# Patient Record
Sex: MALE | Race: WHITE | NOT HISPANIC OR LATINO | URBAN - METROPOLITAN AREA
[De-identification: names, ages, dates, MRNs, and addresses within clinical notes are randomized per-mention and may not be internally consistent; named-entity substitution may affect disease eponyms.]

---

## 2019-04-10 ENCOUNTER — EMERGENCY (EMERGENCY)
Facility: HOSPITAL | Age: 39
LOS: 1 days | Discharge: HOME | End: 2019-04-10
Admitting: EMERGENCY MEDICINE
Payer: MEDICARE

## 2019-04-10 VITALS
RESPIRATION RATE: 16 BRPM | HEART RATE: 105 BPM | OXYGEN SATURATION: 97 % | DIASTOLIC BLOOD PRESSURE: 83 MMHG | TEMPERATURE: 97 F | SYSTOLIC BLOOD PRESSURE: 140 MMHG

## 2019-04-10 VITALS — HEART RATE: 86 BPM

## 2019-04-10 PROCEDURE — 99284 EMERGENCY DEPT VISIT MOD MDM: CPT

## 2019-04-10 PROCEDURE — 93971 EXTREMITY STUDY: CPT | Mod: 26,LT

## 2019-04-10 PROCEDURE — 73564 X-RAY EXAM KNEE 4 OR MORE: CPT | Mod: 26,LT

## 2019-04-10 RX ORDER — IBUPROFEN 200 MG
600 TABLET ORAL ONCE
Qty: 0 | Refills: 0 | Status: COMPLETED | OUTPATIENT
Start: 2019-04-10 | End: 2019-04-10

## 2019-04-10 RX ADMIN — Medication 600 MILLIGRAM(S): at 19:24

## 2019-04-10 NOTE — ED PROVIDER NOTE - CLINICAL SUMMARY MEDICAL DECISION MAKING FREE TEXT BOX
37 yo M here for r/o DVT from [facility] due to L knee pain. pt c/o pain to anterior aspect of knee. no s/ sx of dvt. will obtain xr, sono, and nsaids.

## 2019-04-10 NOTE — ED PROVIDER NOTE - NSFOLLOWUPINSTRUCTIONS_ED_ALL_ED_FT
If your symptoms persist have your ultrasound repeated in 1 week  Take motrin 600mg every 6 hours for pain   Follow up with your PMD in 1-2 days  Return to ER for new or worsening symptoms

## 2019-04-10 NOTE — ED PROVIDER NOTE - OBJECTIVE STATEMENT
37 yo M here for leg pain x 3 weeks. pt reports pain to anterior aspect of L knee for the past 3 weeks. denies injury or trauma. denies other complaints. denies hx of dvt or PE. denies fever chills cp sob weakness HA dizziness numbness tingling. no meds taken.

## 2019-04-10 NOTE — ED PROVIDER NOTE - PHYSICAL EXAMINATION
L knee: FROM NVI sensate intact no erythema/ecchymosis no swelling no calf tenderness mild ttp to anterior aspect of knee.

## 2019-04-10 NOTE — ED ADULT TRIAGE NOTE - CHIEF COMPLAINT QUOTE
Pt sent from [facility] to R/O DVT.  pt c/o L knee pain x few weeks.  Denies chest pain, sob, dizziness, injury

## 2019-05-14 ENCOUNTER — APPOINTMENT (OUTPATIENT)
Dept: PULMONOLOGY | Facility: CLINIC | Age: 39
End: 2019-05-14
Payer: MEDICARE

## 2019-05-14 VITALS
HEART RATE: 94 BPM | OXYGEN SATURATION: 94 % | HEIGHT: 69 IN | WEIGHT: 315 LBS | BODY MASS INDEX: 46.65 KG/M2 | SYSTOLIC BLOOD PRESSURE: 130 MMHG | DIASTOLIC BLOOD PRESSURE: 76 MMHG

## 2019-05-14 DIAGNOSIS — Z78.9 OTHER SPECIFIED HEALTH STATUS: ICD-10-CM

## 2019-05-14 DIAGNOSIS — R05 COUGH: ICD-10-CM

## 2019-05-14 DIAGNOSIS — R06.2 WHEEZING: ICD-10-CM

## 2019-05-14 PROCEDURE — 99203 OFFICE O/P NEW LOW 30 MIN: CPT | Mod: 25

## 2019-05-14 PROCEDURE — 94727 GAS DIL/WSHOT DETER LNG VOL: CPT

## 2019-05-14 PROCEDURE — 94729 DIFFUSING CAPACITY: CPT

## 2019-05-14 PROCEDURE — 94060 EVALUATION OF WHEEZING: CPT

## 2019-05-14 NOTE — REASON FOR VISIT
[Consultation] : a consultation visit [Cough] : cough [Shortness of Breath] : shortness of Breath [Wheezing] : wheezing

## 2019-05-19 PROBLEM — R06.2 WHEEZING: Status: ACTIVE | Noted: 2019-05-19

## 2019-05-19 PROBLEM — Z78.9 NON-SMOKER: Status: ACTIVE | Noted: 2019-05-19

## 2019-05-19 PROBLEM — R05 COUGH: Status: ACTIVE | Noted: 2019-05-19

## 2019-05-19 RX ORDER — MONTELUKAST 10 MG/1
10 TABLET, FILM COATED ORAL
Refills: 0 | Status: ACTIVE | COMMUNITY
Start: 2019-05-19

## 2019-05-19 NOTE — HISTORY OF PRESENT ILLNESS
[FreeTextEntry1] : 40 yo male presents for evaluation of a 4 month history of dry cough with SOB and wheezing. He denies fever, chest pain or hemoptysis. The patient uses montelukast daily but refuses to use inhaled steroids and albuterol MDI. He has ROMMEL but is not compliant with treatment.

## 2019-05-19 NOTE — DISCUSSION/SUMMARY
[FreeTextEntry1] : 40 yo female with respiratory symptoms, likely due to airways inflammation. The patient is to continue montelukast daily and start nebulized steroids with albuterol. Treatment adjustment will depend on symptomatic needs.PFT will be re attempted in the future. If symptoms persist, a HRCT of the chest will be performed.I reviewed the chest xray images on line performed 3/15/19, which appear to be essentially normal. Diet and weight loss stressed. She is to follow up with her PMD as before.

## 2019-05-19 NOTE — PHYSICAL EXAM
[Well Groomed] : well groomed [No Deformities] : no deformities [General Appearance - In No Acute Distress] : no acute distress [Eyelids - No Xanthelasma] : the eyelids demonstrated no xanthelasmas [Normal Conjunctiva] : the conjunctiva exhibited no abnormalities [Normal Oropharynx] : normal oropharynx [Neck Appearance] : the appearance of the neck was normal [Neck Cervical Mass (___cm)] : no neck mass was observed [Jugular Venous Distention Increased] : there was no jugular-venous distention [Heart Rate And Rhythm] : heart rate and rhythm were normal [Thyroid Nodule] : there were no palpable thyroid nodules [Thyroid Diffuse Enlargement] : the thyroid was not enlarged [Heart Sounds] : normal S1 and S2 [Murmurs] : no murmurs present [Respiration, Rhythm And Depth] : normal respiratory rhythm and effort [Abdomen Soft] : soft [Auscultation Breath Sounds / Voice Sounds] : lungs were clear to auscultation bilaterally [Exaggerated Use Of Accessory Muscles For Inspiration] : no accessory muscle use [Abdomen Tenderness] : non-tender [Nail Clubbing] : no clubbing of the fingernails [Abdomen Mass (___ Cm)] : no abdominal mass palpated [Cyanosis, Localized] : no localized cyanosis [Petechial Hemorrhages (___cm)] : no petechial hemorrhages [Skin Color & Pigmentation] : normal skin color and pigmentation [Skin Turgor] : normal skin turgor [] : no rash [No Focal Deficits] : no focal deficits [Impaired Insight] : insight and judgment were intact [Oriented To Time, Place, And Person] : oriented to person, place, and time [Affect] : the affect was normal [FreeTextEntry1] : Obese

## 2019-05-19 NOTE — REVIEW OF SYSTEMS
[As Noted in HPI] : as noted in HPI [Cough] : cough [Dyspnea] : dyspnea [Wheezing] : wheezing [Negative] : Pulmonary Hypertension [Sputum] : not coughing up ~M sputum

## 2019-05-19 NOTE — CONSULT LETTER
[Dear  ___] : Dear  [unfilled], [Courtesy Letter:] : I had the pleasure of seeing your patient, [unfilled], in my office today. [Consult Closing:] : Thank you very much for allowing me to participate in the care of this patient.  If you have any questions, please do not hesitate to contact me. [Please see my note below.] : Please see my note below. [Sincerely,] : Sincerely,

## 2019-06-26 RX ORDER — ALBUTEROL SULFATE 2.5 MG/3ML
(2.5 MG/3ML) SOLUTION RESPIRATORY (INHALATION)
Qty: 90 | Refills: 3 | Status: ACTIVE | COMMUNITY
Start: 2019-06-26 | End: 1900-01-01

## 2019-08-15 ENCOUNTER — RECORD ABSTRACTING (OUTPATIENT)
Age: 39
End: 2019-08-15

## 2019-08-15 DIAGNOSIS — Z86.59 PERSONAL HISTORY OF OTHER MENTAL AND BEHAVIORAL DISORDERS: ICD-10-CM

## 2019-08-15 DIAGNOSIS — Z86.39 PERSONAL HISTORY OF OTHER ENDOCRINE, NUTRITIONAL AND METABOLIC DISEASE: ICD-10-CM

## 2019-08-19 RX ORDER — MONTELUKAST 10 MG/1
10 TABLET, FILM COATED ORAL
Qty: 90 | Refills: 3 | Status: ACTIVE | COMMUNITY
Start: 2019-08-19 | End: 1900-01-01

## 2019-08-19 RX ORDER — BUDESONIDE 0.5 MG/2ML
0.5 INHALANT ORAL TWICE DAILY
Qty: 120 | Refills: 3 | Status: ACTIVE | COMMUNITY
Start: 2019-08-19 | End: 1900-01-01

## 2019-09-25 RX ORDER — BUDESONIDE 0.5 MG/2ML
0.5 INHALANT ORAL
Qty: 180 | Refills: 3 | Status: ACTIVE | COMMUNITY
Start: 2019-09-25 | End: 1900-01-01

## 2019-09-25 RX ORDER — ALBUTEROL SULFATE 2.5 MG/3ML
(2.5 MG/3ML) SOLUTION RESPIRATORY (INHALATION)
Qty: 180 | Refills: 3 | Status: ACTIVE | COMMUNITY
Start: 2019-09-25 | End: 1900-01-01

## 2019-10-31 ENCOUNTER — APPOINTMENT (OUTPATIENT)
Dept: PULMONOLOGY | Facility: CLINIC | Age: 39
End: 2019-10-31
Payer: MEDICARE

## 2019-10-31 VITALS
BODY MASS INDEX: 51.69 KG/M2 | WEIGHT: 315 LBS | OXYGEN SATURATION: 95 % | HEART RATE: 107 BPM | DIASTOLIC BLOOD PRESSURE: 70 MMHG | SYSTOLIC BLOOD PRESSURE: 102 MMHG

## 2019-10-31 DIAGNOSIS — R06.02 SHORTNESS OF BREATH: ICD-10-CM

## 2019-10-31 PROCEDURE — 99214 OFFICE O/P EST MOD 30 MIN: CPT | Mod: 25

## 2019-10-31 PROCEDURE — 94060 EVALUATION OF WHEEZING: CPT

## 2019-10-31 PROCEDURE — 94727 GAS DIL/WSHOT DETER LNG VOL: CPT

## 2019-10-31 PROCEDURE — 94729 DIFFUSING CAPACITY: CPT

## 2019-10-31 RX ORDER — ALBUTEROL SULFATE 2.5 MG/3ML
(2.5 MG/3ML) SOLUTION RESPIRATORY (INHALATION) 4 TIMES DAILY
Qty: 3 | Refills: 3 | Status: ACTIVE | COMMUNITY
Start: 2019-08-19 | End: 1900-01-01

## 2019-10-31 RX ORDER — BUDESONIDE 0.5 MG/2ML
0.5 INHALANT ORAL TWICE DAILY
Qty: 2 | Refills: 3 | Status: ACTIVE | COMMUNITY
Start: 2019-06-26 | End: 1900-01-01

## 2019-11-03 PROBLEM — R06.02 SOB (SHORTNESS OF BREATH): Status: ACTIVE | Noted: 2019-05-19

## 2019-11-03 NOTE — PHYSICAL EXAM
[Well Groomed] : well groomed [General Appearance - In No Acute Distress] : no acute distress [FreeTextEntry1] : Obese [Normal Conjunctiva] : the conjunctiva exhibited no abnormalities [Eyelids - No Xanthelasma] : the eyelids demonstrated no xanthelasmas [Normal Oropharynx] : normal oropharynx [Neck Appearance] : the appearance of the neck was normal [Neck Cervical Mass (___cm)] : no neck mass was observed [Jugular Venous Distention Increased] : there was no jugular-venous distention [Thyroid Diffuse Enlargement] : the thyroid was not enlarged [Thyroid Nodule] : there were no palpable thyroid nodules [Heart Rate And Rhythm] : heart rate and rhythm were normal [Heart Sounds] : normal S1 and S2 [Murmurs] : no murmurs present [Respiration, Rhythm And Depth] : normal respiratory rhythm and effort [Exaggerated Use Of Accessory Muscles For Inspiration] : no accessory muscle use [Auscultation Breath Sounds / Voice Sounds] : lungs were clear to auscultation bilaterally [Abdomen Soft] : soft [Abdomen Tenderness] : non-tender [Abdomen Mass (___ Cm)] : no abdominal mass palpated [Nail Clubbing] : no clubbing of the fingernails [Cyanosis, Localized] : no localized cyanosis [Petechial Hemorrhages (___cm)] : no petechial hemorrhages [Skin Color & Pigmentation] : normal skin color and pigmentation [] : no rash [No Venous Stasis] : no venous stasis [Skin Lesions] : no skin lesions [No Skin Ulcers] : no skin ulcer [No Xanthoma] : no  xanthoma was observed [No Focal Deficits] : no focal deficits [Oriented To Time, Place, And Person] : oriented to person, place, and time

## 2019-11-03 NOTE — REVIEW OF SYSTEMS
[As Noted in HPI] : as noted in HPI [Cough] : no cough [Sputum] : not coughing up ~M sputum [Dyspnea] : no dyspnea [Wheezing] : wheezing [Negative] : Sleep Disorder

## 2019-11-03 NOTE — DISCUSSION/SUMMARY
[FreeTextEntry1] : 40 yo female with OAD and ROMMEL at baseline. Use of nebulized steroids and bronchodilators as before. Treatment adjustment will depend on symptomatic needs. Use of CPAP stressed. Diet and weight loss discussed. She is to follow up with her PMD as before.

## 2020-04-04 ENCOUNTER — INPATIENT (INPATIENT)
Facility: HOSPITAL | Age: 40
LOS: 12 days | Discharge: PSYCHIATRIC OR SUBSTANCE USE FACILITY | End: 2020-04-17
Attending: HOSPITALIST | Admitting: HOSPITALIST
Payer: MEDICARE

## 2020-04-04 VITALS
HEART RATE: 112 BPM | DIASTOLIC BLOOD PRESSURE: 45 MMHG | SYSTOLIC BLOOD PRESSURE: 87 MMHG | TEMPERATURE: 103 F | OXYGEN SATURATION: 93 % | RESPIRATION RATE: 26 BRPM

## 2020-04-04 LAB
ALBUMIN SERPL ELPH-MCNC: 3.7 G/DL — SIGNIFICANT CHANGE UP (ref 3.3–5)
ALP SERPL-CCNC: 48 U/L — SIGNIFICANT CHANGE UP (ref 40–120)
ALT FLD-CCNC: 29 U/L — SIGNIFICANT CHANGE UP (ref 4–41)
ANION GAP SERPL CALC-SCNC: 18 MMO/L — HIGH (ref 7–14)
AST SERPL-CCNC: 52 U/L — HIGH (ref 4–40)
BASE EXCESS BLDV CALC-SCNC: 1.1 MMOL/L — SIGNIFICANT CHANGE UP
BASOPHILS # BLD AUTO: 0.03 K/UL — SIGNIFICANT CHANGE UP (ref 0–0.2)
BASOPHILS NFR BLD AUTO: 0.3 % — SIGNIFICANT CHANGE UP (ref 0–2)
BILIRUB SERPL-MCNC: 0.6 MG/DL — SIGNIFICANT CHANGE UP (ref 0.2–1.2)
BLOOD GAS VENOUS - CREATININE: 1.89 MG/DL — HIGH (ref 0.5–1.3)
BUN SERPL-MCNC: 39 MG/DL — HIGH (ref 7–23)
CALCIUM SERPL-MCNC: 8.7 MG/DL — SIGNIFICANT CHANGE UP (ref 8.4–10.5)
CHLORIDE BLDV-SCNC: 103 MMOL/L — SIGNIFICANT CHANGE UP (ref 96–108)
CHLORIDE SERPL-SCNC: 99 MMOL/L — SIGNIFICANT CHANGE UP (ref 98–107)
CO2 SERPL-SCNC: 23 MMOL/L — SIGNIFICANT CHANGE UP (ref 22–31)
CREAT SERPL-MCNC: 1.93 MG/DL — HIGH (ref 0.5–1.3)
CRP SERPL-MCNC: 308.7 MG/L — HIGH
EOSINOPHIL # BLD AUTO: 0.01 K/UL — SIGNIFICANT CHANGE UP (ref 0–0.5)
EOSINOPHIL NFR BLD AUTO: 0.1 % — SIGNIFICANT CHANGE UP (ref 0–6)
GAS PNL BLDV: 136 MMOL/L — SIGNIFICANT CHANGE UP (ref 136–146)
GLUCOSE BLDV-MCNC: 112 MG/DL — HIGH (ref 70–99)
GLUCOSE SERPL-MCNC: 107 MG/DL — HIGH (ref 70–99)
HCO3 BLDV-SCNC: 25 MMOL/L — SIGNIFICANT CHANGE UP (ref 20–27)
HCT VFR BLD CALC: 36.7 % — LOW (ref 39–50)
HCT VFR BLDV CALC: 38.1 % — LOW (ref 39–51)
HGB BLD-MCNC: 12.1 G/DL — LOW (ref 13–17)
HGB BLDV-MCNC: 12.4 G/DL — LOW (ref 13–17)
IMM GRANULOCYTES NFR BLD AUTO: 0.7 % — SIGNIFICANT CHANGE UP (ref 0–1.5)
LACTATE BLDV-MCNC: 1.9 MMOL/L — SIGNIFICANT CHANGE UP (ref 0.5–2)
LYMPHOCYTES # BLD AUTO: 2.87 K/UL — SIGNIFICANT CHANGE UP (ref 1–3.3)
LYMPHOCYTES # BLD AUTO: 24.6 % — SIGNIFICANT CHANGE UP (ref 13–44)
MCHC RBC-ENTMCNC: 28.4 PG — SIGNIFICANT CHANGE UP (ref 27–34)
MCHC RBC-ENTMCNC: 33 % — SIGNIFICANT CHANGE UP (ref 32–36)
MCV RBC AUTO: 86.2 FL — SIGNIFICANT CHANGE UP (ref 80–100)
MONOCYTES # BLD AUTO: 0.49 K/UL — SIGNIFICANT CHANGE UP (ref 0–0.9)
MONOCYTES NFR BLD AUTO: 4.2 % — SIGNIFICANT CHANGE UP (ref 2–14)
NEUTROPHILS # BLD AUTO: 8.18 K/UL — HIGH (ref 1.8–7.4)
NEUTROPHILS NFR BLD AUTO: 70.1 % — SIGNIFICANT CHANGE UP (ref 43–77)
NRBC # FLD: 0 K/UL — SIGNIFICANT CHANGE UP (ref 0–0)
NT-PROBNP SERPL-SCNC: 583.4 PG/ML — SIGNIFICANT CHANGE UP
PCO2 BLDV: 41 MMHG — SIGNIFICANT CHANGE UP (ref 41–51)
PH BLDV: 7.41 PH — SIGNIFICANT CHANGE UP (ref 7.32–7.43)
PLATELET # BLD AUTO: 276 K/UL — SIGNIFICANT CHANGE UP (ref 150–400)
PMV BLD: 11 FL — SIGNIFICANT CHANGE UP (ref 7–13)
PO2 BLDV: 55 MMHG — HIGH (ref 35–40)
POTASSIUM BLDV-SCNC: 3.4 MMOL/L — SIGNIFICANT CHANGE UP (ref 3.4–4.5)
POTASSIUM SERPL-MCNC: 3.4 MMOL/L — LOW (ref 3.5–5.3)
POTASSIUM SERPL-SCNC: 3.4 MMOL/L — LOW (ref 3.5–5.3)
PROT SERPL-MCNC: 7.9 G/DL — SIGNIFICANT CHANGE UP (ref 6–8.3)
RBC # BLD: 4.26 M/UL — SIGNIFICANT CHANGE UP (ref 4.2–5.8)
RBC # FLD: 14.9 % — HIGH (ref 10.3–14.5)
SAO2 % BLDV: 86.7 % — HIGH (ref 60–85)
SODIUM SERPL-SCNC: 140 MMOL/L — SIGNIFICANT CHANGE UP (ref 135–145)
TROPONIN T, HIGH SENSITIVITY: 48 NG/L — SIGNIFICANT CHANGE UP (ref ?–14)
WBC # BLD: 11.66 K/UL — HIGH (ref 3.8–10.5)
WBC # FLD AUTO: 11.66 K/UL — HIGH (ref 3.8–10.5)

## 2020-04-04 PROCEDURE — 71045 X-RAY EXAM CHEST 1 VIEW: CPT | Mod: 26

## 2020-04-04 RX ORDER — ALBUTEROL 90 UG/1
2 AEROSOL, METERED ORAL ONCE
Refills: 0 | Status: COMPLETED | OUTPATIENT
Start: 2020-04-04 | End: 2020-04-04

## 2020-04-04 RX ORDER — SODIUM CHLORIDE 9 MG/ML
500 INJECTION INTRAMUSCULAR; INTRAVENOUS; SUBCUTANEOUS ONCE
Refills: 0 | Status: COMPLETED | OUTPATIENT
Start: 2020-04-04 | End: 2020-04-04

## 2020-04-04 RX ORDER — TIOTROPIUM BROMIDE 18 UG/1
1 CAPSULE ORAL; RESPIRATORY (INHALATION) ONCE
Refills: 0 | Status: COMPLETED | OUTPATIENT
Start: 2020-04-04 | End: 2020-04-04

## 2020-04-04 RX ORDER — ACETAMINOPHEN 500 MG
975 TABLET ORAL ONCE
Refills: 0 | Status: COMPLETED | OUTPATIENT
Start: 2020-04-04 | End: 2020-04-04

## 2020-04-04 RX ORDER — ALBUTEROL 90 UG/1
4 AEROSOL, METERED ORAL ONCE
Refills: 0 | Status: COMPLETED | OUTPATIENT
Start: 2020-04-04 | End: 2020-04-04

## 2020-04-04 RX ADMIN — Medication 975 MILLIGRAM(S): at 20:16

## 2020-04-04 RX ADMIN — TIOTROPIUM BROMIDE 1 CAPSULE(S): 18 CAPSULE ORAL; RESPIRATORY (INHALATION) at 20:16

## 2020-04-04 RX ADMIN — Medication 125 MILLIGRAM(S): at 20:24

## 2020-04-04 RX ADMIN — ALBUTEROL 2 PUFF(S): 90 AEROSOL, METERED ORAL at 20:37

## 2020-04-04 RX ADMIN — ALBUTEROL 4 PUFF(S): 90 AEROSOL, METERED ORAL at 20:16

## 2020-04-04 RX ADMIN — SODIUM CHLORIDE 500 MILLILITER(S): 9 INJECTION INTRAMUSCULAR; INTRAVENOUS; SUBCUTANEOUS at 20:25

## 2020-04-04 NOTE — ED PROVIDER NOTE - ATTENDING CONTRIBUTION TO CARE
Kelly: 38yo male with a h/o schizophrenia BIBEMS from [facility] for one week of cough, fever and SOB worse today. PT is a poor historian and denies any symptoms but is ill appearing and hypoxic on arrival. Exam: GENERAL: ill appearing, NAD, HEENT: MMM, PERRLA, CARDIO: +S1/S2, no murmurs, rubs or gallops, LUNGS: + tachypnea and accessory muscle use, no retractions, speaking in short sentences, decreased air movement b/l ABD: soft, nontender, BSx4 quadrants, no guarding or rigidity. EXT: No LE edema or calf TTP, 2+ distal pulses x 4 extremities. NEURO: AxOx3,PSYCH: flat affect, no SI/HI SKIN: no rashes or lesions, well perfused A/P- 38 yo male with likely COVID and hypoxia. pt ill appearing but O2 sat improved on NRB. PT hypotensive so small amount of IVF ordered. will obtain labs, CXR, EKG and admit.

## 2020-04-04 NOTE — ED PROVIDER NOTE - PHYSICAL EXAMINATION
General: A&Ox3, well nourished, obese, diaphoretic, in distress  HENT: NC/AT. Posterior oropharynx clear. Patent airway  Eyes: PERRL, EOMI  CV: RRR, no m/r/g. 2+ peripheral pulses. Extremities are warm and well perfused.  Respiratory: Tachypneic, accessory muscle usage, in respiratory distress.  Abdominal: soft, non-distended, non-tender, no rebound, guarding, or rigidity  Neuro: No focal deficits  Skin: no rashes  Psych: normal mood and affect

## 2020-04-04 NOTE — ED PROVIDER NOTE - CLINICAL SUMMARY MEDICAL DECISION MAKING FREE TEXT BOX
Jonathan Weil, PGY3 - strong suspicion for COVID-19 pneumonia or other viral vs bacterial respiratory tract infection, w/ significant risk for deterioration given hx asthma and morbid obesity. Clearly labored, requiring NRB to maintain sats. Giving albuterol, tiotropium, steroids, small fluid bolus for hypotension, and continue supplemental O2 w/ close monitoring.

## 2020-04-04 NOTE — ED PROVIDER NOTE - OBJECTIVE STATEMENT
39M hx schizophrenia/asthma BIBEMS from [facility] for dyspnea. He has had about 1 week of shortness of breath with cough and now fevers, but his dyspnea has worsened today. EMS found him hypoxic to the 70s, improved to 90s on NRB, febrile to 103.

## 2020-04-04 NOTE — ED ADULT TRIAGE NOTE - CHIEF COMPLAINT QUOTE
Pt from [facility]. Sent for fever. Pt with shallow respirations, hypotensive and febrile.

## 2020-04-04 NOTE — ED ADULT NURSE REASSESSMENT NOTE - NS ED NURSE REASSESS COMMENT FT1
Patient presents alert and awake poor historian with SOB, hypoxia and febrile from [facility]. Patient is diaphoretic and tachypneic on exam. IV placed in LT wrist #[id number], RT hand #20g. Labs sent. On monitor will ctm.

## 2020-04-05 DIAGNOSIS — Z02.9 ENCOUNTER FOR ADMINISTRATIVE EXAMINATIONS, UNSPECIFIED: ICD-10-CM

## 2020-04-05 DIAGNOSIS — A41.9 SEPSIS, UNSPECIFIED ORGANISM: ICD-10-CM

## 2020-04-05 DIAGNOSIS — F20.9 SCHIZOPHRENIA, UNSPECIFIED: ICD-10-CM

## 2020-04-05 DIAGNOSIS — N17.9 ACUTE KIDNEY FAILURE, UNSPECIFIED: ICD-10-CM

## 2020-04-05 DIAGNOSIS — R74.0 NONSPECIFIC ELEVATION OF LEVELS OF TRANSAMINASE AND LACTIC ACID DEHYDROGENASE [LDH]: ICD-10-CM

## 2020-04-05 DIAGNOSIS — I10 ESSENTIAL (PRIMARY) HYPERTENSION: ICD-10-CM

## 2020-04-05 DIAGNOSIS — J96.01 ACUTE RESPIRATORY FAILURE WITH HYPOXIA: ICD-10-CM

## 2020-04-05 DIAGNOSIS — Z29.9 ENCOUNTER FOR PROPHYLACTIC MEASURES, UNSPECIFIED: ICD-10-CM

## 2020-04-05 DIAGNOSIS — E11.9 TYPE 2 DIABETES MELLITUS WITHOUT COMPLICATIONS: ICD-10-CM

## 2020-04-05 LAB
ALBUMIN SERPL ELPH-MCNC: 3.8 G/DL — SIGNIFICANT CHANGE UP (ref 3.3–5)
ALP SERPL-CCNC: 50 U/L — SIGNIFICANT CHANGE UP (ref 40–120)
ALT FLD-CCNC: 24 U/L — SIGNIFICANT CHANGE UP (ref 4–41)
ANION GAP SERPL CALC-SCNC: 17 MMO/L — HIGH (ref 7–14)
APTT BLD: 35.2 SEC — SIGNIFICANT CHANGE UP (ref 27.5–36.3)
AST SERPL-CCNC: 47 U/L — HIGH (ref 4–40)
BASE EXCESS BLDV CALC-SCNC: -0.3 MMOL/L — SIGNIFICANT CHANGE UP
BILIRUB SERPL-MCNC: 0.4 MG/DL — SIGNIFICANT CHANGE UP (ref 0.2–1.2)
BLOOD GAS VENOUS - CREATININE: SIGNIFICANT CHANGE UP MG/DL (ref 0.5–1.3)
BUN SERPL-MCNC: 43 MG/DL — HIGH (ref 7–23)
CALCIUM SERPL-MCNC: 8.7 MG/DL — SIGNIFICANT CHANGE UP (ref 8.4–10.5)
CHLORIDE BLDV-SCNC: 108 MMOL/L — SIGNIFICANT CHANGE UP (ref 96–108)
CHLORIDE SERPL-SCNC: 100 MMOL/L — SIGNIFICANT CHANGE UP (ref 98–107)
CK SERPL-CCNC: 883 U/L — HIGH (ref 30–200)
CO2 SERPL-SCNC: 24 MMOL/L — SIGNIFICANT CHANGE UP (ref 22–31)
CREAT SERPL-MCNC: 1.56 MG/DL — HIGH (ref 0.5–1.3)
D DIMER BLD IA.RAPID-MCNC: 346 NG/ML — SIGNIFICANT CHANGE UP
FERRITIN SERPL-MCNC: 438.8 NG/ML — HIGH (ref 30–400)
GAS PNL BLDV: 139 MMOL/L — SIGNIFICANT CHANGE UP (ref 136–146)
GLUCOSE BLDV-MCNC: 183 MG/DL — HIGH (ref 70–99)
GLUCOSE SERPL-MCNC: 177 MG/DL — HIGH (ref 70–99)
HCO3 BLDV-SCNC: 23 MMOL/L — SIGNIFICANT CHANGE UP (ref 20–27)
HCT VFR BLD CALC: 38.1 % — LOW (ref 39–50)
HCT VFR BLDV CALC: 37.8 % — LOW (ref 39–51)
HGB BLD-MCNC: 12.1 G/DL — LOW (ref 13–17)
HGB BLDV-MCNC: 12.3 G/DL — LOW (ref 13–17)
INR BLD: 1.29 — HIGH (ref 0.88–1.17)
LACTATE BLDV-MCNC: 1.3 MMOL/L — SIGNIFICANT CHANGE UP (ref 0.5–2)
LDH SERPL L TO P-CCNC: 616 U/L — HIGH (ref 135–225)
MAGNESIUM SERPL-MCNC: 2.1 MG/DL — SIGNIFICANT CHANGE UP (ref 1.6–2.6)
MCHC RBC-ENTMCNC: 28.2 PG — SIGNIFICANT CHANGE UP (ref 27–34)
MCHC RBC-ENTMCNC: 31.8 % — LOW (ref 32–36)
MCV RBC AUTO: 88.8 FL — SIGNIFICANT CHANGE UP (ref 80–100)
NRBC # FLD: 0 K/UL — SIGNIFICANT CHANGE UP (ref 0–0)
PCO2 BLDV: 60 MMHG — HIGH (ref 41–51)
PH BLDV: 7.26 PH — LOW (ref 7.32–7.43)
PHOSPHATE SERPL-MCNC: 4.3 MG/DL — SIGNIFICANT CHANGE UP (ref 2.5–4.5)
PLATELET # BLD AUTO: 284 K/UL — SIGNIFICANT CHANGE UP (ref 150–400)
PMV BLD: 11.3 FL — SIGNIFICANT CHANGE UP (ref 7–13)
PO2 BLDV: 72 MMHG — HIGH (ref 35–40)
POTASSIUM BLDV-SCNC: 3.8 MMOL/L — SIGNIFICANT CHANGE UP (ref 3.4–4.5)
POTASSIUM SERPL-MCNC: 4 MMOL/L — SIGNIFICANT CHANGE UP (ref 3.5–5.3)
POTASSIUM SERPL-SCNC: 4 MMOL/L — SIGNIFICANT CHANGE UP (ref 3.5–5.3)
PROCALCITONIN SERPL-MCNC: 0.28 NG/ML — HIGH (ref 0.02–0.1)
PROT SERPL-MCNC: 8.1 G/DL — SIGNIFICANT CHANGE UP (ref 6–8.3)
PROTHROM AB SERPL-ACNC: 14.9 SEC — HIGH (ref 9.8–13.1)
RBC # BLD: 4.29 M/UL — SIGNIFICANT CHANGE UP (ref 4.2–5.8)
RBC # FLD: 15 % — HIGH (ref 10.3–14.5)
SAO2 % BLDV: 91.3 % — HIGH (ref 60–85)
SARS-COV-2 RNA SPEC QL NAA+PROBE: DETECTED
SODIUM SERPL-SCNC: 141 MMOL/L — SIGNIFICANT CHANGE UP (ref 135–145)
TROPONIN T, HIGH SENSITIVITY: 25 NG/L — SIGNIFICANT CHANGE UP (ref ?–14)
WBC # BLD: 13.63 K/UL — HIGH (ref 3.8–10.5)
WBC # FLD AUTO: 13.63 K/UL — HIGH (ref 3.8–10.5)

## 2020-04-05 PROCEDURE — 12345: CPT | Mod: NC

## 2020-04-05 PROCEDURE — 99233 SBSQ HOSP IP/OBS HIGH 50: CPT | Mod: CS

## 2020-04-05 RX ORDER — CLOZAPINE 150 MG/1
50 TABLET, ORALLY DISINTEGRATING ORAL AT BEDTIME
Refills: 0 | Status: DISCONTINUED | OUTPATIENT
Start: 2020-04-05 | End: 2020-04-17

## 2020-04-05 RX ORDER — POTASSIUM CHLORIDE 20 MEQ
20 PACKET (EA) ORAL ONCE
Refills: 0 | Status: DISCONTINUED | OUTPATIENT
Start: 2020-04-05 | End: 2020-04-05

## 2020-04-05 RX ORDER — INSULIN LISPRO 100/ML
VIAL (ML) SUBCUTANEOUS
Refills: 0 | Status: DISCONTINUED | OUTPATIENT
Start: 2020-04-05 | End: 2020-04-17

## 2020-04-05 RX ORDER — HEPARIN SODIUM 5000 [USP'U]/ML
5000 INJECTION INTRAVENOUS; SUBCUTANEOUS EVERY 8 HOURS
Refills: 0 | Status: DISCONTINUED | OUTPATIENT
Start: 2020-04-05 | End: 2020-04-09

## 2020-04-05 RX ORDER — ALBUTEROL 90 UG/1
2 AEROSOL, METERED ORAL EVERY 4 HOURS
Refills: 0 | Status: DISCONTINUED | OUTPATIENT
Start: 2020-04-05 | End: 2020-04-17

## 2020-04-05 RX ORDER — GLUCAGON INJECTION, SOLUTION 0.5 MG/.1ML
1 INJECTION, SOLUTION SUBCUTANEOUS ONCE
Refills: 0 | Status: DISCONTINUED | OUTPATIENT
Start: 2020-04-05 | End: 2020-04-17

## 2020-04-05 RX ORDER — ACETAMINOPHEN 500 MG
650 TABLET ORAL EVERY 4 HOURS
Refills: 0 | Status: DISCONTINUED | OUTPATIENT
Start: 2020-04-05 | End: 2020-04-17

## 2020-04-05 RX ORDER — DEXTROSE 50 % IN WATER 50 %
25 SYRINGE (ML) INTRAVENOUS ONCE
Refills: 0 | Status: DISCONTINUED | OUTPATIENT
Start: 2020-04-05 | End: 2020-04-17

## 2020-04-05 RX ORDER — DEXTROSE 50 % IN WATER 50 %
12.5 SYRINGE (ML) INTRAVENOUS ONCE
Refills: 0 | Status: DISCONTINUED | OUTPATIENT
Start: 2020-04-05 | End: 2020-04-17

## 2020-04-05 RX ORDER — SODIUM CHLORIDE 9 MG/ML
1000 INJECTION INTRAMUSCULAR; INTRAVENOUS; SUBCUTANEOUS
Refills: 0 | Status: COMPLETED | OUTPATIENT
Start: 2020-04-05 | End: 2020-04-05

## 2020-04-05 RX ORDER — SODIUM CHLORIDE 9 MG/ML
500 INJECTION INTRAMUSCULAR; INTRAVENOUS; SUBCUTANEOUS ONCE
Refills: 0 | Status: DISCONTINUED | OUTPATIENT
Start: 2020-04-05 | End: 2020-04-05

## 2020-04-05 RX ORDER — BENZTROPINE MESYLATE 1 MG
0.5 TABLET ORAL
Refills: 0 | Status: DISCONTINUED | OUTPATIENT
Start: 2020-04-05 | End: 2020-04-17

## 2020-04-05 RX ORDER — SERTRALINE 25 MG/1
100 TABLET, FILM COATED ORAL DAILY
Refills: 0 | Status: DISCONTINUED | OUTPATIENT
Start: 2020-04-05 | End: 2020-04-17

## 2020-04-05 RX ORDER — DEXTROSE 50 % IN WATER 50 %
15 SYRINGE (ML) INTRAVENOUS ONCE
Refills: 0 | Status: DISCONTINUED | OUTPATIENT
Start: 2020-04-05 | End: 2020-04-17

## 2020-04-05 RX ORDER — HYDROXYCHLOROQUINE SULFATE 200 MG
400 TABLET ORAL EVERY 12 HOURS
Refills: 0 | Status: COMPLETED | OUTPATIENT
Start: 2020-04-05 | End: 2020-04-05

## 2020-04-05 RX ORDER — INSULIN LISPRO 100/ML
VIAL (ML) SUBCUTANEOUS AT BEDTIME
Refills: 0 | Status: DISCONTINUED | OUTPATIENT
Start: 2020-04-05 | End: 2020-04-17

## 2020-04-05 RX ORDER — SENNA PLUS 8.6 MG/1
2 TABLET ORAL AT BEDTIME
Refills: 0 | Status: DISCONTINUED | OUTPATIENT
Start: 2020-04-05 | End: 2020-04-05

## 2020-04-05 RX ORDER — HYDROXYCHLOROQUINE SULFATE 200 MG
TABLET ORAL
Refills: 0 | Status: COMPLETED | OUTPATIENT
Start: 2020-04-05 | End: 2020-04-09

## 2020-04-05 RX ORDER — ZINC SULFATE TAB 220 MG (50 MG ZINC EQUIVALENT) 220 (50 ZN) MG
220 TAB ORAL DAILY
Refills: 0 | Status: DISCONTINUED | OUTPATIENT
Start: 2020-04-05 | End: 2020-04-17

## 2020-04-05 RX ORDER — HYDROXYCHLOROQUINE SULFATE 200 MG
200 TABLET ORAL EVERY 12 HOURS
Refills: 0 | Status: COMPLETED | OUTPATIENT
Start: 2020-04-06 | End: 2020-04-09

## 2020-04-05 RX ORDER — ATORVASTATIN CALCIUM 80 MG/1
10 TABLET, FILM COATED ORAL AT BEDTIME
Refills: 0 | Status: DISCONTINUED | OUTPATIENT
Start: 2020-04-05 | End: 2020-04-17

## 2020-04-05 RX ORDER — SODIUM CHLORIDE 9 MG/ML
1000 INJECTION, SOLUTION INTRAVENOUS
Refills: 0 | Status: DISCONTINUED | OUTPATIENT
Start: 2020-04-05 | End: 2020-04-17

## 2020-04-05 RX ORDER — PANTOPRAZOLE SODIUM 20 MG/1
40 TABLET, DELAYED RELEASE ORAL
Refills: 0 | Status: DISCONTINUED | OUTPATIENT
Start: 2020-04-05 | End: 2020-04-17

## 2020-04-05 RX ADMIN — SODIUM CHLORIDE 100 MILLILITER(S): 9 INJECTION INTRAMUSCULAR; INTRAVENOUS; SUBCUTANEOUS at 13:51

## 2020-04-05 RX ADMIN — Medication 400 MILLIGRAM(S): at 17:45

## 2020-04-05 RX ADMIN — HEPARIN SODIUM 5000 UNIT(S): 5000 INJECTION INTRAVENOUS; SUBCUTANEOUS at 23:23

## 2020-04-05 RX ADMIN — Medication 0.5 MILLIGRAM(S): at 18:53

## 2020-04-05 RX ADMIN — HEPARIN SODIUM 5000 UNIT(S): 5000 INJECTION INTRAVENOUS; SUBCUTANEOUS at 07:27

## 2020-04-05 RX ADMIN — ATORVASTATIN CALCIUM 10 MILLIGRAM(S): 80 TABLET, FILM COATED ORAL at 23:23

## 2020-04-05 RX ADMIN — SODIUM CHLORIDE 100 MILLILITER(S): 9 INJECTION INTRAMUSCULAR; INTRAVENOUS; SUBCUTANEOUS at 06:34

## 2020-04-05 RX ADMIN — Medication 0.5 MILLIGRAM(S): at 07:27

## 2020-04-05 RX ADMIN — CLOZAPINE 50 MILLIGRAM(S): 150 TABLET, ORALLY DISINTEGRATING ORAL at 23:21

## 2020-04-05 RX ADMIN — Medication 400 MILLIGRAM(S): at 13:12

## 2020-04-05 RX ADMIN — HEPARIN SODIUM 5000 UNIT(S): 5000 INJECTION INTRAVENOUS; SUBCUTANEOUS at 17:46

## 2020-04-05 RX ADMIN — Medication 1: at 17:46

## 2020-04-05 RX ADMIN — Medication 1: at 13:18

## 2020-04-05 NOTE — PROGRESS NOTE ADULT - PROBLEM SELECTOR PLAN 7
-f/u hba1c  - SSI and FS qac/hs, patient started on steroids may need to be started on standing premeal and bedtime if steroid induced hyperglycemia

## 2020-04-05 NOTE — PROGRESS NOTE ADULT - SUBJECTIVE AND OBJECTIVE BOX
Patient is a 39y old  Male who presents with a chief complaint of     INTERVAL HPI/OVERNIGHT EVENTS: overnight events noted, pt currently without complaints    MEDICATIONS  (STANDING):  atorvastatin 10 milliGRAM(s) Oral at bedtime  benztropine 0.5 milliGRAM(s) Oral two times a day  cloZAPine 50 milliGRAM(s) Oral at bedtime  dextrose 5%. 1000 milliLiter(s) (50 mL/Hr) IV Continuous <Continuous>  dextrose 50% Injectable 12.5 Gram(s) IV Push once  dextrose 50% Injectable 25 Gram(s) IV Push once  dextrose 50% Injectable 25 Gram(s) IV Push once  heparin  Injectable 5000 Unit(s) SubCutaneous every 8 hours  hydroxychloroquine   Oral   hydroxychloroquine 400 milliGRAM(s) Oral every 12 hours  insulin lispro (HumaLOG) corrective regimen sliding scale   SubCutaneous three times a day before meals  insulin lispro (HumaLOG) corrective regimen sliding scale   SubCutaneous at bedtime  sertraline 100 milliGRAM(s) Oral daily  zinc sulfate 220 milliGRAM(s) Oral daily    MEDICATIONS  (PRN):  acetaminophen   Tablet .. 650 milliGRAM(s) Oral every 4 hours PRN Temp greater or equal to 38.5C (101.3F)  ALBUTerol    90 MICROgram(s) HFA Inhaler 2 Puff(s) Inhalation every 4 hours PRN Shortness of Breath and/or Wheezing  benzonatate 100 milliGRAM(s) Oral three times a day PRN Cough  dextrose 40% Gel 15 Gram(s) Oral once PRN Blood Glucose LESS THAN 70 milliGRAM(s)/deciliter  glucagon  Injectable 1 milliGRAM(s) IntraMuscular once PRN Glucose LESS THAN 70 milligrams/deciliter      Allergies    No Known Allergies    Intolerances        REVIEW OF SYSTEMS:  Please see interval HPI:    Vital Signs Last 24 Hrs  T(C): 36.7 (05 Apr 2020 08:30), Max: 39.6 (04 Apr 2020 20:17)  T(F): 98 (05 Apr 2020 08:30), Max: 103.2 (04 Apr 2020 20:17)  HR: 80 (05 Apr 2020 08:30) (80 - 114)  BP: 130/65 (05 Apr 2020 08:30) (87/45 - 130/65)  BP(mean): --  RR: 24 (05 Apr 2020 08:30) (24 - 30)  SpO2: 94% (05 Apr 2020 08:30) (93% - 94%)  I&O's Detail        PHYSICAL EXAM:  GENERAL: NAD  HEAD:  NCAT  EYES: aniteric  NERVOUS SYSTEM:  follows commands  CHEST/LUNG: no accessory muscle use, speaking in full sentences  ABDOMEN: obese, distended, non-tender  EXTREMITIES:  no CCE      LABS:                        12.1   13.63 )-----------( 284      ( 05 Apr 2020 05:30 )             38.1     05 Apr 2020 05:30    141    |  100    |  43     ----------------------------<  177    4.0     |  24     |  1.56     Ca    8.7        05 Apr 2020 05:30  Phos  4.3       05 Apr 2020 05:30  Mg     2.1       05 Apr 2020 05:30    TPro  8.1    /  Alb  3.8    /  TBili  0.4    /  DBili  x      /  AST  47     /  ALT  24     /  AlkPhos  50     05 Apr 2020 05:30    PT/INR - ( 05 Apr 2020 05:30 )   PT: 14.9 SEC;   INR: 1.29          PTT - ( 05 Apr 2020 05:30 )  PTT:35.2 SEC  CAPILLARY BLOOD GLUCOSE      POCT Blood Glucose.: 163 mg/dL (05 Apr 2020 13:17)  POCT Blood Glucose.: 172 mg/dL (05 Apr 2020 07:34)  POCT Blood Glucose.: 170 mg/dL (05 Apr 2020 05:53)    BLOOD CULTURE    RADIOLOGY & ADDITIONAL TESTS:    Imaging Personally Reviewed:  [ ] YES     Consultant(s) Notes Reviewed:      Care Discussed with Consultants/Other Providers:

## 2020-04-05 NOTE — PROGRESS NOTE ADULT - PROBLEM SELECTOR PLAN 3
- CXR with bilateral opacities, CBC with leukocytosis, elevated CRP  - COVID-19 PCR positive, elevated LDH, Procalcitonin, D-Dimer, Ferritin, CK, Troponin    - although patient is on multiple QT prolonging medications, QTc is currently 439ms - will start Plaquenil, and continue with antipsychotics as these medications are for schizophrenia, monitor daily QTc   - s/p solumedrol 125mg mg IV x1, c/w 60mg IV BID as patient on NRB  - supportive care with PRN tessalon perles, acetaminophen for fever  - currently on NRB, if decompensates will need to intubate as noninvasive ventilation not an option

## 2020-04-05 NOTE — H&P ADULT - PROBLEM SELECTOR PLAN 6
- c/w home medications   - monitor EKG for QTc in setting of Plaquenil and antipsychotic medications - resident at [facility]  - c/w home medications (benztropine, clozapine, sertraline)  - monitor EKG for QTc in setting of Plaquenil and antipsychotic medications

## 2020-04-05 NOTE — H&P ADULT - PROBLEM SELECTOR PLAN 7
- controlled  - SBPs soft, hold home antihypertensives  - monitor VS [id number] - A1C with AM labs  - at home on metformin  - SSI and FS qac/hs, patient started on steroids may need to be started on standing premeal and bedtime if steroid induced hyperglycemia

## 2020-04-05 NOTE — H&P ADULT - PROBLEM SELECTOR PLAN 8
- heparin subq - controlled  - SBPs soft, hold home antihypertensives  - monitor VS [id number] - controlled  - not on any home medications   - monitor VS [id number]

## 2020-04-05 NOTE — PROGRESS NOTE ADULT - PROBLEM SELECTOR PLAN 1
- hypoxic to 70s with EMS, now 90s on NRB  - COVID-19 PCR positive, QTc 439  - continue albuterol HFA PRN  -continue 60mg BID solumedrol  - continue HCQ, Qtc < 500

## 2020-04-05 NOTE — H&P ADULT - PROBLEM SELECTOR PLAN 1
- hypoxic to 70s with EMS, now 90s on NRB  - likely 2/2 COVID-19 infection, management as below  - albuterol HFA PRN, s/p solumedrol 125mg IV x1, c/w 40mg BID  - monitor VS [id number] - hypoxic to 70s with EMS, now 90s on NRB  - likely 2/2 COVID-19 infection, management as below  - albuterol HFA PRN, s/p solumedrol 125mg IV x1, c/w 60mg BID  - monitor VS [id number]

## 2020-04-05 NOTE — H&P ADULT - NSHPLABSRESULTS_GEN_ALL_CORE
12.1   11.66 )-----------( 276      ( 04 Apr 2020 19:33 )             36.7     04-04    140  |  99  |  39<H>  ----------------------------<  107<H>  3.4<L>   |  23  |  1.93<H>    Ca    8.7      04 Apr 2020 19:33    TPro  7.9  /  Alb  3.7  /  TBili  0.6  /  DBili  x   /  AST  52<H>  /  ALT  29  /  AlkPhos  48  04-04    EKG -     CXR - b/l patchy opacities 12.1   11.66 )-----------( 276      ( 04 Apr 2020 19:33 )             36.7     04-04    140  |  99  |  39<H>  ----------------------------<  107<H>  3.4<L>   |  23  |  1.93<H>    Ca    8.7      04 Apr 2020 19:33    TPro  7.9  /  Alb  3.7  /  TBili  0.6  /  DBili  x   /  AST  52<H>  /  ALT  29  /  AlkPhos  48  04-04    EKG - Sinus Tachycardia, QTc 439ms    CXR - b/l patchy opacities

## 2020-04-05 NOTE — PROGRESS NOTE ADULT - PROBLEM SELECTOR PLAN 4
- SCr. improved to 1.56 from 1.93, previously 0.7-0.8 in 2016  - likely pre-renal in setting of viral infection  - s/w 1L NS bolus, will given another 500 cc over 5 hours  - trend daily BMP, renally dose medications

## 2020-04-05 NOTE — CHART NOTE - NSCHARTNOTEFT_GEN_A_CORE
Spoke w/ pts' HCP Claudia [phone]  updated w/ pt's dx, lab results and current condition.  HCP asked to call daily to update    Eri Hull ACP [id number]

## 2020-04-05 NOTE — H&P ADULT - NSHPREVIEWOFSYSTEMS_GEN_ALL_CORE
REVIEW OF SYSTEMS:    CONSTITUTIONAL: +fevers  EYES/ENT: No visual changes;  No vertigo or throat pain   NECK: No pain or stiffness  RESPIRATORY: +cough +SOB  CARDIOVASCULAR: No chest pain or palpitations  GASTROINTESTINAL: No abdominal or epigastric pain. No nausea, vomiting, or hematemesis; No diarrhea or constipation. No melena or hematochezia.  GENITOURINARY: No dysuria, frequency or hematuria  NEUROLOGICAL: No numbness or weakness  SKIN: No itching, rashes

## 2020-04-05 NOTE — H&P ADULT - PROBLEM SELECTOR PLAN 2
+SIRS for tachypnea, tachycardia, fever, leukocytosis   - CXR with bilateral opacities, COVID-19 PCR pending  - management as below for COVID, lower suspicion for bacterial infection, hold off BCx and abx, trend fever curve.

## 2020-04-05 NOTE — H&P ADULT - PROBLEM SELECTOR PLAN 10
Transitions of Care Status:  1.  Name of PCP:  2.  PCP Contacted on Admission: [ ] Y    [x] N    3.  PCP contacted at Discharge: [ ] Y    [ ] N    [ ] N/A  4.  Post-Discharge Appointment Date and Location:  5.  Summary of Handoff given to PCP:

## 2020-04-05 NOTE — H&P ADULT - PROBLEM SELECTOR PLAN 9
Transitions of Care Status:  1.  Name of PCP:  2.  PCP Contacted on Admission: [ ] Y    [x] N    3.  PCP contacted at Discharge: [ ] Y    [ ] N    [ ] N/A  4.  Post-Discharge Appointment Date and Location:  5.  Summary of Handoff given to PCP: - heparin subq

## 2020-04-05 NOTE — H&P ADULT - ATTENDING COMMENTS
39M hx of schizophrenia and asthma BIBEMS form [facility] for worsening SOB a/w acute hypoxic respiratory failure and sepsis likely 2/2 COVID-19 infection. NRB Sat 93 % RR 30. Qtc 439; c/w  psychotropic meds; solumedrol, Plaquenil. Gentle IVF given soft BP

## 2020-04-05 NOTE — RAPID RESPONSE TEAM SUMMARY - NSSITUATIONBACKGROUNDRRT_GEN_ALL_CORE
39M hx of schizophrenia and asthma BIBEMS form [facility] for worsening SOB a/w acute hypoxic respiratory failure and sepsis likely 2/2 COVID-19 infection. RRT called for AMS and hypoxia to 87 while on non breather. At the time of arrival to patient's room, patient is AAOx2-3 (baseline) and sating 93-94% on NRB. BP stable in low 100s. Per primary RN, also concern for "gurgling" when patient falls asleep. Patient with known history of sleep apnea. No further interventions done as patient back to baseline.

## 2020-04-05 NOTE — H&P ADULT - PROBLEM SELECTOR PLAN 4
- SCr. 1.93, previously 0.7-0.8 in 2016  - likely pre-renal in setting of viral infection  - s/w 1L NS bolus, will given another 500 cc bolus, hold off on maintenance fluids as patients with COVID can decompensate and go into ARDS  - trend daily BMP, renally dose medications - SCr. 1.93, previously 0.7-0.8 in 2016  - likely pre-renal in setting of viral infection  - s/w 1L NS bolus, will given another 500 cc over 5 hours  - trend daily BMP, renally dose medications

## 2020-04-05 NOTE — H&P ADULT - ASSESSMENT
39M hx of schizophrenia and asthma BIBEMS form [facility] for worsening SOB a/w acute hypoxic respiratory failure and sepsis likely 2/2 COVID-19 infection.

## 2020-04-05 NOTE — ED ADULT NURSE NOTE - CHIEF COMPLAINT QUOTE
Pt from [facility]. Sent for fever. Pt with shallow respirations, hypotensive and febrile.

## 2020-04-05 NOTE — H&P ADULT - HISTORY OF PRESENT ILLNESS
39M hx of schizophrenia and asthma BIBEMS form [facility] for worsening SOB. Patient has been having SOB, cough, and fevers for one week, exposed to +COVID at [facility]. Today dyspnea worsened, EMS found patient hypoxic to 70s, improved to 90s on NRB. Otherwise denies cp, abdominal pain, n/v/d, dysuria, change in urinary frequency.     ED - Tm 103.2, , BP 99/60, RR 26, 93% on NRB  s/p NS 500cc bolus, acetaminophen 975mg PO x1, albuterol inhaler x 1, solumedrol 125mg IV x1, tiotropium x1

## 2020-04-05 NOTE — H&P ADULT - PROBLEM SELECTOR PLAN 3
- CXR with bilateral opacities, CBC with lymphopenia, elevated CRP  - f/u COVID-19 PCR, LDH, Procalcitonin, D-Dimer, Ferritin, CK, Troponin  - will start Plaquenil 400mg BID x 2 doses, followed by 200mg BID, monitor QTc   - s/p solumedrol 125mg mg IV x1, c/w 40mg IV BID as patient on NRB  - supportive care with PRN tessalon perles, acetaminophen for fever  - currently on NRB, if decompensates will need to intubate as noninvasive ventilation not an option - CXR with bilateral opacities, CBC with leukocytosis, elevated CRP  - f/u COVID-19 PCR, LDH, Procalcitonin, D-Dimer, Ferritin, CK, Troponin  - will start Plaquenil 400mg BID x 2 doses, followed by 200mg BID, monitor QTc   - s/p solumedrol 125mg mg IV x1, c/w 40mg IV BID as patient on NRB  - supportive care with PRN tessalon perles, acetaminophen for fever  - currently on NRB, if decompensates will need to intubate as noninvasive ventilation not an option - CXR with bilateral opacities, CBC with leukocytosis, elevated CRP  - f/u COVID-19 PCR, LDH, Procalcitonin, D-Dimer, Ferritin, CK, Troponin    - although patient is on multiple QT prolonging medications, QTc is currently 439ms - will start Plaquenil, and continue with antipsychotics as these medications are for schizophrenia, monitor daily QTc   - s/p solumedrol 125mg mg IV x1, c/w 60mg IV BID as patient on NRB  - supportive care with PRN tessalon perles, acetaminophen for fever  - currently on NRB, if decompensates will need to intubate as noninvasive ventilation not an option

## 2020-04-05 NOTE — PROGRESS NOTE ADULT - PROBLEM SELECTOR PLAN 2
+SIRS for tachypnea, tachycardia, fever, leukocytosis   - CXR with bilateral opacities, COVID-19 PCR positive

## 2020-04-05 NOTE — ED ADULT NURSE NOTE - OBJECTIVE STATEMENT
Patient received to room 4 from [facility] for fever, hypotension and SOB. Placed on NRB, oxygen ranging between 93-98% on 15L, medicine team made aware. Patient NSR on monitor at this time. A&ox4, hx of schizophrenia, cooperative at this time. Repositioned for comfort, RR 20-30's, shallow and rapid. Medical rapid response called around 4am because patient began to desat 87%, no intervention at the time. Fluids running as per MD orders. Aunt's number on chart.

## 2020-04-05 NOTE — PROGRESS NOTE ADULT - PROBLEM SELECTOR PLAN 6
- resident at [facility]  - c/w home medications (benztropine, clozapine, sertraline)  - monitor EKG for QTc in setting of Plaquenil and antipsychotic medications  -c/s behavioral health

## 2020-04-06 LAB
ALBUMIN SERPL ELPH-MCNC: 3.3 G/DL — SIGNIFICANT CHANGE UP (ref 3.3–5)
ALP SERPL-CCNC: 53 U/L — SIGNIFICANT CHANGE UP (ref 40–120)
ALT FLD-CCNC: 42 U/L — HIGH (ref 4–41)
ANION GAP SERPL CALC-SCNC: 20 MMO/L — HIGH (ref 7–14)
AST SERPL-CCNC: 86 U/L — HIGH (ref 4–40)
BILIRUB SERPL-MCNC: 0.4 MG/DL — SIGNIFICANT CHANGE UP (ref 0.2–1.2)
BUN SERPL-MCNC: 39 MG/DL — HIGH (ref 7–23)
CALCIUM SERPL-MCNC: 9.5 MG/DL — SIGNIFICANT CHANGE UP (ref 8.4–10.5)
CHLORIDE SERPL-SCNC: 105 MMOL/L — SIGNIFICANT CHANGE UP (ref 98–107)
CK SERPL-CCNC: 1726 U/L — HIGH (ref 30–200)
CO2 SERPL-SCNC: 20 MMOL/L — LOW (ref 22–31)
CREAT SERPL-MCNC: 1.15 MG/DL — SIGNIFICANT CHANGE UP (ref 0.5–1.3)
GLUCOSE SERPL-MCNC: 132 MG/DL — HIGH (ref 70–99)
HCT VFR BLD CALC: 40.1 % — SIGNIFICANT CHANGE UP (ref 39–50)
HGB BLD-MCNC: 12.5 G/DL — LOW (ref 13–17)
MCHC RBC-ENTMCNC: 28.1 PG — SIGNIFICANT CHANGE UP (ref 27–34)
MCHC RBC-ENTMCNC: 31.2 % — LOW (ref 32–36)
MCV RBC AUTO: 90.1 FL — SIGNIFICANT CHANGE UP (ref 80–100)
NRBC # FLD: 0.03 K/UL — SIGNIFICANT CHANGE UP (ref 0–0)
PLATELET # BLD AUTO: 370 K/UL — SIGNIFICANT CHANGE UP (ref 150–400)
PMV BLD: 11.3 FL — SIGNIFICANT CHANGE UP (ref 7–13)
POTASSIUM SERPL-MCNC: 4.3 MMOL/L — SIGNIFICANT CHANGE UP (ref 3.5–5.3)
POTASSIUM SERPL-SCNC: 4.3 MMOL/L — SIGNIFICANT CHANGE UP (ref 3.5–5.3)
PROT SERPL-MCNC: 7.9 G/DL — SIGNIFICANT CHANGE UP (ref 6–8.3)
RBC # BLD: 4.45 M/UL — SIGNIFICANT CHANGE UP (ref 4.2–5.8)
RBC # FLD: 15.1 % — HIGH (ref 10.3–14.5)
SODIUM SERPL-SCNC: 145 MMOL/L — SIGNIFICANT CHANGE UP (ref 135–145)
WBC # BLD: 16.26 K/UL — HIGH (ref 3.8–10.5)
WBC # FLD AUTO: 16.26 K/UL — HIGH (ref 3.8–10.5)

## 2020-04-06 PROCEDURE — 99233 SBSQ HOSP IP/OBS HIGH 50: CPT

## 2020-04-06 RX ADMIN — Medication 200 MILLIGRAM(S): at 05:09

## 2020-04-06 RX ADMIN — Medication 1: at 12:20

## 2020-04-06 RX ADMIN — HEPARIN SODIUM 5000 UNIT(S): 5000 INJECTION INTRAVENOUS; SUBCUTANEOUS at 18:08

## 2020-04-06 RX ADMIN — Medication 0.5 MILLIGRAM(S): at 18:07

## 2020-04-06 RX ADMIN — Medication 3: at 17:52

## 2020-04-06 RX ADMIN — SERTRALINE 100 MILLIGRAM(S): 25 TABLET, FILM COATED ORAL at 12:08

## 2020-04-06 RX ADMIN — Medication 60 MILLIGRAM(S): at 18:09

## 2020-04-06 RX ADMIN — PANTOPRAZOLE SODIUM 40 MILLIGRAM(S): 20 TABLET, DELAYED RELEASE ORAL at 05:09

## 2020-04-06 RX ADMIN — ATORVASTATIN CALCIUM 10 MILLIGRAM(S): 80 TABLET, FILM COATED ORAL at 22:56

## 2020-04-06 RX ADMIN — Medication 0.5 MILLIGRAM(S): at 05:09

## 2020-04-06 RX ADMIN — Medication 200 MILLIGRAM(S): at 18:07

## 2020-04-06 RX ADMIN — ZINC SULFATE TAB 220 MG (50 MG ZINC EQUIVALENT) 220 MILLIGRAM(S): 220 (50 ZN) TAB at 18:07

## 2020-04-06 RX ADMIN — HEPARIN SODIUM 5000 UNIT(S): 5000 INJECTION INTRAVENOUS; SUBCUTANEOUS at 22:03

## 2020-04-06 RX ADMIN — Medication 60 MILLIGRAM(S): at 05:09

## 2020-04-06 RX ADMIN — HEPARIN SODIUM 5000 UNIT(S): 5000 INJECTION INTRAVENOUS; SUBCUTANEOUS at 05:09

## 2020-04-06 RX ADMIN — CLOZAPINE 50 MILLIGRAM(S): 150 TABLET, ORALLY DISINTEGRATING ORAL at 22:56

## 2020-04-06 NOTE — PROGRESS NOTE ADULT - PROBLEM SELECTOR PLAN 4
- SCr. improved to 1.56 from 1.93, previously 0.7-0.8 in 2016  - likely pre-renal in setting of viral infection  - s/w 1L NS bolus, will given another 500 cc over 5 hours  - trend daily BMP, renally dose medications - SCr. improved to 1.56 from 1.93, previously 0.7-0.8 in 2016  - likely pre-renal in setting of viral infection  - s/w 1L NS bolus, will given another 500 cc over 5 hours  - trend daily BMP, renally dose medications  - renal function improving creatinine 1.1 today

## 2020-04-06 NOTE — PROGRESS NOTE ADULT - SUBJECTIVE AND OBJECTIVE BOX
Patient is a 39y old  Male who presents with a chief complaint of     SUBJECTIVE / OVERNIGHT EVENTS: pt seen and examined at 12:25pm,  no overnight events, pt reports having cough and sob, on non rebreather oxygen, Sats ok on it, no reports of any diarrhea, No other new issues reported.    MEDICATIONS  (STANDING):  atorvastatin 10 milliGRAM(s) Oral at bedtime  benztropine 0.5 milliGRAM(s) Oral two times a day  cloZAPine 50 milliGRAM(s) Oral at bedtime  dextrose 5%. 1000 milliLiter(s) (50 mL/Hr) IV Continuous <Continuous>  dextrose 50% Injectable 12.5 Gram(s) IV Push once  dextrose 50% Injectable 25 Gram(s) IV Push once  dextrose 50% Injectable 25 Gram(s) IV Push once  heparin  Injectable 5000 Unit(s) SubCutaneous every 8 hours  hydroxychloroquine   Oral   hydroxychloroquine 200 milliGRAM(s) Oral every 12 hours  insulin lispro (HumaLOG) corrective regimen sliding scale   SubCutaneous three times a day before meals  insulin lispro (HumaLOG) corrective regimen sliding scale   SubCutaneous at bedtime  methylPREDNISolone sodium succinate Injectable 60 milliGRAM(s) IV Push two times a day  pantoprazole    Tablet 40 milliGRAM(s) Oral before breakfast  sertraline 100 milliGRAM(s) Oral daily  zinc sulfate 220 milliGRAM(s) Oral daily    MEDICATIONS  (PRN):  acetaminophen   Tablet .. 650 milliGRAM(s) Oral every 4 hours PRN Temp greater or equal to 38.5C (101.3F)  ALBUTerol    90 MICROgram(s) HFA Inhaler 2 Puff(s) Inhalation every 4 hours PRN Shortness of Breath and/or Wheezing  benzonatate 100 milliGRAM(s) Oral three times a day PRN Cough  dextrose 40% Gel 15 Gram(s) Oral once PRN Blood Glucose LESS THAN 70 milliGRAM(s)/deciliter  glucagon  Injectable 1 milliGRAM(s) IntraMuscular once PRN Glucose LESS THAN 70 milligrams/deciliter      Vital Signs Last 24 Hrs  T(C): 36.4 (06 Apr 2020 12:15), Max: 36.8 (05 Apr 2020 23:16)  T(F): 97.6 (06 Apr 2020 12:15), Max: 98.3 (05 Apr 2020 23:16)  HR: 96 (06 Apr 2020 12:15) (93 - 102)  BP: 123/91 (06 Apr 2020 12:15) (108/62 - 123/91)  BP(mean): --  RR: 24 (06 Apr 2020 12:15) (24 - 28)  SpO2: 91% (06 Apr 2020 12:15) (91% - 100%)  CAPILLARY BLOOD GLUCOSE      POCT Blood Glucose.: 199 mg/dL (06 Apr 2020 12:12)  POCT Blood Glucose.: 150 mg/dL (06 Apr 2020 08:33)  POCT Blood Glucose.: 161 mg/dL (05 Apr 2020 23:05)  POCT Blood Glucose.: 174 mg/dL (05 Apr 2020 17:27)    I&O's Summary      PHYSICAL EXAM:  GENERAL: NAD, Obese male  CHEST/LUNG: Breathing comfortably on non rebreather mask oxygen, no accessory muscle use, coughing+  HEART: tachycardia to 102 on vital signs  ABDOMEN: Obese, Soft, Nontender, Nondistended  EXTREMITIES: no LE edema  PSYCH: Calm  NEUROLOGY: AA, answers questions appropriately      LABS:                        12.5   16.26 )-----------( 370      ( 06 Apr 2020 07:00 )             40.1     04-06    145  |  105  |  39<H>  ----------------------------<  132<H>  4.3   |  20<L>  |  1.15    Ca    9.5      06 Apr 2020 07:00  Phos  4.3     04-05  Mg     2.1     04-05    TPro  7.9  /  Alb  3.3  /  TBili  0.4  /  DBili  x   /  AST  86<H>  /  ALT  42<H>  /  AlkPhos  53  04-06    PT/INR - ( 05 Apr 2020 05:30 )   PT: 14.9 SEC;   INR: 1.29          PTT - ( 05 Apr 2020 05:30 )  PTT:35.2 SEC  CARDIAC MARKERS ( 06 Apr 2020 07:00 )  x     / x     / 1726 u/L / x     / x      CARDIAC MARKERS ( 05 Apr 2020 05:30 )  x     / x     / 883 u/L / x     / x              RADIOLOGY & ADDITIONAL TESTS:    Imaging Personally Reviewed:    Consultant(s) Notes Reviewed:      Care Discussed with Consultants/Other Providers:

## 2020-04-06 NOTE — PROGRESS NOTE ADULT - PROBLEM SELECTOR PLAN 1
- hypoxic to 70s with EMS, now on NRB, sats ok on NRB  - COVID-19 PCR positive, QTc 439  - continue albuterol HFA PRN  -continue 60mg BID solumedrol  - continue HCQ, Qtc < 500  - f/u ekg for qtc - hypoxic to 70s with EMS, now on NRB, sats ok on NRB  - COVID-19 PCR positive, QTc 439  - continue albuterol HFA PRN  -continue 60mg BID solumedrol  - continue HCQ, Qtc < 500  - f/u ekg for qtc  - cont to monitor respiratory status closely.  - Updated pt's aunt Sharee who is the HCP on 4/6

## 2020-04-06 NOTE — PROGRESS NOTE ADULT - PROBLEM SELECTOR PLAN 3
- CXR with bilateral opacities, CBC with leukocytosis, elevated CRP  - COVID-19 PCR positive, elevated LDH, Procalcitonin, D-Dimer, Ferritin, CK, Troponin    - although patient is on multiple QT prolonging medications, QTc is currently 439ms - on Plaquenil, and continue with antipsychotics as these medications are for schizophrenia, monitor daily QTc   - s/p solumedrol 125mg mg IV x1, c/w 60mg IV BID as patient on NRB  - supportive care with PRN tessalon perles, acetaminophen for fever  - currently on NRB, if decompensates will need to intubate as noninvasive ventilation not an option

## 2020-04-06 NOTE — PROGRESS NOTE ADULT - PROBLEM SELECTOR PLAN 6
- resident at [facility]  - c/w home medications (benztropine, clozapine, sertraline)  - monitor EKG for QTc in setting of Plaquenil and antipsychotic medications  -c/s behavioral health

## 2020-04-07 LAB
ALBUMIN SERPL ELPH-MCNC: 3.3 G/DL — SIGNIFICANT CHANGE UP (ref 3.3–5)
ALP SERPL-CCNC: 52 U/L — SIGNIFICANT CHANGE UP (ref 40–120)
ALT FLD-CCNC: 40 U/L — SIGNIFICANT CHANGE UP (ref 4–41)
ANION GAP SERPL CALC-SCNC: 14 MMO/L — SIGNIFICANT CHANGE UP (ref 7–14)
AST SERPL-CCNC: 65 U/L — HIGH (ref 4–40)
BILIRUB SERPL-MCNC: 0.4 MG/DL — SIGNIFICANT CHANGE UP (ref 0.2–1.2)
BUN SERPL-MCNC: 39 MG/DL — HIGH (ref 7–23)
CALCIUM SERPL-MCNC: 9.4 MG/DL — SIGNIFICANT CHANGE UP (ref 8.4–10.5)
CHLORIDE SERPL-SCNC: 105 MMOL/L — SIGNIFICANT CHANGE UP (ref 98–107)
CO2 SERPL-SCNC: 25 MMOL/L — SIGNIFICANT CHANGE UP (ref 22–31)
CREAT SERPL-MCNC: 0.9 MG/DL — SIGNIFICANT CHANGE UP (ref 0.5–1.3)
GLUCOSE SERPL-MCNC: 168 MG/DL — HIGH (ref 70–99)
HBA1C BLD-MCNC: 6.6 % — HIGH (ref 4–5.6)
HCT VFR BLD CALC: 40.2 % — SIGNIFICANT CHANGE UP (ref 39–50)
HGB BLD-MCNC: 12.5 G/DL — LOW (ref 13–17)
MCHC RBC-ENTMCNC: 27.8 PG — SIGNIFICANT CHANGE UP (ref 27–34)
MCHC RBC-ENTMCNC: 31.1 % — LOW (ref 32–36)
MCV RBC AUTO: 89.3 FL — SIGNIFICANT CHANGE UP (ref 80–100)
NRBC # FLD: 0.03 K/UL — SIGNIFICANT CHANGE UP (ref 0–0)
PLATELET # BLD AUTO: 429 K/UL — HIGH (ref 150–400)
PMV BLD: 11 FL — SIGNIFICANT CHANGE UP (ref 7–13)
POTASSIUM SERPL-MCNC: 4.1 MMOL/L — SIGNIFICANT CHANGE UP (ref 3.5–5.3)
POTASSIUM SERPL-SCNC: 4.1 MMOL/L — SIGNIFICANT CHANGE UP (ref 3.5–5.3)
PROT SERPL-MCNC: 7.6 G/DL — SIGNIFICANT CHANGE UP (ref 6–8.3)
RBC # BLD: 4.5 M/UL — SIGNIFICANT CHANGE UP (ref 4.2–5.8)
RBC # FLD: 14.9 % — HIGH (ref 10.3–14.5)
SODIUM SERPL-SCNC: 144 MMOL/L — SIGNIFICANT CHANGE UP (ref 135–145)
WBC # BLD: 14.08 K/UL — HIGH (ref 3.8–10.5)
WBC # FLD AUTO: 14.08 K/UL — HIGH (ref 3.8–10.5)

## 2020-04-07 PROCEDURE — 99233 SBSQ HOSP IP/OBS HIGH 50: CPT

## 2020-04-07 PROCEDURE — 93010 ELECTROCARDIOGRAM REPORT: CPT | Mod: CS

## 2020-04-07 RX ADMIN — Medication 1: at 08:58

## 2020-04-07 RX ADMIN — Medication 0.5 MILLIGRAM(S): at 05:30

## 2020-04-07 RX ADMIN — Medication 200 MILLIGRAM(S): at 15:20

## 2020-04-07 RX ADMIN — Medication 1: at 13:10

## 2020-04-07 RX ADMIN — ATORVASTATIN CALCIUM 10 MILLIGRAM(S): 80 TABLET, FILM COATED ORAL at 21:42

## 2020-04-07 RX ADMIN — PANTOPRAZOLE SODIUM 40 MILLIGRAM(S): 20 TABLET, DELAYED RELEASE ORAL at 05:40

## 2020-04-07 RX ADMIN — HEPARIN SODIUM 5000 UNIT(S): 5000 INJECTION INTRAVENOUS; SUBCUTANEOUS at 21:42

## 2020-04-07 RX ADMIN — Medication 60 MILLIGRAM(S): at 18:05

## 2020-04-07 RX ADMIN — ZINC SULFATE TAB 220 MG (50 MG ZINC EQUIVALENT) 220 MILLIGRAM(S): 220 (50 ZN) TAB at 18:07

## 2020-04-07 RX ADMIN — HEPARIN SODIUM 5000 UNIT(S): 5000 INJECTION INTRAVENOUS; SUBCUTANEOUS at 13:14

## 2020-04-07 RX ADMIN — HEPARIN SODIUM 5000 UNIT(S): 5000 INJECTION INTRAVENOUS; SUBCUTANEOUS at 05:30

## 2020-04-07 RX ADMIN — Medication 200 MILLIGRAM(S): at 05:30

## 2020-04-07 RX ADMIN — CLOZAPINE 50 MILLIGRAM(S): 150 TABLET, ORALLY DISINTEGRATING ORAL at 21:42

## 2020-04-07 RX ADMIN — SERTRALINE 100 MILLIGRAM(S): 25 TABLET, FILM COATED ORAL at 13:14

## 2020-04-07 RX ADMIN — Medication 0.5 MILLIGRAM(S): at 18:07

## 2020-04-07 RX ADMIN — Medication 100 MILLIGRAM(S): at 18:07

## 2020-04-07 RX ADMIN — Medication 60 MILLIGRAM(S): at 05:29

## 2020-04-07 NOTE — PROGRESS NOTE ADULT - PROBLEM SELECTOR PLAN 4
- SCr. improved previously 0.7-0.8 in 2016  - likely pre-renal in setting of viral infection  - trend daily BMP, renally dose medications  - renal function improved

## 2020-04-07 NOTE — PROGRESS NOTE ADULT - PROBLEM SELECTOR PLAN 6
- resident at [facility]  - c/w home medications (benztropine, clozapine, sertraline)  - monitor EKG for QTc in setting of Plaquenil and antipsychotic medications  -c/s behavioral health

## 2020-04-07 NOTE — PROGRESS NOTE ADULT - PROBLEM SELECTOR PLAN 7
-hba1c 6.6  - SSI and FS qac/hs, patient started on steroids may need to be started on standing premeal and bedtime if steroid induced hyperglycemia

## 2020-04-07 NOTE — PROVIDER CONTACT NOTE (OTHER) - BACKGROUND
pt is baseline a&ox2, admitted from [facility] for covid+ related illness.

## 2020-04-07 NOTE — PROGRESS NOTE ADULT - SUBJECTIVE AND OBJECTIVE BOX
Patient is a 39y old  Male who presents with a chief complaint of     SUBJECTIVE / OVERNIGHT EVENTS: pt seen and examined at 12N,  no overnight events, afebrile, pt reports having cough and sob, on non rebreather oxygen, Sats ok on it, no diarrhea, No other new issues reported.        MEDICATIONS  (STANDING):  atorvastatin 10 milliGRAM(s) Oral at bedtime  benztropine 0.5 milliGRAM(s) Oral two times a day  cloZAPine 50 milliGRAM(s) Oral at bedtime  dextrose 5%. 1000 milliLiter(s) (50 mL/Hr) IV Continuous <Continuous>  dextrose 50% Injectable 12.5 Gram(s) IV Push once  dextrose 50% Injectable 25 Gram(s) IV Push once  dextrose 50% Injectable 25 Gram(s) IV Push once  heparin  Injectable 5000 Unit(s) SubCutaneous every 8 hours  hydroxychloroquine   Oral   hydroxychloroquine 200 milliGRAM(s) Oral every 12 hours  insulin lispro (HumaLOG) corrective regimen sliding scale   SubCutaneous three times a day before meals  insulin lispro (HumaLOG) corrective regimen sliding scale   SubCutaneous at bedtime  methylPREDNISolone sodium succinate Injectable 60 milliGRAM(s) IV Push two times a day  pantoprazole    Tablet 40 milliGRAM(s) Oral before breakfast  sertraline 100 milliGRAM(s) Oral daily  zinc sulfate 220 milliGRAM(s) Oral daily    MEDICATIONS  (PRN):  acetaminophen   Tablet .. 650 milliGRAM(s) Oral every 4 hours PRN Temp greater or equal to 38.5C (101.3F)  ALBUTerol    90 MICROgram(s) HFA Inhaler 2 Puff(s) Inhalation every 4 hours PRN Shortness of Breath and/or Wheezing  benzonatate 100 milliGRAM(s) Oral three times a day PRN Cough  dextrose 40% Gel 15 Gram(s) Oral once PRN Blood Glucose LESS THAN 70 milliGRAM(s)/deciliter  glucagon  Injectable 1 milliGRAM(s) IntraMuscular once PRN Glucose LESS THAN 70 milligrams/deciliter      Vital Signs Last 24 Hrs  T(C): 36.7 (07 Apr 2020 12:26), Max: 36.7 (07 Apr 2020 05:24)  T(F): 98 (07 Apr 2020 12:26), Max: 98 (07 Apr 2020 05:24)  HR: 84 (07 Apr 2020 12:26) (84 - 96)  BP: 134/103 (07 Apr 2020 12:26) (103/74 - 134/103)  BP(mean): --  RR: 24 (07 Apr 2020 12:26) (24 - 26)  SpO2: 97% (07 Apr 2020 12:26) (93% - 97%)  CAPILLARY BLOOD GLUCOSE      POCT Blood Glucose.: 179 mg/dL (07 Apr 2020 12:29)  POCT Blood Glucose.: 154 mg/dL (07 Apr 2020 08:11)  POCT Blood Glucose.: 164 mg/dL (06 Apr 2020 22:37)  POCT Blood Glucose.: 261 mg/dL (06 Apr 2020 17:17)    I&O's Summary      PHYSICAL EXAM:  GENERAL: NAD, Obese male  CHEST/LUNG: Breathing comfortably on non rebreather mask oxygen, no accessory muscle use, coughing+  HEART: tachycardia to 102 on vital signs  ABDOMEN: Obese, Soft, Nontender, Nondistended  EXTREMITIES: no LE edema  PSYCH: Calm  NEUROLOGY: AA, answers questions appropriately      LABS:                        12.5   14.08 )-----------( 429      ( 07 Apr 2020 06:00 )             40.2     04-07    144  |  105  |  39<H>  ----------------------------<  168<H>  4.1   |  25  |  0.90    Ca    9.4      07 Apr 2020 06:00    TPro  7.6  /  Alb  3.3  /  TBili  0.4  /  DBili  x   /  AST  65<H>  /  ALT  40  /  AlkPhos  52  04-07      CARDIAC MARKERS ( 06 Apr 2020 07:00 )  x     / x     / 1726 u/L / x     / x              Procalcitonin, Serum: 0.28 ng/mL (04-05-20 @ 05:30)    C-Reactive Protein, Serum: 308.7 mg/L (04-04-20 @ 19:33)      D-Dimer Assay, Quantitative: 346 ng/mL (04-05-20 @ 05:30)    Ferritin, Serum: 438.8 ng/mL (04-05-20 @ 05:30)      RADIOLOGY & ADDITIONAL TESTS:    Imaging Personally Reviewed:    Care Discussed with Consultants/Other Providers:

## 2020-04-07 NOTE — PROGRESS NOTE ADULT - PROBLEM SELECTOR PLAN 1
- hypoxic to 70s with EMS, now on NRB, sats ok on NRB  - COVID-19 PCR positive, QTc 439  - continue albuterol HFA PRN  -continue 60mg BID solumedrol, cont plaquenil  - continue HCQ, Qtc < 500  - f/u ekg for qtc discussed with ACP  - cont to monitor respiratory status closely.  - Updated pt's aunt Sharee who is the HCP on 4/6 - hypoxic to 70s with EMS, now on NRB, sats ok on NRB  - COVID-19 PCR positive, QTc 439  - continue albuterol HFA PRN  -continue 60mg BID solumedrol, cont plaquenil  - continue HCQ, Qtc < 500  - f/u ekg for qtc discussed with ACP  - cont to monitor respiratory status closely.  - Updated pt's aunt Sharee who is the HCP on 4/7

## 2020-04-08 LAB
ALBUMIN SERPL ELPH-MCNC: 3.5 G/DL — SIGNIFICANT CHANGE UP (ref 3.3–5)
ALP SERPL-CCNC: 56 U/L — SIGNIFICANT CHANGE UP (ref 40–120)
ALT FLD-CCNC: 35 U/L — SIGNIFICANT CHANGE UP (ref 4–41)
ANION GAP SERPL CALC-SCNC: 18 MMO/L — HIGH (ref 7–14)
AST SERPL-CCNC: 55 U/L — HIGH (ref 4–40)
BILIRUB SERPL-MCNC: 0.4 MG/DL — SIGNIFICANT CHANGE UP (ref 0.2–1.2)
BUN SERPL-MCNC: 41 MG/DL — HIGH (ref 7–23)
CALCIUM SERPL-MCNC: 9.1 MG/DL — SIGNIFICANT CHANGE UP (ref 8.4–10.5)
CHLORIDE SERPL-SCNC: 109 MMOL/L — HIGH (ref 98–107)
CO2 SERPL-SCNC: 25 MMOL/L — SIGNIFICANT CHANGE UP (ref 22–31)
CREAT SERPL-MCNC: 0.93 MG/DL — SIGNIFICANT CHANGE UP (ref 0.5–1.3)
CRP SERPL-MCNC: 30.9 MG/L — HIGH
FERRITIN SERPL-MCNC: 412 NG/ML — HIGH (ref 30–400)
GLUCOSE SERPL-MCNC: 118 MG/DL — HIGH (ref 70–99)
HCT VFR BLD CALC: 39.7 % — SIGNIFICANT CHANGE UP (ref 39–50)
HGB BLD-MCNC: 12.1 G/DL — LOW (ref 13–17)
MCHC RBC-ENTMCNC: 28.1 PG — SIGNIFICANT CHANGE UP (ref 27–34)
MCHC RBC-ENTMCNC: 30.5 % — LOW (ref 32–36)
MCV RBC AUTO: 92.1 FL — SIGNIFICANT CHANGE UP (ref 80–100)
NRBC # FLD: 0 K/UL — SIGNIFICANT CHANGE UP (ref 0–0)
PLATELET # BLD AUTO: 410 K/UL — HIGH (ref 150–400)
PMV BLD: 10.8 FL — SIGNIFICANT CHANGE UP (ref 7–13)
POTASSIUM SERPL-MCNC: 4 MMOL/L — SIGNIFICANT CHANGE UP (ref 3.5–5.3)
POTASSIUM SERPL-SCNC: 4 MMOL/L — SIGNIFICANT CHANGE UP (ref 3.5–5.3)
PROCALCITONIN SERPL-MCNC: 0.07 NG/ML — SIGNIFICANT CHANGE UP (ref 0.02–0.1)
PROT SERPL-MCNC: 7.4 G/DL — SIGNIFICANT CHANGE UP (ref 6–8.3)
RBC # BLD: 4.31 M/UL — SIGNIFICANT CHANGE UP (ref 4.2–5.8)
RBC # FLD: 15.1 % — HIGH (ref 10.3–14.5)
SODIUM SERPL-SCNC: 152 MMOL/L — HIGH (ref 135–145)
WBC # BLD: 13.71 K/UL — HIGH (ref 3.8–10.5)
WBC # FLD AUTO: 13.71 K/UL — HIGH (ref 3.8–10.5)

## 2020-04-08 PROCEDURE — 99233 SBSQ HOSP IP/OBS HIGH 50: CPT

## 2020-04-08 PROCEDURE — 93010 ELECTROCARDIOGRAM REPORT: CPT | Mod: CS,76

## 2020-04-08 RX ORDER — SODIUM CHLORIDE 9 MG/ML
1000 INJECTION, SOLUTION INTRAVENOUS
Refills: 0 | Status: DISCONTINUED | OUTPATIENT
Start: 2020-04-08 | End: 2020-04-09

## 2020-04-08 RX ADMIN — ZINC SULFATE TAB 220 MG (50 MG ZINC EQUIVALENT) 220 MILLIGRAM(S): 220 (50 ZN) TAB at 13:06

## 2020-04-08 RX ADMIN — SODIUM CHLORIDE 70 MILLILITER(S): 9 INJECTION, SOLUTION INTRAVENOUS at 15:05

## 2020-04-08 RX ADMIN — Medication 200 MILLIGRAM(S): at 05:34

## 2020-04-08 RX ADMIN — Medication 1: at 13:06

## 2020-04-08 RX ADMIN — HEPARIN SODIUM 5000 UNIT(S): 5000 INJECTION INTRAVENOUS; SUBCUTANEOUS at 22:48

## 2020-04-08 RX ADMIN — PANTOPRAZOLE SODIUM 40 MILLIGRAM(S): 20 TABLET, DELAYED RELEASE ORAL at 05:36

## 2020-04-08 RX ADMIN — SERTRALINE 100 MILLIGRAM(S): 25 TABLET, FILM COATED ORAL at 13:07

## 2020-04-08 RX ADMIN — ATORVASTATIN CALCIUM 10 MILLIGRAM(S): 80 TABLET, FILM COATED ORAL at 22:48

## 2020-04-08 RX ADMIN — Medication 60 MILLIGRAM(S): at 18:09

## 2020-04-08 RX ADMIN — Medication 60 MILLIGRAM(S): at 05:35

## 2020-04-08 RX ADMIN — HEPARIN SODIUM 5000 UNIT(S): 5000 INJECTION INTRAVENOUS; SUBCUTANEOUS at 05:35

## 2020-04-08 RX ADMIN — Medication 100 MILLIGRAM(S): at 18:10

## 2020-04-08 RX ADMIN — HEPARIN SODIUM 5000 UNIT(S): 5000 INJECTION INTRAVENOUS; SUBCUTANEOUS at 13:11

## 2020-04-08 RX ADMIN — Medication 200 MILLIGRAM(S): at 18:09

## 2020-04-08 RX ADMIN — Medication 0.5 MILLIGRAM(S): at 18:10

## 2020-04-08 RX ADMIN — Medication 0.5 MILLIGRAM(S): at 05:35

## 2020-04-08 RX ADMIN — CLOZAPINE 50 MILLIGRAM(S): 150 TABLET, ORALLY DISINTEGRATING ORAL at 22:48

## 2020-04-08 NOTE — PROGRESS NOTE ADULT - PROBLEM SELECTOR PLAN 4
- SCr. improved previously 0.7-0.8 in 2016  - likely pre-renal in setting of viral infection  - trend daily BMP, renally dose medications  - renal function improved - SCr. improved previously 0.7-0.8 in 2016  - likely pre-renal in setting of viral infection  - trend daily BMP, renally dose medications  - renal function improved    - Hypernatremia- likely due to dehydration, will give gentle hydration for now

## 2020-04-08 NOTE — PROGRESS NOTE ADULT - SUBJECTIVE AND OBJECTIVE BOX
Patient is a 39y old  Male who presents with a chief complaint of     SUBJECTIVE / OVERNIGHT EVENTS: pt seen and examined at 11am, no overnight events, afebrile, pt reports having cough, denies sob comfortable on non rebreather oxygen, Sats ok on it, no diarrhea, No other new issues reported.        MEDICATIONS  (STANDING):  atorvastatin 10 milliGRAM(s) Oral at bedtime  benztropine 0.5 milliGRAM(s) Oral two times a day  cloZAPine 50 milliGRAM(s) Oral at bedtime  dextrose 5%. 1000 milliLiter(s) (50 mL/Hr) IV Continuous <Continuous>  dextrose 50% Injectable 12.5 Gram(s) IV Push once  dextrose 50% Injectable 25 Gram(s) IV Push once  dextrose 50% Injectable 25 Gram(s) IV Push once  heparin  Injectable 5000 Unit(s) SubCutaneous every 8 hours  hydroxychloroquine   Oral   hydroxychloroquine 200 milliGRAM(s) Oral every 12 hours  insulin lispro (HumaLOG) corrective regimen sliding scale   SubCutaneous three times a day before meals  insulin lispro (HumaLOG) corrective regimen sliding scale   SubCutaneous at bedtime  methylPREDNISolone sodium succinate Injectable 60 milliGRAM(s) IV Push two times a day  pantoprazole    Tablet 40 milliGRAM(s) Oral before breakfast  sertraline 100 milliGRAM(s) Oral daily  zinc sulfate 220 milliGRAM(s) Oral daily    MEDICATIONS  (PRN):  acetaminophen   Tablet .. 650 milliGRAM(s) Oral every 4 hours PRN Temp greater or equal to 38.5C (101.3F)  ALBUTerol    90 MICROgram(s) HFA Inhaler 2 Puff(s) Inhalation every 4 hours PRN Shortness of Breath and/or Wheezing  benzonatate 100 milliGRAM(s) Oral three times a day PRN Cough  dextrose 40% Gel 15 Gram(s) Oral once PRN Blood Glucose LESS THAN 70 milliGRAM(s)/deciliter  glucagon  Injectable 1 milliGRAM(s) IntraMuscular once PRN Glucose LESS THAN 70 milligrams/deciliter      Vital Signs Last 24 Hrs  T(C): 36.9 (08 Apr 2020 12:15), Max: 36.9 (08 Apr 2020 12:15)  T(F): 98.5 (08 Apr 2020 12:15), Max: 98.5 (08 Apr 2020 12:15)  HR: 99 (08 Apr 2020 12:15) (93 - 99)  BP: 128/71 (08 Apr 2020 12:15) (110/61 - 132/106)  BP(mean): --  RR: 22 (08 Apr 2020 12:15) (22 - 24)  SpO2: 97% (08 Apr 2020 12:15) (95% - 98%)  CAPILLARY BLOOD GLUCOSE      POCT Blood Glucose.: 186 mg/dL (08 Apr 2020 12:10)  POCT Blood Glucose.: 149 mg/dL (08 Apr 2020 08:55)  POCT Blood Glucose.: 153 mg/dL (07 Apr 2020 22:48)  POCT Blood Glucose.: 136 mg/dL (07 Apr 2020 17:11)    I&O's Summary      PHYSICAL EXAM:  GENERAL: NAD, Obese male  CHEST/LUNG: Breathing comfortably on non rebreather mask oxygen, no accessory muscle use, coughing+  HEART: no tachycardia on vital signs  ABDOMEN: Obese, Soft, Nontender, Nondistended  EXTREMITIES: no LE edema  PSYCH: Calm  NEUROLOGY: AA, answers questions appropriately    LABS:                        12.1   13.71 )-----------( 410      ( 08 Apr 2020 05:54 )             39.7     04-08    152<H>  |  109<H>  |  41<H>  ----------------------------<  118<H>  4.0   |  25  |  0.93    Ca    9.1      08 Apr 2020 05:54    TPro  7.4  /  Alb  3.5  /  TBili  0.4  /  DBili  x   /  AST  55<H>  /  ALT  35  /  AlkPhos  56  04-08              Procalcitonin, Serum: 0.07 ng/mL (04-08-20 @ 05:54)  Procalcitonin, Serum: 0.28 ng/mL (04-05-20 @ 05:30)    C-Reactive Protein, Serum: 30.9 mg/L (04-08-20 @ 05:54)  C-Reactive Protein, Serum: 308.7 mg/L (04-04-20 @ 19:33)      D-Dimer Assay, Quantitative: 346 ng/mL (04-05-20 @ 05:30)    Ferritin, Serum: 412.0 ng/mL (04-08-20 @ 05:54)  Ferritin, Serum: 438.8 ng/mL (04-05-20 @ 05:30)      RADIOLOGY & ADDITIONAL TESTS:    Imaging Personally Reviewed:    Care Discussed with Consultants/Other Providers:

## 2020-04-08 NOTE — PROGRESS NOTE ADULT - PROBLEM SELECTOR PLAN 6
- resident at [facility]  - c/w home medications (benztropine, clozapine, sertraline)  - monitor EKG for QTc in setting of Plaquenil and antipsychotic medications  -c/s behavioral health

## 2020-04-08 NOTE — PROGRESS NOTE ADULT - PROBLEM SELECTOR PLAN 1
- hypoxic to 70s with EMS, now on NRB, sats ok on NRB  - COVID-19 PCR positive, QTc 439  - continue albuterol HFA PRN  -continue 60mg BID solumedrol, cont plaquenil  - continue HCQ, Qtc < 500  - f/u ekg for qtc discussed with ACP  - cont to monitor respiratory status closely, wean as tolerated  - Updated pt's aunt Sharee who is the HCP on 4/7

## 2020-04-09 LAB
ALBUMIN SERPL ELPH-MCNC: 3.5 G/DL — SIGNIFICANT CHANGE UP (ref 3.3–5)
ALP SERPL-CCNC: 56 U/L — SIGNIFICANT CHANGE UP (ref 40–120)
ALT FLD-CCNC: 33 U/L — SIGNIFICANT CHANGE UP (ref 4–41)
ANION GAP SERPL CALC-SCNC: 13 MMO/L — SIGNIFICANT CHANGE UP (ref 7–14)
AST SERPL-CCNC: 39 U/L — SIGNIFICANT CHANGE UP (ref 4–40)
BILIRUB SERPL-MCNC: 0.7 MG/DL — SIGNIFICANT CHANGE UP (ref 0.2–1.2)
BUN SERPL-MCNC: 30 MG/DL — HIGH (ref 7–23)
CALCIUM SERPL-MCNC: 9.2 MG/DL — SIGNIFICANT CHANGE UP (ref 8.4–10.5)
CHLORIDE SERPL-SCNC: 102 MMOL/L — SIGNIFICANT CHANGE UP (ref 98–107)
CO2 SERPL-SCNC: 27 MMOL/L — SIGNIFICANT CHANGE UP (ref 22–31)
CREAT SERPL-MCNC: 0.84 MG/DL — SIGNIFICANT CHANGE UP (ref 0.5–1.3)
GLUCOSE SERPL-MCNC: 127 MG/DL — HIGH (ref 70–99)
HCT VFR BLD CALC: 39 % — SIGNIFICANT CHANGE UP (ref 39–50)
HGB BLD-MCNC: 12.2 G/DL — LOW (ref 13–17)
MCHC RBC-ENTMCNC: 28.1 PG — SIGNIFICANT CHANGE UP (ref 27–34)
MCHC RBC-ENTMCNC: 31.3 % — LOW (ref 32–36)
MCV RBC AUTO: 89.9 FL — SIGNIFICANT CHANGE UP (ref 80–100)
NRBC # FLD: 0 K/UL — SIGNIFICANT CHANGE UP (ref 0–0)
PLATELET # BLD AUTO: 406 K/UL — HIGH (ref 150–400)
PMV BLD: 10.6 FL — SIGNIFICANT CHANGE UP (ref 7–13)
POTASSIUM SERPL-MCNC: 3.9 MMOL/L — SIGNIFICANT CHANGE UP (ref 3.5–5.3)
POTASSIUM SERPL-SCNC: 3.9 MMOL/L — SIGNIFICANT CHANGE UP (ref 3.5–5.3)
PROT SERPL-MCNC: 7.3 G/DL — SIGNIFICANT CHANGE UP (ref 6–8.3)
RBC # BLD: 4.34 M/UL — SIGNIFICANT CHANGE UP (ref 4.2–5.8)
RBC # FLD: 14.6 % — HIGH (ref 10.3–14.5)
SODIUM SERPL-SCNC: 142 MMOL/L — SIGNIFICANT CHANGE UP (ref 135–145)
WBC # BLD: 13.11 K/UL — HIGH (ref 3.8–10.5)
WBC # FLD AUTO: 13.11 K/UL — HIGH (ref 3.8–10.5)

## 2020-04-09 PROCEDURE — 93010 ELECTROCARDIOGRAM REPORT: CPT | Mod: CS

## 2020-04-09 PROCEDURE — 99233 SBSQ HOSP IP/OBS HIGH 50: CPT

## 2020-04-09 RX ORDER — ENOXAPARIN SODIUM 100 MG/ML
40 INJECTION SUBCUTANEOUS EVERY 12 HOURS
Refills: 0 | Status: DISCONTINUED | OUTPATIENT
Start: 2020-04-09 | End: 2020-04-17

## 2020-04-09 RX ADMIN — ATORVASTATIN CALCIUM 10 MILLIGRAM(S): 80 TABLET, FILM COATED ORAL at 21:22

## 2020-04-09 RX ADMIN — ZINC SULFATE TAB 220 MG (50 MG ZINC EQUIVALENT) 220 MILLIGRAM(S): 220 (50 ZN) TAB at 13:21

## 2020-04-09 RX ADMIN — PANTOPRAZOLE SODIUM 40 MILLIGRAM(S): 20 TABLET, DELAYED RELEASE ORAL at 05:53

## 2020-04-09 RX ADMIN — Medication 0.5 MILLIGRAM(S): at 17:13

## 2020-04-09 RX ADMIN — Medication 60 MILLIGRAM(S): at 17:13

## 2020-04-09 RX ADMIN — Medication 1: at 09:06

## 2020-04-09 RX ADMIN — Medication 200 MILLIGRAM(S): at 13:21

## 2020-04-09 RX ADMIN — Medication 60 MILLIGRAM(S): at 05:51

## 2020-04-09 RX ADMIN — Medication 0.5 MILLIGRAM(S): at 05:51

## 2020-04-09 RX ADMIN — ENOXAPARIN SODIUM 40 MILLIGRAM(S): 100 INJECTION SUBCUTANEOUS at 21:25

## 2020-04-09 RX ADMIN — SERTRALINE 100 MILLIGRAM(S): 25 TABLET, FILM COATED ORAL at 13:21

## 2020-04-09 RX ADMIN — CLOZAPINE 50 MILLIGRAM(S): 150 TABLET, ORALLY DISINTEGRATING ORAL at 21:22

## 2020-04-09 RX ADMIN — Medication 200 MILLIGRAM(S): at 05:51

## 2020-04-09 RX ADMIN — HEPARIN SODIUM 5000 UNIT(S): 5000 INJECTION INTRAVENOUS; SUBCUTANEOUS at 13:21

## 2020-04-09 RX ADMIN — Medication 1: at 13:20

## 2020-04-09 RX ADMIN — HEPARIN SODIUM 5000 UNIT(S): 5000 INJECTION INTRAVENOUS; SUBCUTANEOUS at 05:51

## 2020-04-09 NOTE — PROGRESS NOTE ADULT - SUBJECTIVE AND OBJECTIVE BOX
Patient is a 39y old  Male who presents with a chief complaint of     SUBJECTIVE / OVERNIGHT EVENTS: pt seen and examined earlier this morning, no overnight events, afebrile, pt reports having cough, denies sob comfortable on non rebreather oxygen, Sats ok on it, no diarrhea, Per nsg desats when tried to wean to nasal canula. No other new issues reported.        MEDICATIONS  (STANDING):  atorvastatin 10 milliGRAM(s) Oral at bedtime  benztropine 0.5 milliGRAM(s) Oral two times a day  cloZAPine 50 milliGRAM(s) Oral at bedtime  dextrose 5%. 1000 milliLiter(s) (50 mL/Hr) IV Continuous <Continuous>  dextrose 50% Injectable 12.5 Gram(s) IV Push once  dextrose 50% Injectable 25 Gram(s) IV Push once  dextrose 50% Injectable 25 Gram(s) IV Push once  heparin  Injectable 5000 Unit(s) SubCutaneous every 8 hours  insulin lispro (HumaLOG) corrective regimen sliding scale   SubCutaneous three times a day before meals  insulin lispro (HumaLOG) corrective regimen sliding scale   SubCutaneous at bedtime  methylPREDNISolone sodium succinate Injectable 60 milliGRAM(s) IV Push two times a day  pantoprazole    Tablet 40 milliGRAM(s) Oral before breakfast  sertraline 100 milliGRAM(s) Oral daily  zinc sulfate 220 milliGRAM(s) Oral daily    MEDICATIONS  (PRN):  acetaminophen   Tablet .. 650 milliGRAM(s) Oral every 4 hours PRN Temp greater or equal to 38.5C (101.3F)  ALBUTerol    90 MICROgram(s) HFA Inhaler 2 Puff(s) Inhalation every 4 hours PRN Shortness of Breath and/or Wheezing  benzonatate 100 milliGRAM(s) Oral three times a day PRN Cough  dextrose 40% Gel 15 Gram(s) Oral once PRN Blood Glucose LESS THAN 70 milliGRAM(s)/deciliter  glucagon  Injectable 1 milliGRAM(s) IntraMuscular once PRN Glucose LESS THAN 70 milligrams/deciliter      Vital Signs Last 24 Hrs  T(C): 36.6 (09 Apr 2020 13:29), Max: 37.4 (08 Apr 2020 21:46)  T(F): 97.9 (09 Apr 2020 13:29), Max: 99.3 (08 Apr 2020 21:46)  HR: 99 (09 Apr 2020 13:29) (91 - 99)  BP: 119/58 (09 Apr 2020 13:29) (107/70 - 125/68)  BP(mean): --  RR: 21 (09 Apr 2020 13:29) (21 - 23)  SpO2: 95% (09 Apr 2020 13:29) (88% - 96%)  CAPILLARY BLOOD GLUCOSE      POCT Blood Glucose.: 160 mg/dL (09 Apr 2020 12:21)  POCT Blood Glucose.: 162 mg/dL (09 Apr 2020 08:56)  POCT Blood Glucose.: 166 mg/dL (08 Apr 2020 22:36)  POCT Blood Glucose.: 142 mg/dL (08 Apr 2020 17:12)    I&O's Summary      PHYSICAL EXAM:  GENERAL: NAD, Obese male  CHEST/LUNG: Breathing comfortably on non rebreather mask oxygen, no accessory muscle use  HEART: no tachycardia on vital signs  ABDOMEN: Obese, Soft, Nontender, Nondistended  EXTREMITIES: no LE edema  PSYCH: Calm  NEUROLOGY: AA, answers questions appropriately      LABS:                        12.2   13.11 )-----------( 406      ( 09 Apr 2020 06:42 )             39.0     04-09    142  |  102  |  30<H>  ----------------------------<  127<H>  3.9   |  27  |  0.84    Ca    9.2      09 Apr 2020 06:42    TPro  7.3  /  Alb  3.5  /  TBili  0.7  /  DBili  x   /  AST  39  /  ALT  33  /  AlkPhos  56  04-09              Procalcitonin, Serum: 0.07 ng/mL (04-08-20 @ 05:54)  Procalcitonin, Serum: 0.28 ng/mL (04-05-20 @ 05:30)    C-Reactive Protein, Serum: 30.9 mg/L (04-08-20 @ 05:54)  C-Reactive Protein, Serum: 308.7 mg/L (04-04-20 @ 19:33)      D-Dimer Assay, Quantitative: 346 ng/mL (04-05-20 @ 05:30)    Ferritin, Serum: 412.0 ng/mL (04-08-20 @ 05:54)  Ferritin, Serum: 438.8 ng/mL (04-05-20 @ 05:30)      RADIOLOGY & ADDITIONAL TESTS:    Imaging Personally Reviewed:    Care Discussed with Consultants/Other Providers:

## 2020-04-09 NOTE — PROGRESS NOTE ADULT - PROBLEM SELECTOR PLAN 1
- hypoxic to 70s with EMS, now on NRB, sats ok on NRB, unable to wean off non rebreather, discussed with ID attending Dr. Palmer about Anakinra will hold off for now, will inflammatory markers in am and decide upon Anakinra  - COVID-19 PCR positive, QTc 439  - continue albuterol HFA PRN  -continue 60mg BID solumedrol, cont plaquenil  - continue HCQ, Qtc < 500  - f/u ekg for qtc discussed with ACP  - cont to monitor respiratory status closely, wean as tolerated  - Updated pt's aunt Sharee who is the HCP on 4/7 - hypoxic to 70s with EMS, now on NRB, sats ok on NRB, unable to wean off non rebreather, discussed with ID attending Dr. Palmer about Anakinra will hold off for now, will inflammatory markers in am and decide upon Anakinra  - COVID-19 PCR positive, QTc 439  - continue albuterol HFA PRN  -continue 60mg BID solumedrol  - completed plaquenil on 4/9  - continue HCQ, Qtc < 500  - f/u ekg for qtc discussed with ACP  - cont to monitor respiratory status closely, wean as tolerated  - Updated pt's aunt Sharee who is the HCP on 4/7 - hypoxic to 70s with EMS, now on NRB, sats ok on NRB, unable to wean off non rebreather, discussed with ID attending Dr. Palmer about Anakinra will hold off for now, will inflammatory markers in am and decide upon Anakinra  - COVID-19 PCR positive, QTc 439  - continue albuterol HFA PRN  -continue 60mg BID solumedrol  - completed plaquenil on 4/9  - continue HCQ, Qtc < 500  - f/u ekg for qtc discussed with ACP  - cont to monitor respiratory status closely, wean as tolerated  - Updated pt's aunt Sharee who is the HCP on 4/9

## 2020-04-09 NOTE — PROGRESS NOTE ADULT - PROBLEM SELECTOR PLAN 6
- resident at [facility]  - c/w home medications (benztropine, clozapine, sertraline)  - monitor EKG for QTc in setting of Plaquenil and antipsychotic medications  -c/s behavioral health

## 2020-04-09 NOTE — PROGRESS NOTE ADULT - PROBLEM SELECTOR PLAN 4
- SCr. improved previously 0.7-0.8 in 2016  - likely pre-renal in setting of viral infection  - trend daily BMP, renally dose medications  - renal function improved    - Hypernatremia- likely due to dehydration, improved on gentle hydration

## 2020-04-10 LAB
ALBUMIN SERPL ELPH-MCNC: 3.4 G/DL — SIGNIFICANT CHANGE UP (ref 3.3–5)
ALP SERPL-CCNC: 53 U/L — SIGNIFICANT CHANGE UP (ref 40–120)
ALT FLD-CCNC: 34 U/L — SIGNIFICANT CHANGE UP (ref 4–41)
ANION GAP SERPL CALC-SCNC: 16 MMO/L — HIGH (ref 7–14)
AST SERPL-CCNC: 45 U/L — HIGH (ref 4–40)
BILIRUB SERPL-MCNC: 0.5 MG/DL — SIGNIFICANT CHANGE UP (ref 0.2–1.2)
BUN SERPL-MCNC: 32 MG/DL — HIGH (ref 7–23)
CALCIUM SERPL-MCNC: 9.1 MG/DL — SIGNIFICANT CHANGE UP (ref 8.4–10.5)
CHLORIDE SERPL-SCNC: 101 MMOL/L — SIGNIFICANT CHANGE UP (ref 98–107)
CO2 SERPL-SCNC: 23 MMOL/L — SIGNIFICANT CHANGE UP (ref 22–31)
CREAT SERPL-MCNC: 0.74 MG/DL — SIGNIFICANT CHANGE UP (ref 0.5–1.3)
CRP SERPL-MCNC: 14 MG/L — HIGH
D DIMER BLD IA.RAPID-MCNC: 442 NG/ML — SIGNIFICANT CHANGE UP
FERRITIN SERPL-MCNC: 381.7 NG/ML — SIGNIFICANT CHANGE UP (ref 30–400)
GLUCOSE SERPL-MCNC: 136 MG/DL — HIGH (ref 70–99)
HCT VFR BLD CALC: 40.9 % — SIGNIFICANT CHANGE UP (ref 39–50)
HGB BLD-MCNC: 12.7 G/DL — LOW (ref 13–17)
LDH SERPL L TO P-CCNC: 453 U/L — HIGH (ref 135–225)
MAGNESIUM SERPL-MCNC: 2.6 MG/DL — SIGNIFICANT CHANGE UP (ref 1.6–2.6)
MCHC RBC-ENTMCNC: 28 PG — SIGNIFICANT CHANGE UP (ref 27–34)
MCHC RBC-ENTMCNC: 31.1 % — LOW (ref 32–36)
MCV RBC AUTO: 90.3 FL — SIGNIFICANT CHANGE UP (ref 80–100)
NRBC # FLD: 0 K/UL — SIGNIFICANT CHANGE UP (ref 0–0)
PHOSPHATE SERPL-MCNC: 3.7 MG/DL — SIGNIFICANT CHANGE UP (ref 2.5–4.5)
PLATELET # BLD AUTO: 388 K/UL — SIGNIFICANT CHANGE UP (ref 150–400)
PMV BLD: 11.2 FL — SIGNIFICANT CHANGE UP (ref 7–13)
POTASSIUM SERPL-MCNC: 4.4 MMOL/L — SIGNIFICANT CHANGE UP (ref 3.5–5.3)
POTASSIUM SERPL-SCNC: 4.4 MMOL/L — SIGNIFICANT CHANGE UP (ref 3.5–5.3)
PROT SERPL-MCNC: 6.9 G/DL — SIGNIFICANT CHANGE UP (ref 6–8.3)
RBC # BLD: 4.53 M/UL — SIGNIFICANT CHANGE UP (ref 4.2–5.8)
RBC # FLD: 14.6 % — HIGH (ref 10.3–14.5)
SODIUM SERPL-SCNC: 140 MMOL/L — SIGNIFICANT CHANGE UP (ref 135–145)
WBC # BLD: 14.82 K/UL — HIGH (ref 3.8–10.5)
WBC # FLD AUTO: 14.82 K/UL — HIGH (ref 3.8–10.5)

## 2020-04-10 PROCEDURE — 99233 SBSQ HOSP IP/OBS HIGH 50: CPT

## 2020-04-10 RX ORDER — ANAKINRA 100MG/0.67
100 SYRINGE (ML) SUBCUTANEOUS EVERY 6 HOURS
Refills: 0 | Status: COMPLETED | OUTPATIENT
Start: 2020-04-10 | End: 2020-04-13

## 2020-04-10 RX ADMIN — ENOXAPARIN SODIUM 40 MILLIGRAM(S): 100 INJECTION SUBCUTANEOUS at 09:09

## 2020-04-10 RX ADMIN — Medication 0.5 MILLIGRAM(S): at 16:49

## 2020-04-10 RX ADMIN — Medication 0.5 MILLIGRAM(S): at 05:50

## 2020-04-10 RX ADMIN — ENOXAPARIN SODIUM 40 MILLIGRAM(S): 100 INJECTION SUBCUTANEOUS at 21:43

## 2020-04-10 RX ADMIN — Medication 1: at 17:18

## 2020-04-10 RX ADMIN — CLOZAPINE 50 MILLIGRAM(S): 150 TABLET, ORALLY DISINTEGRATING ORAL at 21:43

## 2020-04-10 RX ADMIN — ATORVASTATIN CALCIUM 10 MILLIGRAM(S): 80 TABLET, FILM COATED ORAL at 21:43

## 2020-04-10 RX ADMIN — Medication 100 MILLIGRAM(S): at 09:16

## 2020-04-10 RX ADMIN — Medication 100 MILLIGRAM(S): at 16:48

## 2020-04-10 RX ADMIN — PANTOPRAZOLE SODIUM 40 MILLIGRAM(S): 20 TABLET, DELAYED RELEASE ORAL at 05:50

## 2020-04-10 RX ADMIN — Medication 60 MILLIGRAM(S): at 16:48

## 2020-04-10 RX ADMIN — Medication 1: at 11:58

## 2020-04-10 RX ADMIN — ZINC SULFATE TAB 220 MG (50 MG ZINC EQUIVALENT) 220 MILLIGRAM(S): 220 (50 ZN) TAB at 11:59

## 2020-04-10 RX ADMIN — Medication 60 MILLIGRAM(S): at 05:49

## 2020-04-10 RX ADMIN — SERTRALINE 100 MILLIGRAM(S): 25 TABLET, FILM COATED ORAL at 11:59

## 2020-04-10 RX ADMIN — ALBUTEROL 2 PUFF(S): 90 AEROSOL, METERED ORAL at 09:17

## 2020-04-10 NOTE — PROGRESS NOTE ADULT - PROBLEM SELECTOR PLAN 1
- hypoxic to 70s with EMS, now on NRB, sats ok on NRB, unable to wean off non rebreather, discussed with ID attending Dr. Palmer will start on Anakinra today  - COVID-19 PCR positive, QTc 439  - continue albuterol HFA PRN  -continue 60mg BID solumedrol, can stop after today's dose  - completed plaquenil on 4/9  - cont to monitor respiratory status closely, wean as tolerated  - Updated pt's aunt Sharee who is the HCP on 4/10

## 2020-04-10 NOTE — PROGRESS NOTE ADULT - PROBLEM SELECTOR PLAN 7
-hba1c 6.6  - SSI and FS qac/hs, patient on steroids may need to be started on standing premeal and bedtime if steroid induced hyperglycemia

## 2020-04-10 NOTE — PROGRESS NOTE ADULT - SUBJECTIVE AND OBJECTIVE BOX
Patient is a 39y old  Male who presents with a chief complaint of     SUBJECTIVE / OVERNIGHT EVENTS: pt seen and examined earlier this morning, no overnight events, afebrile, pt reports having cough, denies sob comfortable on non rebreather oxygen, asking for ryanne cola, Sats ok on it, no diarrhea, Per nsg desats when tried to wean to nasal canula. No other new issues reported.       ROS  MEDICATIONS  (STANDING):  anakinra Injectable 100 milliGRAM(s) SubCutaneous every 6 hours  atorvastatin 10 milliGRAM(s) Oral at bedtime  benztropine 0.5 milliGRAM(s) Oral two times a day  cloZAPine 50 milliGRAM(s) Oral at bedtime  dextrose 5%. 1000 milliLiter(s) (50 mL/Hr) IV Continuous <Continuous>  dextrose 50% Injectable 12.5 Gram(s) IV Push once  dextrose 50% Injectable 25 Gram(s) IV Push once  dextrose 50% Injectable 25 Gram(s) IV Push once  enoxaparin Injectable 40 milliGRAM(s) SubCutaneous every 12 hours  insulin lispro (HumaLOG) corrective regimen sliding scale   SubCutaneous three times a day before meals  insulin lispro (HumaLOG) corrective regimen sliding scale   SubCutaneous at bedtime  methylPREDNISolone sodium succinate Injectable 60 milliGRAM(s) IV Push two times a day  pantoprazole    Tablet 40 milliGRAM(s) Oral before breakfast  sertraline 100 milliGRAM(s) Oral daily  zinc sulfate 220 milliGRAM(s) Oral daily    MEDICATIONS  (PRN):  acetaminophen   Tablet .. 650 milliGRAM(s) Oral every 4 hours PRN Temp greater or equal to 38.5C (101.3F)  ALBUTerol    90 MICROgram(s) HFA Inhaler 2 Puff(s) Inhalation every 4 hours PRN Shortness of Breath and/or Wheezing  benzonatate 100 milliGRAM(s) Oral three times a day PRN Cough  dextrose 40% Gel 15 Gram(s) Oral once PRN Blood Glucose LESS THAN 70 milliGRAM(s)/deciliter  glucagon  Injectable 1 milliGRAM(s) IntraMuscular once PRN Glucose LESS THAN 70 milligrams/deciliter      Vital Signs Last 24 Hrs  T(C): 36.4 (10 Apr 2020 09:56), Max: 36.9 (09 Apr 2020 21:05)  T(F): 97.6 (10 Apr 2020 09:56), Max: 98.4 (09 Apr 2020 21:05)  HR: 100 (10 Apr 2020 09:56) (60 - 100)  BP: 110/58 (10 Apr 2020 09:56) (101/69 - 114/79)  BP(mean): --  RR: 24 (10 Apr 2020 09:56) (22 - 24)  SpO2: 93% (10 Apr 2020 09:56) (93% - 96%)  CAPILLARY BLOOD GLUCOSE      POCT Blood Glucose.: 160 mg/dL (10 Apr 2020 11:48)  POCT Blood Glucose.: 146 mg/dL (10 Apr 2020 08:54)  POCT Blood Glucose.: 166 mg/dL (09 Apr 2020 22:08)  POCT Blood Glucose.: 132 mg/dL (09 Apr 2020 17:17)    I&O's Summary      PHYSICAL EXAM:  GENERAL: NAD, Obese male  CHEST/LUNG: Breathing comfortably on non rebreather mask oxygen, no accessory muscle use  HEART: no tachycardia on vital signs  ABDOMEN: Obese, Soft, Nontender, Nondistended  EXTREMITIES: no LE edema  PSYCH: Calm  NEUROLOGY: AA, answers questions appropriately        LABS:                        12.7   14.82 )-----------( 388      ( 10 Apr 2020 07:12 )             40.9     04-10    140  |  101  |  32<H>  ----------------------------<  136<H>  4.4   |  23  |  0.74    Ca    9.1      10 Apr 2020 07:12  Phos  3.7     04-10  Mg     2.6     04-10    TPro  6.9  /  Alb  3.4  /  TBili  0.5  /  DBili  x   /  AST  45<H>  /  ALT  34  /  AlkPhos  53  04-10              Procalcitonin, Serum: 0.07 ng/mL (04-08-20 @ 05:54)  Procalcitonin, Serum: 0.28 ng/mL (04-05-20 @ 05:30)    C-Reactive Protein, Serum: 14.0 mg/L (04-10-20 @ 07:12)  C-Reactive Protein, Serum: 30.9 mg/L (04-08-20 @ 05:54)  C-Reactive Protein, Serum: 308.7 mg/L (04-04-20 @ 19:33)      D-Dimer Assay, Quantitative: 442 ng/mL (04-10-20 @ 07:12)  D-Dimer Assay, Quantitative: 346 ng/mL (04-05-20 @ 05:30)    Ferritin, Serum: 381.7 ng/mL (04-10-20 @ 07:12)  Ferritin, Serum: 412.0 ng/mL (04-08-20 @ 05:54)  Ferritin, Serum: 438.8 ng/mL (04-05-20 @ 05:30)      RADIOLOGY & ADDITIONAL TESTS:    Imaging Personally Reviewed:    Care Discussed with Consultants/Other Providers:

## 2020-04-10 NOTE — PROGRESS NOTE ADULT - PROBLEM SELECTOR PLAN 3
- CXR with bilateral opacities, CBC with leukocytosis, elevated CRP  - COVID-19 PCR positive, elevated LDH, Procalcitonin, D-Dimer, Ferritin, CK, Troponin    - although patient is on multiple QT prolonging medications, QTc is currently 439ms - completed Plaquenil  - continue with antipsychotics as these medications are for schizophrenia, monitor daily QTc   - s/p solumedrol 125mg mg IV x1, c/w 60mg IV BID complete day 5/5 after today's dose,   - cont on NRB  - supportive care with PRN tessalon perles, acetaminophen for fever  - currently on NRB, if decompensates will need to intubate as noninvasive ventilation not an option

## 2020-04-10 NOTE — PROGRESS NOTE ADULT - PROBLEM SELECTOR PLAN 6
- resident at [facility]  - c/w home medications (benztropine, clozapine, sertraline)  - monitor EKG for QTc in setting of Plaquenil and antipsychotic medications  -c/s behavioral health

## 2020-04-11 DIAGNOSIS — B34.2 CORONAVIRUS INFECTION, UNSPECIFIED: ICD-10-CM

## 2020-04-11 LAB
ALBUMIN SERPL ELPH-MCNC: 3.2 G/DL — LOW (ref 3.3–5)
ALP SERPL-CCNC: 53 U/L — SIGNIFICANT CHANGE UP (ref 40–120)
ALT FLD-CCNC: 36 U/L — SIGNIFICANT CHANGE UP (ref 4–41)
ANION GAP SERPL CALC-SCNC: 15 MMO/L — HIGH (ref 7–14)
AST SERPL-CCNC: 38 U/L — SIGNIFICANT CHANGE UP (ref 4–40)
BILIRUB SERPL-MCNC: 0.5 MG/DL — SIGNIFICANT CHANGE UP (ref 0.2–1.2)
BUN SERPL-MCNC: 28 MG/DL — HIGH (ref 7–23)
CALCIUM SERPL-MCNC: 9.2 MG/DL — SIGNIFICANT CHANGE UP (ref 8.4–10.5)
CHLORIDE SERPL-SCNC: 102 MMOL/L — SIGNIFICANT CHANGE UP (ref 98–107)
CO2 SERPL-SCNC: 24 MMOL/L — SIGNIFICANT CHANGE UP (ref 22–31)
CREAT SERPL-MCNC: 0.73 MG/DL — SIGNIFICANT CHANGE UP (ref 0.5–1.3)
GLUCOSE SERPL-MCNC: 133 MG/DL — HIGH (ref 70–99)
HCT VFR BLD CALC: 40.4 % — SIGNIFICANT CHANGE UP (ref 39–50)
HGB BLD-MCNC: 12.5 G/DL — LOW (ref 13–17)
MAGNESIUM SERPL-MCNC: 2.4 MG/DL — SIGNIFICANT CHANGE UP (ref 1.6–2.6)
MCHC RBC-ENTMCNC: 27.7 PG — SIGNIFICANT CHANGE UP (ref 27–34)
MCHC RBC-ENTMCNC: 30.9 % — LOW (ref 32–36)
MCV RBC AUTO: 89.6 FL — SIGNIFICANT CHANGE UP (ref 80–100)
NRBC # FLD: 0.02 K/UL — SIGNIFICANT CHANGE UP (ref 0–0)
PHOSPHATE SERPL-MCNC: 3.7 MG/DL — SIGNIFICANT CHANGE UP (ref 2.5–4.5)
PLATELET # BLD AUTO: 416 K/UL — HIGH (ref 150–400)
PMV BLD: 11 FL — SIGNIFICANT CHANGE UP (ref 7–13)
POTASSIUM SERPL-MCNC: 4.3 MMOL/L — SIGNIFICANT CHANGE UP (ref 3.5–5.3)
POTASSIUM SERPL-SCNC: 4.3 MMOL/L — SIGNIFICANT CHANGE UP (ref 3.5–5.3)
PROCALCITONIN SERPL-MCNC: 0.06 NG/ML — SIGNIFICANT CHANGE UP (ref 0.02–0.1)
PROT SERPL-MCNC: 6.8 G/DL — SIGNIFICANT CHANGE UP (ref 6–8.3)
RBC # BLD: 4.51 M/UL — SIGNIFICANT CHANGE UP (ref 4.2–5.8)
RBC # FLD: 14.6 % — HIGH (ref 10.3–14.5)
SODIUM SERPL-SCNC: 141 MMOL/L — SIGNIFICANT CHANGE UP (ref 135–145)
WBC # BLD: 18.15 K/UL — HIGH (ref 3.8–10.5)
WBC # FLD AUTO: 18.15 K/UL — HIGH (ref 3.8–10.5)

## 2020-04-11 PROCEDURE — 99233 SBSQ HOSP IP/OBS HIGH 50: CPT

## 2020-04-11 RX ADMIN — Medication 0.5 MILLIGRAM(S): at 17:58

## 2020-04-11 RX ADMIN — ATORVASTATIN CALCIUM 10 MILLIGRAM(S): 80 TABLET, FILM COATED ORAL at 21:56

## 2020-04-11 RX ADMIN — Medication 60 MILLIGRAM(S): at 17:58

## 2020-04-11 RX ADMIN — PANTOPRAZOLE SODIUM 40 MILLIGRAM(S): 20 TABLET, DELAYED RELEASE ORAL at 04:55

## 2020-04-11 RX ADMIN — SERTRALINE 100 MILLIGRAM(S): 25 TABLET, FILM COATED ORAL at 12:57

## 2020-04-11 RX ADMIN — ENOXAPARIN SODIUM 40 MILLIGRAM(S): 100 INJECTION SUBCUTANEOUS at 21:56

## 2020-04-11 RX ADMIN — Medication 100 MILLIGRAM(S): at 21:56

## 2020-04-11 RX ADMIN — Medication 100 MILLIGRAM(S): at 14:17

## 2020-04-11 RX ADMIN — CLOZAPINE 50 MILLIGRAM(S): 150 TABLET, ORALLY DISINTEGRATING ORAL at 21:56

## 2020-04-11 RX ADMIN — Medication 100 MILLIGRAM(S): at 05:00

## 2020-04-11 RX ADMIN — Medication 1: at 12:56

## 2020-04-11 RX ADMIN — ZINC SULFATE TAB 220 MG (50 MG ZINC EQUIVALENT) 220 MILLIGRAM(S): 220 (50 ZN) TAB at 12:57

## 2020-04-11 RX ADMIN — Medication 0.5 MILLIGRAM(S): at 04:55

## 2020-04-11 RX ADMIN — Medication 60 MILLIGRAM(S): at 04:54

## 2020-04-11 RX ADMIN — ENOXAPARIN SODIUM 40 MILLIGRAM(S): 100 INJECTION SUBCUTANEOUS at 07:54

## 2020-04-11 RX ADMIN — Medication 100 MILLIGRAM(S): at 01:13

## 2020-04-11 NOTE — PROGRESS NOTE ADULT - PROBLEM SELECTOR PLAN 1
- hypoxic to 70s with EMS, now on NC 6L and saturating 94%  - COVID-19 PCR positive, QTc 439  - continue albuterol HFA PRN  - s/p course of plaquenil and steroids  - cont to monitor respiratory status closely, wean as tolerated

## 2020-04-11 NOTE — PROGRESS NOTE ADULT - PROBLEM SELECTOR PLAN 2
- CXR with bilateral opacities, CBC with leukocytosis, elevated CRP  - COVID-19 PCR positive, elevated LDH, Procalcitonin, D-Dimer, Ferritin, CK, Troponin  - although patient is on multiple QT prolonging medications, QTc is currently 439ms - completed Plaquenil  - continue with antipsychotics as these medications are for schizophrenia, monitor daily QTc   - s/p solumedrol 125mg mg IV x1, c/w 60mg IV BID completed 5 day course  - supportive care with PRN tessalon perles, acetaminophen for fever  - currently on NRB, if decompensates will need to intubate as noninvasive ventilation not an option

## 2020-04-11 NOTE — PROGRESS NOTE ADULT - SUBJECTIVE AND OBJECTIVE BOX
Patient is a 39y old  Male who presents with a chief complaint of     SUBJECTIVE / OVERNIGHT EVENTS: Denies any complaints. Wants pizza and soda. Stable on 6l NC     ROS  MEDICATIONS  (STANDING):  anakinra Injectable 100 milliGRAM(s) SubCutaneous every 6 hours  atorvastatin 10 milliGRAM(s) Oral at bedtime  benztropine 0.5 milliGRAM(s) Oral two times a day  cloZAPine 50 milliGRAM(s) Oral at bedtime  dextrose 5%. 1000 milliLiter(s) (50 mL/Hr) IV Continuous <Continuous>  dextrose 50% Injectable 12.5 Gram(s) IV Push once  dextrose 50% Injectable 25 Gram(s) IV Push once  dextrose 50% Injectable 25 Gram(s) IV Push once  enoxaparin Injectable 40 milliGRAM(s) SubCutaneous every 12 hours  insulin lispro (HumaLOG) corrective regimen sliding scale   SubCutaneous three times a day before meals  insulin lispro (HumaLOG) corrective regimen sliding scale   SubCutaneous at bedtime  methylPREDNISolone sodium succinate Injectable 60 milliGRAM(s) IV Push two times a day  pantoprazole    Tablet 40 milliGRAM(s) Oral before breakfast  sertraline 100 milliGRAM(s) Oral daily  zinc sulfate 220 milliGRAM(s) Oral daily    MEDICATIONS  (PRN):  acetaminophen   Tablet .. 650 milliGRAM(s) Oral every 4 hours PRN Temp greater or equal to 38.5C (101.3F)  ALBUTerol    90 MICROgram(s) HFA Inhaler 2 Puff(s) Inhalation every 4 hours PRN Shortness of Breath and/or Wheezing  benzonatate 100 milliGRAM(s) Oral three times a day PRN Cough  dextrose 40% Gel 15 Gram(s) Oral once PRN Blood Glucose LESS THAN 70 milliGRAM(s)/deciliter  glucagon  Injectable 1 milliGRAM(s) IntraMuscular once PRN Glucose LESS THAN 70 milligrams/deciliter      Vital Signs Last 24 Hrs  T(C): 36.6 (11 Apr 2020 14:17), Max: 36.8 (10 Apr 2020 21:32)  T(F): 97.9 (11 Apr 2020 14:17), Max: 98.2 (10 Apr 2020 21:32)  HR: 97 (11 Apr 2020 14:17) (64 - 97)  BP: 139/80 (11 Apr 2020 14:17) (107/75 - 139/80)  BP(mean): --  RR: 20 (11 Apr 2020 14:17) (20 - 20)  SpO2: 94% (11 Apr 2020 14:17) (90% - 94%)      CAPILLARY BLOOD GLUCOSE      POCT Blood Glucose.: 186 mg/dL (11 Apr 2020 12:22)  POCT Blood Glucose.: 150 mg/dL (11 Apr 2020 08:24)  POCT Blood Glucose.: 204 mg/dL (10 Apr 2020 22:22)  POCT Blood Glucose.: 155 mg/dL (10 Apr 2020 17:05)      PHYSICAL EXAM:  GENERAL: NAD, Obese male  CHEST/LUNG: Breathing comfortably on NC, no accessory muscle use  HEART:  tachycardia on vital signs  ABDOMEN: Obese, Soft, Nontender, Nondistended  EXTREMITIES: no LE edema  PSYCH: Calm  NEUROLOGY: AA, answers questions appropriately        LABS:                          12.5   18.15 )-----------( 416      ( 11 Apr 2020 07:39 )             40.4   04-11    141  |  102  |  28<H>  ----------------------------<  133<H>  4.3   |  24  |  0.73    Ca    9.2      11 Apr 2020 07:39  Phos  3.7     04-11  Mg     2.4     04-11    TPro  6.8  /  Alb  3.2<L>  /  TBili  0.5  /  DBili  x   /  AST  38  /  ALT  36  /  AlkPhos  53  04-11        Procalcitonin, Serum: 0.07 ng/mL (04-08-20 @ 05:54)  Procalcitonin, Serum: 0.28 ng/mL (04-05-20 @ 05:30)    C-Reactive Protein, Serum: 14.0 mg/L (04-10-20 @ 07:12)  C-Reactive Protein, Serum: 30.9 mg/L (04-08-20 @ 05:54)  C-Reactive Protein, Serum: 308.7 mg/L (04-04-20 @ 19:33)      D-Dimer Assay, Quantitative: 442 ng/mL (04-10-20 @ 07:12)  D-Dimer Assay, Quantitative: 346 ng/mL (04-05-20 @ 05:30)    Ferritin, Serum: 381.7 ng/mL (04-10-20 @ 07:12)  Ferritin, Serum: 412.0 ng/mL (04-08-20 @ 05:54)  Ferritin, Serum: 438.8 ng/mL (04-05-20 @ 05:30)      RADIOLOGY & ADDITIONAL TESTS:    Imaging Personally Reviewed:    Care Discussed with Consultants/Other Providers:

## 2020-04-11 NOTE — PROGRESS NOTE ADULT - ASSESSMENT
39M hx of schizophrenia and asthma BIBEMS form [place] for worsening SOB a/w acute hypoxic respiratory failure and sepsis  2/2 COVID-19 infection.

## 2020-04-11 NOTE — PROGRESS NOTE ADULT - PROBLEM SELECTOR PLAN 5
- resident at [facility]  - c/w home medications (benztropine, clozapine, sertraline)  -c/s behavioral health  -abilify depot shot monthly, next dose on april 24th

## 2020-04-12 LAB
ALBUMIN SERPL ELPH-MCNC: 3.3 G/DL — SIGNIFICANT CHANGE UP (ref 3.3–5)
ALP SERPL-CCNC: 50 U/L — SIGNIFICANT CHANGE UP (ref 40–120)
ALT FLD-CCNC: 49 U/L — HIGH (ref 4–41)
ANION GAP SERPL CALC-SCNC: 13 MMO/L — SIGNIFICANT CHANGE UP (ref 7–14)
AST SERPL-CCNC: 32 U/L — SIGNIFICANT CHANGE UP (ref 4–40)
BILIRUB SERPL-MCNC: 0.4 MG/DL — SIGNIFICANT CHANGE UP (ref 0.2–1.2)
BUN SERPL-MCNC: 28 MG/DL — HIGH (ref 7–23)
CALCIUM SERPL-MCNC: 9.1 MG/DL — SIGNIFICANT CHANGE UP (ref 8.4–10.5)
CHLORIDE SERPL-SCNC: 101 MMOL/L — SIGNIFICANT CHANGE UP (ref 98–107)
CO2 SERPL-SCNC: 24 MMOL/L — SIGNIFICANT CHANGE UP (ref 22–31)
CREAT SERPL-MCNC: 0.76 MG/DL — SIGNIFICANT CHANGE UP (ref 0.5–1.3)
GLUCOSE SERPL-MCNC: 145 MG/DL — HIGH (ref 70–99)
HCT VFR BLD CALC: 41.9 % — SIGNIFICANT CHANGE UP (ref 39–50)
HGB BLD-MCNC: 13.1 G/DL — SIGNIFICANT CHANGE UP (ref 13–17)
MAGNESIUM SERPL-MCNC: 2.4 MG/DL — SIGNIFICANT CHANGE UP (ref 1.6–2.6)
MCHC RBC-ENTMCNC: 28.3 PG — SIGNIFICANT CHANGE UP (ref 27–34)
MCHC RBC-ENTMCNC: 31.3 % — LOW (ref 32–36)
MCV RBC AUTO: 90.5 FL — SIGNIFICANT CHANGE UP (ref 80–100)
NRBC # FLD: 0 K/UL — SIGNIFICANT CHANGE UP (ref 0–0)
PHOSPHATE SERPL-MCNC: 4.4 MG/DL — SIGNIFICANT CHANGE UP (ref 2.5–4.5)
PLATELET # BLD AUTO: 391 K/UL — SIGNIFICANT CHANGE UP (ref 150–400)
PMV BLD: 11 FL — SIGNIFICANT CHANGE UP (ref 7–13)
POTASSIUM SERPL-MCNC: 4.3 MMOL/L — SIGNIFICANT CHANGE UP (ref 3.5–5.3)
POTASSIUM SERPL-SCNC: 4.3 MMOL/L — SIGNIFICANT CHANGE UP (ref 3.5–5.3)
PROT SERPL-MCNC: 6.9 G/DL — SIGNIFICANT CHANGE UP (ref 6–8.3)
RBC # BLD: 4.63 M/UL — SIGNIFICANT CHANGE UP (ref 4.2–5.8)
RBC # FLD: 14.7 % — HIGH (ref 10.3–14.5)
SODIUM SERPL-SCNC: 138 MMOL/L — SIGNIFICANT CHANGE UP (ref 135–145)
WBC # BLD: 18.07 K/UL — HIGH (ref 3.8–10.5)
WBC # FLD AUTO: 18.07 K/UL — HIGH (ref 3.8–10.5)

## 2020-04-12 PROCEDURE — 99232 SBSQ HOSP IP/OBS MODERATE 35: CPT

## 2020-04-12 RX ORDER — NYSTATIN CREAM 100000 [USP'U]/G
1 CREAM TOPICAL THREE TIMES A DAY
Refills: 0 | Status: DISCONTINUED | OUTPATIENT
Start: 2020-04-12 | End: 2020-04-17

## 2020-04-12 RX ADMIN — Medication 0.5 MILLIGRAM(S): at 17:51

## 2020-04-12 RX ADMIN — Medication 0.5 MILLIGRAM(S): at 05:38

## 2020-04-12 RX ADMIN — Medication 100 MILLIGRAM(S): at 17:51

## 2020-04-12 RX ADMIN — Medication 100 MILLIGRAM(S): at 23:07

## 2020-04-12 RX ADMIN — PANTOPRAZOLE SODIUM 40 MILLIGRAM(S): 20 TABLET, DELAYED RELEASE ORAL at 05:38

## 2020-04-12 RX ADMIN — CLOZAPINE 50 MILLIGRAM(S): 150 TABLET, ORALLY DISINTEGRATING ORAL at 23:06

## 2020-04-12 RX ADMIN — Medication 100 MILLIGRAM(S): at 01:26

## 2020-04-12 RX ADMIN — ATORVASTATIN CALCIUM 10 MILLIGRAM(S): 80 TABLET, FILM COATED ORAL at 23:06

## 2020-04-12 RX ADMIN — Medication 100 MILLIGRAM(S): at 05:38

## 2020-04-12 RX ADMIN — SERTRALINE 100 MILLIGRAM(S): 25 TABLET, FILM COATED ORAL at 11:24

## 2020-04-12 RX ADMIN — ZINC SULFATE TAB 220 MG (50 MG ZINC EQUIVALENT) 220 MILLIGRAM(S): 220 (50 ZN) TAB at 11:24

## 2020-04-12 RX ADMIN — ENOXAPARIN SODIUM 40 MILLIGRAM(S): 100 INJECTION SUBCUTANEOUS at 23:07

## 2020-04-12 RX ADMIN — Medication 100 MILLIGRAM(S): at 11:24

## 2020-04-12 RX ADMIN — ENOXAPARIN SODIUM 40 MILLIGRAM(S): 100 INJECTION SUBCUTANEOUS at 11:24

## 2020-04-12 RX ADMIN — NYSTATIN CREAM 1 APPLICATION(S): 100000 CREAM TOPICAL at 23:06

## 2020-04-12 NOTE — PROGRESS NOTE ADULT - SUBJECTIVE AND OBJECTIVE BOX
Patient is a 39y old  Male who presents with a chief complaint of     SUBJECTIVE / OVERNIGHT EVENTS: Denies any complaints. Stable on 4l NC     ROS  MEDICATIONS  (STANDING):  anakinra Injectable 100 milliGRAM(s) SubCutaneous every 6 hours  atorvastatin 10 milliGRAM(s) Oral at bedtime  benztropine 0.5 milliGRAM(s) Oral two times a day  cloZAPine 50 milliGRAM(s) Oral at bedtime  dextrose 5%. 1000 milliLiter(s) (50 mL/Hr) IV Continuous <Continuous>  dextrose 50% Injectable 12.5 Gram(s) IV Push once  dextrose 50% Injectable 25 Gram(s) IV Push once  dextrose 50% Injectable 25 Gram(s) IV Push once  enoxaparin Injectable 40 milliGRAM(s) SubCutaneous every 12 hours  insulin lispro (HumaLOG) corrective regimen sliding scale   SubCutaneous three times a day before meals  insulin lispro (HumaLOG) corrective regimen sliding scale   SubCutaneous at bedtime  methylPREDNISolone sodium succinate Injectable 60 milliGRAM(s) IV Push two times a day  pantoprazole    Tablet 40 milliGRAM(s) Oral before breakfast  sertraline 100 milliGRAM(s) Oral daily  zinc sulfate 220 milliGRAM(s) Oral daily    MEDICATIONS  (PRN):  acetaminophen   Tablet .. 650 milliGRAM(s) Oral every 4 hours PRN Temp greater or equal to 38.5C (101.3F)  ALBUTerol    90 MICROgram(s) HFA Inhaler 2 Puff(s) Inhalation every 4 hours PRN Shortness of Breath and/or Wheezing  benzonatate 100 milliGRAM(s) Oral three times a day PRN Cough  dextrose 40% Gel 15 Gram(s) Oral once PRN Blood Glucose LESS THAN 70 milliGRAM(s)/deciliter  glucagon  Injectable 1 milliGRAM(s) IntraMuscular once PRN Glucose LESS THAN 70 milligrams/deciliter      Vital Signs Last 24 Hrs  T(C): 36.4 (11 Apr 2020 21:51), Max: 36.6 (11 Apr 2020 14:17)  T(F): 97.5 (11 Apr 2020 21:51), Max: 97.9 (11 Apr 2020 14:17)  HR: 65 (11 Apr 2020 21:51) (65 - 97)  BP: 116/67 (11 Apr 2020 21:51) (116/67 - 139/80)  BP(mean): --  RR: 20 (11 Apr 2020 21:51) (19 - 20)  SpO2: 94% (11 Apr 2020 21:51) (94% - 94%)    CAPILLARY BLOOD GLUCOSE    POCT Blood Glucose.: 109 mg/dL (12 Apr 2020 10:19)  POCT Blood Glucose.: 155 mg/dL (12 Apr 2020 08:48)  POCT Blood Glucose.: 184 mg/dL (11 Apr 2020 22:12)  POCT Blood Glucose.: 114 mg/dL (11 Apr 2020 17:50)        PHYSICAL EXAM:  GENERAL: NAD, Obese male  CHEST/LUNG: Breathing comfortably on NC, no accessory muscle use  HEART:  tachycardia on vital signs  ABDOMEN: Obese, Soft, Nontender, Nondistended  EXTREMITIES: no LE edema  PSYCH: Calm  NEUROLOGY: AA, answers questions appropriately        LABS:                          13.1   18.07 )-----------( 391      ( 12 Apr 2020 05:53 )             41.9   04-12    138  |  101  |  28<H>  ----------------------------<  145<H>  4.3   |  24  |  0.76    Ca    9.1      12 Apr 2020 05:53  Phos  4.4     04-12  Mg     2.4     04-12    TPro  6.9  /  Alb  3.3  /  TBili  0.4  /  DBili  x   /  AST  32  /  ALT  49<H>  /  AlkPhos  50  04-12    Procalcitonin, Serum: 0.07 ng/mL (04-08-20 @ 05:54)  Procalcitonin, Serum: 0.28 ng/mL (04-05-20 @ 05:30)    C-Reactive Protein, Serum: 14.0 mg/L (04-10-20 @ 07:12)  C-Reactive Protein, Serum: 30.9 mg/L (04-08-20 @ 05:54)  C-Reactive Protein, Serum: 308.7 mg/L (04-04-20 @ 19:33)      D-Dimer Assay, Quantitative: 442 ng/mL (04-10-20 @ 07:12)  D-Dimer Assay, Quantitative: 346 ng/mL (04-05-20 @ 05:30)    Ferritin, Serum: 381.7 ng/mL (04-10-20 @ 07:12)  Ferritin, Serum: 412.0 ng/mL (04-08-20 @ 05:54)  Ferritin, Serum: 438.8 ng/mL (04-05-20 @ 05:30)      RADIOLOGY & ADDITIONAL TESTS:    Imaging Personally Reviewed:    Care Discussed with Consultants/Other Providers:

## 2020-04-12 NOTE — PROGRESS NOTE ADULT - PROBLEM SELECTOR PLAN 5
- resident at [facility]  - c/w home medications (benztropine, clozapine, sertraline)  -c/s behavioral health  -abilify depot shot monthly, next dose on april 24th

## 2020-04-12 NOTE — PROGRESS NOTE ADULT - PROBLEM SELECTOR PLAN 1
- hypoxic to 70s with EMS, now on NC 4L and saturating 94%  - COVID-19 PCR positive, QTc 439  - continue albuterol HFA PRN  - s/p course of plaquenil and steroids  - cont to monitor respiratory status closely, wean as tolerated

## 2020-04-13 PROCEDURE — 99232 SBSQ HOSP IP/OBS MODERATE 35: CPT

## 2020-04-13 RX ORDER — ANAKINRA 100MG/0.67
100 SYRINGE (ML) SUBCUTANEOUS EVERY 24 HOURS
Refills: 0 | Status: DISCONTINUED | OUTPATIENT
Start: 2020-04-17 | End: 2020-04-17

## 2020-04-13 RX ORDER — ANAKINRA 100MG/0.67
100 SYRINGE (ML) SUBCUTANEOUS EVERY 12 HOURS
Refills: 0 | Status: COMPLETED | OUTPATIENT
Start: 2020-04-14 | End: 2020-04-16

## 2020-04-13 RX ORDER — ANAKINRA 100MG/0.67
SYRINGE (ML) SUBCUTANEOUS
Refills: 0 | Status: DISCONTINUED | OUTPATIENT
Start: 2020-04-14 | End: 2020-04-17

## 2020-04-13 RX ADMIN — CLOZAPINE 50 MILLIGRAM(S): 150 TABLET, ORALLY DISINTEGRATING ORAL at 22:28

## 2020-04-13 RX ADMIN — PANTOPRAZOLE SODIUM 40 MILLIGRAM(S): 20 TABLET, DELAYED RELEASE ORAL at 05:13

## 2020-04-13 RX ADMIN — NYSTATIN CREAM 1 APPLICATION(S): 100000 CREAM TOPICAL at 13:59

## 2020-04-13 RX ADMIN — Medication 0.5 MILLIGRAM(S): at 17:29

## 2020-04-13 RX ADMIN — ATORVASTATIN CALCIUM 10 MILLIGRAM(S): 80 TABLET, FILM COATED ORAL at 22:29

## 2020-04-13 RX ADMIN — Medication 100 MILLIGRAM(S): at 05:12

## 2020-04-13 RX ADMIN — NYSTATIN CREAM 1 APPLICATION(S): 100000 CREAM TOPICAL at 22:29

## 2020-04-13 RX ADMIN — ENOXAPARIN SODIUM 40 MILLIGRAM(S): 100 INJECTION SUBCUTANEOUS at 22:29

## 2020-04-13 RX ADMIN — Medication 100 MILLIGRAM(S): at 11:49

## 2020-04-13 RX ADMIN — ZINC SULFATE TAB 220 MG (50 MG ZINC EQUIVALENT) 220 MILLIGRAM(S): 220 (50 ZN) TAB at 11:49

## 2020-04-13 RX ADMIN — SERTRALINE 100 MILLIGRAM(S): 25 TABLET, FILM COATED ORAL at 11:49

## 2020-04-13 RX ADMIN — Medication 0.5 MILLIGRAM(S): at 05:13

## 2020-04-13 RX ADMIN — ENOXAPARIN SODIUM 40 MILLIGRAM(S): 100 INJECTION SUBCUTANEOUS at 08:28

## 2020-04-13 RX ADMIN — NYSTATIN CREAM 1 APPLICATION(S): 100000 CREAM TOPICAL at 05:13

## 2020-04-13 RX ADMIN — Medication 100 MILLIGRAM(S): at 11:48

## 2020-04-13 NOTE — PROGRESS NOTE ADULT - SUBJECTIVE AND OBJECTIVE BOX
Patient is a 39y old  Male who presents with a chief complaint of     SUBJECTIVE / OVERNIGHT EVENTS: Asleep this morning but arousable. When sleeping sat in high 80s but improved to low 90s with 6L NC     ROS  MEDICATIONS  (STANDING):  anakinra Injectable 100 milliGRAM(s) SubCutaneous every 6 hours  atorvastatin 10 milliGRAM(s) Oral at bedtime  benztropine 0.5 milliGRAM(s) Oral two times a day  cloZAPine 50 milliGRAM(s) Oral at bedtime  dextrose 5%. 1000 milliLiter(s) (50 mL/Hr) IV Continuous <Continuous>  dextrose 50% Injectable 12.5 Gram(s) IV Push once  dextrose 50% Injectable 25 Gram(s) IV Push once  dextrose 50% Injectable 25 Gram(s) IV Push once  enoxaparin Injectable 40 milliGRAM(s) SubCutaneous every 12 hours  insulin lispro (HumaLOG) corrective regimen sliding scale   SubCutaneous three times a day before meals  insulin lispro (HumaLOG) corrective regimen sliding scale   SubCutaneous at bedtime  methylPREDNISolone sodium succinate Injectable 60 milliGRAM(s) IV Push two times a day  pantoprazole    Tablet 40 milliGRAM(s) Oral before breakfast  sertraline 100 milliGRAM(s) Oral daily  zinc sulfate 220 milliGRAM(s) Oral daily    MEDICATIONS  (PRN):  acetaminophen   Tablet .. 650 milliGRAM(s) Oral every 4 hours PRN Temp greater or equal to 38.5C (101.3F)  ALBUTerol    90 MICROgram(s) HFA Inhaler 2 Puff(s) Inhalation every 4 hours PRN Shortness of Breath and/or Wheezing  benzonatate 100 milliGRAM(s) Oral three times a day PRN Cough  dextrose 40% Gel 15 Gram(s) Oral once PRN Blood Glucose LESS THAN 70 milliGRAM(s)/deciliter  glucagon  Injectable 1 milliGRAM(s) IntraMuscular once PRN Glucose LESS THAN 70 milligrams/deciliter      Vital Signs Last 24 Hrs  T(C): 36.2 (13 Apr 2020 05:10), Max: 36.7 (12 Apr 2020 20:48)  T(F): 97.2 (13 Apr 2020 05:10), Max: 98 (12 Apr 2020 20:48)  HR: 60 (13 Apr 2020 05:10) (60 - 82)  BP: 105/77 (13 Apr 2020 05:10) (105/77 - 125/78)  BP(mean): --  RR: 21 (13 Apr 2020 05:10) (20 - 21)  SpO2: 93% (13 Apr 2020 05:10) (93% - 94%)    CAPILLARY BLOOD GLUCOSE  POCT Blood Glucose.: 104 mg/dL (13 Apr 2020 08:26)  POCT Blood Glucose.: 113 mg/dL (12 Apr 2020 21:48)  POCT Blood Glucose.: 129 mg/dL (12 Apr 2020 17:31)  POCT Blood Glucose.: 127 mg/dL (12 Apr 2020 13:01)        PHYSICAL EXAM:  GENERAL: NAD, Obese male  CHEST/LUNG: Breathing comfortably on NC, no accessory muscle use  HEART: not tachycardia on vital signs  ABDOMEN: Obese, Soft, Nontender, Nondistended  EXTREMITIES: no LE edema  PSYCH: Calm  NEUROLOGY: AA, answers questions appropriately        LABS:                          13.1   18.07 )-----------( 391      ( 12 Apr 2020 05:53 )             41.9   04-12    138  |  101  |  28<H>  ----------------------------<  145<H>  4.3   |  24  |  0.76    Ca    9.1      12 Apr 2020 05:53  Phos  4.4     04-12  Mg     2.4     04-12    TPro  6.9  /  Alb  3.3  /  TBili  0.4  /  DBili  x   /  AST  32  /  ALT  49<H>  /  AlkPhos  50  04-12  	    Procalcitonin, Serum: 0.07 ng/mL (04-08-20 @ 05:54)  Procalcitonin, Serum: 0.28 ng/mL (04-05-20 @ 05:30)    C-Reactive Protein, Serum: 14.0 mg/L (04-10-20 @ 07:12)  C-Reactive Protein, Serum: 30.9 mg/L (04-08-20 @ 05:54)  C-Reactive Protein, Serum: 308.7 mg/L (04-04-20 @ 19:33)      D-Dimer Assay, Quantitative: 442 ng/mL (04-10-20 @ 07:12)  D-Dimer Assay, Quantitative: 346 ng/mL (04-05-20 @ 05:30)    Ferritin, Serum: 381.7 ng/mL (04-10-20 @ 07:12)  Ferritin, Serum: 412.0 ng/mL (04-08-20 @ 05:54)  Ferritin, Serum: 438.8 ng/mL (04-05-20 @ 05:30)      RADIOLOGY & ADDITIONAL TESTS:    Imaging Personally Reviewed:    Care Discussed with Consultants/Other Providers:

## 2020-04-13 NOTE — PROGRESS NOTE ADULT - PROBLEM SELECTOR PLAN 1
- hypoxic to 70s with EMS, now on NC 6L and saturating low to mid 90s%  - COVID-19 PCR positive, QTc 439  - continue albuterol HFA PRN  - s/p course of plaquenil and steroids  -currently on course of anakinra Day 3. Will continue with a tapered course.   - cont to monitor respiratory status closely, wean as tolerated

## 2020-04-13 NOTE — PROGRESS NOTE ADULT - PROBLEM SELECTOR PLAN 5
- resident at [facility]  - c/w home medications (benztropine, clozapine, sertraline)  -c/s behavioral health  -abilify depot shot monthly, next dose on april 24th

## 2020-04-14 PROCEDURE — 99232 SBSQ HOSP IP/OBS MODERATE 35: CPT

## 2020-04-14 RX ADMIN — NYSTATIN CREAM 1 APPLICATION(S): 100000 CREAM TOPICAL at 05:01

## 2020-04-14 RX ADMIN — ATORVASTATIN CALCIUM 10 MILLIGRAM(S): 80 TABLET, FILM COATED ORAL at 21:32

## 2020-04-14 RX ADMIN — Medication 100 MILLIGRAM(S): at 05:04

## 2020-04-14 RX ADMIN — Medication 0.5 MILLIGRAM(S): at 17:16

## 2020-04-14 RX ADMIN — SERTRALINE 100 MILLIGRAM(S): 25 TABLET, FILM COATED ORAL at 11:59

## 2020-04-14 RX ADMIN — Medication 0.5 MILLIGRAM(S): at 05:01

## 2020-04-14 RX ADMIN — ENOXAPARIN SODIUM 40 MILLIGRAM(S): 100 INJECTION SUBCUTANEOUS at 21:32

## 2020-04-14 RX ADMIN — ZINC SULFATE TAB 220 MG (50 MG ZINC EQUIVALENT) 220 MILLIGRAM(S): 220 (50 ZN) TAB at 11:59

## 2020-04-14 RX ADMIN — PANTOPRAZOLE SODIUM 40 MILLIGRAM(S): 20 TABLET, DELAYED RELEASE ORAL at 05:01

## 2020-04-14 RX ADMIN — Medication 100 MILLIGRAM(S): at 17:16

## 2020-04-14 RX ADMIN — CLOZAPINE 50 MILLIGRAM(S): 150 TABLET, ORALLY DISINTEGRATING ORAL at 21:32

## 2020-04-14 RX ADMIN — ENOXAPARIN SODIUM 40 MILLIGRAM(S): 100 INJECTION SUBCUTANEOUS at 08:17

## 2020-04-14 NOTE — PROGRESS NOTE ADULT - PROBLEM SELECTOR PLAN 5
- resident at [facility]  - c/w home medications (benztropine, clozapine, sertraline)  -c/s behavioral health  -abilify depot shot monthly, next dose on april 24th

## 2020-04-14 NOTE — PROGRESS NOTE ADULT - SUBJECTIVE AND OBJECTIVE BOX
Patient is a 39y old  Male who presents with a chief complaint of     SUBJECTIVE / OVERNIGHT EVENTS: Denies any complaints. Feels well. Able to transition to 3L NC today     ROS  MEDICATIONS  (STANDING):  anakinra Injectable 100 milliGRAM(s) SubCutaneous every 6 hours  atorvastatin 10 milliGRAM(s) Oral at bedtime  benztropine 0.5 milliGRAM(s) Oral two times a day  cloZAPine 50 milliGRAM(s) Oral at bedtime  dextrose 5%. 1000 milliLiter(s) (50 mL/Hr) IV Continuous <Continuous>  dextrose 50% Injectable 12.5 Gram(s) IV Push once  dextrose 50% Injectable 25 Gram(s) IV Push once  dextrose 50% Injectable 25 Gram(s) IV Push once  enoxaparin Injectable 40 milliGRAM(s) SubCutaneous every 12 hours  insulin lispro (HumaLOG) corrective regimen sliding scale   SubCutaneous three times a day before meals  insulin lispro (HumaLOG) corrective regimen sliding scale   SubCutaneous at bedtime  methylPREDNISolone sodium succinate Injectable 60 milliGRAM(s) IV Push two times a day  pantoprazole    Tablet 40 milliGRAM(s) Oral before breakfast  sertraline 100 milliGRAM(s) Oral daily  zinc sulfate 220 milliGRAM(s) Oral daily    MEDICATIONS  (PRN):  acetaminophen   Tablet .. 650 milliGRAM(s) Oral every 4 hours PRN Temp greater or equal to 38.5C (101.3F)  ALBUTerol    90 MICROgram(s) HFA Inhaler 2 Puff(s) Inhalation every 4 hours PRN Shortness of Breath and/or Wheezing  benzonatate 100 milliGRAM(s) Oral three times a day PRN Cough  dextrose 40% Gel 15 Gram(s) Oral once PRN Blood Glucose LESS THAN 70 milliGRAM(s)/deciliter  glucagon  Injectable 1 milliGRAM(s) IntraMuscular once PRN Glucose LESS THAN 70 milligrams/deciliter      Vital Signs Last 24 Hrs  T(C): 36.3 (14 Apr 2020 11:13), Max: 36.3 (13 Apr 2020 22:27)  T(F): 97.4 (14 Apr 2020 11:13), Max: 97.4 (13 Apr 2020 22:27)  HR: 89 (14 Apr 2020 11:13) (86 - 90)  BP: 116/80 (14 Apr 2020 11:13) (110/60 - 118/89)  BP(mean): --  RR: 20 (14 Apr 2020 11:13) (20 - 22)  SpO2: 94% (14 Apr 2020 11:13) (93% - 94%)    CAPILLARY BLOOD GLUCOSE  POCT Blood Glucose.: 91 mg/dL (14 Apr 2020 12:13)  POCT Blood Glucose.: 112 mg/dL (14 Apr 2020 08:26)  POCT Blood Glucose.: 117 mg/dL (13 Apr 2020 21:27)  POCT Blood Glucose.: 117 mg/dL (13 Apr 2020 17:11)      PHYSICAL EXAM:  GENERAL: NAD, Obese male  CHEST/LUNG: Breathing comfortably on NC, no accessory muscle use  HEART: not tachycardia on vital signs  ABDOMEN: Obese, Soft, Nontender, Nondistended  EXTREMITIES: no LE edema  PSYCH: Calm  NEUROLOGY: AA, answers questions appropriately        LABS:      	    Procalcitonin, Serum: 0.07 ng/mL (04-08-20 @ 05:54)  Procalcitonin, Serum: 0.28 ng/mL (04-05-20 @ 05:30)    C-Reactive Protein, Serum: 14.0 mg/L (04-10-20 @ 07:12)  C-Reactive Protein, Serum: 30.9 mg/L (04-08-20 @ 05:54)  C-Reactive Protein, Serum: 308.7 mg/L (04-04-20 @ 19:33)      D-Dimer Assay, Quantitative: 442 ng/mL (04-10-20 @ 07:12)  D-Dimer Assay, Quantitative: 346 ng/mL (04-05-20 @ 05:30)    Ferritin, Serum: 381.7 ng/mL (04-10-20 @ 07:12)  Ferritin, Serum: 412.0 ng/mL (04-08-20 @ 05:54)  Ferritin, Serum: 438.8 ng/mL (04-05-20 @ 05:30)      RADIOLOGY & ADDITIONAL TESTS:    Imaging Personally Reviewed:    Care Discussed with Consultants/Other Providers:

## 2020-04-15 ENCOUNTER — TRANSCRIPTION ENCOUNTER (OUTPATIENT)
Age: 40
End: 2020-04-15

## 2020-04-15 LAB
ANION GAP SERPL CALC-SCNC: 11 MMO/L — SIGNIFICANT CHANGE UP (ref 7–14)
BUN SERPL-MCNC: 19 MG/DL — SIGNIFICANT CHANGE UP (ref 7–23)
CALCIUM SERPL-MCNC: 9.2 MG/DL — SIGNIFICANT CHANGE UP (ref 8.4–10.5)
CHLORIDE SERPL-SCNC: 101 MMOL/L — SIGNIFICANT CHANGE UP (ref 98–107)
CO2 SERPL-SCNC: 25 MMOL/L — SIGNIFICANT CHANGE UP (ref 22–31)
CREAT SERPL-MCNC: 0.89 MG/DL — SIGNIFICANT CHANGE UP (ref 0.5–1.3)
CRP SERPL-MCNC: 18.1 MG/L — HIGH
GLUCOSE SERPL-MCNC: 119 MG/DL — HIGH (ref 70–99)
HCT VFR BLD CALC: 42.5 % — SIGNIFICANT CHANGE UP (ref 39–50)
HGB BLD-MCNC: 13.1 G/DL — SIGNIFICANT CHANGE UP (ref 13–17)
MCHC RBC-ENTMCNC: 27.9 PG — SIGNIFICANT CHANGE UP (ref 27–34)
MCHC RBC-ENTMCNC: 30.8 % — LOW (ref 32–36)
MCV RBC AUTO: 90.6 FL — SIGNIFICANT CHANGE UP (ref 80–100)
NRBC # FLD: 0 K/UL — SIGNIFICANT CHANGE UP (ref 0–0)
PLATELET # BLD AUTO: 272 K/UL — SIGNIFICANT CHANGE UP (ref 150–400)
PMV BLD: 11.3 FL — SIGNIFICANT CHANGE UP (ref 7–13)
POTASSIUM SERPL-MCNC: 4.1 MMOL/L — SIGNIFICANT CHANGE UP (ref 3.5–5.3)
POTASSIUM SERPL-SCNC: 4.1 MMOL/L — SIGNIFICANT CHANGE UP (ref 3.5–5.3)
PROCALCITONIN SERPL-MCNC: 0.1 NG/ML — SIGNIFICANT CHANGE UP (ref 0.02–0.1)
RBC # BLD: 4.69 M/UL — SIGNIFICANT CHANGE UP (ref 4.2–5.8)
RBC # FLD: 14.9 % — HIGH (ref 10.3–14.5)
SODIUM SERPL-SCNC: 137 MMOL/L — SIGNIFICANT CHANGE UP (ref 135–145)
WBC # BLD: 11.85 K/UL — HIGH (ref 3.8–10.5)
WBC # FLD AUTO: 11.85 K/UL — HIGH (ref 3.8–10.5)

## 2020-04-15 PROCEDURE — 99232 SBSQ HOSP IP/OBS MODERATE 35: CPT | Mod: CS

## 2020-04-15 RX ADMIN — ZINC SULFATE TAB 220 MG (50 MG ZINC EQUIVALENT) 220 MILLIGRAM(S): 220 (50 ZN) TAB at 16:25

## 2020-04-15 RX ADMIN — ENOXAPARIN SODIUM 40 MILLIGRAM(S): 100 INJECTION SUBCUTANEOUS at 21:20

## 2020-04-15 RX ADMIN — Medication 0.5 MILLIGRAM(S): at 16:54

## 2020-04-15 RX ADMIN — PANTOPRAZOLE SODIUM 40 MILLIGRAM(S): 20 TABLET, DELAYED RELEASE ORAL at 05:10

## 2020-04-15 RX ADMIN — Medication 100 MILLIGRAM(S): at 05:10

## 2020-04-15 RX ADMIN — ATORVASTATIN CALCIUM 10 MILLIGRAM(S): 80 TABLET, FILM COATED ORAL at 21:20

## 2020-04-15 RX ADMIN — SERTRALINE 100 MILLIGRAM(S): 25 TABLET, FILM COATED ORAL at 16:25

## 2020-04-15 RX ADMIN — Medication 0.5 MILLIGRAM(S): at 05:10

## 2020-04-15 RX ADMIN — CLOZAPINE 50 MILLIGRAM(S): 150 TABLET, ORALLY DISINTEGRATING ORAL at 21:20

## 2020-04-15 RX ADMIN — Medication 100 MILLIGRAM(S): at 16:54

## 2020-04-15 RX ADMIN — ENOXAPARIN SODIUM 40 MILLIGRAM(S): 100 INJECTION SUBCUTANEOUS at 09:33

## 2020-04-15 NOTE — DIETITIAN INITIAL EVALUATION ADULT. - OTHER INFO
40 y/o M with hx schizophrenia BIBEMS with SOB and hypoxic resp failure 2/2 COVID-19. Collateral obtained from chart review. A&Ox2-3 per nursing. Unable to assess PO intake. No GI distress (nausea/vomiting/diarrhea/constipation) per chart. Last BM 4/13 with "fecal impaction." No chewing or swallowing difficulties at this time per chart. Unable to assess wt history.

## 2020-04-15 NOTE — DIETITIAN INITIAL EVALUATION ADULT. - PROBLEM SELECTOR PLAN 7
- A1C with AM labs  - at home on metformin  - SSI and FS qac/hs, patient started on steroids may need to be started on standing premeal and bedtime if steroid induced hyperglycemia

## 2020-04-15 NOTE — DISCHARGE NOTE PROVIDER - NSDCFUSCHEDAPPT_GEN_ALL_CORE_FT
ELKE INTERIANO ; 04/30/2020 ; [facility] [id number] [facility] ; 04/30/2020 ; [facility] [id number] [facility] ; 04/30/2020 ; [facility] [id number] [facility] ; 04/30/2020 ; [facility] [id number]

## 2020-04-15 NOTE — DIETITIAN INITIAL EVALUATION ADULT. - PROBLEM SELECTOR PLAN 6
- resident at [facility]  - c/w home medications (benztropine, clozapine, sertraline)  - monitor EKG for QTc in setting of Plaquenil and antipsychotic medications

## 2020-04-15 NOTE — DIETITIAN INITIAL EVALUATION ADULT. - PROBLEM SELECTOR PLAN 1
- hypoxic to 70s with EMS, now 90s on NRB  - likely 2/2 COVID-19 infection, management as below  - albuterol HFA PRN, s/p solumedrol 125mg IV x1, c/w 60mg BID  - monitor VS [id number]

## 2020-04-15 NOTE — DIETITIAN INITIAL EVALUATION ADULT. - PERTINENT LABORATORY DATA
04-15 Na 137 mmol/L Glu 119 mg/dL<H> K+ 4.1 mmol/L Cr 0.89 mg/dL BUN 19 mg/dL Phos n/a    04-07-20 HbA1c 6.6 %  04-15 @ 08:48 POCT 108 mg/dL  04-14 @ 22:28 POCT 136 mg/dL  04-14 @ 16:43 POCT 97 mg/dL  04-14 @ 12:13 POCT 91 mg/dL

## 2020-04-15 NOTE — DISCHARGE NOTE PROVIDER - NSDCCPCAREPLAN_GEN_ALL_CORE_FT
PRINCIPAL DISCHARGE DIAGNOSIS  Diagnosis: Acute respiratory failure with hypoxia  Assessment and Plan of Treatment: You have been diagnosed with the COVID-19 virus during your hospital stay. You must self quarantine to complete a 14 day time period.  Monitor for fevers, shortness of breath and cough primarily.  Monitor your temperature daily to not any changes and increases.    It has been determined that you no longer need hospitalization and can recover while remaining in self-quarantine at home. You should follow the prevention steps below until a healthcare provider or local or state health department says you can return to your normal activities.  1. You should restrict activities outside your home, except for getting medical care.  2. Do not go to work, school, or public areas.  3. Avoid using public transportation, ride-sharing, or taxis.  4. Separate yourself from other people and animals in your home.  People: As much as possible, you should stay in a specific room and away from other people in your home. Also, you should use a separate bathroom, if available.  Animals: You should restrict contact with pets and other animals while you are sick with COVID-19, just like you would around other people. Although there have not been reports of pets or other animals becoming sick with COVID-19, it is still recommended that people sick with COVID-19 limit contact with animals until more information is known about the virus.  When possible, have another member of your household care for your animals while you are sick. If you must care for your pet or be around animals while you are sick, wash your hands before and after you interact with pets and wear a facemask.  5. Call ahead before visiting your doctor.  If you have a medical appointment, call the healthcare provider and tell them that you have or may have COVID-19. This will help the healthcare provider’s office take steps to keep other people from getting infected or exposed.  6. Wear a facemask.  You should wear a facemask when you are around other people (e.g., sharing a room or vehicle) or pets and before you enter a healthcare pro      SECONDARY DISCHARGE DIAGNOSES  Diagnosis: Type 2 diabetes mellitus without complication, without long-term current use of insulin  Assessment and Plan of Treatment: Continue your medication regimen and a consistent carbohydrate diet (Meaning eating the same amount of carbohydrates at the same time each day). Monitor blood glucose levels throughout the day before meals and at bedtime. Record blood sugars and bring to outpatient providers appointment in order to be reviewed by your doctor for management modifications. If your sugars are more than 400 or less than 70 you should contact your PCP immediately. Monitor for signs/symptoms of low blood glucose, such as, dizziness, altered mental status, or cool/clammy skin. In addition, monitor for signs/symptoms of high blood glucose, such as, feeling hot, dry, fatigued, or with increased thirst/urination. Make regular podiatry appointments in order to have feet checked for wounds and uncontrolled toe nail growth to prevent infections, as well as, appointments with an ophthalmologist to monitor your vision.      Diagnosis: GABRIELLE (acute kidney injury)  Assessment and Plan of Treatment: In order to prevent further disease progression, continue to follow recommendations made by your primary provider/nephrologist. Continue a diet that is low in sodium and avoid foods that are concentrated in potassium and phosphorus. Continue your medications/supplementations as directed and avoid over-the-counter drugs that are harmful to kidneys, such as, Non-Steroidal Anti-Inflammatory Drugs (NSAIDs). Follow-up as outpatient to monitor your kidney function, as well as, vitamin D, Calcium, potassium, and phosphorus levels.      Diagnosis: Schizophrenia  Assessment and Plan of Treatment: Continue your medications as directed and follow up with your PCP and psychiatrist for further evaluation and medical management. If you are ever in need of immediate psychiatric assistance you may reach out to the [facility] [phone].      Diagnosis: Pneumonia  Assessment and Plan of Treatment: You Completed antibiotic therapy as directed.  Monitor for any further signs and symptoms of further infection including but not limited to fevers, rigors, chills and or difficulty breathing.      Diagnosis: Hypertension  Assessment and Plan of Treatment: Continue blood pressure medication regimen as directed. Monitor for any visual changes, headaches or dizziness.  Monitor blood pressure regularly.  Follow up with your PCP for further management for high blood pressure. PRINCIPAL DISCHARGE DIAGNOSIS  Diagnosis: Acute respiratory failure with hypoxia  Assessment and Plan of Treatment: You have been diagnosed with the COVID-19 virus during your hospital stay.  Repeat COVID testing on 4/16/20 was negative.  You must self quarantine to complete a 14 day time period (last day 4/18/2020).  Monitor for fevers, shortness of breath and cough primarily.  Monitor your temperature daily to not any changes and increases.    It has been determined that you no longer need hospitalization and can recover while remaining in self-quarantine at home. You should follow the prevention steps below until a healthcare provider or local or state health department says you can return to your normal activities.  1. You should restrict activities outside your home, except for getting medical care.  2. Do not go to work, school, or public areas.  3. Avoid using public transportation, ride-sharing, or taxis.  4. Separate yourself from other people and animals in your home.  People: As much as possible, you should stay in a specific room and away from other people in your home. Also, you should use a separate bathroom, if available.  Animals: You should restrict contact with pets and other animals while you are sick with COVID-19, just like you would around other people. Although there have not been reports of pets or other animals becoming sick with COVID-19, it is still recommended that people sick with COVID-19 limit contact with animals until more information is known about the virus.  When possible, have another member of your household care for your animals while you are sick. If you must care for your pet or be around animals while you are sick, wash your hands before and after you interact with pets and wear a facemask.  5. Call ahead before visiting your doctor.  If you have a medical appointment, call the healthcare provider and tell them that you have or may have COVID-19. This will help the healthcare provider’s office take steps to keep other people from getting infected or exposed.  6. Wear a facemask.  You should wear a facemask when you are around other people (e.g., sha      SECONDARY DISCHARGE DIAGNOSES  Diagnosis: Type 2 diabetes mellitus without complication, without long-term current use of insulin  Assessment and Plan of Treatment: Continue your medication regimen and a consistent carbohydrate diet (Meaning eating the same amount of carbohydrates at the same time each day). Monitor blood glucose levels throughout the day before meals and at bedtime. Record blood sugars and bring to outpatient providers appointment in order to be reviewed by your doctor for management modifications. If your sugars are more than 400 or less than 70 you should contact your PCP immediately. Monitor for signs/symptoms of low blood glucose, such as, dizziness, altered mental status, or cool/clammy skin. In addition, monitor for signs/symptoms of high blood glucose, such as, feeling hot, dry, fatigued, or with increased thirst/urination. Make regular podiatry appointments in order to have feet checked for wounds and uncontrolled toe nail growth to prevent infections, as well as, appointments with an ophthalmologist to monitor your vision.      Diagnosis: GABRIELLE (acute kidney injury)  Assessment and Plan of Treatment: In order to prevent further disease progression, continue to follow recommendations made by your primary provider/nephrologist. Continue a diet that is low in sodium and avoid foods that are concentrated in potassium and phosphorus. Continue your medications/supplementations as directed and avoid over-the-counter drugs that are harmful to kidneys, such as, Non-Steroidal Anti-Inflammatory Drugs (NSAIDs). Follow-up as outpatient to monitor your kidney function, as well as, vitamin D, Calcium, potassium, and phosphorus levels.      Diagnosis: Schizophrenia  Assessment and Plan of Treatment: Continue your medications as directed and follow up with your PCP and psychiatrist for further evaluation and medical management. If you are ever in need of immediate psychiatric assistance you may reach out to the [facility] [phone].      Diagnosis: Pneumonia  Assessment and Plan of Treatment: You Completed antibiotic therapy  Monitor for any further signs and symptoms of further infection including but not limited to fevers, rigors, chills and or difficulty breathing.      Diagnosis: Hypertension  Assessment and Plan of Treatment: Continue blood pressure medication regimen as directed. Monitor for any visual changes, headaches or dizziness.  Monitor blood pressure regularly.  Follow up with your PCP for further management for high blood pressure.

## 2020-04-15 NOTE — DIETITIAN INITIAL EVALUATION ADULT. - PROBLEM SELECTOR PLAN 4
- SCr. 1.93, previously 0.7-0.8 in 2016  - likely pre-renal in setting of viral infection  - s/w 1L NS bolus, will given another 500 cc over 5 hours  - trend daily BMP, renally dose medications

## 2020-04-15 NOTE — DIETITIAN INITIAL EVALUATION ADULT. - PERTINENT MEDS FT
MEDICATIONS  (STANDING):  anakinra Injectable 100 milliGRAM(s) SubCutaneous every 12 hours  atorvastatin 10 milliGRAM(s) Oral at bedtime  benztropine 0.5 milliGRAM(s) Oral two times a day  cloZAPine 50 milliGRAM(s) Oral at bedtime  dextrose 5%. 1000 milliLiter(s) (50 mL/Hr) IV Continuous <Continuous>  dextrose 50% Injectable 12.5 Gram(s) IV Push once  dextrose 50% Injectable 25 Gram(s) IV Push once  dextrose 50% Injectable 25 Gram(s) IV Push once  enoxaparin Injectable 40 milliGRAM(s) SubCutaneous every 12 hours  insulin lispro (HumaLOG) corrective regimen sliding scale   SubCutaneous three times a day before meals  insulin lispro (HumaLOG) corrective regimen sliding scale   SubCutaneous at bedtime  nystatin Powder 1 Application(s) Topical three times a day  pantoprazole    Tablet 40 milliGRAM(s) Oral before breakfast  sertraline 100 milliGRAM(s) Oral daily  zinc sulfate 220 milliGRAM(s) Oral daily

## 2020-04-15 NOTE — DISCHARGE NOTE PROVIDER - NSFOLLOWUPCLINICS_GEN_ALL_ED_FT
[facility]  Psychiatry  [id number] [place] [address]  Phone: [phone]  Fax:   Follow Up Time:

## 2020-04-15 NOTE — DISCHARGE NOTE PROVIDER - HOSPITAL COURSE
39M hx of schizophrenia and asthma BIBEMS from [facility] for worsening SOB. Patient had been having SOB, cough, and fevers for one week, exposed to +COVID at [facility]. EMS found patient hypoxic to 70s, improved to 90s on NRB.        Pt was admitted to the hospital and was found to have COVID 19. Pt was treated with ---- plaquenil, steroids, IL1 inhibitor---- . Pt was also managed supportively with oxygen supplementation. Pt was weaned off supplemental oxygen and was saturating well on room air prior to discharge.         Dispo- On ___ this case was reviewed with  ____, the patient is medically stable and optimized for discharge. All medications were reviewed and prescriptions were sent to mutually agreed upon pharmacy. 39M hx of schizophrenia and asthma BIBEMS from [facility] for worsening SOB. Patient had been having SOB, cough, and fevers for one week, exposed to +COVID at [facility]. EMS found patient hypoxic to 70s, improved to 90s on NRB.        Pt was admitted to the hospital and was found to have COVID 19 (om 4/4/20). Pt was treated with plaquenil, steroids, IL1 inhibitor. Pt was also managed supportively with oxygen supplementation. Pt was weaned off supplemental oxygen and was saturating well on room air prior to discharge. Repeat COVID testing on 4/16/20 was negative.        Dispo- On 4/17/20 this case was reviewed with Dr. Malik, the patient is medically stable and optimized for discharge. All medications were reviewed.

## 2020-04-15 NOTE — DIETITIAN INITIAL EVALUATION ADULT. - PROBLEM SELECTOR PLAN 3
- CXR with bilateral opacities, CBC with leukocytosis, elevated CRP  - f/u COVID-19 PCR, LDH, Procalcitonin, D-Dimer, Ferritin, CK, Troponin    - although patient is on multiple QT prolonging medications, QTc is currently 439ms - will start Plaquenil, and continue with antipsychotics as these medications are for schizophrenia, monitor daily QTc   - s/p solumedrol 125mg mg IV x1, c/w 60mg IV BID as patient on NRB  - supportive care with PRN tessalon perles, acetaminophen for fever  - currently on NRB, if decompensates will need to intubate as noninvasive ventilation not an option

## 2020-04-15 NOTE — PROGRESS NOTE ADULT - SUBJECTIVE AND OBJECTIVE BOX
Patient is a 39y old  Male who presents with a chief complaint of     SUBJECTIVE / OVERNIGHT EVENTS: Denies any complaints. Feels well. Able to transition to 2L NC today     ROS  MEDICATIONS  (STANDING):  anakinra Injectable 100 milliGRAM(s) SubCutaneous every 6 hours  atorvastatin 10 milliGRAM(s) Oral at bedtime  benztropine 0.5 milliGRAM(s) Oral two times a day  cloZAPine 50 milliGRAM(s) Oral at bedtime  dextrose 5%. 1000 milliLiter(s) (50 mL/Hr) IV Continuous <Continuous>  dextrose 50% Injectable 12.5 Gram(s) IV Push once  dextrose 50% Injectable 25 Gram(s) IV Push once  dextrose 50% Injectable 25 Gram(s) IV Push once  enoxaparin Injectable 40 milliGRAM(s) SubCutaneous every 12 hours  insulin lispro (HumaLOG) corrective regimen sliding scale   SubCutaneous three times a day before meals  insulin lispro (HumaLOG) corrective regimen sliding scale   SubCutaneous at bedtime  methylPREDNISolone sodium succinate Injectable 60 milliGRAM(s) IV Push two times a day  pantoprazole    Tablet 40 milliGRAM(s) Oral before breakfast  sertraline 100 milliGRAM(s) Oral daily  zinc sulfate 220 milliGRAM(s) Oral daily    MEDICATIONS  (PRN):  acetaminophen   Tablet .. 650 milliGRAM(s) Oral every 4 hours PRN Temp greater or equal to 38.5C (101.3F)  ALBUTerol    90 MICROgram(s) HFA Inhaler 2 Puff(s) Inhalation every 4 hours PRN Shortness of Breath and/or Wheezing  benzonatate 100 milliGRAM(s) Oral three times a day PRN Cough  dextrose 40% Gel 15 Gram(s) Oral once PRN Blood Glucose LESS THAN 70 milliGRAM(s)/deciliter  glucagon  Injectable 1 milliGRAM(s) IntraMuscular once PRN Glucose LESS THAN 70 milligrams/deciliter    Vital Signs Last 24 Hrs  T(C): 36.3 (15 Apr 2020 05:08), Max: 36.3 (14 Apr 2020 20:51)  T(F): 97.4 (15 Apr 2020 05:08), Max: 97.4 (14 Apr 2020 21:31)  HR: 84 (15 Apr 2020 05:08) (84 - 100)  BP: 101/65 (15 Apr 2020 05:08) (100/50 - 101/65)  BP(mean): --  RR: 20 (15 Apr 2020 05:08) (20 - 20)  SpO2: 95% (15 Apr 2020 05:08) (95% - 96%)    CAPILLARY BLOOD GLUCOSE      POCT Blood Glucose.: 106 mg/dL (15 Apr 2020 11:54)      PHYSICAL EXAM:  GENERAL: NAD, Obese male  CHEST/LUNG: Breathing comfortably on NC, no accessory muscle use  HEART: not tachycardia on vital signs  ABDOMEN: Obese, Soft, Nontender, Nondistended  EXTREMITIES: no LE edema  PSYCH: Calm  NEUROLOGY: AA, answers questions appropriately        LABS:                          13.1   11.85 )-----------( 272      ( 15 Apr 2020 06:35 )             42.5   04-15    137  |  101  |  19  ----------------------------<  119<H>  4.1   |  25  |  0.89    Ca    9.2      15 Apr 2020 06:35      	    Procalcitonin, Serum: 0.07 ng/mL (04-08-20 @ 05:54)  Procalcitonin, Serum: 0.28 ng/mL (04-05-20 @ 05:30)    C-Reactive Protein, Serum: 14.0 mg/L (04-10-20 @ 07:12)  C-Reactive Protein, Serum: 30.9 mg/L (04-08-20 @ 05:54)  C-Reactive Protein, Serum: 308.7 mg/L (04-04-20 @ 19:33)      D-Dimer Assay, Quantitative: 442 ng/mL (04-10-20 @ 07:12)  D-Dimer Assay, Quantitative: 346 ng/mL (04-05-20 @ 05:30)    Ferritin, Serum: 381.7 ng/mL (04-10-20 @ 07:12)  Ferritin, Serum: 412.0 ng/mL (04-08-20 @ 05:54)  Ferritin, Serum: 438.8 ng/mL (04-05-20 @ 05:30)      RADIOLOGY & ADDITIONAL TESTS:    Imaging Personally Reviewed:    Care Discussed with Consultants/Other Providers:

## 2020-04-15 NOTE — PROGRESS NOTE ADULT - PROBLEM SELECTOR PLAN 5
- resident at [facility]  - c/w home medications (benztropine, clozapine, sertraline)  -c/s behavioral health  -abilify depot shot monthly, next dose on april 24th  -Will need repeat covid testing prior to discharge

## 2020-04-15 NOTE — PROGRESS NOTE ADULT - PROBLEM SELECTOR PLAN 1
- hypoxic to 70s with EMS, now on NC 2L and saturating mid 90s%  - COVID-19 PCR positive, QTc 439  - continue albuterol HFA PRN  - s/p course of plaquenil and steroids  -currently on course of anakinra. Will continue with a tapered course.   - cont to monitor respiratory status closely, wean as tolerated

## 2020-04-16 PROCEDURE — 99232 SBSQ HOSP IP/OBS MODERATE 35: CPT | Mod: CS

## 2020-04-16 RX ADMIN — ENOXAPARIN SODIUM 40 MILLIGRAM(S): 100 INJECTION SUBCUTANEOUS at 20:21

## 2020-04-16 RX ADMIN — Medication 0.5 MILLIGRAM(S): at 17:22

## 2020-04-16 RX ADMIN — SERTRALINE 100 MILLIGRAM(S): 25 TABLET, FILM COATED ORAL at 10:59

## 2020-04-16 RX ADMIN — CLOZAPINE 50 MILLIGRAM(S): 150 TABLET, ORALLY DISINTEGRATING ORAL at 20:22

## 2020-04-16 RX ADMIN — Medication 100 MILLIGRAM(S): at 17:23

## 2020-04-16 RX ADMIN — ENOXAPARIN SODIUM 40 MILLIGRAM(S): 100 INJECTION SUBCUTANEOUS at 10:59

## 2020-04-16 RX ADMIN — Medication 100 MILLIGRAM(S): at 04:57

## 2020-04-16 RX ADMIN — ATORVASTATIN CALCIUM 10 MILLIGRAM(S): 80 TABLET, FILM COATED ORAL at 20:22

## 2020-04-16 RX ADMIN — NYSTATIN CREAM 1 APPLICATION(S): 100000 CREAM TOPICAL at 20:22

## 2020-04-16 RX ADMIN — PANTOPRAZOLE SODIUM 40 MILLIGRAM(S): 20 TABLET, DELAYED RELEASE ORAL at 04:57

## 2020-04-16 RX ADMIN — Medication 0.5 MILLIGRAM(S): at 04:58

## 2020-04-16 NOTE — PROGRESS NOTE ADULT - SUBJECTIVE AND OBJECTIVE BOX
Patient is a 39y old  Male who presents with a chief complaint of     SUBJECTIVE / OVERNIGHT EVENTS: Pt seen and examined at 12:25pm, no overnight events, denies any cough or sob, feels comfortable on nasal canula oxygen, no diarrhea, no other complaints. No other new issues reported.  MEDICATIONS  (STANDING):  anakinra Injectable 100 milliGRAM(s) SubCutaneous every 12 hours  atorvastatin 10 milliGRAM(s) Oral at bedtime  benztropine 0.5 milliGRAM(s) Oral two times a day  cloZAPine 50 milliGRAM(s) Oral at bedtime  dextrose 5%. 1000 milliLiter(s) (50 mL/Hr) IV Continuous <Continuous>  dextrose 50% Injectable 12.5 Gram(s) IV Push once  dextrose 50% Injectable 25 Gram(s) IV Push once  dextrose 50% Injectable 25 Gram(s) IV Push once  enoxaparin Injectable 40 milliGRAM(s) SubCutaneous every 12 hours  insulin lispro (HumaLOG) corrective regimen sliding scale   SubCutaneous three times a day before meals  insulin lispro (HumaLOG) corrective regimen sliding scale   SubCutaneous at bedtime  nystatin Powder 1 Application(s) Topical three times a day  pantoprazole    Tablet 40 milliGRAM(s) Oral before breakfast  sertraline 100 milliGRAM(s) Oral daily  zinc sulfate 220 milliGRAM(s) Oral daily    MEDICATIONS  (PRN):  acetaminophen   Tablet .. 650 milliGRAM(s) Oral every 4 hours PRN Temp greater or equal to 38.5C (101.3F)  ALBUTerol    90 MICROgram(s) HFA Inhaler 2 Puff(s) Inhalation every 4 hours PRN Shortness of Breath and/or Wheezing  benzonatate 100 milliGRAM(s) Oral three times a day PRN Cough  dextrose 40% Gel 15 Gram(s) Oral once PRN Blood Glucose LESS THAN 70 milliGRAM(s)/deciliter  glucagon  Injectable 1 milliGRAM(s) IntraMuscular once PRN Glucose LESS THAN 70 milligrams/deciliter      Vital Signs Last 24 Hrs  T(C): 36.4 (16 Apr 2020 13:17), Max: 36.8 (16 Apr 2020 04:56)  T(F): 97.6 (16 Apr 2020 13:17), Max: 98.2 (16 Apr 2020 04:56)  HR: 83 (16 Apr 2020 13:17) (70 - 83)  BP: 126/71 (16 Apr 2020 13:17) (105/60 - 126/71)  BP(mean): --  RR: 19 (16 Apr 2020 13:17) (19 - 20)  SpO2: 93% (16 Apr 2020 13:17) (93% - 97%)  CAPILLARY BLOOD GLUCOSE      POCT Blood Glucose.: 80 mg/dL (16 Apr 2020 12:07)  POCT Blood Glucose.: 133 mg/dL (16 Apr 2020 08:59)  POCT Blood Glucose.: 120 mg/dL (15 Apr 2020 22:33)  POCT Blood Glucose.: 97 mg/dL (15 Apr 2020 17:09)  POCT Blood Glucose.: 105 mg/dL (15 Apr 2020 16:46)    I&O's Summary    15 Apr 2020 07:01  -  16 Apr 2020 07:00  --------------------------------------------------------  IN: 554 mL / OUT: 1 mL / NET: 553 mL        PHYSICAL EXAM:  GENERAL: NAD, Obese male  CHEST/LUNG: Breathing comfortably on NC, no accessory muscle use  HEART: not tachycardia on vital signs  ABDOMEN: Obese, Soft, Nontender, Nondistended  EXTREMITIES: no LE edema  PSYCH: Calm  NEUROLOGY: AA, answers questions appropriately        LABS:                        13.1   11.85 )-----------( 272      ( 15 Apr 2020 06:35 )             42.5     04-15    137  |  101  |  19  ----------------------------<  119<H>  4.1   |  25  |  0.89    Ca    9.2      15 Apr 2020 06:35                Procalcitonin, Serum: 0.10 ng/mL (04-15-20 @ 06:35)  Procalcitonin, Serum: 0.06 ng/mL (04-10-20 @ 07:12)    C-Reactive Protein, Serum: 18.1 mg/L (04-15-20 @ 06:35)  C-Reactive Protein, Serum: 14.0 mg/L (04-10-20 @ 07:12)      D-Dimer Assay, Quantitative: 442 ng/mL (04-10-20 @ 07:12)    Ferritin, Serum: 381.7 ng/mL (04-10-20 @ 07:12)      RADIOLOGY & ADDITIONAL TESTS:    Imaging Personally Reviewed:    Care Discussed with Consultants/Other Providers:

## 2020-04-16 NOTE — PROGRESS NOTE ADULT - PROBLEM SELECTOR PLAN 5
- resident at [facility]  - c/w home medications (benztropine, clozapine, sertraline)  -c/s behavioral health  -abilify depot shot monthly, next dose on april 24th  -Will need repeat covid testing prior to discharge

## 2020-04-16 NOTE — PROGRESS NOTE ADULT - PROBLEM SELECTOR PLAN 2
- CXR with bilateral opacities, CBC with leukocytosis, elevated CRP  - COVID-19 PCR positive, elevated LDH, Procalcitonin, D-Dimer, Ferritin, CK, Troponin  - although patient is on multiple QT prolonging medications, QTc is currently 439ms - completed Plaquenil  - continue with antipsychotics as these medications are for schizophrenia, monitor daily QTc   - s/p solumedrol 125mg mg IV x1, c/w 60mg IV BID completed 5 day course  - supportive care with PRN tessalon perles, acetaminophen for fever  - currently on NRB, if decompensates will need to intubate as noninvasive ventilation not an option  - rpt covid in preparation for d/c

## 2020-04-16 NOTE — PROGRESS NOTE ADULT - PROBLEM SELECTOR PLAN 1
- hypoxic to 70s with EMS, now on NC 2L and saturating ok  - COVID-19 PCR positive, QTc 439  - continue albuterol HFA PRN  - s/p course of plaquenil and steroids  -currently on course of anakinra. Will continue with a tapered course.   - cont to monitor respiratory status closely, wean as tolerated  - updated aunt brittney on 4/16

## 2020-04-17 ENCOUNTER — TRANSCRIPTION ENCOUNTER (OUTPATIENT)
Age: 40
End: 2020-04-17

## 2020-04-17 VITALS
OXYGEN SATURATION: 95 % | TEMPERATURE: 98 F | DIASTOLIC BLOOD PRESSURE: 70 MMHG | HEART RATE: 91 BPM | SYSTOLIC BLOOD PRESSURE: 122 MMHG | RESPIRATION RATE: 18 BRPM

## 2020-04-17 LAB
ALBUMIN SERPL ELPH-MCNC: 3.5 G/DL — SIGNIFICANT CHANGE UP (ref 3.3–5)
ALP SERPL-CCNC: 65 U/L — SIGNIFICANT CHANGE UP (ref 40–120)
ALT FLD-CCNC: 126 U/L — HIGH (ref 4–41)
ANION GAP SERPL CALC-SCNC: 13 MMO/L — SIGNIFICANT CHANGE UP (ref 7–14)
AST SERPL-CCNC: 54 U/L — HIGH (ref 4–40)
BASOPHILS # BLD AUTO: 0.04 K/UL — SIGNIFICANT CHANGE UP (ref 0–0.2)
BASOPHILS NFR BLD AUTO: 0.5 % — SIGNIFICANT CHANGE UP (ref 0–2)
BILIRUB SERPL-MCNC: 0.3 MG/DL — SIGNIFICANT CHANGE UP (ref 0.2–1.2)
BUN SERPL-MCNC: 16 MG/DL — SIGNIFICANT CHANGE UP (ref 7–23)
CALCIUM SERPL-MCNC: 9.5 MG/DL — SIGNIFICANT CHANGE UP (ref 8.4–10.5)
CHLORIDE SERPL-SCNC: 104 MMOL/L — SIGNIFICANT CHANGE UP (ref 98–107)
CO2 SERPL-SCNC: 24 MMOL/L — SIGNIFICANT CHANGE UP (ref 22–31)
CREAT SERPL-MCNC: 0.83 MG/DL — SIGNIFICANT CHANGE UP (ref 0.5–1.3)
EOSINOPHIL # BLD AUTO: 0 K/UL — SIGNIFICANT CHANGE UP (ref 0–0.5)
EOSINOPHIL NFR BLD AUTO: 0 % — SIGNIFICANT CHANGE UP (ref 0–6)
GLUCOSE SERPL-MCNC: 118 MG/DL — HIGH (ref 70–99)
HCT VFR BLD CALC: 41.7 % — SIGNIFICANT CHANGE UP (ref 39–50)
HGB BLD-MCNC: 13.2 G/DL — SIGNIFICANT CHANGE UP (ref 13–17)
IMM GRANULOCYTES NFR BLD AUTO: 2.8 % — HIGH (ref 0–1.5)
LYMPHOCYTES # BLD AUTO: 2.56 K/UL — SIGNIFICANT CHANGE UP (ref 1–3.3)
LYMPHOCYTES # BLD AUTO: 32.9 % — SIGNIFICANT CHANGE UP (ref 13–44)
MAGNESIUM SERPL-MCNC: 2.2 MG/DL — SIGNIFICANT CHANGE UP (ref 1.6–2.6)
MCHC RBC-ENTMCNC: 28.6 PG — SIGNIFICANT CHANGE UP (ref 27–34)
MCHC RBC-ENTMCNC: 31.7 % — LOW (ref 32–36)
MCV RBC AUTO: 90.5 FL — SIGNIFICANT CHANGE UP (ref 80–100)
MONOCYTES # BLD AUTO: 0.59 K/UL — SIGNIFICANT CHANGE UP (ref 0–0.9)
MONOCYTES NFR BLD AUTO: 7.6 % — SIGNIFICANT CHANGE UP (ref 2–14)
NEUTROPHILS # BLD AUTO: 4.37 K/UL — SIGNIFICANT CHANGE UP (ref 1.8–7.4)
NEUTROPHILS NFR BLD AUTO: 56.2 % — SIGNIFICANT CHANGE UP (ref 43–77)
NRBC # FLD: 0 K/UL — SIGNIFICANT CHANGE UP (ref 0–0)
PHOSPHATE SERPL-MCNC: 4.1 MG/DL — SIGNIFICANT CHANGE UP (ref 2.5–4.5)
PLATELET # BLD AUTO: 264 K/UL — SIGNIFICANT CHANGE UP (ref 150–400)
PMV BLD: 11.5 FL — SIGNIFICANT CHANGE UP (ref 7–13)
POTASSIUM SERPL-MCNC: 4.4 MMOL/L — SIGNIFICANT CHANGE UP (ref 3.5–5.3)
POTASSIUM SERPL-SCNC: 4.4 MMOL/L — SIGNIFICANT CHANGE UP (ref 3.5–5.3)
PROT SERPL-MCNC: 6.6 G/DL — SIGNIFICANT CHANGE UP (ref 6–8.3)
RBC # BLD: 4.61 M/UL — SIGNIFICANT CHANGE UP (ref 4.2–5.8)
RBC # FLD: 14.8 % — HIGH (ref 10.3–14.5)
SARS-COV-2 RNA SPEC QL NAA+PROBE: SIGNIFICANT CHANGE UP
SODIUM SERPL-SCNC: 141 MMOL/L — SIGNIFICANT CHANGE UP (ref 135–145)
WBC # BLD: 7.78 K/UL — SIGNIFICANT CHANGE UP (ref 3.8–10.5)
WBC # FLD AUTO: 7.78 K/UL — SIGNIFICANT CHANGE UP (ref 3.8–10.5)

## 2020-04-17 PROCEDURE — 99239 HOSP IP/OBS DSCHRG MGMT >30: CPT | Mod: CS

## 2020-04-17 RX ORDER — ALBUTEROL 90 UG/1
2 AEROSOL, METERED ORAL
Qty: 0 | Refills: 0 | DISCHARGE
Start: 2020-04-17

## 2020-04-17 RX ORDER — DOCUSATE SODIUM 100 MG
1 CAPSULE ORAL
Qty: 0 | Refills: 0 | DISCHARGE

## 2020-04-17 RX ORDER — NYSTATIN CREAM 100000 [USP'U]/G
1 CREAM TOPICAL
Qty: 0 | Refills: 0 | DISCHARGE
Start: 2020-04-17

## 2020-04-17 RX ORDER — PANTOPRAZOLE SODIUM 20 MG/1
1 TABLET, DELAYED RELEASE ORAL
Qty: 0 | Refills: 0 | DISCHARGE
Start: 2020-04-17

## 2020-04-17 RX ORDER — ACETAMINOPHEN 500 MG
2 TABLET ORAL
Qty: 0 | Refills: 0 | DISCHARGE
Start: 2020-04-17

## 2020-04-17 RX ORDER — ZINC SULFATE TAB 220 MG (50 MG ZINC EQUIVALENT) 220 (50 ZN) MG
1 TAB ORAL
Qty: 0 | Refills: 0 | DISCHARGE
Start: 2020-04-17

## 2020-04-17 RX ORDER — CLOZAPINE 150 MG/1
2 TABLET, ORALLY DISINTEGRATING ORAL
Qty: 0 | Refills: 0 | DISCHARGE

## 2020-04-17 RX ADMIN — ENOXAPARIN SODIUM 40 MILLIGRAM(S): 100 INJECTION SUBCUTANEOUS at 12:51

## 2020-04-17 RX ADMIN — Medication 0.5 MILLIGRAM(S): at 17:38

## 2020-04-17 RX ADMIN — Medication 0.5 MILLIGRAM(S): at 04:16

## 2020-04-17 RX ADMIN — SERTRALINE 100 MILLIGRAM(S): 25 TABLET, FILM COATED ORAL at 12:51

## 2020-04-17 RX ADMIN — PANTOPRAZOLE SODIUM 40 MILLIGRAM(S): 20 TABLET, DELAYED RELEASE ORAL at 04:16

## 2020-04-17 RX ADMIN — Medication 100 MILLIGRAM(S): at 17:38

## 2020-04-17 NOTE — PROGRESS NOTE ADULT - PROBLEM SELECTOR PLAN 2
- CXR with bilateral opacities, CBC with leukocytosis, elevated CRP  - COVID-19 PCR positive, elevated LDH, Procalcitonin, D-Dimer, Ferritin, CK, Troponin  - although patient is on multiple QT prolonging medications, QTc is currently 439ms - completed Plaquenil  - continue with antipsychotics as these medications are for schizophrenia, monitor daily QTc   - s/p solumedrol 125mg mg IV x1, c/w 60mg IV BID completed 5 day course  - supportive care with PRN tessalon perles, acetaminophen for fever  - currently on NRB, if decompensates will need to intubate as noninvasive ventilation not an option  - rpt covid in preparation for d/c - CXR with bilateral opacities, CBC with leukocytosis, elevated CRP  - COVID-19 PCR positive, elevated LDH, Procalcitonin, D-Dimer, Ferritin, CK, Troponin  - although patient is on multiple QT prolonging medications, QTc is currently 439ms - completed Plaquenil  - continue with antipsychotics as these medications are for schizophrenia, monitor daily QTc   - s/p solumedrol 125mg mg IV x1, c/w 60mg IV BID completed 5 day course  - supportive care with PRN tessalon perles, acetaminophen for fever  - currently on NRB, if decompensates will need to intubate as noninvasive ventilation not an option  - rpt covid neg

## 2020-04-17 NOTE — PROGRESS NOTE ADULT - PROBLEM SELECTOR PLAN 1
- hypoxic to 70s with EMS, now on RA and saturating ok  - COVID-19 PCR positive, QTc 439  - continue albuterol HFA PRN  - s/p course of plaquenil and steroids  -currently on course of anakinra. Will continue with a tapered course.   - cont to monitor respiratory status closely, wean as tolerated  - updated aunt brittney on 4/17  - d/c today back to [facility] d/c planning time spent in coordination 40 mts ( preparing d/c summary and plan, discussion with ACP and pt's family)

## 2020-04-17 NOTE — DISCHARGE NOTE NURSING/CASE MANAGEMENT/SOCIAL WORK - NSDCCRNAME_GEN_ALL_CORE_FT
[facility] [address] , [place]  at 430pm SHENG ([address] [facility] [address] [place] [address]) Cedrick Villaseñor Pick-Up 9:30p.m. Trip # [id number]

## 2020-04-17 NOTE — PROGRESS NOTE ADULT - SUBJECTIVE AND OBJECTIVE BOX
Patient is a 39y old  Male who presents with a chief complaint of     SUBJECTIVE / OVERNIGHT EVENTS: Pt seen and examined earlier this am, no overnight events, denies any cough or sob, feels comfortable on RA, no diarrhea, no other complaints. No other new issues reported.      MEDICATIONS  (STANDING):  anakinra Injectable   SubCutaneous   anakinra Injectable 100 milliGRAM(s) SubCutaneous every 24 hours  atorvastatin 10 milliGRAM(s) Oral at bedtime  benztropine 0.5 milliGRAM(s) Oral two times a day  cloZAPine 50 milliGRAM(s) Oral at bedtime  dextrose 5%. 1000 milliLiter(s) (50 mL/Hr) IV Continuous <Continuous>  dextrose 50% Injectable 12.5 Gram(s) IV Push once  dextrose 50% Injectable 25 Gram(s) IV Push once  dextrose 50% Injectable 25 Gram(s) IV Push once  enoxaparin Injectable 40 milliGRAM(s) SubCutaneous every 12 hours  insulin lispro (HumaLOG) corrective regimen sliding scale   SubCutaneous three times a day before meals  insulin lispro (HumaLOG) corrective regimen sliding scale   SubCutaneous at bedtime  nystatin Powder 1 Application(s) Topical three times a day  pantoprazole    Tablet 40 milliGRAM(s) Oral before breakfast  sertraline 100 milliGRAM(s) Oral daily  zinc sulfate 220 milliGRAM(s) Oral daily    MEDICATIONS  (PRN):  acetaminophen   Tablet .. 650 milliGRAM(s) Oral every 4 hours PRN Temp greater or equal to 38.5C (101.3F)  ALBUTerol    90 MICROgram(s) HFA Inhaler 2 Puff(s) Inhalation every 4 hours PRN Shortness of Breath and/or Wheezing  benzonatate 100 milliGRAM(s) Oral three times a day PRN Cough  dextrose 40% Gel 15 Gram(s) Oral once PRN Blood Glucose LESS THAN 70 milliGRAM(s)/deciliter  glucagon  Injectable 1 milliGRAM(s) IntraMuscular once PRN Glucose LESS THAN 70 milligrams/deciliter      Vital Signs Last 24 Hrs  T(C): 36.5 (17 Apr 2020 12:52), Max: 36.5 (17 Apr 2020 12:52)  T(F): 97.7 (17 Apr 2020 12:52), Max: 97.7 (17 Apr 2020 12:52)  HR: 91 (17 Apr 2020 12:52) (85 - 101)  BP: 122/70 (17 Apr 2020 12:52) (114/63 - 122/70)  BP(mean): --  RR: 18 (17 Apr 2020 12:52) (18 - 21)  SpO2: 95% (17 Apr 2020 12:52) (90% - 96%)  CAPILLARY BLOOD GLUCOSE      POCT Blood Glucose.: 81 mg/dL (17 Apr 2020 12:10)  POCT Blood Glucose.: 122 mg/dL (17 Apr 2020 08:49)  POCT Blood Glucose.: 114 mg/dL (16 Apr 2020 22:18)  POCT Blood Glucose.: 114 mg/dL (16 Apr 2020 16:52)    I&O's Summary      PHYSICAL EXAM:  GENERAL: NAD, Obese male  CHEST/LUNG: Breathing comfortably on NC, no accessory muscle use  HEART: not tachycardia on vital signs  ABDOMEN: Obese, Soft, Nontender, Nondistended  EXTREMITIES: no LE edema  PSYCH: Calm  NEUROLOGY: AA, answers questions appropriately      LABS:                        13.2   7.78  )-----------( 264      ( 17 Apr 2020 06:00 )             41.7     04-17    141  |  104  |  16  ----------------------------<  118<H>  4.4   |  24  |  0.83    Ca    9.5      17 Apr 2020 06:00  Phos  4.1     04-17  Mg     2.2     04-17    TPro  6.6  /  Alb  3.5  /  TBili  0.3  /  DBili  x   /  AST  54<H>  /  ALT  126<H>  /  AlkPhos  65  04-17              Procalcitonin, Serum: 0.10 ng/mL (04-15-20 @ 06:35)    C-Reactive Protein, Serum: 18.1 mg/L (04-15-20 @ 06:35)            RADIOLOGY & ADDITIONAL TESTS:    Imaging Personally Reviewed:    Care Discussed with Consultants/Other Providers:

## 2020-04-17 NOTE — DISCHARGE NOTE NURSING/CASE MANAGEMENT/SOCIAL WORK - PATIENT PORTAL LINK FT
You can access the FollowMyHealth Patient Portal offered by [facility] by registering at the following website: [email or web address]. By joining [facility]’s FollowMyHealth portal, you will also be able to view your health information using other applications (apps) compatible with our system.

## 2020-04-17 NOTE — PROGRESS NOTE ADULT - PROBLEM SELECTOR PLAN 5
- resident at [facility]  - c/w home medications (benztropine, clozapine, sertraline)  -c/s behavioral health  -abilify depot shot monthly, next dose on april 24th  -Will need repeat covid testing prior to discharge - resident at [facility]  - c/w home medications (benztropine, clozapine, sertraline)  -c/s behavioral health  -abilify depot shot monthly, next dose on april 24th

## 2020-04-24 ENCOUNTER — INPATIENT (INPATIENT)
Facility: HOSPITAL | Age: 40
LOS: 2 days | Discharge: PSYCHIATRIC OR SUBSTANCE USE FACILITY | End: 2020-04-27
Attending: STUDENT IN AN ORGANIZED HEALTH CARE EDUCATION/TRAINING PROGRAM | Admitting: STUDENT IN AN ORGANIZED HEALTH CARE EDUCATION/TRAINING PROGRAM
Payer: MEDICARE

## 2020-04-24 VITALS
SYSTOLIC BLOOD PRESSURE: 100 MMHG | TEMPERATURE: 99 F | OXYGEN SATURATION: 95 % | HEART RATE: 109 BPM | DIASTOLIC BLOOD PRESSURE: 71 MMHG | RESPIRATION RATE: 15 BRPM

## 2020-04-24 DIAGNOSIS — F20.9 SCHIZOPHRENIA, UNSPECIFIED: ICD-10-CM

## 2020-04-24 DIAGNOSIS — Z29.9 ENCOUNTER FOR PROPHYLACTIC MEASURES, UNSPECIFIED: ICD-10-CM

## 2020-04-24 DIAGNOSIS — E11.9 TYPE 2 DIABETES MELLITUS WITHOUT COMPLICATIONS: ICD-10-CM

## 2020-04-24 DIAGNOSIS — B34.9 VIRAL INFECTION, UNSPECIFIED: ICD-10-CM

## 2020-04-24 DIAGNOSIS — F20.0 PARANOID SCHIZOPHRENIA: ICD-10-CM

## 2020-04-24 DIAGNOSIS — Z02.9 ENCOUNTER FOR ADMINISTRATIVE EXAMINATIONS, UNSPECIFIED: ICD-10-CM

## 2020-04-24 DIAGNOSIS — R26.81 UNSTEADINESS ON FEET: ICD-10-CM

## 2020-04-24 LAB
ALBUMIN SERPL ELPH-MCNC: 3 G/DL — LOW (ref 3.3–5)
ALP SERPL-CCNC: 145 U/L — HIGH (ref 40–120)
ALT FLD-CCNC: 122 U/L — HIGH (ref 4–41)
AMPHET UR-MCNC: NEGATIVE — SIGNIFICANT CHANGE UP
ANION GAP SERPL CALC-SCNC: 13 MMO/L — SIGNIFICANT CHANGE UP (ref 7–14)
APPEARANCE UR: SIGNIFICANT CHANGE UP
AST SERPL-CCNC: 96 U/L — HIGH (ref 4–40)
BACTERIA # UR AUTO: HIGH
BARBITURATES UR SCN-MCNC: NEGATIVE — SIGNIFICANT CHANGE UP
BASE EXCESS BLDV CALC-SCNC: 1.9 MMOL/L — SIGNIFICANT CHANGE UP
BASOPHILS # BLD AUTO: 0.04 K/UL — SIGNIFICANT CHANGE UP (ref 0–0.2)
BASOPHILS NFR BLD AUTO: 0.7 % — SIGNIFICANT CHANGE UP (ref 0–2)
BENZODIAZ UR-MCNC: NEGATIVE — SIGNIFICANT CHANGE UP
BILIRUB SERPL-MCNC: 0.7 MG/DL — SIGNIFICANT CHANGE UP (ref 0.2–1.2)
BILIRUB UR-MCNC: NEGATIVE — SIGNIFICANT CHANGE UP
BLOOD GAS VENOUS - CREATININE: 1 MG/DL — SIGNIFICANT CHANGE UP (ref 0.5–1.3)
BLOOD UR QL VISUAL: SIGNIFICANT CHANGE UP
BUN SERPL-MCNC: 12 MG/DL — SIGNIFICANT CHANGE UP (ref 7–23)
CALCIUM SERPL-MCNC: 9 MG/DL — SIGNIFICANT CHANGE UP (ref 8.4–10.5)
CANNABINOIDS UR-MCNC: NEGATIVE — SIGNIFICANT CHANGE UP
CHLORIDE BLDV-SCNC: 103 MMOL/L — SIGNIFICANT CHANGE UP (ref 96–108)
CHLORIDE SERPL-SCNC: 97 MMOL/L — LOW (ref 98–107)
CK SERPL-CCNC: 86 U/L — SIGNIFICANT CHANGE UP (ref 30–200)
CO2 SERPL-SCNC: 24 MMOL/L — SIGNIFICANT CHANGE UP (ref 22–31)
COCAINE METAB.OTHER UR-MCNC: NEGATIVE — SIGNIFICANT CHANGE UP
COLOR SPEC: YELLOW — SIGNIFICANT CHANGE UP
CREAT SERPL-MCNC: 0.93 MG/DL — SIGNIFICANT CHANGE UP (ref 0.5–1.3)
CRP SERPL-MCNC: 190.8 MG/L — HIGH
D DIMER BLD IA.RAPID-MCNC: 1008 NG/ML — SIGNIFICANT CHANGE UP
EOSINOPHIL # BLD AUTO: 0.03 K/UL — SIGNIFICANT CHANGE UP (ref 0–0.5)
EOSINOPHIL NFR BLD AUTO: 0.5 % — SIGNIFICANT CHANGE UP (ref 0–6)
FERRITIN SERPL-MCNC: 1081 NG/ML — HIGH (ref 30–400)
FOLATE SERPL-MCNC: 18.6 NG/ML — SIGNIFICANT CHANGE UP (ref 4.7–20)
GAS PNL BLDV: 135 MMOL/L — LOW (ref 136–146)
GIANT PLATELETS BLD QL SMEAR: PRESENT — SIGNIFICANT CHANGE UP
GLUCOSE BLDC GLUCOMTR-MCNC: 104 MG/DL — HIGH (ref 70–99)
GLUCOSE BLDC GLUCOMTR-MCNC: 121 MG/DL — HIGH (ref 70–99)
GLUCOSE BLDV-MCNC: 112 MG/DL — HIGH (ref 70–99)
GLUCOSE SERPL-MCNC: 112 MG/DL — HIGH (ref 70–99)
GLUCOSE UR-MCNC: NEGATIVE — SIGNIFICANT CHANGE UP
HCO3 BLDV-SCNC: 26 MMOL/L — SIGNIFICANT CHANGE UP (ref 20–27)
HCT VFR BLD CALC: 32.4 % — LOW (ref 39–50)
HCT VFR BLDV CALC: 26.5 % — LOW (ref 39–51)
HGB BLD-MCNC: 10.9 G/DL — LOW (ref 13–17)
HGB BLDV-MCNC: 8.5 G/DL — LOW (ref 13–17)
HYALINE CASTS # UR AUTO: HIGH
IMM GRANULOCYTES NFR BLD AUTO: 0.9 % — SIGNIFICANT CHANGE UP (ref 0–1.5)
KETONES UR-MCNC: NEGATIVE — SIGNIFICANT CHANGE UP
LACTATE BLDV-MCNC: 2.2 MMOL/L — HIGH (ref 0.5–2)
LDH SERPL L TO P-CCNC: 364 U/L — HIGH (ref 135–225)
LEUKOCYTE ESTERASE UR-ACNC: SIGNIFICANT CHANGE UP
LYMPHOCYTES # BLD AUTO: 1.46 K/UL — SIGNIFICANT CHANGE UP (ref 1–3.3)
LYMPHOCYTES # BLD AUTO: 26.4 % — SIGNIFICANT CHANGE UP (ref 13–44)
MANUAL SMEAR VERIFICATION: SIGNIFICANT CHANGE UP
MCHC RBC-ENTMCNC: 28.7 PG — SIGNIFICANT CHANGE UP (ref 27–34)
MCHC RBC-ENTMCNC: 33.6 % — SIGNIFICANT CHANGE UP (ref 32–36)
MCV RBC AUTO: 85.3 FL — SIGNIFICANT CHANGE UP (ref 80–100)
METHADONE UR-MCNC: NEGATIVE — SIGNIFICANT CHANGE UP
MICROCYTES BLD QL: SLIGHT — SIGNIFICANT CHANGE UP
MONOCYTES # BLD AUTO: 0.61 K/UL — SIGNIFICANT CHANGE UP (ref 0–0.9)
MONOCYTES NFR BLD AUTO: 11 % — SIGNIFICANT CHANGE UP (ref 2–14)
NEUTROPHILS # BLD AUTO: 3.35 K/UL — SIGNIFICANT CHANGE UP (ref 1.8–7.4)
NEUTROPHILS NFR BLD AUTO: 60.5 % — SIGNIFICANT CHANGE UP (ref 43–77)
NITRITE UR-MCNC: NEGATIVE — SIGNIFICANT CHANGE UP
NRBC # FLD: 0 K/UL — SIGNIFICANT CHANGE UP (ref 0–0)
OPIATES UR-MCNC: NEGATIVE — SIGNIFICANT CHANGE UP
OXYCODONE UR-MCNC: NEGATIVE — SIGNIFICANT CHANGE UP
PCO2 BLDV: 37 MMHG — LOW (ref 41–51)
PCP UR-MCNC: NEGATIVE — SIGNIFICANT CHANGE UP
PH BLDV: 7.45 PH — HIGH (ref 7.32–7.43)
PH UR: 6 — SIGNIFICANT CHANGE UP (ref 5–8)
PLATELET # BLD AUTO: 100 K/UL — LOW (ref 150–400)
PLATELET COUNT - ESTIMATE: SIGNIFICANT CHANGE UP
PMV BLD: 12.4 FL — SIGNIFICANT CHANGE UP (ref 7–13)
PO2 BLDV: 48 MMHG — HIGH (ref 35–40)
POTASSIUM BLDV-SCNC: 4.6 MMOL/L — HIGH (ref 3.4–4.5)
POTASSIUM SERPL-MCNC: 3.1 MMOL/L — LOW (ref 3.5–5.3)
POTASSIUM SERPL-SCNC: 3.1 MMOL/L — LOW (ref 3.5–5.3)
PROCALCITONIN SERPL-MCNC: 0.43 NG/ML — HIGH (ref 0.02–0.1)
PROT SERPL-MCNC: 7.2 G/DL — SIGNIFICANT CHANGE UP (ref 6–8.3)
PROT UR-MCNC: 50 — SIGNIFICANT CHANGE UP
RBC # BLD: 3.8 M/UL — LOW (ref 4.2–5.8)
RBC # FLD: 15.2 % — HIGH (ref 10.3–14.5)
RBC CASTS # UR COMP ASSIST: SIGNIFICANT CHANGE UP (ref 0–?)
SAO2 % BLDV: 88 % — HIGH (ref 60–85)
SARS-COV-2 RNA SPEC QL NAA+PROBE: SIGNIFICANT CHANGE UP
SODIUM SERPL-SCNC: 134 MMOL/L — LOW (ref 135–145)
SP GR SPEC: 1.02 — SIGNIFICANT CHANGE UP (ref 1–1.04)
SQUAMOUS # UR AUTO: SIGNIFICANT CHANGE UP
TSH SERPL-MCNC: 5.27 UIU/ML — HIGH (ref 0.27–4.2)
UROBILINOGEN FLD QL: SIGNIFICANT CHANGE UP
VIT B12 SERPL-MCNC: 491 PG/ML — SIGNIFICANT CHANGE UP (ref 200–900)
WBC # BLD: 5.54 K/UL — SIGNIFICANT CHANGE UP (ref 3.8–10.5)
WBC # FLD AUTO: 5.54 K/UL — SIGNIFICANT CHANGE UP (ref 3.8–10.5)
WBC UR QL: >50 — HIGH (ref 0–?)

## 2020-04-24 PROCEDURE — 99223 1ST HOSP IP/OBS HIGH 75: CPT | Mod: 95

## 2020-04-24 PROCEDURE — 99223 1ST HOSP IP/OBS HIGH 75: CPT | Mod: CS,AI,GC

## 2020-04-24 PROCEDURE — 71045 X-RAY EXAM CHEST 1 VIEW: CPT | Mod: 26

## 2020-04-24 RX ORDER — CLOZAPINE 150 MG/1
50 TABLET, ORALLY DISINTEGRATING ORAL AT BEDTIME
Refills: 0 | Status: DISCONTINUED | OUTPATIENT
Start: 2020-04-24 | End: 2020-04-24

## 2020-04-24 RX ORDER — POTASSIUM CHLORIDE 20 MEQ
40 PACKET (EA) ORAL ONCE
Refills: 0 | Status: COMPLETED | OUTPATIENT
Start: 2020-04-24 | End: 2020-04-24

## 2020-04-24 RX ORDER — OLANZAPINE 15 MG/1
2.5 TABLET, FILM COATED ORAL EVERY 6 HOURS
Refills: 0 | Status: DISCONTINUED | OUTPATIENT
Start: 2020-04-24 | End: 2020-04-27

## 2020-04-24 RX ORDER — DEXTROSE 50 % IN WATER 50 %
12.5 SYRINGE (ML) INTRAVENOUS ONCE
Refills: 0 | Status: DISCONTINUED | OUTPATIENT
Start: 2020-04-24 | End: 2020-04-27

## 2020-04-24 RX ORDER — CEFTRIAXONE 500 MG/1
1000 INJECTION, POWDER, FOR SOLUTION INTRAMUSCULAR; INTRAVENOUS ONCE
Refills: 0 | Status: COMPLETED | OUTPATIENT
Start: 2020-04-24 | End: 2020-04-26

## 2020-04-24 RX ORDER — ACETAMINOPHEN 500 MG
650 TABLET ORAL EVERY 6 HOURS
Refills: 0 | Status: DISCONTINUED | OUTPATIENT
Start: 2020-04-24 | End: 2020-04-27

## 2020-04-24 RX ORDER — OLANZAPINE 15 MG/1
2.5 TABLET, FILM COATED ORAL DAILY
Refills: 0 | Status: DISCONTINUED | OUTPATIENT
Start: 2020-04-24 | End: 2020-04-24

## 2020-04-24 RX ORDER — DEXTROSE 50 % IN WATER 50 %
25 SYRINGE (ML) INTRAVENOUS ONCE
Refills: 0 | Status: DISCONTINUED | OUTPATIENT
Start: 2020-04-24 | End: 2020-04-27

## 2020-04-24 RX ORDER — SERTRALINE 25 MG/1
100 TABLET, FILM COATED ORAL DAILY
Refills: 0 | Status: DISCONTINUED | OUTPATIENT
Start: 2020-04-24 | End: 2020-04-27

## 2020-04-24 RX ORDER — ENOXAPARIN SODIUM 100 MG/ML
40 INJECTION SUBCUTANEOUS DAILY
Refills: 0 | Status: DISCONTINUED | OUTPATIENT
Start: 2020-04-24 | End: 2020-04-27

## 2020-04-24 RX ORDER — ACETAMINOPHEN 500 MG
975 TABLET ORAL ONCE
Refills: 0 | Status: COMPLETED | OUTPATIENT
Start: 2020-04-24 | End: 2020-04-24

## 2020-04-24 RX ORDER — CLOZAPINE 150 MG/1
25 TABLET, ORALLY DISINTEGRATING ORAL AT BEDTIME
Refills: 0 | Status: DISCONTINUED | OUTPATIENT
Start: 2020-04-24 | End: 2020-04-27

## 2020-04-24 RX ORDER — BENZTROPINE MESYLATE 1 MG
0.5 TABLET ORAL
Refills: 0 | Status: DISCONTINUED | OUTPATIENT
Start: 2020-04-24 | End: 2020-04-27

## 2020-04-24 RX ORDER — GLUCAGON INJECTION, SOLUTION 0.5 MG/.1ML
1 INJECTION, SOLUTION SUBCUTANEOUS ONCE
Refills: 0 | Status: DISCONTINUED | OUTPATIENT
Start: 2020-04-24 | End: 2020-04-27

## 2020-04-24 RX ORDER — INSULIN LISPRO 100/ML
VIAL (ML) SUBCUTANEOUS
Refills: 0 | Status: DISCONTINUED | OUTPATIENT
Start: 2020-04-24 | End: 2020-04-27

## 2020-04-24 RX ORDER — INSULIN LISPRO 100/ML
VIAL (ML) SUBCUTANEOUS AT BEDTIME
Refills: 0 | Status: DISCONTINUED | OUTPATIENT
Start: 2020-04-24 | End: 2020-04-27

## 2020-04-24 RX ORDER — SODIUM CHLORIDE 9 MG/ML
1000 INJECTION, SOLUTION INTRAVENOUS
Refills: 0 | Status: DISCONTINUED | OUTPATIENT
Start: 2020-04-24 | End: 2020-04-27

## 2020-04-24 RX ORDER — DEXTROSE 50 % IN WATER 50 %
15 SYRINGE (ML) INTRAVENOUS ONCE
Refills: 0 | Status: DISCONTINUED | OUTPATIENT
Start: 2020-04-24 | End: 2020-04-27

## 2020-04-24 RX ADMIN — SERTRALINE 100 MILLIGRAM(S): 25 TABLET, FILM COATED ORAL at 18:07

## 2020-04-24 RX ADMIN — Medication 975 MILLIGRAM(S): at 09:37

## 2020-04-24 RX ADMIN — Medication 40 MILLIEQUIVALENT(S): at 09:41

## 2020-04-24 RX ADMIN — ENOXAPARIN SODIUM 40 MILLIGRAM(S): 100 INJECTION SUBCUTANEOUS at 14:43

## 2020-04-24 RX ADMIN — Medication 0.5 MILLIGRAM(S): at 18:06

## 2020-04-24 NOTE — ED ADULT NURSE NOTE - CHIEF COMPLAINT QUOTE
Pt brought in by ambulance from [place] [address].  Staff called EMS for B/L lower extremity weakness for "a while", but this morning they gave out.  Pt ambulatory to ambulance.  Denies any pain.  Pt endorses mild dizziness.  Calm and cooperative in triage.  Covid negative from 4/16.  Denies any symptoms.  PMHx:  HTN, ROMMEL, schizophrenia

## 2020-04-24 NOTE — H&P ADULT - NSHPLABSRESULTS_GEN_ALL_CORE
LABS: Personally reviewed labs, imaging, and ECG                          10.9   5.54  )-----------( 100      ( 24 Apr 2020 08:00 )             32.4       04-24    134<L>  |  97<L>  |  12  ----------------------------<  112<H>  3.1<L>   |  24  |  0.93    Ca    9.0      24 Apr 2020 08:00    TPro  7.2  /  Alb  3.0<L>  /  TBili  0.7  /  DBili  x   /  AST  96<H>  /  ALT  122<H>  /  AlkPhos  145<H>  04-24           RADIOLOGY & ADDITIONAL TESTS:     < from: Xray Chest 1 View AP/PA (04.24.20 @ 08:54) >    Findings:  The heart is unchanged in size. Mild hazy bilateral opacities are decreased compared to prior exam. No pneumothorax. Unremarkable osseous structures.    Impression:  1.  Mild hazy bilateral opacities appear decreased compared to prior exam.    < end of copied text >      ECG: sinus 100 bpm, QTc 438, no acute ischemic pattern changes

## 2020-04-24 NOTE — H&P ADULT - PROBLEM SELECTOR PLAN 4
DVT ppx: lovenox 40 QD  Diet: Carb consistent  Eventual PT eval DVT ppx: lovenox 40 QD  Diet: Carb consistent  PT eval

## 2020-04-24 NOTE — H&P ADULT - PROBLEM SELECTOR PLAN 5
Transitions of Care Status:  1.  Name of PCP: Gillian Soto  2.  PCP Contacted on Admission: [ ] Y    [x] N    3.  PCP contacted at Discharge: [ ] Y    [ ] N    [ ] N/A  4.  Post-Discharge Appointment Date and Location:  5.  Summary of Handoff given to PCP:

## 2020-04-24 NOTE — H&P ADULT - NSHPPHYSICALEXAM_GEN_ALL_CORE
Vital Signs Last 24 Hrs  T(C): 36.5 (24 Apr 2020 12:40), Max: 37.9 (24 Apr 2020 09:15)  T(F): 97.7 (24 Apr 2020 12:40), Max: 100.2 (24 Apr 2020 09:15)  HR: 106 (24 Apr 2020 09:15) (106 - 109)  BP: 114/65 (24 Apr 2020 09:15) (100/71 - 114/65)  BP(mean): --  RR: 26 (24 Apr 2020 09:15) (15 - 26)  SpO2: 95% (24 Apr 2020 09:15) (95% - 95%)    Physical Exam:  Gen: Alert, well-developed  HEENT: NCAT, clear conjunctiva, no scleral icterus  Neck: Supple, no JVD, no LAD  CV: RRR, S1S2, no m/r/g  Resp: CTAB, normal respiratory effort on RA  Abd: Soft, NT, ND, normal bowel sounds  Ext: no edema, no clubbing or cyanosis  Neuro: Alert, appropriate speech, CN2-12 grossly intact, briskly ARSHAD  Skin: warm, perfused

## 2020-04-24 NOTE — BEHAVIORAL HEALTH ASSESSMENT NOTE - SUMMARY
42 yo , male, residing at [facility] with PMHx HTN, HLD, ROMMEL, DM, PN, COVID (+4/4), Asthma, Obesity, PPHx Schizophrenia, paranoid type presents to [facility] for c/o "feeling wobbly" for 1 month.     Chart reviewed, pt A&OX3, reports mood as "normal," denies si/sa/hi, denies a/v hallucinations/delusions, reports intermittent sleep disturbances, no c/o appetite disturbances, per chart review multiple inpatient hospitalizations, reports saw psychiatrist Dr. Clari Soto [phone] in March and spoke to MD this morning, reports patient unable to state current medications. On physical exam patient able to move lower extremities without difficulty, no c/o numbness, tingling or pain, reports "feeling wobbly" for 1 month.     Collateral obtained from patient's aunt Sharee Clark [phone] reports patient has no hx si/sa/hi, no history alcohol/substance abuse, past history paranoia, auditory hallucinations,  reports last inpatient hospitalization 2016, diagnosed with Schizophrenia at age 23.     Left voicemail for Dr. Soto, awaiting call back to discuss patient's current medication regimen, including last Abilify injection. Discussed with Karey Chowdhury PA recommendations below:    PLAN:  -Obtain and monitor ANC, WBC's, Troponins, CRP, ESR  -Monitor EKG qtc interval  -c/w Zoloft 100mg PO daily  -c/w Cogentin 0.5mg PO BID  -CHANGE Clozapine to 25mg PO HS-monitor cardiac status  -Zyprexa 2.5mg PO/IM Q6H PRN-SEVERE AGITATION, if refractory agitation may give additional 2.5mg, hold if qtc >500    PLEASE do not give IV/IM Ativan within 3 hours of IM Zyprexa d/t risk of sedation and respiratory depression. 41 yo , male, residing at [facility] with PMHx HTN, HLD, ROMMEL, DM, PN, COVID (+4/4), Asthma, Obesity, PPHx Schizophrenia, paranoid type presents to [facility] for c/o "feeling wobbly" for 1 month.     Chart reviewed, pt A&OX3, reports mood as "normal," denies si/sa/hi, denies a/v hallucinations/delusions, reports intermittent sleep disturbances, no c/o appetite disturbances, per chart review multiple inpatient hospitalizations, reports saw psychiatrist Dr. Clari Soto [phone] in March and spoke to MD this morning,  patient unable to state current medications. observed that patient able to move lower extremities without difficulty, no c/o numbness, tingling or pain, reports "feeling wobbly" for 1 month.     Collateral obtained from patient's aunt Sharee Clark [phone] reports patient has no hx si/sa/hi, no history alcohol/substance abuse, past history paranoia, auditory hallucinations,  reports last inpatient hospitalization 2016, diagnosed with Schizophrenia at age 23.     Left voicemail for Dr. Soto, awaiting call back to discuss patient's current medication regimen, including last Abilify injection. Discussed with Karey Chowdhury PA recommendations below:    In terms of wobbly gait, low suspicion that psychiatric medications are causing it. As per neuro note: Patient with that normal gait  and pt. does not demonstrate any extrapyramidal symptoms, including a parkinsonism type gait. Gayla instability could be due to deconditioning due to recent hospitalization vs other underlying medical etiology.     As per chart: Per ED resident, the ED resident received a call from patient's [place] psychiatrist stating that patient was recently changed from clozapine 25 to 50mg. Will lower the dose to 25mg to see if there would be any improvement by lowering the dose.      PLAN:  -Obtain and monitor ANC, WBC's, troponins, ESR , CRP  -Check B12 and Folate  -Monitor EKG qtc interval  -c/w Zoloft 100mg PO daily  -c/w Cogentin 0.5mg PO BID  -CHANGE Clozapine to 25mg PO HS-monitor cardiac status   -Check Orthostatic BP, as pt. is on Clozapine  -Zyprexa 2.5mg PO/IM Q6H PRN-SEVERE AGITATION, if refractory agitation may give additional 2.5mg, hold if qtc >500  -Left messages for Dr. Clari Soto [phone];    *****As per chart pt. is on : Abilify Maintena 400 mg intramuscular injection, extended release: 400 milligram(s) intramuscular once every four week,  needs to confirm when last dose was given******    PLEASE do not give IV/IM Ativan within 3 hours of IM Zyprexa d/t risk of sedation and respiratory depression.

## 2020-04-24 NOTE — H&P ADULT - NSICDXPASTMEDICALHX_GEN_ALL_CORE_FT
PAST MEDICAL HISTORY:  Hypertension     Obstructive sleep apnea     Schizophrenia     Type 2 diabetes mellitus

## 2020-04-24 NOTE — ED ADULT TRIAGE NOTE - CHIEF COMPLAINT QUOTE
Pt brought in by ambulance from [place] [address].  Staff called EMS for B/L lower extremity weakness for "a while", but this morning they gave out.  Pt ambulatory to ambulance.  Denies any pain.  Pt endorses mild dizziness.  Calm and cooperative in triage.  Covid negative from 4/16.  Denies any symptoms.  PMHx:  HTN, ROMMEL, schizophrenia

## 2020-04-24 NOTE — BEHAVIORAL HEALTH ASSESSMENT NOTE - NSBHCONSULTPRIMARYDISCUSSYES_PSY_A_CORE FT
discussed with MARYANN Corrales above discussed with MARYANN Corrales above    Pt. should continue to follow up with his psychiatrist Dr. Clari Soto [phone];

## 2020-04-24 NOTE — BEHAVIORAL HEALTH ASSESSMENT NOTE - HPI (INCLUDE ILLNESS QUALITY, SEVERITY, DURATION, TIMING, CONTEXT, MODIFYING FACTORS, ASSOCIATED SIGNS AND SYMPTOMS)
40 yo , male, residing at [facility] with PMHx HTN, HLD, ROMMEL, DM, PN, COVID (+4/4), Asthma, Obesity, PPHx Schizophrenia, paranoid type presents to [facility] for c/o "feeling wobbly" for 1 month.     Chart reviewed. Patient seen via Zoom, including attending Dr. Jaquez. Patient A&OX3, engaged in interview, cooperative, reports mood as "normal," denies si/sa/hi, denies a/v hallucinations/delusions, reports intermittent sleep disturbances, no c/o appetite disturbances, per chart review multiple inpatient hospitalizations, reports saw psychiatrist Dr. Clari Soto [phone] in March and spoke to MD this morning, reports patient unable to state current medications. On physical exam patient able to move lower extremities without difficulty, no c/o numbness, tingling or pain, reports "feeling wobbly" for 1 month.     Collateral obtained from patient's aunt Sharee Clark [phone] who reports patient has no hx si/sa/hi, no history alcohol/substance abuse, past history paranoia, auditory hallucinations,  reports last inpatient hospitalization 2016, states patient raised by grandmother and her as patient's parents were unable to care for patient and his brother, patient diagnosed with Schizophrenia at age 23, prior high functioning, athlete, in law school.     Left voicemail for Dr. Soto, awaiting call back to discuss patient's current medication regimen. Discussed with Karey Chowdhury PA recommendations below. 41 yo , male, residing at [facility] with PMHx HTN, HLD, ROMMEL, DM, PN, COVID (+4/4), Asthma, Obesity, PPHx Schizophrenia, paranoid type presents to [facility] for c/o "feeling wobbly" for 1 month.     Chart reviewed. Patient seen via Zoom, including attending Dr. Jaquez. Patient A&OX3, engaged in interview, cooperative, reports mood as "normal," denies si/sa/hi, denies a/v hallucinations/delusions, reports intermittent sleep disturbances, no c/o appetite disturbances, per chart review multiple inpatient hospitalizations, reports saw psychiatrist Dr. Clari Soto [phone] in March and spoke to MD this morning, reports patient unable to state current medications. On physical exam patient able to move lower extremities without difficulty, no c/o numbness, tingling or pain, reports "feeling wobbly" for 1 month.     Collateral obtained from patient's aunt Sharee Clark [phone] who reports patient has no hx si/sa/hi, no history alcohol/substance abuse, past history paranoia, auditory hallucinations,  reports last inpatient hospitalization 2016, states patient raised by grandmother and her as patient's parents were unable to care for patient and his brother, patient diagnosed with Schizophrenia at age 23, prior high functioning, athlete, in law school.     Left voicemail for Dr. Soto, awaiting call back to discuss patient's current medication regimen. Discussed with Karey Chowdhury PA recommendations below. 39 yo , male, residing at [facility] with PMHx HTN, HLD, ROMMEL, DM, PN, COVID (+4/4), Asthma, Obesity, PPHx Schizophrenia, paranoid type presents to [facility] for c/o "feeling wobbly" for 1 month.     Patient was not seen in person , in light of covid-19 pandemic for infection risk mitigation. . Limitations of tele-psychiatric evaluation were discussed and patient consented to tele-psychiatric eval. Primary team in agreement. Attempted to speak with patient today via Zoom Video call with help of psychiatry telepresenter. Saw patient on video call    Chart reviewed. Patient seen via Zoom, including attending Dr. Jaquez. Patient A&OX3, engaged in interview, cooperative, reports mood as "normal," denies si/sa/hi, denies a/v hallucinations/delusions, reports intermittent sleep disturbances, no c/o appetite disturbances, per chart review multiple inpatient hospitalizations, reports saw psychiatrist Dr. Clari Soto [phone] in March and spoke to MD this morning,  patient unable to state current medications. patient was observed that he was able to move lower extremities and upper extremities without difficulty, no c/o numbness, tingling or pain, reports "feeling wobbly" for 1 month.     Collateral obtained from patient's aunt Sharee Clark [phone] (pt. gave consent) who reports patient has no hx si/sa/hi, no history alcohol/substance abuse, past history paranoia, auditory hallucinations,  reports last inpatient hospitalization 2016, states patient raised by grandmother and her as patient's parents were unable to care for patient and his brother, patient diagnosed with Schizophrenia at age 23, prior high functioning, athlete, in law school.     Left voicemail for Dr. Clari Soto [phone];  awaiting call back to discuss patient's current medication regimen.   Discussed with Karey Chowdhury PA recommendations below.

## 2020-04-24 NOTE — CONSULT NOTE ADULT - ASSESSMENT
40M w/ PMH of HTN, DMII, Asthma, Schizophrenia, recent COVID PNA and prolonged hospitalization (4/4 - 4/17) presents w/ gait instability.     Impression: Non-focal exam w/ normal gait -- patient may have generalized subjective weakness secondary to deconditioning. Although patient has had his clozapine increased, he does not demonstrate any extrapyramidal symptoms, including a parkinsonism type gait.     Plan:   [] No further neurological workup   [] Will follow up TSH, CK, RPR, B12, Folate 40M w/ PMH of HTN, DMII, Asthma, Schizophrenia, recent COVID PNA and prolonged hospitalization (4/4 - 4/17) presents w/ gait instability.     Impression: Non-focal exam w/ normal gait -- patient may have generalized subjective weakness secondary to deconditioning. Although patient has had his clozapine increased, he does not demonstrate any extrapyramidal symptoms, including a parkinsonism type gait.     Plan:   [] No further neurological workup   [] Will follow up TSH, CK, RPR, B12, Folate  [] Will consider further imaging if does not improve   [] PT/OT

## 2020-04-24 NOTE — H&P ADULT - ATTENDING COMMENTS
40M schizophrenia HTN DM@ asthma recovering from COVID-19 pneumonia p/w gait disturbance  --likely deconditioning, PT consult  --neuro consult r/o neurologic sequela of COVID-19  --psych consult, need to clarify medications, OK to continue clozapine at current low dose

## 2020-04-24 NOTE — H&P ADULT - ASSESSMENT
40y M w/ PMH HTN, DM2, asthma, schizophrenia, recent COVID PNA presents from [facility] for generalized weakness and gait instability.

## 2020-04-24 NOTE — BEHAVIORAL HEALTH ASSESSMENT NOTE - NSBHCHARTREVIEWLAB_PSY_A_CORE FT
CBC Full  -  ( 24 Apr 2020 08:00 )  WBC Count : 5.54 K/uL  RBC Count : 3.80 M/uL  Hemoglobin : 10.9 g/dL  Hematocrit : 32.4 %  Platelet Count - Automated : 100 K/uL  Mean Cell Volume : 85.3 fL  Mean Cell Hemoglobin : 28.7 pg  Mean Cell Hemoglobin Concentration : 33.6 %  Auto Neutrophil # : 3.35 K/uL  Auto Lymphocyte # : 1.46 K/uL  Auto Monocyte # : 0.61 K/uL  Auto Eosinophil # : 0.03 K/uL  Auto Basophil # : 0.04 K/uL  Auto Neutrophil % : 60.5 %  Auto Lymphocyte % : 26.4 %  Auto Monocyte % : 11.0 %  Auto Eosinophil % : 0.5 %  Auto Basophil % : 0.7 %  04-24    134<L>  |  97<L>  |  12  ----------------------------<  112<H>  3.1<L>   |  24  |  0.93    Ca    9.0      24 Apr 2020 08:00    TPro  7.2  /  Alb  3.0<L>  /  TBili  0.7  /  DBili  x   /  AST  96<H>  /  ALT  122<H>  /  AlkPhos  145<H>  04-24

## 2020-04-24 NOTE — BEHAVIORAL HEALTH ASSESSMENT NOTE - CASE SUMMARY
Patient was not seen in person , in light of covid-19 pandemic for infection risk mitigation. . Limitations of tele-psychiatric evaluation were discussed and patient consented to tele-psychiatric eval. Primary team in agreement. Attempted to speak with patient today via Zoom Video call with help of psychiatry telepresenter. Saw patient on video call. I agree with above assessment and plan, pt. calm, cooperative, grossly oriented, denies feeling depressed, denies acute psychotic sxs, adamantly denies SI and HI. Thought process seems to be concrete, pt. is poor historian in terms of his psychiatric medications. Patient c/o feeling wobbly for 1-2 months.  Low suspicion that psychiatric medications are causing wobbly gait, however Clozapine was recently increased will lower the dose and will monitor closely. Gait instability could be due to deconditioning due to recent admission vs other organic etiology. Left a message for outpt. psychiatrist. Awaiting call back. Recommend meds. as written above, monitor EKG for qtc, WBCs, ANCs , check B12 and Folate, and monitor orthostatic BP. Case discussed with Dr. Hazel and Dr. Agee, will follow

## 2020-04-24 NOTE — BEHAVIORAL HEALTH ASSESSMENT NOTE - NSBHCHARTREVIEWINVESTIGATE_PSY_A_CORE FT
< from: 12 Lead ECG (04.24.20 @ 10:46) >    Ventricular Rate 100 BPM    Atrial Rate 100 BPM    P-R Interval 134 ms    QRS Duration 84 ms    Q-T Interval 340 ms    QTC Calculation(Bezet) 438 ms    P Axis 43 degrees    R Axis 42 degrees    T Axis 38 degrees    Diagnosis Line Normal sinus rhythm  Normal ECG

## 2020-04-24 NOTE — BEHAVIORAL HEALTH ASSESSMENT NOTE - OTHER
difficult to assess, patient wearing mask-interview via Zoom unable to assess gait-pt in bed, however patient able to lift lower extremities up and bend legs without difficulty unable to report current medication regimen unable to report past inpatient hospitalizations variable not assessed hx schizophrenia, paranoid type unable to assess, seen by zoom concrete future oriented

## 2020-04-24 NOTE — CONSULT NOTE ADULT - ATTENDING COMMENTS
Patient seen. A+ox3, CNII-XII intact, motor exam 5/5 throughout. Normal sensation to light touch throughout. DTRs 2+ at patella, 1+ at ankles. Gait slow, slightly wide based. Romberg negative. No ataxia on FNF.    Impression: No clear neurological abnormalities, no signs of AIDP, suspect deconditioning. If situation changes, please re-consult

## 2020-04-24 NOTE — ED PROVIDER NOTE - ATTENDING CONTRIBUTION TO CARE
agree with PA note    "39 y/o M pmh HTN, schizophrenia and asthma c/o "feeling wobbly" x 1 month and unable to stand this morning after getting up from bed. Denies leg weakness, states he just feels "wobbly", unsteady on his feet. Recently admitted this month for COVID, recently tested negative on 4/16. Pt denies fever, chills, cough, congestion, cp, sob, abd pain, n/v/d."  Pt was positive covid a month ago with recent negative.  Here with cough, SOB, sweating.  States decreased PO.  Declining rectal temperature    PE: ill appearing, diaphoretic, when sits up tachypenic and desat to 90; CTAB/L; s1 s2 no m/r/g abd soft/NT/ND ext: no edema Neuro: 5/5 motor UE and LE; sensation intact; gait stable    Imp: likely appears to be covid; EKG, CXR, labs, ambulatory sat, and reassess agree with PA note    "41 y/o M pmh HTN, schizophrenia and asthma c/o "feeling wobbly" x 1 month and unable to stand this morning after getting up from bed. Denies leg weakness, states he just feels "wobbly", unsteady on his feet. Recently admitted this month for COVID, recently tested negative on 4/16. Pt denies fever, chills, cough, congestion, cp, sob, abd pain, n/v/d."  Pt was positive covid a month ago with recent negative.  Here with cough, SOB, sweating.  States decreased PO.  Declining rectal temperature    PE: ill appearing, diaphoretic, when sits up tachypenic and desat to 90; CTAB/L; s1 s2 no m/r/g abd soft/NT/ND ext: no edema Neuro: 5/5 motor UE and LE; sensation intact; gait stable    Imp: likely appears to be covid; EKG, CXR, labs, ambulatory sat, and reassess    pt weak and not steady on feet (not focal); hyponatremic; LFTs elevated; pulse ox to 95 on 4 steps but appears to be falling to ground; will admit

## 2020-04-24 NOTE — BEHAVIORAL HEALTH ASSESSMENT NOTE - NSBHCONSULTMEDSEVERE_PSY_A_CORE FT
Zyprexa 2.5mg PO/IM Q6H PRN-SEVERE AGITATION, may give additional 2.5mg for refractory agitation- hold if qtc interval >500

## 2020-04-24 NOTE — BEHAVIORAL HEALTH ASSESSMENT NOTE - RISK ASSESSMENT
Low Acute Suicide Risk Risk Factors: acute medical condition, history of disorganization, wandering, paranoia, auditory hallucinations, past inpatient psychiatric hospitalizations  Protective Factors: residential stability, supportive family, educated, engaged in treatment, no access to firearms, denies si/sa/hi, no s/s psychosis at this time    At this time patient is not a danger to self or others, denies si Risk Factors: acute medical condition, history of disorganization, wandering, paranoia, auditory hallucinations, past inpatient psychiatric hospitalizations  Protective Factors: residential stability, supportive family, educated, engaged in treatment, no access to firearms, denies si/sa/hi, no s/s psychosis at this time, compliant with treatment    At this time patient is not a danger to self or others, denies si and HI

## 2020-04-24 NOTE — BEHAVIORAL HEALTH ASSESSMENT NOTE - NSBHMEDSOTHERFT_PSY_A_CORE
Home Medications:  Abilify Maintena 400 mg intramuscular injection, extended release: 400 milligram(s) intramuscular once every four weeks (05 Apr 2020 03:24)  acetaminophen 325 mg oral tablet: 2 tab(s) orally every 4 hours, As needed, Temp greater or equal to 38.5C (101.3F) (17 Apr 2020 14:51)  albuterol 90 mcg/inh inhalation aerosol: 2 puff(s) inhaled every 4 hours, As needed, Shortness of Breath and/or Wheezing (17 Apr 2020 14:51)  benzonatate 100 mg oral capsule: 1 cap(s) orally 3 times a day, As needed, Cough (17 Apr 2020 14:51)  cloZAPine 25 mg oral tablet: 2 tab(s) orally once a day at bedtime  (17 Apr 2020 14:51)  Docusil 100 mg oral capsule: 1 cap(s) orally 2 times a day- hold for loose stool/diarrhea (17 Apr 2020 14:51)  metFORMIN 500 mg oral tablet: 1 tab(s) orally 2 times a day (05 Apr 2020 03:24)  nystatin 100,000 units/g topical powder: 1 application topically 3 times a day to affected area  (17 Apr 2020 14:51)  pantoprazole 40 mg oral delayed release tablet: 1 tab(s) orally once a day (before a meal) (17 Apr 2020 14:51)  zinc sulfate 220 mg oral capsule: 1 cap(s) orally once a day (17 Apr 2020 14:51)

## 2020-04-24 NOTE — H&P ADULT - PROBLEM SELECTOR PLAN 2
No active psychosis or mood or behavioral disturbances  - recently increased from clozapine 25 to 50mg QD  - check clozapine level  - left VM with outpt psychiatrist Dr. Clari Soto ([phone]) to discuss psychiatric history and medication mgmt  - recently discharged on 4/17 on clozapine 25 qhs, aripiprazole 400 IM q4 weeks, benztropine 0.5 BID, and sertraline 100 QD No active psychosis or mood or behavioral disturbances  - recently increased from clozapine 25 to 50mg QD  - check clozapine level  - left VM with outpt psychiatrist Dr. Clari Soto ([phone]) to discuss psychiatric history and medication mgmt  - recently discharged on 4/17 on clozapine 25 qhs, aripiprazole 400 IM q4 weeks, benztropine 0.5 BID, and sertraline 100 QD; will attempt to clarify home psych regimen with outpatient psychiatrist prior to starting previously documented regimen No active psychosis or mood or behavioral disturbances  - recently increased from clozapine 25 to 50mg QD  - check clozapine level  - left VM with outpt psychiatrist Dr. Clari Soto ([phone]) to discuss psychiatric history and medication mgmt  - recently discharged on 4/17 on clozapine 25 qhs, aripiprazole 400 IM q4 weeks, benztropine 0.5 BID, and sertraline 100 QD; will resume home clozapine, benztropine, and sertraline at recent documented home dose for now; attempt to clarify home psych regimen with outpatient psychiatrist

## 2020-04-24 NOTE — ED PROVIDER NOTE - MUSCULOSKELETAL, MLM
Spine appears normal, range of motion is not limited, no muscle or joint tenderness. +distal pulses in LE b/l

## 2020-04-24 NOTE — H&P ADULT - NSHPREVIEWOFSYSTEMS_GEN_ALL_CORE
General: + fatigue; No fevers, chills  HEENT: No headaches, acute visual changes, rhinorrhea, sore throat  CVS: No chest pain, palpitations  Resp: No cough, dyspnea, wheezing  GI: No abdominal pain, nausea, vomiting, constipation, diarrhea  : No dysuria, frequency, hematuria  MSK: No joint pain or swelling  Ext: No edema, no claudication  Skin: No rashes or itching  Heme: No easy bruising or petechiae  Neuro: No confusion, numbness, focal weakness, or loss of consciousness  Endocrine: No excessive heat or cold symptoms, polyuria, polydipsia

## 2020-04-24 NOTE — ED PROVIDER NOTE - CLINICAL SUMMARY MEDICAL DECISION MAKING FREE TEXT BOX
41 y/o M pmh HTN, schizophrenia and asthma c/o "feeling wobbly" x 1 month and unable to stand this morning after getting up from bed.  likely lightheadedness 2/2 infection - tachycardic, tacyhpnea, T99F  Pt refusing rectal temp  -labs, cxr, walking o2 sat, reassess

## 2020-04-24 NOTE — BEHAVIORAL HEALTH ASSESSMENT NOTE - NSBHCONSULTFOLLOWDETAILS_PSY_A_CORE FT
Needs to clarify from outpt. psychiatrist:   *****As per chart pt. is on : Abilify Maintena 400 mg intramuscular injection, extended release: 400 milligram(s) intramuscular once every four week,  needs to confirm when last dose was given*****    Pt. should continue to follow up with his psychiatrist Dr. Clari Soto [phone]

## 2020-04-24 NOTE — BEHAVIORAL HEALTH ASSESSMENT NOTE - NSBHCONSFOLLOWNEEDS_PSY_A_CORE
This note written by NP, the above/impression to be reviewed by Dr. Jaquez, this note needs to be cosigned This note written by NP, the above/impression to be reviewed by Dr. Jaquez, this note needs to be cosigned/Patient needs further psychiatric safety assessment prior to discharge

## 2020-04-24 NOTE — CONSULT NOTE ADULT - SUBJECTIVE AND OBJECTIVE BOX
40M w/ PMH of HTN, DMII, Asthma, Schizophrenia, recent COVID PNA and prolonged hospitalization presents w/ gait instability.     Recently increased from Clozapine 25mg to 50mg QD  Recently discharged on Clozapine 25mg, Ariprazole 400mg IM [id number], benztropine 0.5mg BID, sertraline 100 QD.     Recent hospitalization on 4/4-4/15 for COVID, treated w/ steroids, Plaquenil IL1 inhibitor.     Exam:   MS:   CN:   Motor:   Sensory: 40M w/ PMH of HTN, DMII, Asthma, Schizophrenia, recent COVID PNA and prolonged hospitalization (4/4 - 4/17) presents w/ gait instability.     Patient states he's been having trouble walking for the past month. When asked whether this started prior to hospitalization, he states "I'm not sure, just weak". States he was unable to stand up this morning, but now able to. Denies any room spinning sensations. Denies changes in speech, vision, hearing, swallowing, voice quality, numbness/tingling, weakness, balance/room-spinning sensations. No previous history of CVA/TIA, seizures, migraines. No history of arrythmia. No AC.     Medication increases:   Recently increased from Clozapine 25mg to 50mg QD  Recently discharged on Clozapine 25mg, Ariprazole 400mg IM [id number], benztropine 0.5mg BID, sertraline 100 QD.   Recent hospitalization on 4/4-4/15 for COVID, treated w/ steroids, Plaquenil IL1 inhibitor.     Exam:   MS: Oriented x3. Fluent. Follows crossed commands.   CN: VFF. EOMI. Face symmetric. T/u midline.    Motor: Full strength throughout   Sensory: Intact to LT. JPS intact.   Coordination: Mild intention tremor on end reach but no dysmetria or H2S ataxia   Gait: Slightly wide based but otherwise normal gait.

## 2020-04-24 NOTE — H&P ADULT - HISTORY OF PRESENT ILLNESS
40y M w/ PMH HTN, DM2, asthma, schizophrenia, recent COVID PNA presents from [facility] for gait instability. Patient was recent admitted for hypoxemia 2/2 COVID PNA from 4/4 - 4/17 and reports recovering well in his respiratory symptoms. Patient now returns c/o feeling more weak and wobbly when walking over the past month. Patient not forthcoming with history. Patient normally able to ambulate without assistance but has had progressive instability in his gait a/w generalized weakness. This morning had difficulty just being able to stand up. Denies other symptoms including fevers, chills, headache, dizziness, hearing or vision changes, chest pain, palpitations, cough, SOB, abd pain, n/v/d, urinary symptoms, focal weakness, numbness, LE pain or swelling, joint pain, rash. Denies head trauma, LOC, seizures. No recent travels or sick contacts. Patient unsure of what meds he's on nor any recent medication changes. Denies any recreational drug use. Per ED resident, the ED resident received a call from patient's [facility] psychiatrist stating that patient was recently changed from clozapine 25 to 50mg.  In ED: tm 100.2 oral, -109, /71 – 114/65, RR 16, SpO2 95% on RA. Given Tylenol 975.

## 2020-04-24 NOTE — ED PROVIDER NOTE - OBJECTIVE STATEMENT
39 y/o M pmh HTN, schizophrenia and asthma c/o "feeling wobbly" x 1 month and unable to stand this morning after getting up from bed. Denies leg weakness, states he just feels "wobbly", unsteady on his feet. Recently admitted this month for COVID, recently tested negative on 4/16. Pt denies fever, chills, cough, congestion, cp, sob, abd pain, n/v/d.

## 2020-04-24 NOTE — ED ADULT NURSE NOTE - OBJECTIVE STATEMENT
Pt received to room 29 complaining of bilateral lower extremity weakness. Pt arrives from [facility]. No [facility] staff at bedside. AxOx4 and ambulatory. Pt is able to ambulate steadily. Pt states he has been feeling weak for months and that today his "legs gave out." Pt denies fall. No leg drift noted. Neuro status intact. Respirations even and unlabored. Oxygen saturation 100% on room air. Sinus tachycardia on monitor. MD aware. Pt afebrile orally at this time. Pt refusing rectal temperature at this time. 18G IV placed to right arm by [facility]. Labs drawn and sent. Pt r/o covid at this time. Pt previously tested negative for covid on 4/16. Will continue to monitor. Pt received to room 29 complaining of bilateral lower extremity weakness. Pt arrives from [facility]. No [facility] staff at bedside. AxOx4 and ambulatory. Pt is able to ambulate steadily. Pt states he has been feeling weak for months and that today his "legs gave out." Pt denies fall. No leg drift noted. Neuro status intact. Respirations even and unlabored. Oxygen saturation 100% on room air. Sinus tachycardia on monitor. MD aware. Pt afebrile orally at this time. Pt refusing rectal temperature at this time. 18G IV placed to right arm by [facility]. Labs drawn and sent. Pt previously tested negative for covid on 4/16. Will continue to monitor. Pt received to room 29 complaining of bilateral lower extremity weakness. Pt arrives from [facility]. No [facility] staff at bedside. AxOx4 and ambulatory. Pt is able to ambulate steadily. Pt states he has been feeling weak for months and that today his "legs gave out." Pt denies fall. No leg drift noted. Neuro status intact. Respirations even and unlabored. Oxygen saturation 95% on room air. Sinus tachycardia on monitor. MD aware. Pt afebrile orally at this time. Pt refusing rectal temperature at this time. 18G IV placed to right arm by [facility]. Labs drawn and sent. Pt previously tested negative for covid on 4/16. Will continue to monitor.

## 2020-04-25 LAB
ALBUMIN SERPL ELPH-MCNC: 3.3 G/DL — SIGNIFICANT CHANGE UP (ref 3.3–5)
ALP SERPL-CCNC: 158 U/L — HIGH (ref 40–120)
ALT FLD-CCNC: 112 U/L — HIGH (ref 4–41)
ANION GAP SERPL CALC-SCNC: 15 MMO/L — HIGH (ref 7–14)
AST SERPL-CCNC: 72 U/L — HIGH (ref 4–40)
BASOPHILS # BLD AUTO: 0.02 K/UL — SIGNIFICANT CHANGE UP (ref 0–0.2)
BASOPHILS NFR BLD AUTO: 0.4 % — SIGNIFICANT CHANGE UP (ref 0–2)
BILIRUB SERPL-MCNC: 0.5 MG/DL — SIGNIFICANT CHANGE UP (ref 0.2–1.2)
BUN SERPL-MCNC: 10 MG/DL — SIGNIFICANT CHANGE UP (ref 7–23)
CALCIUM SERPL-MCNC: 9 MG/DL — SIGNIFICANT CHANGE UP (ref 8.4–10.5)
CHLORIDE SERPL-SCNC: 100 MMOL/L — SIGNIFICANT CHANGE UP (ref 98–107)
CO2 SERPL-SCNC: 25 MMOL/L — SIGNIFICANT CHANGE UP (ref 22–31)
CREAT SERPL-MCNC: 0.81 MG/DL — SIGNIFICANT CHANGE UP (ref 0.5–1.3)
EOSINOPHIL # BLD AUTO: 0.11 K/UL — SIGNIFICANT CHANGE UP (ref 0–0.5)
EOSINOPHIL NFR BLD AUTO: 2 % — SIGNIFICANT CHANGE UP (ref 0–6)
FOLATE SERPL-MCNC: 7 NG/ML — SIGNIFICANT CHANGE UP (ref 4.7–20)
GLUCOSE BLDC GLUCOMTR-MCNC: 111 MG/DL — HIGH (ref 70–99)
GLUCOSE BLDC GLUCOMTR-MCNC: 140 MG/DL — HIGH (ref 70–99)
GLUCOSE SERPL-MCNC: 100 MG/DL — HIGH (ref 70–99)
HCT VFR BLD CALC: 33.3 % — LOW (ref 39–50)
HGB BLD-MCNC: 10.9 G/DL — LOW (ref 13–17)
IMM GRANULOCYTES NFR BLD AUTO: 0.4 % — SIGNIFICANT CHANGE UP (ref 0–1.5)
LYMPHOCYTES # BLD AUTO: 1.54 K/UL — SIGNIFICANT CHANGE UP (ref 1–3.3)
LYMPHOCYTES # BLD AUTO: 28.5 % — SIGNIFICANT CHANGE UP (ref 13–44)
MAGNESIUM SERPL-MCNC: 1.9 MG/DL — SIGNIFICANT CHANGE UP (ref 1.6–2.6)
MCHC RBC-ENTMCNC: 28.7 PG — SIGNIFICANT CHANGE UP (ref 27–34)
MCHC RBC-ENTMCNC: 32.7 % — SIGNIFICANT CHANGE UP (ref 32–36)
MCV RBC AUTO: 87.6 FL — SIGNIFICANT CHANGE UP (ref 80–100)
MONOCYTES # BLD AUTO: 0.68 K/UL — SIGNIFICANT CHANGE UP (ref 0–0.9)
MONOCYTES NFR BLD AUTO: 12.6 % — SIGNIFICANT CHANGE UP (ref 2–14)
NEUTROPHILS # BLD AUTO: 3.04 K/UL — SIGNIFICANT CHANGE UP (ref 1.8–7.4)
NEUTROPHILS NFR BLD AUTO: 56.1 % — SIGNIFICANT CHANGE UP (ref 43–77)
NRBC # FLD: 0 K/UL — SIGNIFICANT CHANGE UP (ref 0–0)
PHOSPHATE SERPL-MCNC: 4.1 MG/DL — SIGNIFICANT CHANGE UP (ref 2.5–4.5)
PLATELET # BLD AUTO: 130 K/UL — LOW (ref 150–400)
PMV BLD: 11.6 FL — SIGNIFICANT CHANGE UP (ref 7–13)
POTASSIUM SERPL-MCNC: 3.5 MMOL/L — SIGNIFICANT CHANGE UP (ref 3.5–5.3)
POTASSIUM SERPL-SCNC: 3.5 MMOL/L — SIGNIFICANT CHANGE UP (ref 3.5–5.3)
PROT SERPL-MCNC: 6.6 G/DL — SIGNIFICANT CHANGE UP (ref 6–8.3)
RBC # BLD: 3.8 M/UL — LOW (ref 4.2–5.8)
RBC # FLD: 15.3 % — HIGH (ref 10.3–14.5)
SODIUM SERPL-SCNC: 140 MMOL/L — SIGNIFICANT CHANGE UP (ref 135–145)
T PALLIDUM AB TITR SER: NEGATIVE — SIGNIFICANT CHANGE UP
T3FREE SERPL-MCNC: 2.89 PG/ML — SIGNIFICANT CHANGE UP (ref 1.8–4.6)
T4 FREE SERPL-MCNC: 1.23 NG/DL — SIGNIFICANT CHANGE UP (ref 0.9–1.8)
VIT B12 SERPL-MCNC: 506 PG/ML — SIGNIFICANT CHANGE UP (ref 200–900)
WBC # BLD: 5.41 K/UL — SIGNIFICANT CHANGE UP (ref 3.8–10.5)
WBC # FLD AUTO: 5.41 K/UL — SIGNIFICANT CHANGE UP (ref 3.8–10.5)

## 2020-04-25 PROCEDURE — 99223 1ST HOSP IP/OBS HIGH 75: CPT | Mod: CS

## 2020-04-25 PROCEDURE — ZZZZZ: CPT | Mod: CS

## 2020-04-25 RX ORDER — CEFTRIAXONE 500 MG/1
1000 INJECTION, POWDER, FOR SOLUTION INTRAMUSCULAR; INTRAVENOUS EVERY 24 HOURS
Refills: 0 | Status: DISCONTINUED | OUTPATIENT
Start: 2020-04-25 | End: 2020-04-27

## 2020-04-25 RX ADMIN — ENOXAPARIN SODIUM 40 MILLIGRAM(S): 100 INJECTION SUBCUTANEOUS at 13:10

## 2020-04-25 RX ADMIN — SERTRALINE 100 MILLIGRAM(S): 25 TABLET, FILM COATED ORAL at 13:11

## 2020-04-25 RX ADMIN — Medication 0.5 MILLIGRAM(S): at 17:32

## 2020-04-25 RX ADMIN — Medication 0.5 MILLIGRAM(S): at 06:18

## 2020-04-25 RX ADMIN — CEFTRIAXONE 100 MILLIGRAM(S): 500 INJECTION, POWDER, FOR SOLUTION INTRAMUSCULAR; INTRAVENOUS at 18:58

## 2020-04-25 RX ADMIN — CLOZAPINE 25 MILLIGRAM(S): 150 TABLET, ORALLY DISINTEGRATING ORAL at 00:20

## 2020-04-25 RX ADMIN — OLANZAPINE 2.5 MILLIGRAM(S): 15 TABLET, FILM COATED ORAL at 22:32

## 2020-04-25 NOTE — PHYSICAL THERAPY INITIAL EVALUATION ADULT - GENERAL OBSERVATIONS, REHAB EVAL
Pt received semisupine in bed, in no apparent distress. Pt agreeable to participate in physical therapy evaluation.

## 2020-04-25 NOTE — PHYSICAL THERAPY INITIAL EVALUATION ADULT - PATIENT PROFILE REVIEW, REHAB EVAL
yes/PT orders received: No formal activity order. Consult with [facility] , patient may participate in PT evaluation.

## 2020-04-25 NOTE — PROGRESS NOTE ADULT - SUBJECTIVE AND OBJECTIVE BOX
Patient seen and examined at bedside.    Overnight Events:     Review of Systems:  CONSTITUTIONAL: No weakness, fevers or chills  EYES/ENT: No visual changes;  No dysphagia  NECK: No pain or stiffness  RESPIRATORY: No cough, wheezing, hemoptysis; No shortness of breath  CARDIOVASCULAR: No chest pain or palpitations; No lower extremity edema  GASTROINTESTINAL: No abdominal or epigastric pain. No nausea, vomiting, or hematemesis; No diarrhea or constipation. No melena or hematochezia.  BACK: No back pain  GENITOURINARY: No dysuria, frequency or hematuria  NEUROLOGICAL: No numbness or weakness  SKIN: No itching, burning, rashes, or lesions   All other review of systems is negative unless indicated above.        Medications:  acetaminophen   Tablet .. 650 milliGRAM(s) Oral every 6 hours PRN  benztropine 0.5 milliGRAM(s) Oral two times a day  cefTRIAXone   IVPB 1000 milliGRAM(s) IV Intermittent once  cloZAPine 25 milliGRAM(s) Oral at bedtime  dextrose 40% Gel 15 Gram(s) Oral once PRN  dextrose 5%. 1000 milliLiter(s) IV Continuous <Continuous>  dextrose 50% Injectable 12.5 Gram(s) IV Push once  dextrose 50% Injectable 25 Gram(s) IV Push once  dextrose 50% Injectable 25 Gram(s) IV Push once  enoxaparin Injectable 40 milliGRAM(s) SubCutaneous daily  glucagon  Injectable 1 milliGRAM(s) IntraMuscular once PRN  insulin lispro (HumaLOG) corrective regimen sliding scale   SubCutaneous three times a day before meals  insulin lispro (HumaLOG) corrective regimen sliding scale   SubCutaneous at bedtime  OLANZapine 2.5 milliGRAM(s) Oral every 6 hours PRN  OLANZapine Injectable 2.5 milliGRAM(s) IntraMuscular every 6 hours PRN  sertraline 100 milliGRAM(s) Oral daily      PAST MEDICAL & SURGICAL HISTORY:  Type 2 diabetes mellitus  Hypertension  Obstructive sleep apnea  Schizophrenia  No significant past surgical history      Vitals:  Vital Signs Last 24 Hrs  T(C): 37 (25 Apr 2020 06:17), Max: 37.1 (24 Apr 2020 14:44)  T(F): 98.6 (25 Apr 2020 06:17), Max: 98.7 (24 Apr 2020 14:44)  HR: 106 (25 Apr 2020 06:17) (92 - 106)  BP: 134/83 (25 Apr 2020 06:17) (109/67 - 138/72)  BP(mean): --  RR: 20 (25 Apr 2020 06:17) (20 - 28)  SpO2: 95% (25 Apr 2020 06:17) (95% - 98%)  I&O's Summary    Physical Exam:  Appearance: No acute distress; well appearing  Eyes: PERRL, EOMI, pink conjunctiva  HENT: Normal oral muscosa  Cardiovascular: RRR, S1, S2, no murmurs, rubs, or gallops; no edema; no JVD  Respiratory: Clear to auscultation bilaterally  Gastrointestinal: soft, non-tender, non-distended with normal bowel sounds  Musculoskeletal: No clubbing; no joint deformity   Neurologic: Non-focal  Lymphatic: No lymphadenopathy  Psychiatry: AAOx3, mood & affect appropriate  Skin: No rashes, ecchymoses, or cyanosis                          10.9   5.41  )-----------( 130      ( 25 Apr 2020 06:45 )             33.3     04-25    140  |  100  |  10  ----------------------------<  100<H>  3.5   |  25  |  0.81    Ca    9.0      25 Apr 2020 06:45  Phos  4.1     04-25  Mg     1.9     04-25    TPro  6.6  /  Alb  3.3  /  TBili  0.5  /  DBili  x   /  AST  72<H>  /  ALT  112<H>  /  AlkPhos  158<H>  04-25      CARDIAC MARKERS ( 24 Apr 2020 13:00 )  x     / x     / 86 u/L / x     / x          New ECG(s): Personally reviewed  NSR, QTc 438    Imaging:  < from: Xray Chest 1 View AP/PA (04.24.20 @ 08:54) >    	Findings:  	The heart is unchanged in size. Mild hazy bilateral opacities are decreased compared to prior exam. No pneumothorax. Unremarkable osseous structures.    	Impression:  	1.  Mild hazy bilateral opacities appear decreased compared to prior exam.    	< end of copied text > Overnight Events: No acute events overnight. Pt still w/ LE weakness this AM. Also w/ burning w/ urination. Denies any cough, SOB, fever.    Review of Systems:  CONSTITUTIONAL: +Weakness  EYES/ENT: No visual changes;  No dysphagia  NECK: No pain or stiffness  RESPIRATORY: No cough, wheezing, hemoptysis; No shortness of breath  CARDIOVASCULAR: No chest pain or palpitations; No lower extremity edema  GASTROINTESTINAL: No abdominal or epigastric pain. No nausea, vomiting, or hematemesis; No diarrhea or constipation. No melena or hematochezia.  BACK: No back pain  GENITOURINARY: +Dysuria  NEUROLOGICAL: No numbness or weakness  SKIN: No itching, burning, rashes, or lesions   All other review of systems is negative unless indicated above.        Medications:  acetaminophen   Tablet .. 650 milliGRAM(s) Oral every 6 hours PRN  benztropine 0.5 milliGRAM(s) Oral two times a day  cefTRIAXone   IVPB 1000 milliGRAM(s) IV Intermittent once  cloZAPine 25 milliGRAM(s) Oral at bedtime  dextrose 40% Gel 15 Gram(s) Oral once PRN  dextrose 5%. 1000 milliLiter(s) IV Continuous <Continuous>  dextrose 50% Injectable 12.5 Gram(s) IV Push once  dextrose 50% Injectable 25 Gram(s) IV Push once  dextrose 50% Injectable 25 Gram(s) IV Push once  enoxaparin Injectable 40 milliGRAM(s) SubCutaneous daily  glucagon  Injectable 1 milliGRAM(s) IntraMuscular once PRN  insulin lispro (HumaLOG) corrective regimen sliding scale   SubCutaneous three times a day before meals  insulin lispro (HumaLOG) corrective regimen sliding scale   SubCutaneous at bedtime  OLANZapine 2.5 milliGRAM(s) Oral every 6 hours PRN  OLANZapine Injectable 2.5 milliGRAM(s) IntraMuscular every 6 hours PRN  sertraline 100 milliGRAM(s) Oral daily      PAST MEDICAL & SURGICAL HISTORY:  Type 2 diabetes mellitus  Hypertension  Obstructive sleep apnea  Schizophrenia  No significant past surgical history      Vitals:  Vital Signs Last 24 Hrs  T(C): 37 (25 Apr 2020 06:17), Max: 37.1 (24 Apr 2020 14:44)  T(F): 98.6 (25 Apr 2020 06:17), Max: 98.7 (24 Apr 2020 14:44)  HR: 106 (25 Apr 2020 06:17) (92 - 106)  BP: 134/83 (25 Apr 2020 06:17) (109/67 - 138/72)  BP(mean): --  RR: 20 (25 Apr 2020 06:17) (20 - 28)  SpO2: 95% (25 Apr 2020 06:17) (95% - 98%)  I&O's Summary    Physical Exam:  Appearance: No acute distress; obese  Eyes: PERRL, EOMI, pink conjunctiva  HENT: Normal oral mucosa  Cardiovascular: RRR, S1, S2, no murmurs, rubs, or gallops; no edema; no JVD  Respiratory: Clear to auscultation bilaterally  Gastrointestinal: soft, non-tender, non-distended with normal bowel sounds  Musculoskeletal: No clubbing; no joint deformity   Psychiatry: AAOx3, slow to respond, but answers questions appropriately  Skin: No rashes, ecchymoses, or cyanosis  Neuro: CN II-XII intact. 5/5 strength throughout. Sensation intact. Slightly wide based gait                            10.9   5.41  )-----------( 130      ( 25 Apr 2020 06:45 )             33.3     04-25    140  |  100  |  10  ----------------------------<  100<H>  3.5   |  25  |  0.81    Ca    9.0      25 Apr 2020 06:45  Phos  4.1     04-25  Mg     1.9     04-25    TPro  6.6  /  Alb  3.3  /  TBili  0.5  /  DBili  x   /  AST  72<H>  /  ALT  112<H>  /  AlkPhos  158<H>  04-25      CARDIAC MARKERS ( 24 Apr 2020 13:00 )  x     / x     / 86 u/L / x     / x          New ECG(s): Personally reviewed  NSR, QTc 438    Imaging:  < from: Xray Chest 1 View AP/PA (04.24.20 @ 08:54) >    	Findings:  	The heart is unchanged in size. Mild hazy bilateral opacities are decreased compared to prior exam. No pneumothorax. Unremarkable osseous structures.    	Impression:  	1.  Mild hazy bilateral opacities appear decreased compared to prior exam.    	< end of copied text >

## 2020-04-25 NOTE — PHYSICAL THERAPY INITIAL EVALUATION ADULT - PLANNED THERAPY INTERVENTIONS, PT EVAL
Alert-The patient is alert, awake and responds to voice. The patient is oriented to time, place, and person. The triage nurse is able to obtain subjective information.
balance training/bed mobility training/gait training/strengthening/transfer training

## 2020-04-25 NOTE — PHYSICAL THERAPY INITIAL EVALUATION ADULT - PERTINENT HX OF CURRENT PROBLEM, REHAB EVAL
Pt is a 40 year old male presenting for gait instability. PMH: HTN, DM2, asthma, schizophrenia, recent COVID PNA.

## 2020-04-25 NOTE — PROGRESS NOTE ADULT - ASSESSMENT
40y M w/ PMH HTN, DM2, asthma, schizophrenia, recent COVID PNA presents from [facility] for generalized weakness and gait instability.    #Gait instability: Reports 1 week of wobbly gait a/w generalized weakness in setting of recent COVID PNA and prolonged hospitalization. Now COVID negative   -Could be 2/2 deconditioning vs possible hypoxemia on ambulation a/w recent viral illness and prolonged hospitalization  -CBC, CMP, CXR, ECG, UTox, B1, B12, folate, CK unrevealing   -TSH mildly elevated, free thyroxine wnl  -U/W w/ large LE, WBC >50, many bacteria. Follow up urine culture  -F/u RPR, clozapine level  -Neuro consulted. No further workup at this time. Will consider further imaging if does not improve  -Check Orthostatic BP   -PT/OT  -Fall precautions    #Schizophrenia: No active psychosis or mood or behavioral disturbances  -Recently increased from clozapine 25 to 50mg QD  -F/u clozapine level  -Continue clozapine 25 mg PO HS  -Continue zoloft 100mg PO daily  -Continue Cogentin 0.5 mg PO BID  -Zyprexa 2.5mg PO/IM Q6H PRN for severe agitation, if refractory agitation may give additional 2.5mg, hold if qtc >500  -Monitor ANC, WBC's, ESR/CRP  -Monitor EKG qtc interval    #DM2:  A1c 6.6 on metformin 500 BID  -Monitor FS on ISS TIDAC/QHS    DVT PPx: lovenox 40 QD  Diet: Carb consistent  FULL CODE  PT eval    Full note to follow 40y M w/ PMH HTN, DM2, asthma, schizophrenia, recent COVID PNA presents from [facility] for generalized weakness and gait instability.    #Gait instability: Reports 1 week of wobbly gait a/w generalized weakness in setting of recent COVID PNA and prolonged hospitalization. Now COVID negative   -Could be 2/2 deconditioning vs possible hypoxemia on ambulation a/w recent viral illness and prolonged hospitalization  -CBC, CMP, CXR, ECG, UTox, B1, B12, folate, CK unrevealing   -TSH mildly elevated, free thyroxine wnl   -F/u RPR, clozapine level  -Neuro consulted. No further workup at this time. Will consider further imaging if does not improve  -Check Orthostatic BP   -PT/OT  -Fall precautions    #UTI:   -Pt is having dysuria  -U/A w/ large LE, WBC >50, many bacteria  -F/u urine cultures  -Continue ceftriaxone, will narrow based on culture information    #Schizophrenia: No active psychosis or mood or behavioral disturbances  -Recently increased from clozapine 25 to 50mg QD  -F/u clozapine level  -Continue clozapine 25 mg PO HS  -Continue zoloft 100mg PO daily  -Continue Cogentin 0.5 mg PO BID  -Zyprexa 2.5mg PO/IM Q6H PRN for severe agitation, if refractory agitation may give additional 2.5mg, hold if qtc >500  -Monitor ANC, WBC's, ESR/CRP  -Monitor EKG qtc interval  -Appreciate psych input    #DM2:  A1c 6.6 on metformin 500 BID  -Monitor FS on ISS TIDAC/QHS    DVT PPx: lovenox 40 QD  Diet: Carb consistent  FULL CODE  PT [address], #[phone]

## 2020-04-26 LAB
ALBUMIN SERPL ELPH-MCNC: 3 G/DL — LOW (ref 3.3–5)
ALP SERPL-CCNC: 138 U/L — HIGH (ref 40–120)
ALT FLD-CCNC: 82 U/L — HIGH (ref 4–41)
ANION GAP SERPL CALC-SCNC: 18 MMO/L — HIGH (ref 7–14)
AST SERPL-CCNC: 44 U/L — HIGH (ref 4–40)
BASOPHILS # BLD AUTO: 0.03 K/UL — SIGNIFICANT CHANGE UP (ref 0–0.2)
BASOPHILS NFR BLD AUTO: 0.6 % — SIGNIFICANT CHANGE UP (ref 0–2)
BILIRUB SERPL-MCNC: 0.4 MG/DL — SIGNIFICANT CHANGE UP (ref 0.2–1.2)
BUN SERPL-MCNC: 10 MG/DL — SIGNIFICANT CHANGE UP (ref 7–23)
CALCIUM SERPL-MCNC: 9.5 MG/DL — SIGNIFICANT CHANGE UP (ref 8.4–10.5)
CHLORIDE SERPL-SCNC: 102 MMOL/L — SIGNIFICANT CHANGE UP (ref 98–107)
CO2 SERPL-SCNC: 22 MMOL/L — SIGNIFICANT CHANGE UP (ref 22–31)
CREAT SERPL-MCNC: 0.74 MG/DL — SIGNIFICANT CHANGE UP (ref 0.5–1.3)
EOSINOPHIL # BLD AUTO: 0.19 K/UL — SIGNIFICANT CHANGE UP (ref 0–0.5)
EOSINOPHIL NFR BLD AUTO: 3.5 % — SIGNIFICANT CHANGE UP (ref 0–6)
GLUCOSE BLDC GLUCOMTR-MCNC: 101 MG/DL — HIGH (ref 70–99)
GLUCOSE BLDC GLUCOMTR-MCNC: 104 MG/DL — HIGH (ref 70–99)
GLUCOSE BLDC GLUCOMTR-MCNC: 127 MG/DL — HIGH (ref 70–99)
GLUCOSE SERPL-MCNC: 119 MG/DL — HIGH (ref 70–99)
HCT VFR BLD CALC: 33.8 % — LOW (ref 39–50)
HGB BLD-MCNC: 10.9 G/DL — LOW (ref 13–17)
IMM GRANULOCYTES NFR BLD AUTO: 0.7 % — SIGNIFICANT CHANGE UP (ref 0–1.5)
LYMPHOCYTES # BLD AUTO: 2.11 K/UL — SIGNIFICANT CHANGE UP (ref 1–3.3)
LYMPHOCYTES # BLD AUTO: 38.7 % — SIGNIFICANT CHANGE UP (ref 13–44)
MAGNESIUM SERPL-MCNC: 1.9 MG/DL — SIGNIFICANT CHANGE UP (ref 1.6–2.6)
MCHC RBC-ENTMCNC: 28.3 PG — SIGNIFICANT CHANGE UP (ref 27–34)
MCHC RBC-ENTMCNC: 32.2 % — SIGNIFICANT CHANGE UP (ref 32–36)
MCV RBC AUTO: 87.8 FL — SIGNIFICANT CHANGE UP (ref 80–100)
MONOCYTES # BLD AUTO: 0.42 K/UL — SIGNIFICANT CHANGE UP (ref 0–0.9)
MONOCYTES NFR BLD AUTO: 7.7 % — SIGNIFICANT CHANGE UP (ref 2–14)
NEUTROPHILS # BLD AUTO: 2.66 K/UL — SIGNIFICANT CHANGE UP (ref 1.8–7.4)
NEUTROPHILS NFR BLD AUTO: 48.8 % — SIGNIFICANT CHANGE UP (ref 43–77)
NRBC # FLD: 0 K/UL — SIGNIFICANT CHANGE UP (ref 0–0)
PHOSPHATE SERPL-MCNC: 3.4 MG/DL — SIGNIFICANT CHANGE UP (ref 2.5–4.5)
PLATELET # BLD AUTO: 166 K/UL — SIGNIFICANT CHANGE UP (ref 150–400)
PMV BLD: 10.3 FL — SIGNIFICANT CHANGE UP (ref 7–13)
POTASSIUM SERPL-MCNC: 3.5 MMOL/L — SIGNIFICANT CHANGE UP (ref 3.5–5.3)
POTASSIUM SERPL-SCNC: 3.5 MMOL/L — SIGNIFICANT CHANGE UP (ref 3.5–5.3)
PROT SERPL-MCNC: 7.5 G/DL — SIGNIFICANT CHANGE UP (ref 6–8.3)
RBC # BLD: 3.85 M/UL — LOW (ref 4.2–5.8)
RBC # FLD: 15.2 % — HIGH (ref 10.3–14.5)
SODIUM SERPL-SCNC: 142 MMOL/L — SIGNIFICANT CHANGE UP (ref 135–145)
WBC # BLD: 5.45 K/UL — SIGNIFICANT CHANGE UP (ref 3.8–10.5)
WBC # FLD AUTO: 5.45 K/UL — SIGNIFICANT CHANGE UP (ref 3.8–10.5)

## 2020-04-26 PROCEDURE — ZZZZZ: CPT

## 2020-04-26 RX ADMIN — Medication 0.5 MILLIGRAM(S): at 17:34

## 2020-04-26 RX ADMIN — CEFTRIAXONE 100 MILLIGRAM(S): 500 INJECTION, POWDER, FOR SOLUTION INTRAMUSCULAR; INTRAVENOUS at 17:39

## 2020-04-26 RX ADMIN — OLANZAPINE 2.5 MILLIGRAM(S): 15 TABLET, FILM COATED ORAL at 22:08

## 2020-04-26 RX ADMIN — CLOZAPINE 25 MILLIGRAM(S): 150 TABLET, ORALLY DISINTEGRATING ORAL at 22:08

## 2020-04-26 RX ADMIN — CEFTRIAXONE 100 MILLIGRAM(S): 500 INJECTION, POWDER, FOR SOLUTION INTRAMUSCULAR; INTRAVENOUS at 06:09

## 2020-04-26 RX ADMIN — ENOXAPARIN SODIUM 40 MILLIGRAM(S): 100 INJECTION SUBCUTANEOUS at 13:24

## 2020-04-26 RX ADMIN — Medication 0.5 MILLIGRAM(S): at 06:09

## 2020-04-26 RX ADMIN — OLANZAPINE 2.5 MILLIGRAM(S): 15 TABLET, FILM COATED ORAL at 13:24

## 2020-04-26 RX ADMIN — SERTRALINE 100 MILLIGRAM(S): 25 TABLET, FILM COATED ORAL at 13:24

## 2020-04-26 RX ADMIN — CLOZAPINE 25 MILLIGRAM(S): 150 TABLET, ORALLY DISINTEGRATING ORAL at 01:18

## 2020-04-26 NOTE — PROGRESS NOTE ADULT - ASSESSMENT
40y M w/ PMH HTN, DM2, asthma, schizophrenia, recent COVID PNA presents from [facility] for generalized weakness and gait instability.    #Gait instability: Reports 1 week of wobbly gait a/w generalized weakness in setting of recent COVID PNA and prolonged hospitalization. Now COVID negative   -Could be 2/2 deconditioning vs possible hypoxemia on ambulation a/w recent viral illness and prolonged hospitalization vs. UTI  -CBC, CMP, CXR, ECG, UTox, B1, B12, folate, CK, RPR unrevealing   -TSH mildly elevated, fT3, fT4 wnl  -F/u clozapine level  -Neuro consulted. No further workup at this time. Will consider further imaging if does not improve  -PT/OT  -Fall precautions    #UTI:   -Pt is having dysuria  -U/A w/ large LE, WBC >50, many bacteria  -UCx w/ GNR. Awaiting speciation  -Continue ceftriaxone, will narrow based on culture information    #Schizophrenia: No active psychosis or mood or behavioral disturbances  -Recently increased from clozapine 25 to 50mg QD  -F/u clozapine level  -Continue clozapine 25 mg PO HS  -Continue zoloft 100mg PO daily  -Continue Cogentin 0.5 mg PO BID  -Zyprexa 2.5mg PO/IM Q6H PRN for severe agitation, if refractory agitation may give additional 2.5mg, hold if qtc >500  -Monitor ANC, WBC's, ESR/CRP  -Monitor EKG qtc interval  -Appreciate psych input    #DM2:  A1c 6.6 on metformin 500 BID  -Monitor FS on ISS TIDAC/QHS    DVT PPx: lovenox 40 QD  Diet: Carb consistent  GOC: FULL CODE  PT [address], #[phone]    Full note to follow 40y M w/ PMH HTN, DM2, asthma, schizophrenia, recent COVID PNA presents from [facility] for generalized weakness and gait instability.    #Gait instability: Recently had long hospitalization w/ COVID PNA. Since discharge, reports 1 week of wobbly gait a/w generalized weakness. Could be 2/2 deconditioning vs possible hypoxemia on ambulation a/w recent viral illness and prolonged hospitalization vs. UTI. Improved after starting treatment for UTI.  -CBC, CMP, CXR, ECG, UTox, B1, B12, folate, CK, RPR unrevealing   -TSH mildly elevated, fT3, fT4 wnl  -F/u clozapine level  -Neuro consulted. No further workup at this time  -PT/OT  -Fall precautions    #UTI: Complained of dysuria on admisison  -U/A w/ large LE, WBC >50, many bacteria  -UCx w/ GNR  -Continue ceftriaxone, will narrow based on culture information    #Schizophrenia: No active psychosis or mood or behavioral disturbances  -Recently increased from clozapine 25 to 50mg QD  -F/u clozapine level  -Continue clozapine 25 mg PO HS  -Continue zoloft 100mg PO daily  -Continue cogentin 0.5 mg PO BID  -Zyprexa 2.5mg PO/IM Q6H PRN for severe agitation, if refractory agitation may give additional 2.5mg, hold if qtc >500  -Monitor ANC, WBC's, ESR/CRP  -Monitor EKG qtc interval  -Appreciate psych input    #DM2:  A1c 6.6 on metformin 500 BID  -Monitor FS on ISS TIDAC/QHS    DVT PPx: lovenox 40 QD  Diet: Carb consistent  GOC: FULL CODE  PT [address], #[phone]    Full note to follow 40y M w/ PMH HTN, DM2, asthma, schizophrenia, recent COVID PNA presents from [facility] for generalized weakness and gait instability.    #Gait instability: Recently had long hospitalization w/ COVID PNA. Since discharge, reports 1 week of wobbly gait a/w generalized weakness. Could be 2/2 deconditioning vs possible hypoxemia on ambulation a/w recent viral illness and prolonged hospitalization vs. UTI. Improved after starting treatment for UTI.  -CBC, CMP, CXR, ECG, UTox, B1, B12, folate, CK, RPR unrevealing   -TSH mildly elevated, fT3, fT4 wnl  -F/u clozapine level  -Neuro consulted. No further workup at this time  -PT/OT    #UTI: Complained of dysuria on admisison  -U/A w/ large LE, WBC >50, many bacteria  -UCx w/ GNR  -Continue ceftriaxone, will narrow based on culture information    #Schizophrenia: No active psychosis or mood or behavioral disturbances  -Recently increased from clozapine 25 to 50mg QD  -F/u clozapine level  -Continue clozapine 25 mg PO HS  -Continue zoloft 100mg PO daily  -Continue cogentin 0.5 mg PO BID  -Zyprexa 2.5mg PO/IM Q6H PRN for severe agitation, if refractory agitation may give additional 2.5mg, hold if qtc >500  -Monitor ANC, WBC's, ESR/CRP  -Monitor EKG qtc interval  -Appreciate psych input    #DM2:  A1c 6.6 on metformin 500 BID  -Monitor FS on ISS TIDAC/QHS    DVT PPx: lovenox 40 QD  Diet: Carb consistent  GOC: FULL CODE  Dispo: Pt can return to [place] to finish course of PO antibiotics pending urine culture speciation/sensitivies.     [address], #[phone]

## 2020-04-26 NOTE — PROGRESS NOTE ADULT - SUBJECTIVE AND OBJECTIVE BOX
Prelim note    Medicine Progress Note    Overnight Events: No acute events overnight.     Review of Systems:  CONSTITUTIONAL: +Weakness  EYES/ENT: No visual changes;  No dysphagia  NECK: No pain or stiffness  RESPIRATORY: No cough, wheezing, hemoptysis; No shortness of breath  CARDIOVASCULAR: No chest pain or palpitations; No lower extremity edema  GASTROINTESTINAL: No abdominal or epigastric pain. No nausea, vomiting, or hematemesis; No diarrhea or constipation. No melena or hematochezia.  BACK: No back pain  GENITOURINARY: +Dysuria  NEUROLOGICAL: No numbness or weakness  SKIN: No itching, burning, rashes, or lesions   All other review of systems is negative unless indicated above.        Medications:  MEDICATIONS  (STANDING):  benztropine 0.5 milliGRAM(s) Oral two times a day  cefTRIAXone   IVPB 1000 milliGRAM(s) IV Intermittent every 24 hours  cloZAPine 25 milliGRAM(s) Oral at bedtime  dextrose 5%. 1000 milliLiter(s) (50 mL/Hr) IV Continuous <Continuous>  dextrose 50% Injectable 12.5 Gram(s) IV Push once  dextrose 50% Injectable 25 Gram(s) IV Push once  dextrose 50% Injectable 25 Gram(s) IV Push once  enoxaparin Injectable 40 milliGRAM(s) SubCutaneous daily  insulin lispro (HumaLOG) corrective regimen sliding scale   SubCutaneous three times a day before meals  insulin lispro (HumaLOG) corrective regimen sliding scale   SubCutaneous at bedtime  sertraline 100 milliGRAM(s) Oral daily    MEDICATIONS  (PRN):  acetaminophen   Tablet .. 650 milliGRAM(s) Oral every 6 hours PRN Temp greater or equal to 38C (100.4F), Mild Pain (1 - 3)  dextrose 40% Gel 15 Gram(s) Oral once PRN Blood Glucose LESS THAN 70 milliGRAM(s)/deciliter  glucagon  Injectable 1 milliGRAM(s) IntraMuscular once PRN Glucose LESS THAN 70 milligrams/deciliter  OLANZapine 2.5 milliGRAM(s) Oral every 6 hours PRN severe agitation  OLANZapine Injectable 2.5 milliGRAM(s) IntraMuscular every 6 hours PRN severe agitation    PAST MEDICAL & SURGICAL HISTORY:  Type 2 diabetes mellitus  Hypertension  Obstructive sleep apnea  Schizophrenia  No significant past surgical history      Vitals:  Vital Signs Last 24 Hrs  T(C): 37 (25 Apr 2020 22:34), Max: 37 (25 Apr 2020 22:34)  T(F): 98.6 (25 Apr 2020 22:34), Max: 98.6 (25 Apr 2020 22:34)  HR: 98 (25 Apr 2020 22:34) (87 - 98)  BP: 138/75 (25 Apr 2020 22:34) (120/79 - 138/75)  BP(mean): --  RR: 18 (25 Apr 2020 22:34) (18 - 18)  SpO2: 100% (25 Apr 2020 22:34) (100% - 100%)    Physical Exam:  Appearance: No acute distress; obese  Eyes: PERRL, EOMI, pink conjunctiva  HENT: Normal oral mucosa  Cardiovascular: RRR, S1, S2, no murmurs, rubs, or gallops; no edema; no JVD  Respiratory: Clear to auscultation bilaterally  Gastrointestinal: soft, non-tender, non-distended with normal bowel sounds  Musculoskeletal: No clubbing; no joint deformity   Psychiatry: AAOx3, slow to respond, but answers questions appropriately  Skin: No rashes, ecchymoses, or cyanosis  Neuro: CN II-XII intact. 5/5 strength throughout. Sensation intact. Slightly wide based gait                          10.9   5.45  )-----------( 166      ( 26 Apr 2020 06:40 )             33.8     04-26    142  |  102  |  10  ----------------------------<  119<H>  3.5   |  22  |  0.74    Ca    9.5      26 Apr 2020 06:40  Phos  3.4     04-26  Mg     1.9     04-26    TPro  7.5  /  Alb  3.0<L>  /  TBili  0.4  /  DBili  x   /  AST  44<H>  /  ALT  82<H>  /  AlkPhos  138<H>  04-26            New ECG(s): Personally reviewed  NSR, QTc 438    Imaging:  < from: Xray Chest 1 View AP/PA (04.24.20 @ 08:54) >    	Findings:  	The heart is unchanged in size. Mild hazy bilateral opacities are decreased compared to prior exam. No pneumothorax. Unremarkable osseous structures.    	Impression:  	1.  Mild hazy bilateral opacities appear decreased compared to prior exam.    	< end of copied text > Medicine Progress Note    Overnight Events: No acute events overnight. Patient feels well today. I walked him around the room and he is no longer having gait unsteadiness. He is eager to go home.    Additional ROS: negative        MEDICATIONS  (STANDING):  benztropine 0.5 milliGRAM(s) Oral two times a day  cefTRIAXone   IVPB 1000 milliGRAM(s) IV Intermittent every 24 hours  cloZAPine 25 milliGRAM(s) Oral at bedtime  dextrose 5%. 1000 milliLiter(s) (50 mL/Hr) IV Continuous <Continuous>  dextrose 50% Injectable 12.5 Gram(s) IV Push once  dextrose 50% Injectable 25 Gram(s) IV Push once  dextrose 50% Injectable 25 Gram(s) IV Push once  enoxaparin Injectable 40 milliGRAM(s) SubCutaneous daily  insulin lispro (HumaLOG) corrective regimen sliding scale   SubCutaneous three times a day before meals  insulin lispro (HumaLOG) corrective regimen sliding scale   SubCutaneous at bedtime  sertraline 100 milliGRAM(s) Oral daily    MEDICATIONS  (PRN):  acetaminophen   Tablet .. 650 milliGRAM(s) Oral every 6 hours PRN Temp greater or equal to 38C (100.4F), Mild Pain (1 - 3)  dextrose 40% Gel 15 Gram(s) Oral once PRN Blood Glucose LESS THAN 70 milliGRAM(s)/deciliter  glucagon  Injectable 1 milliGRAM(s) IntraMuscular once PRN Glucose LESS THAN 70 milligrams/deciliter  OLANZapine 2.5 milliGRAM(s) Oral every 6 hours PRN severe agitation  OLANZapine Injectable 2.5 milliGRAM(s) IntraMuscular every 6 hours PRN severe agitation    PAST MEDICAL & SURGICAL HISTORY:  Type 2 diabetes mellitus  Hypertension  Obstructive sleep apnea  Schizophrenia  No significant past surgical history    Vitals:  Vital Signs Last 24 Hrs  T(C): 37 (25 Apr 2020 22:34), Max: 37 (25 Apr 2020 22:34)  T(F): 98.6 (25 Apr 2020 22:34), Max: 98.6 (25 Apr 2020 22:34)  HR: 98 (25 Apr 2020 22:34) (87 - 98)  BP: 138/75 (25 Apr 2020 22:34) (120/79 - 138/75)  BP(mean): --  RR: 18 (25 Apr 2020 22:34) (18 - 18)  SpO2: 100% (25 Apr 2020 22:34) (100% - 100%)    Physical Exam:  Appearance: No acute distress; obese  Eyes: PERRL, EOMI, pink conjunctiva  HENT: Normal oral mucosa  Cardiovascular: RRR, S1, S2, no murmurs, rubs, or gallops; no edema; no JVD  Respiratory: Clear to auscultation bilaterally  Gastrointestinal: soft, non-tender, non-distended with normal bowel sounds  Musculoskeletal: No clubbing; no joint deformity   Psychiatry: AAOx3, slow to respond, but answers questions appropriately  Skin: No rashes, ecchymoses, or cyanosis  Neuro: CN II-XII intact. 5/5 strength throughout. Sensation intact.                        10.9   5.45  )-----------( 166      ( 26 Apr 2020 06:40 )             33.8     04-26    142  |  102  |  10  ----------------------------<  119<H>  3.5   |  22  |  0.74    Ca    9.5      26 Apr 2020 06:40  Phos  3.4     04-26  Mg     1.9     04-26    TPro  7.5  /  Alb  3.0<L>  /  TBili  0.4  /  DBili  x   /  AST  44<H>  /  ALT  82<H>  /  AlkPhos  138<H>  04-26    Urinalysis (04.24.20 @ 18:00)    Color: YELLOW    Urine Appearance: Lt TURBID    Glucose: NEGATIVE    Bilirubin: NEGATIVE    Ketone - Urine: NEGATIVE    Specific Gravity: 1.018    Blood: SMALL    pH - Urine: 6.0    Protein, Urine: 50    Urobilinogen: TRACE    Nitrite: NEGATIVE    Leukocyte Esterase Concentration: LARGE    Red Blood Cell - Urine: 3-5    White Blood Cell - Urine: >50    Hyaline Casts: 2+    Bacteria: MANY    Squamous Epithelial: OCC      Culture - Urine (04.25.20 @ 02:04)    Specimen Source: .Urine Clean Catch (Midstream)    Culture Results:   >100,000 CFU/ml Gram Negative Rods              New ECG(s): Personally reviewed  NSR, QTc 438    Imaging:  < from: Xray Chest 1 View AP/PA (04.24.20 @ 08:54) >    	Findings:  	The heart is unchanged in size. Mild hazy bilateral opacities are decreased compared to prior exam. No pneumothorax. Unremarkable osseous structures.    	Impression:  	1.  Mild hazy bilateral opacities appear decreased compared to prior exam.    	< end of copied text >

## 2020-04-27 ENCOUNTER — TRANSCRIPTION ENCOUNTER (OUTPATIENT)
Age: 40
End: 2020-04-27

## 2020-04-27 VITALS
OXYGEN SATURATION: 98 % | RESPIRATION RATE: 20 BRPM | DIASTOLIC BLOOD PRESSURE: 78 MMHG | TEMPERATURE: 98 F | SYSTOLIC BLOOD PRESSURE: 126 MMHG | HEART RATE: 112 BPM

## 2020-04-27 LAB
-  AMIKACIN: SIGNIFICANT CHANGE UP
-  AMPICILLIN/SULBACTAM: SIGNIFICANT CHANGE UP
-  AMPICILLIN: SIGNIFICANT CHANGE UP
-  AZTREONAM: SIGNIFICANT CHANGE UP
-  CEFAZOLIN: SIGNIFICANT CHANGE UP
-  CEFEPIME: SIGNIFICANT CHANGE UP
-  CEFOXITIN: SIGNIFICANT CHANGE UP
-  CEFTRIAXONE: SIGNIFICANT CHANGE UP
-  CIPROFLOXACIN: SIGNIFICANT CHANGE UP
-  GENTAMICIN: SIGNIFICANT CHANGE UP
-  IMIPENEM: SIGNIFICANT CHANGE UP
-  LEVOFLOXACIN: SIGNIFICANT CHANGE UP
-  MEROPENEM: SIGNIFICANT CHANGE UP
-  NITROFURANTOIN: SIGNIFICANT CHANGE UP
-  PIPERACILLIN/TAZOBACTAM: SIGNIFICANT CHANGE UP
-  TIGECYCLINE: SIGNIFICANT CHANGE UP
-  TOBRAMYCIN: SIGNIFICANT CHANGE UP
-  TRIMETHOPRIM/SULFAMETHOXAZOLE: SIGNIFICANT CHANGE UP
ALBUMIN SERPL ELPH-MCNC: 3.4 G/DL — SIGNIFICANT CHANGE UP (ref 3.3–5)
ALP SERPL-CCNC: 143 U/L — HIGH (ref 40–120)
ALT FLD-CCNC: 84 U/L — HIGH (ref 4–41)
ANION GAP SERPL CALC-SCNC: 17 MMO/L — HIGH (ref 7–14)
AST SERPL-CCNC: 56 U/L — HIGH (ref 4–40)
BASOPHILS # BLD AUTO: 0.06 K/UL — SIGNIFICANT CHANGE UP (ref 0–0.2)
BASOPHILS NFR BLD AUTO: 0.9 % — SIGNIFICANT CHANGE UP (ref 0–2)
BILIRUB SERPL-MCNC: 0.4 MG/DL — SIGNIFICANT CHANGE UP (ref 0.2–1.2)
BUN SERPL-MCNC: 10 MG/DL — SIGNIFICANT CHANGE UP (ref 7–23)
CALCIUM SERPL-MCNC: 9.5 MG/DL — SIGNIFICANT CHANGE UP (ref 8.4–10.5)
CHLORIDE SERPL-SCNC: 103 MMOL/L — SIGNIFICANT CHANGE UP (ref 98–107)
CO2 SERPL-SCNC: 23 MMOL/L — SIGNIFICANT CHANGE UP (ref 22–31)
CREAT SERPL-MCNC: 0.81 MG/DL — SIGNIFICANT CHANGE UP (ref 0.5–1.3)
CULTURE RESULTS: SIGNIFICANT CHANGE UP
EOSINOPHIL # BLD AUTO: 0.26 K/UL — SIGNIFICANT CHANGE UP (ref 0–0.5)
EOSINOPHIL NFR BLD AUTO: 3.8 % — SIGNIFICANT CHANGE UP (ref 0–6)
GLUCOSE BLDC GLUCOMTR-MCNC: 106 MG/DL — HIGH (ref 70–99)
GLUCOSE BLDC GLUCOMTR-MCNC: 109 MG/DL — HIGH (ref 70–99)
GLUCOSE BLDC GLUCOMTR-MCNC: 111 MG/DL — HIGH (ref 70–99)
GLUCOSE BLDC GLUCOMTR-MCNC: 113 MG/DL — HIGH (ref 70–99)
GLUCOSE BLDC GLUCOMTR-MCNC: 96 MG/DL — SIGNIFICANT CHANGE UP (ref 70–99)
GLUCOSE BLDC GLUCOMTR-MCNC: 96 MG/DL — SIGNIFICANT CHANGE UP (ref 70–99)
GLUCOSE SERPL-MCNC: 107 MG/DL — HIGH (ref 70–99)
HCT VFR BLD CALC: 36.3 % — LOW (ref 39–50)
HGB BLD-MCNC: 11.3 G/DL — LOW (ref 13–17)
IMM GRANULOCYTES NFR BLD AUTO: 1.2 % — SIGNIFICANT CHANGE UP (ref 0–1.5)
LYMPHOCYTES # BLD AUTO: 2.2 K/UL — SIGNIFICANT CHANGE UP (ref 1–3.3)
LYMPHOCYTES # BLD AUTO: 32.4 % — SIGNIFICANT CHANGE UP (ref 13–44)
MAGNESIUM SERPL-MCNC: 1.9 MG/DL — SIGNIFICANT CHANGE UP (ref 1.6–2.6)
MCHC RBC-ENTMCNC: 27.4 PG — SIGNIFICANT CHANGE UP (ref 27–34)
MCHC RBC-ENTMCNC: 31.1 % — LOW (ref 32–36)
MCV RBC AUTO: 88.1 FL — SIGNIFICANT CHANGE UP (ref 80–100)
METHOD TYPE: SIGNIFICANT CHANGE UP
MONOCYTES # BLD AUTO: 0.55 K/UL — SIGNIFICANT CHANGE UP (ref 0–0.9)
MONOCYTES NFR BLD AUTO: 8.1 % — SIGNIFICANT CHANGE UP (ref 2–14)
NEUTROPHILS # BLD AUTO: 3.64 K/UL — SIGNIFICANT CHANGE UP (ref 1.8–7.4)
NEUTROPHILS NFR BLD AUTO: 53.6 % — SIGNIFICANT CHANGE UP (ref 43–77)
NRBC # FLD: 0 K/UL — SIGNIFICANT CHANGE UP (ref 0–0)
ORGANISM # SPEC MICROSCOPIC CNT: SIGNIFICANT CHANGE UP
ORGANISM # SPEC MICROSCOPIC CNT: SIGNIFICANT CHANGE UP
PHOSPHATE SERPL-MCNC: 4.3 MG/DL — SIGNIFICANT CHANGE UP (ref 2.5–4.5)
PLATELET # BLD AUTO: 210 K/UL — SIGNIFICANT CHANGE UP (ref 150–400)
PMV BLD: 10.6 FL — SIGNIFICANT CHANGE UP (ref 7–13)
POTASSIUM SERPL-MCNC: 3.4 MMOL/L — LOW (ref 3.5–5.3)
POTASSIUM SERPL-SCNC: 3.4 MMOL/L — LOW (ref 3.5–5.3)
PROT SERPL-MCNC: 7.6 G/DL — SIGNIFICANT CHANGE UP (ref 6–8.3)
RBC # BLD: 4.12 M/UL — LOW (ref 4.2–5.8)
RBC # FLD: 15.2 % — HIGH (ref 10.3–14.5)
SODIUM SERPL-SCNC: 143 MMOL/L — SIGNIFICANT CHANGE UP (ref 135–145)
SPECIMEN SOURCE: SIGNIFICANT CHANGE UP
WBC # BLD: 6.79 K/UL — SIGNIFICANT CHANGE UP (ref 3.8–10.5)
WBC # FLD AUTO: 6.79 K/UL — SIGNIFICANT CHANGE UP (ref 3.8–10.5)

## 2020-04-27 PROCEDURE — 99233 SBSQ HOSP IP/OBS HIGH 50: CPT | Mod: 95

## 2020-04-27 PROCEDURE — 99232 SBSQ HOSP IP/OBS MODERATE 35: CPT | Mod: CS

## 2020-04-27 RX ORDER — DOCUSATE SODIUM 100 MG
1 CAPSULE ORAL
Qty: 0 | Refills: 0 | DISCHARGE

## 2020-04-27 RX ORDER — CEFUROXIME AXETIL 250 MG
1 TABLET ORAL
Qty: 8 | Refills: 0
Start: 2020-04-27 | End: 2020-04-30

## 2020-04-27 RX ORDER — POTASSIUM CHLORIDE 20 MEQ
40 PACKET (EA) ORAL ONCE
Refills: 0 | Status: DISCONTINUED | OUTPATIENT
Start: 2020-04-27 | End: 2020-04-27

## 2020-04-27 RX ORDER — ARIPIPRAZOLE 15 MG/1
400 TABLET ORAL
Qty: 0 | Refills: 0 | DISCHARGE

## 2020-04-27 RX ORDER — POTASSIUM CHLORIDE 20 MEQ
40 PACKET (EA) ORAL ONCE
Refills: 0 | Status: COMPLETED | OUTPATIENT
Start: 2020-04-27 | End: 2020-04-27

## 2020-04-27 RX ORDER — CLOZAPINE 150 MG/1
2 TABLET, ORALLY DISINTEGRATING ORAL
Qty: 0 | Refills: 0 | DISCHARGE

## 2020-04-27 RX ORDER — METFORMIN HYDROCHLORIDE 850 MG/1
1 TABLET ORAL
Qty: 0 | Refills: 0 | DISCHARGE

## 2020-04-27 RX ORDER — ARIPIPRAZOLE 15 MG/1
400 TABLET ORAL ONCE
Refills: 0 | Status: COMPLETED | OUTPATIENT
Start: 2020-04-27 | End: 2020-04-27

## 2020-04-27 RX ADMIN — Medication 0.5 MILLIGRAM(S): at 05:40

## 2020-04-27 RX ADMIN — ENOXAPARIN SODIUM 40 MILLIGRAM(S): 100 INJECTION SUBCUTANEOUS at 11:00

## 2020-04-27 RX ADMIN — SERTRALINE 100 MILLIGRAM(S): 25 TABLET, FILM COATED ORAL at 11:00

## 2020-04-27 RX ADMIN — Medication 0.5 MILLIGRAM(S): at 16:45

## 2020-04-27 RX ADMIN — CEFTRIAXONE 100 MILLIGRAM(S): 500 INJECTION, POWDER, FOR SOLUTION INTRAMUSCULAR; INTRAVENOUS at 17:29

## 2020-04-27 RX ADMIN — ARIPIPRAZOLE 400 MILLIGRAM(S): 15 TABLET ORAL at 15:30

## 2020-04-27 RX ADMIN — Medication 40 MILLIEQUIVALENT(S): at 11:00

## 2020-04-27 NOTE — PROGRESS NOTE BEHAVIORAL HEALTH - MODIFICATIONS
I will SWITCH the dose or number of times a day I take the medications listed below when I get home from the hospital:  None As below

## 2020-04-27 NOTE — PROGRESS NOTE BEHAVIORAL HEALTH - CASE SUMMARY
Pt seen for follow-up with Dr. Hudson. Attempted to speak with patient today via Zoom Video call with help of psychiatry telepresenter. Saw patient on video call. I agree with above assessment and plan, Patient calm, cooperative, thought process seems to be concrete and limited. Patient denies acute psychotic or manic sxs, adamantly denies SI and HI. He is future oriented, looking forward to returning home and seeing his family. Collateral obtained from outpt. psychiatrist (see above for details). Recommend meds. as written above, monitor WBC, ANC and qtc.  Please note that pt due for Abilify Maintena 400mg IM 4/24 per outpt psychiatrist, will attempt to administer today. Order has been placed. Please don't hesitate to call [facility] with any further questions or concerns.

## 2020-04-27 NOTE — PROGRESS NOTE BEHAVIORAL HEALTH - RISK ASSESSMENT
Pt is low risk for imminent self harm/suicide.  Risk factors include prior psychiatric hospitalizations, diagnosis of major mental illness.  Protective factors include good social supports, stability of domicile, no active psychosis, no active SI/HI. Pt is low risk for imminent self harm/suicide.  Risk factors include prior psychiatric hospitalizations, diagnosis of major mental illness.  Protective factors include good social supports, stability of domicile, no active psychosis, no active SI/HI. Has outpt. psychiatrist, on LINARES, future oriented, compliant with treatment, wants to get better    Pt. is not at acute risk of harm to self or others at this time and does not need an inpatient admission as he does not meet criteria for involuntary admission

## 2020-04-27 NOTE — PROGRESS NOTE BEHAVIORAL HEALTH - NSBHCHARTREVIEWVS_PSY_A_CORE FT
Vital Signs Last 24 Hrs  T(C): 36.4 (26 Apr 2020 22:25), Max: 36.7 (26 Apr 2020 17:57)  T(F): 97.5 (26 Apr 2020 22:25), Max: 98 (26 Apr 2020 17:57)  HR: 97 (26 Apr 2020 22:25) (97 - 100)  BP: 145/87 (26 Apr 2020 22:25) (110/73 - 145/87)  BP(mean): --  RR: 22 (26 Apr 2020 22:25) (20 - 22)  SpO2: 96% (26 Apr 2020 22:25) (96% - 96%)

## 2020-04-27 NOTE — PROGRESS NOTE BEHAVIORAL HEALTH - NSBHCONSULTFOLLOWAFTERCARE_PSY_A_CORE FT
Pt may follow up with outpatient psychiatrist Dr. Clari Cobos [phone] upon discharge. ********Please note that pt due for Abilify Maintena 400mg IM 4/24 per outpt psychiatrist, will attempt to administer today. Order has been placed*********    Pt may follow up with outpatient psychiatrist Dr. Clari Cobos [phone] upon discharge.

## 2020-04-27 NOTE — PROGRESS NOTE BEHAVIORAL HEALTH - NSBHFUPINTERVALHXFT_PSY_A_CORE
Pt seen for follow-up.  Patient was not seen in person , in light of covid-19 pandemic for infection risk mitigation. Limitations of tele-psychiatric evaluation were discussed and patient consented to tele-psychiatric eval. Primary team in agreement. Attempted to speak with patient today via Zoom Video call with help of psychiatry telepresenter. Saw patient on video call.  Over weekend pt received Zyprexa 2.5mg PO x1 on 4/25 and x2 on 4/26 for agitation.  Today pt reports mood as "fine", appears calm and in good behavioral control.  Demonstrated gait to psychiatry team, and was wnl.  He feels safe in hospital, reports staff treating him well.  Denies SI/HI.  Denies AH/VH.  Future-oriented, looking forward to returning home and seeing family.      Spoke with pt's outpatient psychiatrist Dr. Mera Cobos ([phone]) - she reports traeting him for past four years on Abilify Maintena 400mg IM q4 weeks; was also on much higher dose of clozapine but was decreased due to pt's obesity and has been tolerating 25mg qhs well.  Reports he last received Abilify Maintena on 3/27/2020 and was due on 4/24/2020 however did not receive 4/24 dose as he was taken to ER that day.  Suspects he may have been more agitated as he was due for LINARES.  Reports she did thorough chart review and pt does not have documented adverse reactions to Zyprexa.  Reports at baseline pt calm with prominent negative symptoms, concrete. Pt seen for follow-up.  Patient was not seen in person , in light of covid-19 pandemic for infection risk mitigation. Limitations of tele-psychiatric evaluation were discussed and patient consented to tele-psychiatric eval. Primary team in agreement. Attempted to speak with patient today via Zoom Video call with help of psychiatry telepresenter. Saw patient on video call.    Over weekend pt received Zyprexa 2.5mg PO x1 on 4/25 and x2 on 4/26 for agitation.  Today pt reports mood as "fine", appears calm and in good behavioral control.  Demonstrated gait to psychiatry team, and was wnl.  He feels safe in hospital, reports staff treating him well. Adamantly  denies SI/HI.  Denies AH/VH. No delusions elicited.  Future-oriented,  looking forward to returning home and seeing his family.      Spoke with pt's outpatient psychiatrist Dr. Mera Cobos ([phone]) - she reports treating him for past four years on Abilify Maintena 400mg IM q4 weeks; was also on much higher dose of clozapine but was decreased due to pt's obesity and has been tolerating 25mg qhs well.  Reports he last received Abilify Maintena on 3/27/2020 and was due on 4/24/2020 however did not receive 4/24 dose as he was taken to ER that day.  Suspects he may have been more agitated as he was due for LINARES.  Reports she did thorough chart review and pt does not have documented adverse reactions to Zyprexa.  Reports at baseline pt calm with prominent negative symptoms, concrete.

## 2020-04-27 NOTE — CHART NOTE - NSCHARTNOTEFT_GEN_A_CORE
Spoke w/ Dr. Soto (outpt psychiatrist) re Abilify injection 400 mg monthly, gave the number for [facility] inpt Psych to discuss pt's meds. As per conversation, pt was due for monthly injection on 4/24 and will need to receive the dose today, which will be ordered by [facility] Psychiatrist       Eri Hull [facility] NP  [id number]

## 2020-04-27 NOTE — DISCHARGE NOTE PROVIDER - NSDCFUSCHEDAPPT_GEN_ALL_CORE_FT
ELKE INTERIANO ; 04/30/2020 ; [facility] [id number] ELKE INTERIANO ; 04/30/2020 ; [facility] [id number]

## 2020-04-27 NOTE — DISCHARGE NOTE NURSING/CASE MANAGEMENT/SOCIAL WORK - NSDCPNINST_GEN_ALL_CORE
pt ambulating, eating, voiding without difficulty. iv discontinued. no distress noted. large loose BM noted this afternoon. patient showered self.

## 2020-04-27 NOTE — PROGRESS NOTE BEHAVIORAL HEALTH - SUMMARY
39 yo , male, residing at [facility] with PMHx HTN, HLD, ROMMEL, DM, PN, COVID (+4/4), Asthma, Obesity, PPHx Schizophrenia, paranoid type, multiple past psychiatric hospitalizations (last in 2016), no prior suicide attempt,  presents to [facility] for c/o "feeling wobbly" for 1 month; recently discharged from [facility] 4/17 for COVID PNA.    On evaluation today pt appears calm, with prominent negative symptoms of schizophrenia, but cooperative.  History limited as pt poor historian secondary to concrete thought process.  He has denied SI/HI/psychosis since admission.  Per collateral from psychiatrist, pt last received Abilify Maintena 400mg IM on 3/27/2020, next due 4/24/2020.    PLAN:    -safety - routine checks, pt does not need 1:1 at this time.  -c/w clozapine 25mg QHS  -pt due for Abilify Maintena 4/24 per outpt psychiatrist, will attempt to administer today.  -PRNs:  Zyprexa 2.5mg PO/IM q6h prn agitation for QTc < 500ms.  Monitory QTc  -case seen and discussed with Dr. Jaquez via telehealth platform. 41 yo , male, residing at [facility] with PMHx HTN, HLD, ROMMEL, DM, PN, COVID (+4/4), Asthma, Obesity, PPHx Schizophrenia, paranoid type, multiple past psychiatric hospitalizations (last in 2016), no prior suicide attempt,  presents to [facility] for c/o "feeling wobbly" for 1 month; recently discharged from [facility] 4/17 for COVID PNA.    On evaluation today pt appears calm, with prominent negative symptoms of schizophrenia, but cooperative.  History limited as pt poor historian secondary to concrete thought process.  He has denied SI/HI/psychosis since admission.  Per collateral from psychiatrist, pt last received Abilify Maintena 400mg IM on 3/27/2020, next due 4/24/2020.    PLAN:    -safety - routine checks, pt does not need 1:1 at this time.  -c/w clozapine 25mg QHS, zoloft 100mg daily  -pt due for Abilify Maintena 4/24 per outpt psychiatrist, will attempt to administer today.  -PRNs:  Zyprexa 2.5mg PO/IM q6h prn agitation for QTc < 500ms.  Monitor QTc  -case seen and discussed with Dr. Jaquez via telehealth platform. 41 yo , male, residing at [facility] with PMHx HTN, HLD, ROMMEL, DM, PN, COVID (+4/4), Asthma, Obesity, PPHx Schizophrenia, paranoid type, multiple past psychiatric hospitalizations (last in 2016), no prior suicide attempt,  presents to [facility] for c/o "feeling wobbly" for 1 month; recently discharged from [facility] 4/17 for COVID PNA.    4/27; On evaluation today pt appears calm, with prominent negative symptoms of schizophrenia, but cooperative.  History limited as pt poor historian secondary to concrete thought process.  He has denied SI/HI/psychosis since admission.  Per collateral from psychiatrist, pt last received Abilify Maintena 400mg IM on 3/27/2020, next due 4/24/2020.    PLAN:    -safety - routine checks, pt does not need 1:1 at this time. Denies SI and HI  -c/w clozapine 25mg QHS if qtc <500  -c/w Cogentin 0.5mg PO BID  -c/w continue  zoloft 100mg daily  - Monitor WBCs, ANC and qtc as pt. is on Clozapine  -pt due for Abilify Maintena 400mg IM 4/24 per outpt psychiatrist, will attempt to administer today.  -PRNs:  Zyprexa 2.5mg PO/IM q6h prn agitation for QTc < 500ms.  Monitor QTc  -case seen and discussed with Dr. Jaquez

## 2020-04-27 NOTE — PROGRESS NOTE ADULT - SUBJECTIVE AND OBJECTIVE BOX
S/24 Events: No acute events overnight.     O:  T(C): 36.9 (04-27-20 @ 11:44), Max: 36.9 (04-27-20 @ 11:44)  HR: 82 (04-27-20 @ 11:44) (82 - 100)  BP: 142/78 (04-27-20 @ 11:44) (110/73 - 145/87)  RR: 22 (04-27-20 @ 11:44) (20 - 22)  SpO2: 95% (04-27-20 @ 11:44) (95% - 96%)      O/E:  Gen: NAD, obese  HEENT: EOMI  CV: RRR, normal S1 + S2, no m/r/g  Lungs: CTAB  Abd: soft, non-tender  Ext: nonpitting trace b/l LE edema    Labs:                        11.3   6.79  )-----------( 210      ( 27 Apr 2020 06:20 )             36.3     04-27    143  |  103  |  10  ----------------------------<  107<H>  3.4<L>   |  23  |  0.81    Ca    9.5      27 Apr 2020 06:20  Phos  4.3     04-27  Mg     1.9     04-27    TPro  7.6  /  Alb  3.4  /  TBili  0.4  /  DBili  x   /  AST  56<H>  /  ALT  84<H>  /  AlkPhos  143<H>  04-27              Meds:  MEDICATIONS  (STANDING):  ARIPiprazole Injectable, Long Acting (ABILIFY MAINTENA) 400 milliGRAM(s) IntraMuscular once  benztropine 0.5 milliGRAM(s) Oral two times a day  cefTRIAXone   IVPB 1000 milliGRAM(s) IV Intermittent every 24 hours  cloZAPine 25 milliGRAM(s) Oral at bedtime  dextrose 5%. 1000 milliLiter(s) (50 mL/Hr) IV Continuous <Continuous>  dextrose 50% Injectable 12.5 Gram(s) IV Push once  dextrose 50% Injectable 25 Gram(s) IV Push once  dextrose 50% Injectable 25 Gram(s) IV Push once  enoxaparin Injectable 40 milliGRAM(s) SubCutaneous daily  insulin lispro (HumaLOG) corrective regimen sliding scale   SubCutaneous three times a day before meals  insulin lispro (HumaLOG) corrective regimen sliding scale   SubCutaneous at bedtime  sertraline 100 milliGRAM(s) Oral daily      A/P:

## 2020-04-27 NOTE — DISCHARGE NOTE NURSING/CASE MANAGEMENT/SOCIAL WORK - PATIENT PORTAL LINK FT
You can access the FollowMyHealth Patient Portal offered by [facility] by registering at the following website: [email or web address]. By joining [employer]’s FollowMyHealth portal, you will also be able to view your health information using other applications (apps) compatible with our system.

## 2020-04-27 NOTE — PROGRESS NOTE BEHAVIORAL HEALTH - NSBHCHARTREVIEWLAB_PSY_A_CORE FT
11.3   6.79  )-----------( 210      ( 27 Apr 2020 06:20 )             36.3    04-27    143  |  103  |  10  ----------------------------<  107<H>  3.4<L>   |  23  |  0.81    Ca    9.5      27 Apr 2020 06:20  Phos  4.3     04-27  Mg     1.9     04-27    TPro  7.6  /  Alb  3.4  /  TBili  0.4  /  DBili  x   /  AST  56<H>  /  ALT  84<H>  /  AlkPhos  143<H>  04-27

## 2020-04-27 NOTE — DISCHARGE NOTE PROVIDER - NSDCCPCAREPLAN_GEN_ALL_CORE_FT
PRINCIPAL DISCHARGE DIAGNOSIS  Diagnosis: Acute UTI  Assessment and Plan of Treatment: treated w/ IV ceftriaxone      SECONDARY DISCHARGE DIAGNOSES  Diagnosis: Schizophrenia, paranoid type  Assessment and Plan of Treatment: Pt has been evaluated by Psych recommend to c/w Zoloft 100mg PO daily, Cogentin 0.5mg PO BID and CHANGED Clozapine to 25mg PO HS      Diagnosis: Type 2 diabetes mellitus  Assessment and Plan of Treatment: - HgA1c 6.6 % continue  metformin 500 BID PRINCIPAL DISCHARGE DIAGNOSIS  Diagnosis: Acute UTI  Assessment and Plan of Treatment: treated w/ IV ceftriaxone. Please complete 4 more days of Ceftin 500 mg 2 x day  You are scheduled to see Dr Fountain on Thursday 4/30 at 11:30 AM  call w/any changes [phone]      SECONDARY DISCHARGE DIAGNOSES  Diagnosis: Schizophrenia, paranoid type  Assessment and Plan of Treatment: Pt has been evaluated by Psych recommend to c/w Zoloft 100mg PO daily, Cogentin 0.5mg PO BID and CHANGED Clozapine to 25mg PO HS  S/p Abilify Maintena 400mg IM 4/27/2020      Diagnosis: Type 2 diabetes mellitus  Assessment and Plan of Treatment: - HgA1c 6.6 % continue  metformin 500 BID

## 2020-04-27 NOTE — PROGRESS NOTE BEHAVIORAL HEALTH - NSBHCONSULTPRIMARYDISCUSSYES_PSY_A_CORE FT
discussed with MARYANN Corrales above    Pt. should continue to follow up with his psychiatrist Dr. Clari Soto [phone];

## 2020-04-27 NOTE — PROGRESS NOTE BEHAVIORAL HEALTH - OTHER
unable to assess, seen by zoom concrete future oriented unable to report current medication regimen unable to report past inpatient hospitalizations not assessed unable to assess, seen by zoom vdo call concrete but goal directed future oriented,  looking forward to returning home and seeing his family.

## 2020-04-27 NOTE — PROGRESS NOTE ADULT - ASSESSMENT
40y M w/ PMH HTN, DM2, asthma, schizophrenia, recent COVID PNA presents from [facility] for generalized weakness and gait instability.    #Gait instability: Recently had long hospitalization w/ COVID PNA. Since discharge, reports 1 week of wobbly gait a/w generalized weakness. Could be 2/2 deconditioning vs possible hypoxemia on ambulation a/w recent viral illness and prolonged hospitalization vs. UTI. Improved after starting treatment for UTI.  -CBC, CMP, CXR, ECG, UTox, B1, B12, folate, CK, RPR unrevealing   -TSH mildly elevated, fT3, fT4 wnl  -Neuro consulted. No further workup at this time  -Observed walking in room, unsteadiness has resolved    #UTI: Complained of dysuria on admission  -U/A w/ large LE, WBC >50, many bacteria  -UCx w/ GNR  -Based on culture sensitivities, will change to Ceftin today for DC for total of 7 day course    #Schizophrenia: No active psychosis or mood or behavioral disturbances  -Recently increased from clozapine 25 to 50mg QD  -Continue clozapine 25 mg PO HS  -Continue zoloft 100mg PO daily  -Continue cogentin 0.5 mg PO BID  -Zyprexa 2.5mg PO/IM Q6H PRN for severe agitation, if refractory agitation may give additional 2.5mg, hold if qtc >500  -Monitor ANC, WBC's, ESR/CRP  -Monitor EKG qtc interval  -Appreciate psych input    #DM2:  A1c 6.6 on metformin 500 BID  -Monitor FS on ISS TIDAC/QHS    DVT PPx: lovenox 40 QD  Diet: Carb consistent  GOC: FULL CODE  Dispo: Plan for DC today, Mi MENDEZ notified of plan to finish po antibiotic course as outpt

## 2020-04-27 NOTE — DISCHARGE NOTE PROVIDER - NSDCMRMEDTOKEN_GEN_ALL_CORE_FT
Abilify Maintena 400 mg intramuscular injection, extended release: 400 milligram(s) intramuscular once every four weeks  acetaminophen 325 mg oral tablet: 2 tab(s) orally every 4 hours, As needed, Temp greater or equal to 38.5C (101.3F)  albuterol 90 mcg/inh inhalation aerosol: 2 puff(s) inhaled every 4 hours, As needed, Shortness of Breath and/or Wheezing  atorvastatin 10 mg oral tablet: 1 tab(s) orally once a day (at bedtime)  benzonatate 100 mg oral capsule: 1 cap(s) orally 3 times a day, As needed, Cough  benztropine 0.5 mg oral tablet: 1 tab(s) orally 2 times a day  cloZAPine 25 mg oral tablet: 2 tab(s) orally once a day at bedtime   Docusil 100 mg oral capsule: 1 cap(s) orally 2 times a day- hold for loose stool/diarrhea  metFORMIN 500 mg oral tablet: 1 tab(s) orally 2 times a day  nystatin 100,000 units/g topical powder: 1 application topically 3 times a day to affected area   pantoprazole 40 mg oral delayed release tablet: 1 tab(s) orally once a day (before a meal)  sertraline 100 mg oral tablet: 1 tab(s) orally once a day  zinc sulfate 220 mg oral capsule: 1 cap(s) orally once a day Abilify Maintena 400 mg intramuscular injection, extended release: 400 milligram(s) intramuscular once every four weeks, given 4/27  acetaminophen 325 mg oral tablet: 2 tab(s) orally every 4 hours, As needed, Temp greater or equal to 38.5C (101.3F)  albuterol 90 mcg/inh inhalation aerosol: 2 puff(s) inhaled every 4 hours, As needed, Shortness of Breath and/or Wheezing  atorvastatin 10 mg oral tablet: 1 tab(s) orally once a day (at bedtime)  benzonatate 100 mg oral capsule: 1 cap(s) orally 3 times a day, As needed, Cough  benztropine 0.5 mg oral tablet: 1 tab(s) orally 2 times a day  cefuroxime 500 mg oral tablet: 1 tab(s) orally every 12 hours   cloZAPine 25 mg oral tablet: 2 tab(s) orally once a day at bedtime   Docusil 100 mg oral capsule: 1 cap(s) orally 2 times a day- hold for loose stool/diarrhea  metFORMIN 500 mg oral tablet: 1 tab(s) orally 2 times a day  nystatin 100,000 units/g topical powder: 1 application topically 3 times a day to affected area   pantoprazole 40 mg oral delayed release tablet: 1 tab(s) orally once a day (before a meal)  sertraline 100 mg oral tablet: 1 tab(s) orally once a day  zinc sulfate 220 mg oral capsule: 1 cap(s) orally once a day

## 2020-04-27 NOTE — DISCHARGE NOTE PROVIDER - HOSPITAL COURSE
40y M w/ PMH HTN, DM2, asthma, schizophrenia, recent COVID PNA presents from [facility] for generalized weakness and gait instability. Gait instability: Recently had long hospitalization w/ COVID PNA. Since discharge, reports 1 week of wobbly gait a/w generalized weakness. Could be 2/2 deconditioning vs possible hypoxemia on ambulation a/w recent viral illness and prolonged hospitalization vs. UTI. Improved after starting ceftriaxone. Neuro consulted. No further workup at this time    Schizophrenia: No active psychosis or mood or behavioral disturbances    -Recently increased from clozapine 25 to 50mg QD, c/w clozapine 25 mg PO HS    -Continue zoloft 100mg PO daily and cogentin 0.5 mg PO 40y M w/ PMH HTN, DM2, asthma, schizophrenia, recent COVID PNA presents from [facility] for generalized weakness and gait instability. Gait instability: Recently had long hospitalization w/ COVID PNA. Since discharge, reports 1 week of wobbly gait a/w generalized weakness. Could be 2/2 deconditioning vs possible hypoxemia on ambulation a/w recent viral illness and prolonged hospitalization vs. UTI. Improved after starting ceftriaxone. Neuro consulted. No further workup at this time    Schizophrenia: No active psychosis or mood or behavioral disturbances    -Recently increased from clozapine 25 to 50mg QD, c/w clozapine 25 mg PO HS    -Continue zoloft 100mg PO daily and cogentin 0.5 mg PO. S/p Abilify Maintena 400mg IM 4/27    Pt is hemodynamically stable and is optimzed for dc

## 2020-04-27 NOTE — DISCHARGE NOTE PROVIDER - PROVIDER TOKENS
FREE:[LAST:[You are scheduled to see Dr Fountain on Thursday 4/30 at 11:30 AM  call w/any changes [phone]],PHONE:[(   )    -],FAX:[(   )    -]]

## 2020-04-27 NOTE — DISCHARGE NOTE PROVIDER - CARE PROVIDER_API CALL
You are scheduled to see Dr Fountain on Thursday 4/30 at 11:30 AM  call w/any changes [phone],   Phone: (   )    -  Fax: (   )    -  Follow Up Time:

## 2020-04-29 LAB
CULTURE RESULTS: SIGNIFICANT CHANGE UP
CULTURE RESULTS: SIGNIFICANT CHANGE UP
SPECIMEN SOURCE: SIGNIFICANT CHANGE UP
SPECIMEN SOURCE: SIGNIFICANT CHANGE UP

## 2020-04-30 ENCOUNTER — APPOINTMENT (OUTPATIENT)
Dept: PULMONOLOGY | Facility: CLINIC | Age: 40
End: 2020-04-30
Payer: MEDICARE

## 2020-04-30 VITALS
DIASTOLIC BLOOD PRESSURE: 80 MMHG | BODY MASS INDEX: 45.04 KG/M2 | WEIGHT: 305 LBS | TEMPERATURE: 97 F | OXYGEN SATURATION: 97 % | RESPIRATION RATE: 16 BRPM | SYSTOLIC BLOOD PRESSURE: 120 MMHG | HEART RATE: 104 BPM

## 2020-04-30 DIAGNOSIS — J12.82 COVID-19: ICD-10-CM

## 2020-04-30 DIAGNOSIS — U07.1 COVID-19: ICD-10-CM

## 2020-04-30 PROCEDURE — 99214 OFFICE O/P EST MOD 30 MIN: CPT | Mod: CS

## 2020-05-01 LAB
CLOZAPINE SERPL-MCNC: SIGNIFICANT CHANGE UP
CULTURE RESULTS: SIGNIFICANT CHANGE UP
SPECIMEN SOURCE: SIGNIFICANT CHANGE UP

## 2020-05-09 NOTE — DISCUSSION/SUMMARY
[FreeTextEntry1] : 41 yo male with stable pulmonary exam post treat for covid 19 infection with pneumonia. CPAP use was again stressed for ROMMEL. His aid was present. Follow up with his PMD as before.

## 2020-05-09 NOTE — PHYSICAL EXAM
[No Acute Distress] : no acute distress [No Neck Mass] : no neck mass [Normal Appearance] : normal appearance [Normal Oropharynx] : normal oropharynx [No Murmurs] : no murmurs [Normal Rate/Rhythm] : normal rate/rhythm [Normal S1, S2] : normal s1, s2 [No Abnormalities] : no abnormalities [Clear to Auscultation Bilaterally] : clear to auscultation bilaterally [No Resp Distress] : no resp distress [Benign] : benign [Normal Gait] : normal gait [No Cyanosis] : no cyanosis [No Clubbing] : no clubbing [FROM] : FROM [No Edema] : no edema [Normal Color/ Pigmentation] : normal color/ pigmentation [Normal Affect] : normal affect [Oriented x3] : oriented x3 [No Focal Deficits] : no focal deficits

## 2020-05-09 NOTE — HISTORY OF PRESENT ILLNESS
[TextBox_4] : 41 yo male with hx of OAD presents for follow up. The patient was recently hospitalized at [facility] for SOB and fever, treated for Covid 19 related pneumonia with hydroxychloroquine, steroids and Il-1 inhibitor with supplemental oxygen. Repeat chest xray prior to discharge revealed improvement of bilateral infiltrates. Repeat covid 19 swab was negative.The patient feels "better". He denies cough, chest pain or hemoptysis. He continues not to use CPAP for ROMMEL.

## 2020-08-29 NOTE — PROGRESS NOTE ADULT - ASSESSMENT
39M hx of schizophrenia and asthma BIBEMS form [place] for worsening SOB a/w acute hypoxic respiratory failure and sepsis  2/2 COVID-19 infection. Alert/Awake

## 2020-09-22 NOTE — DISCHARGE NOTE PROVIDER - NSDCMRMEDTOKEN_GEN_ALL_CORE_FT
Subjective:    Patient ID:  Annika Uribe is a 36 y.o. female patient here for evaluation Chest Pain and Results (stress/echo)      History of Present Illness:     Ms. Uribe presents today for follow up appointment from stress and echo. Patient denies any further chest pain and her breathing has been stable. Patient reports she has been very stressed recently but has been dealing with it okay.     Review of patient's allergies indicates:  No Known Allergies    No past medical history on file.  No past surgical history on file.  Social History     Tobacco Use    Smoking status: Never Smoker    Smokeless tobacco: Never Used   Substance Use Topics    Alcohol use: Yes     Comment: occ    Drug use: No        Review of Systems   As noted in HPI in addition     Constitutional: Negative for chills, fatigue and fever.   Eyes: No double vision, No blurred vision  Neuro: No headaches, No dizziness  Respiratory: Negative for cough, shortness of breath and wheezing.    Cardiovascular: Negative for chest pain. Negative for palpitations and leg swelling.   Gastrointestinal: Negative for abdominal pain, No melena, diarrhea, nausea and vomiting.   Genitourinary: Negative for dysuria and frequency, Negative for hematuria  Skin: Negative for bruising, Negative for edema or discoloration noted.   Endocrine: Negative for polyphagia, Negative for heat intolerance, Negative for cold intolerance  Psychiatric: Negative for depression, Negative for anxiety, Negative for memory loss  Musculoskeletal: Negative for neck pain, Negative for muscle weakness, Negative for back pain          Objective:        Vitals:    09/22/20 1531   BP: 138/82   Pulse: 88   Resp: 16       Lab Results   Component Value Date    WBC 4.84 04/30/2020    HGB 12.9 04/30/2020    HCT 40.1 04/30/2020     04/30/2020    ALT 14 04/30/2020    AST 14 04/30/2020     04/30/2020    K 4.1 04/30/2020     04/30/2020    CREATININE 1.1 04/30/2020    BUN 9  04/30/2020    CO2 28 04/30/2020        ECHOCARDIOGRAM RESULTS  Results for orders placed during the hospital encounter of 09/08/20   Echo Color Flow Doppler? Yes    Narrative · Concentric left ventricular remodeling.  · Normal left ventricular systolic function. The estimated ejection   fraction is 64%.  · Normal LV diastolic function.  · Normal right ventricular systolic function.            CURRENT/PREVIOUS VISIT EKG  Results for orders placed or performed during the hospital encounter of 04/30/20   EKG 12-lead    Collection Time: 04/30/20  9:17 AM    Narrative    Test Reason : R07.9,    Vent. Rate : 089 BPM     Atrial Rate : 089 BPM     P-R Int : 134 ms          QRS Dur : 066 ms      QT Int : 348 ms       P-R-T Axes : 075 055 045 degrees     QTc Int : 423 ms    Normal sinus rhythm  Borderline Nonspecific ST and T wave abnormality  No previous ECGs available  Confirmed by Santhosh Lobato MD ([id number]) on 5/1/2020 3:14:34 PM    Referred By: [email or web address]   SELF           Confirmed By:Santhosh Lobato MD     No procedure found.   Results for orders placed during the hospital encounter of 09/08/20   Exercise Stress - EKG    Narrative   The EKG portion of this study is abnormal but not diagnostic.    The patient reported chest pain during the stress test.    The blood pressure response to stress was normal.    The patient reported chest discomfort during the stress test. The test   was stopped because the patient experienced leg fatigue.       No procedure found.        PHYSICAL EXAM    GENERAL: well built, well nourished, well-developed in no apparent distress alert and oriented.   HEENT: Normocephalic. Pupils normal and conjunctivae normal.  Mucous membranes normal, no cyanosis or icterus, trachea central,no pallor or icterus is noted..   NECK: No JVD. No bruit..   CARDIAC: Regular rate and rhythm. S1 is normal.S2 is normal.No gallops, clicks or murmurs noted at this time.  CHEST ANATOMY: normal.   LUNGS: Clear to  auscultation. No wheezing or rhonchi..   ABDOMEN: Soft no masses or organomegaly.  No abdomen pulsations or bruits.  Normal bowel sounds. No pulsations and no masses felt, No guarding or rebound.   URINARY: No abdul catheter with clear yellow urine  EXTREMITIES: No cyanosis, clubbing or edema noted at this time., no calf tenderness bilaterally.   PERIPHERAL VASCULAR SYSTEM: Good palpable distal pulses.   CENTRAL NERVOUS SYSTEM: No focal motor or sensory deficits noted.   SKIN: Skin without lesions, moist, well perfused.   MUSCLE STRENGTH & TONE: No noteable weakness, atrophy or abnormal movement.     I HAVE REVIEWED :    The vital signs, nurses notes, and all the pertinent radiology and labs.         No current outpatient medications       Assessment:     1. Abnormal stress EKG- ST-T wave changes with exercise  2.  Chest discomfort/atypical chest pain- no further reports since last visit  3.  Abnormal EKG with borderline nonspecific ST-T wave changes with left atrial enlargement.  4.  Obesity  5.  Gastroesophageal reflux disease.      Plan:     1. Discussed results of echocardiogram with patient. EF is 64% with no major valve issues noted.   2. Discussed results of regular stress test also. Patient had upsloping and ST depression noted on stress EKG.   Would like to do a Nuclear exercise stress test to further evaluate for reversible ischemia.   3. Patient to notify us if any further chest pain occurs.   4. Follow up 1-2 weeks post testing.     No follow-ups on file.        Abilify Maintena 400 mg intramuscular injection, extended release: 400 milligram(s) intramuscular once every four weeks  atorvastatin 10 mg oral tablet: 1 tab(s) orally once a day (at bedtime)  benztropine 0.5 mg oral tablet: 1 tab(s) orally 2 times a day  cloZAPine 25 mg oral tablet: 2 tab(s) orally once a day  Docusil 100 mg oral capsule: 1 cap(s) orally 2 times a day  metFORMIN 500 mg oral tablet: 1 tab(s) orally 2 times a day  sertraline 100 mg oral tablet: 1 tab(s) orally once a day Abilify Maintena 400 mg intramuscular injection, extended release: 400 milligram(s) intramuscular once every four weeks  acetaminophen 325 mg oral tablet: 2 tab(s) orally every 4 hours, As needed, Temp greater or equal to 38.5C (101.3F)  albuterol 90 mcg/inh inhalation aerosol: 2 puff(s) inhaled every 4 hours, As needed, Shortness of Breath and/or Wheezing  atorvastatin 10 mg oral tablet: 1 tab(s) orally once a day (at bedtime)  benzonatate 100 mg oral capsule: 1 cap(s) orally 3 times a day, As needed, Cough  benztropine 0.5 mg oral tablet: 1 tab(s) orally 2 times a day  cloZAPine 25 mg oral tablet: 2 tab(s) orally once a day at bedtime   Docusil 100 mg oral capsule: 1 cap(s) orally 2 times a day- hold for loose stool/diarrhea  metFORMIN 500 mg oral tablet: 1 tab(s) orally 2 times a day  nystatin 100,000 units/g topical powder: 1 application topically 3 times a day to affected area   pantoprazole 40 mg oral delayed release tablet: 1 tab(s) orally once a day (before a meal)  sertraline 100 mg oral tablet: 1 tab(s) orally once a day  zinc sulfate 220 mg oral capsule: 1 cap(s) orally once a day

## 2020-10-29 ENCOUNTER — APPOINTMENT (OUTPATIENT)
Dept: PULMONOLOGY | Facility: CLINIC | Age: 40
End: 2020-10-29

## 2020-10-29 PROBLEM — E11.9 TYPE 2 DIABETES MELLITUS WITHOUT COMPLICATIONS: Chronic | Status: ACTIVE | Noted: 2020-04-24

## 2020-11-12 ENCOUNTER — APPOINTMENT (OUTPATIENT)
Dept: PULMONOLOGY | Facility: CLINIC | Age: 40
End: 2020-11-12
Payer: MEDICARE

## 2020-11-12 VITALS
OXYGEN SATURATION: 96 % | DIASTOLIC BLOOD PRESSURE: 80 MMHG | TEMPERATURE: 97 F | HEART RATE: 94 BPM | SYSTOLIC BLOOD PRESSURE: 108 MMHG | RESPIRATION RATE: 17 BRPM

## 2020-11-12 DIAGNOSIS — J44.9 CHRONIC OBSTRUCTIVE PULMONARY DISEASE, UNSPECIFIED: ICD-10-CM

## 2020-11-12 PROCEDURE — 99214 OFFICE O/P EST MOD 30 MIN: CPT

## 2020-11-16 PROBLEM — J44.9 OAD (OBSTRUCTIVE AIRWAY DISEASE): Status: ACTIVE | Noted: 2019-05-14

## 2020-11-16 NOTE — HISTORY OF PRESENT ILLNESS
[Never] : never [Awakes Unrefreshed] : awakes unrefreshed [Awakes with Dry Mouth] : awakes with dry mouth [Daytime Somnolence] : daytime somnolence [Fatigue] : fatigue [Snoring] : snoring [TextBox_4] : 41 yo male with hx of ROMMEL, post covid 19 pneumonia, presents for follow up. The patient is not compliant with CPAP use because of mask discomfort. He continues to have sleep related complaints including daytime somnolence. He has not used albuterol as prescribed but uses montelukast daily.

## 2020-11-16 NOTE — DISCUSSION/SUMMARY
[FreeTextEntry1] : 41 yo male with ROMMEL non compliant with CPAP use. Compliance stressed. Diet and weight loss also recommended. PRN albuterol MDI use. He is to follow up with his PMD as before and for the influenza vaccine. His aid was present.

## 2021-05-13 ENCOUNTER — APPOINTMENT (OUTPATIENT)
Dept: PULMONOLOGY | Facility: CLINIC | Age: 41
End: 2021-05-13

## 2021-05-22 ENCOUNTER — EMERGENCY (EMERGENCY)
Facility: HOSPITAL | Age: 41
LOS: 1 days | Discharge: HOME | End: 2021-05-22
Attending: EMERGENCY MEDICINE | Admitting: EMERGENCY MEDICINE
Payer: MEDICARE

## 2021-05-22 VITALS
SYSTOLIC BLOOD PRESSURE: 118 MMHG | DIASTOLIC BLOOD PRESSURE: 58 MMHG | RESPIRATION RATE: 16 BRPM | OXYGEN SATURATION: 99 % | TEMPERATURE: 99 F | HEIGHT: 69 IN | HEART RATE: 83 BPM

## 2021-05-22 PROCEDURE — 99283 EMERGENCY DEPT VISIT LOW MDM: CPT

## 2021-05-22 RX ORDER — POLYMYXIN B SULF/TRIMETHOPRIM 10000-1/ML
1 DROPS OPHTHALMIC (EYE)
Qty: 2 | Refills: 0
Start: 2021-05-22

## 2021-05-22 RX ORDER — ERYTHROMYCIN BASE 5 MG/GRAM
1 OINTMENT (GRAM) OPHTHALMIC (EYE)
Qty: 4 | Refills: 0
Start: 2021-05-22 | End: 2021-05-31

## 2021-05-22 RX ORDER — POLYMYXIN B SULF/TRIMETHOPRIM 10000-1/ML
1 DROPS OPHTHALMIC (EYE) ONCE
Refills: 0 | Status: COMPLETED | OUTPATIENT
Start: 2021-05-22 | End: 2021-05-22

## 2021-05-22 RX ADMIN — Medication 1 DROP(S): at 17:25

## 2021-05-22 NOTE — ED PROVIDER NOTE - PATIENT PORTAL LINK FT
You can access the FollowMyHealth Patient Portal offered by [facility] by registering at the following website: [email or web address]. By joining [employer]’s FollowMyHealth portal, you will also be able to view your health information using other applications (apps) compatible with our system.

## 2021-05-22 NOTE — PROVIDER CONTACT NOTE (OTHER) - ASSESSMENT
Tiffanie Talavera (PA) informed that pt has been  medically cleared for discharge and need a cab [facility]  . Writer contacted [facility] # 62 - [facility] – [phone].  and  spoke with Ms Arredondo Quinton . and made aware of pt’s  ED visit and Ms Maria Elena Alcantara confirmed that pt resides there.  As per Ms Maria Elena Alcantara informed pt  is an independent commuter and uses Cabs while travelling .  Writer attempted to get a cab via [employer] online portal but pt has [facility] [phone] and office is closed at this time, After this Writer filled out the [employer] and arrange cab service and Booking #  [phone].  Writer provided the medical team was informed about this intervention and in agreement with this plan. No further SW intervention needed for this visit.

## 2021-05-22 NOTE — ED PROVIDER NOTE - ATTENDING CONTRIBUTION TO CARE
Arianna: I have seen and examined the patient face to face, have reviewed and addended the HPI, PE and a/p as necessary.    40 y/o male with pmxh of DM type 2, htn, asthma, COVID pneumonia 1 month ago, schizophrenia from outpatient [facility], presents to ED c/o left eye swelling, redness and discharge x 1 week. Pt denies any history of allergies. No fevers, chills, vision changes, itching, pain with eye movements.    GEN - NAD; well appearing; A+O x3; non-toxic appearing  L eye: EOMI, PERRLA, no pain with EOM, vision intact, conjunctival injection, with purulent drainage.   R eye: eomi, perrla  CARD -s1s2, RRR, no M,G,R;   PULM - breathing comfortably  NEURO - no focal neuro deficits, no slurred speech    Likely conjunctivitis bacterial vs viral - will give erythromycin ointment at night and polytrim drops.  Discussed with sw prior to dc to [place].

## 2021-05-22 NOTE — ED ADULT TRIAGE NOTE - CHIEF COMPLAINT QUOTE
Pt presents to ED via EMS from [place] [address] with c/o L eye redness and drainage x 1 week. Pt denies itching or pain to area. Pt reports sometimes it causes blurry vision but when he wipes it goes away.

## 2021-05-22 NOTE — ED PROVIDER NOTE - EYES, MLM
Clear bilaterally, pupils equal, round and reactive to light. Left eye conjunctival injection, left eyelid swelling and erythema, +dried crusty yellow discharge. EOMs intact.

## 2021-05-22 NOTE — ED POST DISCHARGE NOTE - RESULT SUMMARY
for patient called; states samples of med given to patient at discharge cannot be used as there is no Rx label and [facility] medication policy is that all meds are labeled for staff administration to patients.  Info for [facility] provided; info confirmed with read back confirmation.  Rx's sent to pharmacy after discussing Rx details with MARYANN Talavera.

## 2021-05-22 NOTE — ED PROVIDER NOTE - OBJECTIVE STATEMENT
40 y/o male with pmxh of DM type 2, htn, asthma, COVID pneumonia 1 month ago, schizophrenia from outpatient [facility], presents to ED c/o left eye swelling, redness and discharge x 1 week. Pt denies any history of allergies. No fevers, chills, vision changes, itching, pain with eye movements.

## 2021-05-22 NOTE — ED PROVIDER NOTE - CLINICAL SUMMARY MEDICAL DECISION MAKING FREE TEXT BOX
42 y/o male with pmxh of DM type 2, htn, asthma, COVID pneumonia 1 month ago, schizophrenia from outpatient [facility], presents to ED c/o left eye swelling, redness and discharge x 1 week. Pt denies any history of allergies. No fevers, chills, vision changes, itching, pain with eye movements. pt with left eye conjunctival injection, eyelid swelling and erythema and dried yellow discharge on exam. eoms intact. will treat for conjunctivitis and blepharitis with polytrim eye drops and erythromycin ointment. discussed contact spread, hand washing, etc. SW to help arrange dispo home.

## 2021-05-22 NOTE — ED PROVIDER NOTE - CPE EDP GASTRO NORM
HOME O2 EVAL    1.SPO2% 92 AT REST ON ROOM AIR.    2.SPO2% AT REST ON OXYGEN  LPM    3.SPO2% 92 WHILE WALKING ON ROOM AIR.    4.SPO2% WHILE WALKING ON OXYGEN  LPM.    Comment:       normal...

## 2021-06-04 NOTE — DISCHARGE NOTE PROVIDER - NSDCCPGOAL_GEN_ALL_CORE_FT
To get better and follow your care plan as instructed. Dr. Brenner made aware and ordered to notify NP Florina Galicia  NP Florina Galicia made aware, ordered to hold discharge for now Dr. Brenner made aware and ordered to notify NP Florina Galicia  NP Florina Galicia made aware, ordered to hold discharge for now until adjustments made to medication regimen   Pt started on PO Tropolol XL

## 2021-07-14 ENCOUNTER — NON-APPOINTMENT (OUTPATIENT)
Age: 41
End: 2021-07-14

## 2021-07-14 ENCOUNTER — APPOINTMENT (OUTPATIENT)
Dept: OPHTHALMOLOGY | Facility: CLINIC | Age: 41
End: 2021-07-14
Payer: MEDICARE

## 2021-07-14 PROCEDURE — 92202 OPSCPY EXTND ON/MAC DRAW: CPT

## 2021-07-14 PROCEDURE — 92004 COMPRE OPH EXAM NEW PT 1/>: CPT

## 2021-07-19 ENCOUNTER — NON-APPOINTMENT (OUTPATIENT)
Age: 41
End: 2021-07-19

## 2021-07-19 ENCOUNTER — APPOINTMENT (OUTPATIENT)
Dept: OPHTHALMOLOGY | Facility: CLINIC | Age: 41
End: 2021-07-19
Payer: MEDICARE

## 2021-07-19 PROCEDURE — 92015 DETERMINE REFRACTIVE STATE: CPT | Mod: NC

## 2021-07-19 PROCEDURE — 92012 INTRM OPH EXAM EST PATIENT: CPT

## 2021-09-10 NOTE — DIETITIAN INITIAL EVALUATION ADULT. - PROBLEM SELECTOR PROBLEM 10
Discharge planning issues
Render In Strict Bullet Format?: Yes
Detail Level: Zone
Initiate Treatment: Metro-gel (as directed)
Discontinue Regimen: Tretinoin
Plan: Doxycycline in reserve\\nFollow up in 6 weeks

## 2021-10-06 PROBLEM — U07.1 PNEUMONIA DUE TO COVID-19 VIRUS: Status: ACTIVE | Noted: 2020-05-09

## 2021-10-10 ENCOUNTER — EMERGENCY (EMERGENCY)
Facility: HOSPITAL | Age: 41
LOS: 1 days | Discharge: HOME | End: 2021-10-10
Attending: EMERGENCY MEDICINE | Admitting: EMERGENCY MEDICINE
Payer: MEDICARE

## 2021-10-10 VITALS
RESPIRATION RATE: 18 BRPM | TEMPERATURE: 98 F | DIASTOLIC BLOOD PRESSURE: 72 MMHG | HEIGHT: 69 IN | HEART RATE: 100 BPM | OXYGEN SATURATION: 100 % | SYSTOLIC BLOOD PRESSURE: 128 MMHG

## 2021-10-10 LAB
ALBUMIN SERPL ELPH-MCNC: 4 G/DL — SIGNIFICANT CHANGE UP (ref 3.3–5)
ALP SERPL-CCNC: 95 U/L — SIGNIFICANT CHANGE UP (ref 40–120)
ALT FLD-CCNC: 73 U/L — HIGH (ref 4–41)
ANION GAP SERPL CALC-SCNC: 12 MMOL/L — SIGNIFICANT CHANGE UP (ref 7–14)
AST SERPL-CCNC: 45 U/L — HIGH (ref 4–40)
BASOPHILS # BLD AUTO: 0.11 K/UL — SIGNIFICANT CHANGE UP (ref 0–0.2)
BASOPHILS NFR BLD AUTO: 1 % — SIGNIFICANT CHANGE UP (ref 0–2)
BILIRUB SERPL-MCNC: 0.3 MG/DL — SIGNIFICANT CHANGE UP (ref 0.2–1.2)
BUN SERPL-MCNC: 10 MG/DL — SIGNIFICANT CHANGE UP (ref 7–23)
CALCIUM SERPL-MCNC: 9.7 MG/DL — SIGNIFICANT CHANGE UP (ref 8.4–10.5)
CHLORIDE SERPL-SCNC: 101 MMOL/L — SIGNIFICANT CHANGE UP (ref 98–107)
CO2 SERPL-SCNC: 26 MMOL/L — SIGNIFICANT CHANGE UP (ref 22–31)
CREAT SERPL-MCNC: 0.73 MG/DL — SIGNIFICANT CHANGE UP (ref 0.5–1.3)
EOSINOPHIL # BLD AUTO: 0.33 K/UL — SIGNIFICANT CHANGE UP (ref 0–0.5)
EOSINOPHIL NFR BLD AUTO: 3 % — SIGNIFICANT CHANGE UP (ref 0–6)
GLUCOSE SERPL-MCNC: 99 MG/DL — SIGNIFICANT CHANGE UP (ref 70–99)
HCT VFR BLD CALC: 40.7 % — SIGNIFICANT CHANGE UP (ref 39–50)
HGB BLD-MCNC: 13.2 G/DL — SIGNIFICANT CHANGE UP (ref 13–17)
IANC: 6.62 K/UL — SIGNIFICANT CHANGE UP (ref 1.5–8.5)
IMM GRANULOCYTES NFR BLD AUTO: 0.5 % — SIGNIFICANT CHANGE UP (ref 0–1.5)
LYMPHOCYTES # BLD AUTO: 28.6 % — SIGNIFICANT CHANGE UP (ref 13–44)
LYMPHOCYTES # BLD AUTO: 3.17 K/UL — SIGNIFICANT CHANGE UP (ref 1–3.3)
MAGNESIUM SERPL-MCNC: 2 MG/DL — SIGNIFICANT CHANGE UP (ref 1.6–2.6)
MCHC RBC-ENTMCNC: 29.7 PG — SIGNIFICANT CHANGE UP (ref 27–34)
MCHC RBC-ENTMCNC: 32.4 GM/DL — SIGNIFICANT CHANGE UP (ref 32–36)
MCV RBC AUTO: 91.5 FL — SIGNIFICANT CHANGE UP (ref 80–100)
MONOCYTES # BLD AUTO: 0.81 K/UL — SIGNIFICANT CHANGE UP (ref 0–0.9)
MONOCYTES NFR BLD AUTO: 7.3 % — SIGNIFICANT CHANGE UP (ref 2–14)
NEUTROPHILS # BLD AUTO: 6.62 K/UL — SIGNIFICANT CHANGE UP (ref 1.8–7.4)
NEUTROPHILS NFR BLD AUTO: 59.6 % — SIGNIFICANT CHANGE UP (ref 43–77)
NRBC # BLD: 0 /100 WBCS — SIGNIFICANT CHANGE UP
NRBC # FLD: 0 K/UL — SIGNIFICANT CHANGE UP
PHOSPHATE SERPL-MCNC: 2.7 MG/DL — SIGNIFICANT CHANGE UP (ref 2.5–4.5)
PLATELET # BLD AUTO: 232 K/UL — SIGNIFICANT CHANGE UP (ref 150–400)
POTASSIUM SERPL-MCNC: 4.2 MMOL/L — SIGNIFICANT CHANGE UP (ref 3.5–5.3)
POTASSIUM SERPL-SCNC: 4.2 MMOL/L — SIGNIFICANT CHANGE UP (ref 3.5–5.3)
PROT SERPL-MCNC: 6.9 G/DL — SIGNIFICANT CHANGE UP (ref 6–8.3)
RBC # BLD: 4.45 M/UL — SIGNIFICANT CHANGE UP (ref 4.2–5.8)
RBC # FLD: 13.9 % — SIGNIFICANT CHANGE UP (ref 10.3–14.5)
SODIUM SERPL-SCNC: 139 MMOL/L — SIGNIFICANT CHANGE UP (ref 135–145)
TROPONIN T, HIGH SENSITIVITY RESULT: 6 NG/L — SIGNIFICANT CHANGE UP
WBC # BLD: 11.1 K/UL — HIGH (ref 3.8–10.5)
WBC # FLD AUTO: 11.1 K/UL — HIGH (ref 3.8–10.5)

## 2021-10-10 PROCEDURE — 99284 EMERGENCY DEPT VISIT MOD MDM: CPT

## 2021-10-10 PROCEDURE — 71046 X-RAY EXAM CHEST 2 VIEWS: CPT | Mod: 26

## 2021-10-10 PROCEDURE — 73564 X-RAY EXAM KNEE 4 OR MORE: CPT | Mod: 26,RT

## 2021-10-10 RX ORDER — IBUPROFEN 200 MG
600 TABLET ORAL ONCE
Refills: 0 | Status: COMPLETED | OUTPATIENT
Start: 2021-10-10 | End: 2021-10-10

## 2021-10-10 RX ADMIN — Medication 600 MILLIGRAM(S): at 09:21

## 2021-10-10 NOTE — ED PROVIDER NOTE - CLINICAL SUMMARY MEDICAL DECISION MAKING FREE TEXT BOX
42 y/o male with pmxh of DM type 2, htn, asthma, schizophrenia presents to ED after fall with L chest pain and R knee pain. States chest discomfort preceded his fall, no assoc symptoms. Also complaining of R knee pain, full ROM, able to bear weight, no deformity or swelling. EKG WNL. Pt with multiple CV risk factors, atypical cp, plan for labs with trop, cxr, yan, reassess.

## 2021-10-10 NOTE — PROVIDER CONTACT NOTE (OTHER) - ASSESSMENT
Kristel Mcknight)  informed that pt has been  medically cleared for discharge and need to go back to [facility]  . Writer contacted [facility] # 62 - [facility] – [phone].  and  spoke with Mr. Felipe Kothari   and made aware of pt’s  ED visit. Mr. Wang -   confirmed that pt resides there.  As per Mr. Felipe Kothari  informed pt  is an independent commuter and uses Cabs while travelling .  Writer attempted to get a cab via [employer] online portal but pt has [employer] Phone: [phone]  and office is closed at this time, After this Writer  arrange cab service via [employer] and Booking #[phone] .  Writer provided the medical team was informed about this intervention and in agreement with this plan. verbal huddle occurred. No further SW intervention needed for this visit.

## 2021-10-10 NOTE — ED ADULT TRIAGE NOTE - CHIEF COMPLAINT QUOTE
slip and fall onto knees and arms c/o R knee pain, also reports pulling sensation in L side of chest

## 2021-10-10 NOTE — ED PROVIDER NOTE - PHYSICAL EXAMINATION
GEN - NAD; well appearing; A+O x3   HEAD - NC/AT   EYES- PERRL, EOMI  ENT: Airway patent, mmm  NECK: Neck supple, non-tender   PULMONARY - CTA b/l, symmetric breath sounds.   CARDIAC -s1s2, RRR, no M,G,R  ABDOMEN - +BS, ND, NT, soft, no guarding, no rebound, no masses   BACK - no CVA tenderness, Normal  spine   EXTREMITIES - FROM, symmetric pulses, capillary refill < 2 seconds, no edema   SKIN - no rash or bruising   NEUROLOGIC - alert, speech clear, no focal deficits  PSYCH -nl mood/affect, nl insight.

## 2021-10-10 NOTE — ED ADULT NURSE NOTE - OBJECTIVE STATEMENT
Pt received to intake presents s/p fall, reports hitting right knee. pt denies hitting head, 18G IV placed left hand, labs drawn and sent. Pt medicated as per ordered, will continue to monitor.

## 2021-10-10 NOTE — ED PROVIDER NOTE - OBJECTIVE STATEMENT
40 y/o male with pmxh of DM type 2, htn, asthma, schizophrenia presents to ED after fall with L chest pain and R knee pain. Pt was mopping a floor and states he slipped on wet floor, fell forward, denies LOC or head trauma. Was unable to get up initially but now able to ambulate with minimal pain to R knee. Patient states this morning prior to fall he felt a "strain on my heart." Denies pain but has difficulty further characterizing the sensation in his left chest. Denies sob, nausea, diaphoresis, radiation of pain.

## 2021-10-10 NOTE — ED PROVIDER NOTE - PATIENT PORTAL LINK FT
You can access the FollowMyHealth Patient Portal offered by [facility] by registering at the following website: [email or web address]. By joining [employer]’s FollowMyHealth portal, you will also be able to view your health information using other applications (apps) compatible with our system.

## 2021-10-10 NOTE — ED PROVIDER NOTE - NSFOLLOWUPINSTRUCTIONS_ED_ALL_ED_FT
Your xray of you knee and chest were normal. Your blood tests weer also normal (results attached). Please follow up with your primary care doctor if you symptoms persist. If you have worsening symptoms such as chest pain, difficulty breathing, pain with walking or any new symptoms concerning to you, please return to the ER.

## 2021-12-27 ENCOUNTER — EMERGENCY (EMERGENCY)
Facility: HOSPITAL | Age: 41
LOS: 1 days | Discharge: HOME | End: 2021-12-27
Attending: STUDENT IN AN ORGANIZED HEALTH CARE EDUCATION/TRAINING PROGRAM | Admitting: STUDENT IN AN ORGANIZED HEALTH CARE EDUCATION/TRAINING PROGRAM
Payer: MEDICARE

## 2021-12-27 VITALS
DIASTOLIC BLOOD PRESSURE: 60 MMHG | HEART RATE: 86 BPM | OXYGEN SATURATION: 96 % | TEMPERATURE: 97 F | SYSTOLIC BLOOD PRESSURE: 95 MMHG | RESPIRATION RATE: 18 BRPM

## 2021-12-27 VITALS
HEART RATE: 115 BPM | DIASTOLIC BLOOD PRESSURE: 54 MMHG | HEIGHT: 69 IN | SYSTOLIC BLOOD PRESSURE: 110 MMHG | TEMPERATURE: 98 F | OXYGEN SATURATION: 99 % | RESPIRATION RATE: 20 BRPM

## 2021-12-27 LAB
ALBUMIN SERPL ELPH-MCNC: 4.5 G/DL — SIGNIFICANT CHANGE UP (ref 3.3–5)
ALP SERPL-CCNC: 76 U/L — SIGNIFICANT CHANGE UP (ref 40–120)
ALT FLD-CCNC: 24 U/L — SIGNIFICANT CHANGE UP (ref 4–41)
ANION GAP SERPL CALC-SCNC: 11 MMOL/L — SIGNIFICANT CHANGE UP (ref 7–14)
AST SERPL-CCNC: 15 U/L — SIGNIFICANT CHANGE UP (ref 4–40)
BASOPHILS # BLD AUTO: 0.09 K/UL — SIGNIFICANT CHANGE UP (ref 0–0.2)
BASOPHILS NFR BLD AUTO: 0.6 % — SIGNIFICANT CHANGE UP (ref 0–2)
BILIRUB SERPL-MCNC: 0.2 MG/DL — SIGNIFICANT CHANGE UP (ref 0.2–1.2)
BUN SERPL-MCNC: 18 MG/DL — SIGNIFICANT CHANGE UP (ref 7–23)
CALCIUM SERPL-MCNC: 9.6 MG/DL — SIGNIFICANT CHANGE UP (ref 8.4–10.5)
CHLORIDE SERPL-SCNC: 101 MMOL/L — SIGNIFICANT CHANGE UP (ref 98–107)
CO2 SERPL-SCNC: 28 MMOL/L — SIGNIFICANT CHANGE UP (ref 22–31)
CREAT SERPL-MCNC: 0.92 MG/DL — SIGNIFICANT CHANGE UP (ref 0.5–1.3)
EOSINOPHIL # BLD AUTO: 0.34 K/UL — SIGNIFICANT CHANGE UP (ref 0–0.5)
EOSINOPHIL NFR BLD AUTO: 2.4 % — SIGNIFICANT CHANGE UP (ref 0–6)
FLUAV AG NPH QL: SIGNIFICANT CHANGE UP
FLUBV AG NPH QL: SIGNIFICANT CHANGE UP
GLUCOSE SERPL-MCNC: 84 MG/DL — SIGNIFICANT CHANGE UP (ref 70–99)
HCT VFR BLD CALC: 43.8 % — SIGNIFICANT CHANGE UP (ref 39–50)
HGB BLD-MCNC: 13.5 G/DL — SIGNIFICANT CHANGE UP (ref 13–17)
IANC: 8.21 K/UL — SIGNIFICANT CHANGE UP (ref 1.5–8.5)
IMM GRANULOCYTES NFR BLD AUTO: 0.9 % — SIGNIFICANT CHANGE UP (ref 0–1.5)
LYMPHOCYTES # BLD AUTO: 29.3 % — SIGNIFICANT CHANGE UP (ref 13–44)
LYMPHOCYTES # BLD AUTO: 4.12 K/UL — HIGH (ref 1–3.3)
MAGNESIUM SERPL-MCNC: 1.9 MG/DL — SIGNIFICANT CHANGE UP (ref 1.6–2.6)
MCHC RBC-ENTMCNC: 29 PG — SIGNIFICANT CHANGE UP (ref 27–34)
MCHC RBC-ENTMCNC: 30.8 GM/DL — LOW (ref 32–36)
MCV RBC AUTO: 94.2 FL — SIGNIFICANT CHANGE UP (ref 80–100)
MONOCYTES # BLD AUTO: 1.19 K/UL — HIGH (ref 0–0.9)
MONOCYTES NFR BLD AUTO: 8.5 % — SIGNIFICANT CHANGE UP (ref 2–14)
NEUTROPHILS # BLD AUTO: 8.21 K/UL — HIGH (ref 1.8–7.4)
NEUTROPHILS NFR BLD AUTO: 58.3 % — SIGNIFICANT CHANGE UP (ref 43–77)
NRBC # BLD: 0 /100 WBCS — SIGNIFICANT CHANGE UP
NRBC # FLD: 0 K/UL — SIGNIFICANT CHANGE UP
PLATELET # BLD AUTO: 308 K/UL — SIGNIFICANT CHANGE UP (ref 150–400)
POTASSIUM SERPL-MCNC: 4.3 MMOL/L — SIGNIFICANT CHANGE UP (ref 3.5–5.3)
POTASSIUM SERPL-SCNC: 4.3 MMOL/L — SIGNIFICANT CHANGE UP (ref 3.5–5.3)
PROT SERPL-MCNC: 7.4 G/DL — SIGNIFICANT CHANGE UP (ref 6–8.3)
RBC # BLD: 4.65 M/UL — SIGNIFICANT CHANGE UP (ref 4.2–5.8)
RBC # FLD: 13.8 % — SIGNIFICANT CHANGE UP (ref 10.3–14.5)
RSV RNA NPH QL NAA+NON-PROBE: SIGNIFICANT CHANGE UP
SARS-COV-2 RNA SPEC QL NAA+PROBE: SIGNIFICANT CHANGE UP
SODIUM SERPL-SCNC: 140 MMOL/L — SIGNIFICANT CHANGE UP (ref 135–145)
WBC # BLD: 14.07 K/UL — HIGH (ref 3.8–10.5)
WBC # FLD AUTO: 14.07 K/UL — HIGH (ref 3.8–10.5)

## 2021-12-27 PROCEDURE — 93010 ELECTROCARDIOGRAM REPORT: CPT

## 2021-12-27 PROCEDURE — 71045 X-RAY EXAM CHEST 1 VIEW: CPT | Mod: 26

## 2021-12-27 PROCEDURE — 99284 EMERGENCY DEPT VISIT MOD MDM: CPT | Mod: CS,25

## 2021-12-27 RX ORDER — ACETAMINOPHEN 500 MG
650 TABLET ORAL ONCE
Refills: 0 | Status: COMPLETED | OUTPATIENT
Start: 2021-12-27 | End: 2021-12-27

## 2021-12-27 RX ORDER — ONDANSETRON 8 MG/1
8 TABLET, FILM COATED ORAL ONCE
Refills: 0 | Status: DISCONTINUED | OUTPATIENT
Start: 2021-12-27 | End: 2021-12-27

## 2021-12-27 RX ORDER — SODIUM CHLORIDE 9 MG/ML
1000 INJECTION INTRAMUSCULAR; INTRAVENOUS; SUBCUTANEOUS ONCE
Refills: 0 | Status: COMPLETED | OUTPATIENT
Start: 2021-12-27 | End: 2021-12-27

## 2021-12-27 RX ORDER — ONDANSETRON 8 MG/1
8 TABLET, FILM COATED ORAL ONCE
Refills: 0 | Status: COMPLETED | OUTPATIENT
Start: 2021-12-27 | End: 2021-12-27

## 2021-12-27 RX ADMIN — SODIUM CHLORIDE 1000 MILLILITER(S): 9 INJECTION INTRAMUSCULAR; INTRAVENOUS; SUBCUTANEOUS at 18:25

## 2021-12-27 RX ADMIN — Medication 650 MILLIGRAM(S): at 18:22

## 2021-12-27 RX ADMIN — ONDANSETRON 8 MILLIGRAM(S): 8 TABLET, FILM COATED ORAL at 18:36

## 2021-12-27 NOTE — ED ADULT NURSE NOTE - OBJECTIVE STATEMENT
Received pt to 10a with c/o cough, and nausea x 3 days. Pt denies fever, chest pain, difficulty breathing, body aches or sick contacts. Pt is A&OX4, #20g IV placed to RAC, lab work collected as ordered, medicated as ordered will continue to monitor.

## 2021-12-27 NOTE — ED PROVIDER NOTE - PATIENT PORTAL LINK FT
You can access the FollowMyHealth Patient Portal offered by  by registering at the following website: [email or web address]. By joining [employer]’s FollowMyHealth portal, you will also be able to view your health information using other applications (apps) compatible with our system. You can access the FollowMyHealth Patient Portal offered by [facility] by registering at the following website: [email or web address]. By joining [employer]’s FollowMyHealth portal, you will also be able to view your health information using other applications (apps) compatible with our system.

## 2021-12-27 NOTE — ED PROVIDER NOTE - CLINICAL SUMMARY MEDICAL DECISION MAKING FREE TEXT BOX
41 Yr male k/c DM HTN Asthma Schizophrenia with nausea and productive cough (Green sputum) for 3 days. VS wnl. PE wnl. Plan for labs, CXR, EKG, Observe.

## 2021-12-27 NOTE — PROVIDER CONTACT NOTE (OTHER) - ASSESSMENT
Pt is from [facility] Writer contacted [facility] # 62 - [facility] – [phone].  and  spoke with Ms: Meggan  and confirmed pt is a resident there. She said if pt is Positive with Covid, pt can return and they will Isolate him in his room, but they need the Covid Result. So waiting for Covid result. Signed out to covering . Notified the provider.

## 2021-12-27 NOTE — ED PROVIDER NOTE - ATTENDING CONTRIBUTION TO CARE
I (Isaac) agree with above, I performed a history and physical. Counseled ana medical staff, physician assistant, and/or medical student on medical decision making as documented. Medical decisions and treatment interventions were made in real time during the patient encounter. Additionally and/or with the following exceptions: the patient presented with cough productive of green sputum as above, the patient is vaccinated. well appearing, no respiratory distress, obese body habitus, clear lungs. not hypoxic. cxr clear, ekg non ischemic, labs within normal limits. stable for discharge to home. Return precautions reviewed. Patient verbalized understand of conditions for return and plan for follow up.

## 2021-12-27 NOTE — ED PROVIDER NOTE - NSFOLLOWUPINSTRUCTIONS_ED_ALL_ED_FT
Rest, drink plenty of fluids.  Advance activity as tolerated.  Continue all previously prescribed medications as directed.  Follow up with your primary care physician in 48-72 hours- bring copies of your results.  Return to the ER for worsening or persistent symptoms, and/or ANY NEW OR CONCERNING SYMPTOMS. If you have issues obtaining follow up, please call: [phone] to obtain a doctor or specialist who takes your insurance in your area.  You may call [phone] to make an appointment with the internal medicine clinic.    Please follow up with the Primary care doctor, please return if symptoms persist.

## 2021-12-27 NOTE — ED PROVIDER NOTE - NS ED ROS FT
CONSTITUTIONAL: No weakness, fevers or chills  EYES/ENT: No visual changes;  No vertigo or throat pain   NECK: No pain   RESPIRATORY: c/o cough. Yes shortness of breath  CARDIOVASCULAR: No chest pain or palpitations  GASTROINTESTINAL: c/o nausea. No abdominal or epigastric pain. No vomiting, or hematemesis; No diarrhea or constipation. No melena or hematochezia.  GENITOURINARY: No dysuria, frequency or hematuria  NEUROLOGICAL: No numbness or weakness  SKIN: No itching, burning, rashes, or lesions     All other review of systems is negative unless indicated above.

## 2021-12-27 NOTE — ED PROVIDER NOTE - PHYSICAL EXAMINATION
PHYSICAL EXAM:    GENERAL: Lying in bed comfortably  HEAD:  Atraumatic, Normocephalic  EYES: EOMI, PERRLA, conjunctiva and sclera clear  ENT: No erythema/pallor/petechiae/lesions;  NECK: Supple  LUNG: CTA b/l; no r/r/w  HEART: RRR, +S1/S2; No m/r/g  ABDOMEN: soft, NT/ND; BS audible   EXTREMITIES:  2+ Peripheral Pulses, brisk cap refill. No clubbing, cyanosis, or edema  NERVOUS SYSTEM:  AAOx3, speech clear. No sensory/motor deficits   SKIN: No rashes or lesions

## 2021-12-27 NOTE — ED PROVIDER NOTE - OBJECTIVE STATEMENT
41 Yr male k/c DM HTN Asthma Schizophrenia with nausea and productive cough (Green sputum) for 3 days.   Denies vomiting, fever, body aches, chest pain,, abdominal pain, diarrhea.   Patient has received 2 x COVID Vaccine (Does not remember type) is pending Booster.

## 2021-12-28 NOTE — PROVIDER CONTACT NOTE (OTHER) - ASSESSMENT
MD Migue  informed that pt. is medically cleared for discharge.  SAPNA met with pt. to confirm address on file. Pt is from [place] [address].  SAPNA contacted residence [phone] and spoke to [facility] staff DSP.  Ligia confirmed that patient mode of transportation is taxi. Provider is in agreement with taxi back to [place], [address] [place] [address]. Provider who agrees with taxi transport.  SAPNA completed Verbal Huddle. MAS stated that he has [facility] [employer] that manager his insurance. [place] was unable to arrange a taxi.  Taxis arranged with [employer] on [address] for safety –fare USD 12.82 .confirmation # lilliana ID# [id number] . There no further SW intervention needed for this trip. Write reached out to [employer] [phone] and will arrive in 11:30 Pm. Case was discussed with the medical team and is in agreement with this plan and intervention.  No further  intervention needed for this trip.

## 2022-01-10 ENCOUNTER — APPOINTMENT (OUTPATIENT)
Dept: OPHTHALMOLOGY | Facility: CLINIC | Age: 42
End: 2022-01-10

## 2022-07-08 ENCOUNTER — APPOINTMENT (OUTPATIENT)
Dept: PULMONOLOGY | Facility: CLINIC | Age: 42
End: 2022-07-08

## 2022-07-08 VITALS
DIASTOLIC BLOOD PRESSURE: 72 MMHG | SYSTOLIC BLOOD PRESSURE: 110 MMHG | WEIGHT: 315 LBS | BODY MASS INDEX: 54.94 KG/M2 | OXYGEN SATURATION: 95 % | HEART RATE: 105 BPM | TEMPERATURE: 96.6 F

## 2022-07-08 DIAGNOSIS — R53.83 OTHER FATIGUE: ICD-10-CM

## 2022-07-08 DIAGNOSIS — Z99.89 OBSTRUCTIVE SLEEP APNEA (ADULT) (PEDIATRIC): ICD-10-CM

## 2022-07-08 DIAGNOSIS — G47.33 OBSTRUCTIVE SLEEP APNEA (ADULT) (PEDIATRIC): ICD-10-CM

## 2022-07-08 PROCEDURE — 99214 OFFICE O/P EST MOD 30 MIN: CPT

## 2022-07-09 PROBLEM — G47.33 OSA ON CPAP: Status: ACTIVE | Noted: 2019-11-03

## 2022-07-09 PROBLEM — R53.83 FATIGUE: Status: ACTIVE | Noted: 2022-07-09

## 2022-07-09 NOTE — REVIEW OF SYSTEMS
[Fatigue] : fatigue [Recent Wt Gain (___ Lbs)] : ~T recent [unfilled] lb weight gain [Depression] : depression [Anxiety] : anxiety [Diabetes] : diabetes [Negative] : Neurologic

## 2022-07-09 NOTE — HISTORY OF PRESENT ILLNESS
[Never] : never [TextBox_4] : 41 yo male with hx of ROMMEL, last seen November 2020, presents for follow up. The patient is not compliant with CPAP use. He complains of daytime somnolence and fatigue. His weight has increased significantly since last visit. He denies cough or SOB. [Awakes Unrefreshed] : awakes unrefreshed [Daytime Somnolence] : daytime somnolence [Fatigue] : fatigue [Hypersomnolence] : hypersomnolence [Snoring] : snoring

## 2022-07-09 NOTE — DISCUSSION/SUMMARY
[FreeTextEntry1] : 43 yo male with hx of ROMMEL, non compliant with CPAP use, with sleep related complaints. The need for treatment of ROMMEL was discussed. He will be referred for an oral appliance  ([facility] [phone]). He is to continue use of montelukast as before with PRN albuterol.The patient was seen alone, without an aid! Info sent to his residence.

## 2022-07-09 NOTE — CONSULT LETTER
[Courtesy Letter:] : I had the pleasure of seeing your patient, [unfilled], in my office today. [Please see my note below.] : Please see my note below. [Consult Closing:] : Thank you very much for allowing me to participate in the care of this patient.  If you have any questions, please do not hesitate to contact me. [Sincerely,] : Sincerely, [FreeTextEntry2] : [employer]

## 2022-08-21 NOTE — PROVIDER CONTACT NOTE (OTHER) - RECOMMENDATIONS
No further action is required at this time, continue to monitor BP
do a manual bp
monitor for s/s of hypertension
no

## 2023-01-30 NOTE — BEHAVIORAL HEALTH ASSESSMENT NOTE - NSBHCHARTREVIEWVS_PSY_A_CORE FT
Vital Signs Last 24 Hrs  T(C): 37.1 (24 Apr 2020 14:44), Max: 37.9 (24 Apr 2020 09:15)  T(F): 98.7 (24 Apr 2020 14:44), Max: 100.2 (24 Apr 2020 09:15)  HR: 95 (24 Apr 2020 14:44) (95 - 109)  BP: 134/76 (24 Apr 2020 14:44) (100/71 - 134/76)  BP(mean): --  RR: 20 (24 Apr 2020 14:44) (15 - 28)  SpO2: 96% (24 Apr 2020 14:44) (95% - 97%) Deep Sutures: 4-0 Monocryl

## 2023-04-27 NOTE — PROGRESS NOTE ADULT - PROBLEM/PLAN-10
Left patient a message that her MFM appointment had to be rescheduled  The new time, date and location were provided  The patient has been instructed to please call us back at [phone] with any questions or concerns        Appt changed from 5/15 Oto to 5/16 at [facility]  DISPLAY PLAN FREE TEXT

## 2023-07-17 NOTE — ED ADULT NURSE NOTE - NSFALLRSKINDICATORS_ED_ALL_ED
no FAMILY HISTORY:  Father  Still living? Unknown  Family history of heart disease, Age at diagnosis: Age Unknown

## 2023-10-05 NOTE — BEHAVIORAL HEALTH ASSESSMENT NOTE - NSBHCONSORIP_PSY_A_CORE
Goal Outcome Evaluation:  Plan of Care Reviewed With: patient     Problem: Adult Inpatient Plan of Care  Goal: Plan of Care Review  Outcome: Ongoing, Progressing  Flowsheets (Taken 10/5/2023 8618)  Progress: improving  Plan of Care Reviewed With: patient        Progress: improving       VSS on RA. Temp low grade today. C/o N/V today which resolved. Continued to c/o severe HA; does appear to be feeling better at this time. IV antibiotics continue as ordered. Straining urine-sediment noted. Will monitor.       Consult...

## 2023-11-01 ENCOUNTER — APPOINTMENT (OUTPATIENT)
Dept: OPHTHALMOLOGY | Facility: CLINIC | Age: 43
End: 2023-11-01

## 2023-12-04 ENCOUNTER — APPOINTMENT (OUTPATIENT)
Dept: OPHTHALMOLOGY | Facility: CLINIC | Age: 43
End: 2023-12-04

## 2023-12-15 ENCOUNTER — EMERGENCY (EMERGENCY)
Facility: HOSPITAL | Age: 43
LOS: 1 days | Discharge: HOME | End: 2023-12-15
Admitting: EMERGENCY MEDICINE
Payer: MEDICARE

## 2023-12-15 VITALS
SYSTOLIC BLOOD PRESSURE: 117 MMHG | OXYGEN SATURATION: 96 % | DIASTOLIC BLOOD PRESSURE: 78 MMHG | TEMPERATURE: 99 F | RESPIRATION RATE: 18 BRPM | HEART RATE: 91 BPM

## 2023-12-15 PROCEDURE — 99283 EMERGENCY DEPT VISIT LOW MDM: CPT

## 2023-12-15 NOTE — ED ADULT TRIAGE NOTE - CHIEF COMPLAINT QUOTE
Pt presents to ED ambulatory via EMS from [facility] with c/o feeling hungry. Pt reports eating lunch at 12noon today, and has been feeling hungry since. Pt reports hx of asthma, schizophrenia, HTN and sleep apnea. Pt denies physical complaints. Pt presents to ED ambulatory via EMS from [facility] with c/o feeling hungry. Pt reports eating lunch at 12noon today, and has been feeling hungry since. Pt reports hx of asthma, schizophrenia, HTN and sleep apnea. Pt denies physical complaints.

## 2023-12-15 NOTE — ED PROVIDER NOTE - OBJECTIVE STATEMENT
42 y/o male pmh schizophrenia, htn, dm, c/o hunger. Pt states that he was at [facility] and was hungry. Pt couldn't get any food so called EMS. Pt has no other complaints at this time.

## 2023-12-15 NOTE — ED PROVIDER NOTE - CLINICAL SUMMARY MEDICAL DECISION MAKING FREE TEXT BOX
42 y/o male pmh schizophrenia, htn, dm BIBEMS from [facility] c/o feeling hungry. Pt states that he couldn't get any food at [facility] and came to the hospital Pt denies any other complaints. Pt has a normal PE, given a sandwich and water, will call  to arrange transport back to [facility]. 42 y/o male pmh schizophrenia, htn, dm BIBEMS from [facility] c/o feeling hungry. Pt states that he couldn't get any food at [facility] and came to the hospital Pt denies any other complaints. Pt has a normal PE, given a sandwich and water, will call  to arrange transport back to [facility].

## 2023-12-15 NOTE — ED PROVIDER NOTE - PATIENT PORTAL LINK FT
You can access the FollowMyHealth Patient Portal offered by [facility] by registering at the following website: [email or web address]. By joining [employer]’s FollowMyHealth portal, you will also be able to view your health information using other applications (apps) compatible with our system. You can access the FollowMyHealth Patient Portal offered by [facility] by registering at the following website: [email or web address]. By joining [employer]’s FollowMyHealth portal, you will also be able to view your health information using other applications (apps) compatible with our system.

## 2023-12-15 NOTE — PROVIDER CONTACT NOTE (OTHER) - ASSESSMENT
pt. is medically cleared for discharge.  SAPNA met with pt. to confirm address on file. Pt is from [place] [address].  SW contacted residence [phone] and spoke to [facility] staff, She confirmed pt can tale bus. Pt asked for a [employer] card, provided, pt walked out to the bus stop. SW completed Verbal Huddle. pt. is medically cleared for discharge.  SAPNA met with pt. to confirm address on file. Pt is from [place] [address].  SW contacted residence [phone] and spoke to [facility] staff, She confirmed pt can tale bus. Pt asked for a [employer] card, provided, pt walked out to the bus stop. SW completed Verbal Huddle.

## 2023-12-15 NOTE — ED ADULT NURSE NOTE - CHIEF COMPLAINT QUOTE
Pt presents to ED ambulatory via EMS from [facility] with c/o feeling hungry. Pt reports eating lunch at 12noon today, and has been feeling hungry since. Pt reports hx of asthma, schizophrenia, HTN and sleep apnea. Pt denies physical complaints. Pt presents to ED ambulatory via EMS from [facility] with c/o feeling hungry. Pt reports eating lunch at 12noon today, and has been feeling hungry since. Pt reports hx of asthma, schizophrenia, HTN and sleep apnea. Pt denies physical complaints.

## 2023-12-15 NOTE — ED ADULT TRIAGE NOTE - HOSPITALS/PSYCHIATRIC FACILITIES
[facility] [place] [facility] [place]

## 2023-12-15 NOTE — ED ADULT NURSE NOTE - OBJECTIVE STATEMENT
pt brought by EMS for hunger cause he didn't eat since 12. Pt given sandwich, SW called for ride back. No other complaints.

## 2023-12-15 NOTE — ED ADULT NURSE NOTE - NSFALLUNIVINTERV_ED_ALL_ED
Bed/Stretcher in lowest position, wheels locked, appropriate side rails in place/Call bell, personal items and telephone in reach/Instruct patient to call for assistance before getting out of bed/chair/stretcher/Non-slip footwear applied when patient is off stretcher/[place] to call system/Physically safe environment - no spills, clutter or unnecessary equipment/Purposeful proactive rounding/Room/bathroom lighting operational, light cord in reach Bed/Stretcher in lowest position, wheels locked, appropriate side rails in place/Call bell, personal items and telephone in reach/Instruct patient to call for assistance before getting out of bed/chair/stretcher/Non-slip footwear applied when patient is off stretcher/[place] to call system/Physically safe environment - no spills, clutter or unnecessary equipment/Purposeful proactive rounding/Room/bathroom lighting operational, light cord in reach

## 2024-04-01 ENCOUNTER — EMERGENCY (EMERGENCY)
Facility: HOSPITAL | Age: 44
LOS: 1 days | Discharge: HOME | End: 2024-04-01
Attending: EMERGENCY MEDICINE | Admitting: EMERGENCY MEDICINE
Payer: MEDICARE

## 2024-04-01 VITALS
DIASTOLIC BLOOD PRESSURE: 77 MMHG | OXYGEN SATURATION: 95 % | TEMPERATURE: 98 F | SYSTOLIC BLOOD PRESSURE: 126 MMHG | RESPIRATION RATE: 18 BRPM | HEART RATE: 82 BPM

## 2024-04-01 VITALS
RESPIRATION RATE: 17 BRPM | SYSTOLIC BLOOD PRESSURE: 119 MMHG | DIASTOLIC BLOOD PRESSURE: 71 MMHG | TEMPERATURE: 99 F | HEART RATE: 79 BPM | OXYGEN SATURATION: 98 %

## 2024-04-01 PROCEDURE — 99284 EMERGENCY DEPT VISIT MOD MDM: CPT

## 2024-04-01 PROCEDURE — 90792 PSYCH DIAG EVAL W/MED SRVCS: CPT

## 2024-04-01 NOTE — ED BEHAVIORAL HEALTH NOTE - BEHAVIORAL HEALTH NOTE
As per provider, worker called [facility] ([phone]) for collateral information. Worker spoke with Mr. Guzman – director.  Patient is a 43-year-old female, domiciled at [facility], connected to [facility]. 73, [employer] after requesting to come for food. Mr. Guzman states that the patient came to their desk after eating breakfast asking to go to the hospital because he wanted food. He states that staff tried to re- direct him. Patient became verbally aggressive and then became irritable. He states that the patient’s doctor recommended that the patient eat healthier. Patient has this ongoing relationship with food and behavior of acting out when he does not get food. Patient is connected to psychiatrist Dr. Cobos; Who he last seen on Friday. Patient’s next appointment is on 4/29 ([phone]). Patient missed his medications for the last three days. Patient has a dx of paranoid schizophrenia. Unsure if he is hearing voices. No SI/HI or physical violence. Pt goes out and buys food and 2- litter sodas. Patient chronically has issues showering and even refuses when he is prompted. As per provider, worker called [facility] ([phone]) for collateral information. Worker spoke with Mr. Guzman – director.  Patient is a 43-year-old female, domiciled at [facility], connected to [facility]. [employer] after requesting to come for food. Mr. Guzman states that the patient came to their desk after eating breakfast asking to go to the hospital because he wanted food. He states that staff tried to re- direct him. Patient became verbally aggressive and then became irritable. He states that the patient’s doctor recommended that the patient eat healthier. Patient has this ongoing relationship with food and behavior of acting out when he does not get food. Patient is connected to psychiatrist Dr. Cobos; Who he last seen on Friday. Patient’s next appointment is on 4/29 ([phone]). Patient missed his medications for the last three days. Patient has a dx of paranoid schizophrenia. Unsure if he is hearing voices. No SI/HI or physical violence. Pt goes out and buys food and 2- litter sodas. Patient chronically has issues showering and even refuses when he is prompted.    Per provider, Dr. Collins , patient is cleared and is able to return to their previous residence, [facility] [phone].  has spoken to [employer] security guard,  confirmed that patients mode of transportation is [employer], and that patient travels INDEPENDENTLY. Clinical provider is in agreement with [employer] back to group home. Verbal huddle regarding coordination of care completed with interdisciplinary team.  Date: 04/01/2024 Time: call Inv# [id number]  Reason for Trip  Discharge  Medicaid ID (MARY #)  [id number]  Enrollee Address  [address] [place] [id number]  Enrollee Phone  [phone]  Transport  [employer] [place]  Transport Phone  [phone]

## 2024-04-01 NOTE — ED PROVIDER NOTE - NSFOLLOWUPINSTRUCTIONS_ED_ALL_ED_FT
see your psychiatrist on april 5, 10 am  continue your present meds  Advance activity as tolerated.  Continue all previously prescribed medications as directed unless otherwise instructed.  Follow up with your primary care physician in 48-72 hours- bring copies of your results.  Return to the ER for worsening or persistent symptoms, and/or ANY NEW OR CONCERNING SYMPTOMS. If you have issues obtaining follow up, please call: [phone] to obtain a doctor or specialist who takes your insurance in your area.  You may call [phone] to make an appointment with the internal medicine clinic.

## 2024-04-01 NOTE — ED BEHAVIORAL HEALTH ASSESSMENT NOTE - CURRENT MEDICATION
Amlodipine 5mg daily Atorvastatin 10mg qhs Abilify 30mg daily Singulair 10mg qhs Flovent 110mcg BID sertraline 100mg daily Colace 100mg PO BID   Metformin 50mmg PO BID   Sertraline 100mg PO qdaily  Vit D3 2000IU PO qdaily  Lisinopril 5mg PO qdaily  Clozapine 50mg PO qdaily  Abilify Maintena ER 400mg Injection, last on 3/29/24  Atorvastatin 80mg PO qdaily  Montelukast 10mg PO qdaily  Fenofibrate 48mg PO qdaily  Abuterol 90mcg inhaler 2puffs q4h PRN

## 2024-04-01 NOTE — ED PROVIDER NOTE - NS ED ROS FT
Patient only describes being hungry.    Aggressiveness noted by staff at [facility], only verbal

## 2024-04-01 NOTE — ED PROVIDER NOTE - PHYSICAL EXAMINATION
morbidly obese male no acute distress   alert and oriented x 3   HEENT note some mild crusting around eyes but conjunctiva clear no purulent drainage   cranial nerves II through XII intact   mucous membranes moist   neck supple   no adenopathy noted   heart sounds S1-S2 no murmurs gallops or rubs noted   lungs clear   abdomen obese no tenderness   extremities no edema no rashes noted

## 2024-04-01 NOTE — ED ADULT NURSE NOTE - OBJECTIVE STATEMENT
pt received to , agitated.  pt states he is mad because they weren't feeding him at [facility].  belongings secured.  pt is malodorous, denies SI/HI/AVH.

## 2024-04-01 NOTE — ED PROVIDER NOTE - PATIENT PORTAL LINK FT
You can access the FollowMyHealth Patient Portal offered by [facility] by registering at the following website: [email or web address]. By joining [employer]’s FollowMyHealth portal, you will also be able to view your health information using other applications (apps) compatible with our system.

## 2024-04-01 NOTE — ED BEHAVIORAL HEALTH ASSESSMENT NOTE - PROFESSIONAL COLLATERAL RELATIONSHIP
[facility]iatrist [facility] ([phone]) for collateral information

## 2024-04-01 NOTE — ED BEHAVIORAL HEALTH ASSESSMENT NOTE - OTHER
outpatient psychiatrist; residence staff [facility] staff VENKAT : This report was requested by: Maryam Foley | Reference #: [id number] no controlled substances [facility] resdience staff

## 2024-04-01 NOTE — ED ADULT TRIAGE NOTE - CHIEF COMPLAINT QUOTE
from [facility], pt was verbally aggressive towards staff prior to arrival. pt states "I was hungry and they wouldn't feed me". denies S/I, H/I, alcohol/drug use, auditory/visual hallucinations. past medical history diabetes type 2, ROMMEL, schizophrenia. FS 89.

## 2024-04-01 NOTE — ED BEHAVIORAL HEALTH ASSESSMENT NOTE - HPI (INCLUDE ILLNESS QUALITY, SEVERITY, DURATION, TIMING, CONTEXT, MODIFYING FACTORS, ASSOCIATED SIGNS AND SYMPTOMS)
36 yo M, domiciled on [facility] with a documented h/o schizophrenia, at least two previous psychiatric admissions including [facility], is referred to the ED by his outpatient psychiatrist for decompensation in the context of medication (psychotropic and medical) non-compliance since May 2015.  He refuses to engage in assessment so this history is based on chart review and a letter sent by his psychiatrist.  He has a foul-smelling wound on his foot for which he has been refusing to seek care.  His ADLs are poor.  He is noted to be internally preoccupied with blunted affect.  He has also had a recent episode of becoming agitated and threw a razor at his aunt. Patient is a 42 yo M, domiciled on [employer] grounds with a documented psychiatric h/o schizophrenia, w/ previous psychiatric admissions including [facility], in outpatient tx with Dr Cobos currently, unemployed, single, with past medical history of T2DM, HLD, obesity, brought in by EMS after verbal aggression at residence.     On evaluation, patient is alert, calm and cooperative w/ interview, focused on food, which was provided to him prior to start of evaluation.  He admits to becoming irritated and loud at the residence and states that he is remorseful about this      is referred to the ED by his outpatient psychiatrist for decompensation in the context of medication (psychotropic and medical) non-compliance since May 2015.  He refuses to engage in assessment so this history is based on chart review and a letter sent by his psychiatrist.  He has a foul-smelling wound on his foot for which he has been refusing to seek care.  His ADLs are poor.  He is noted to be internally preoccupied with blunted affect.  He has also had a recent episode of becoming agitated and threw a razor at his aunt. Patient is a 42 yo M, domiciled on [facility] grounds with a documented psychiatric h/o schizophrenia, w/ previous psychiatric admissions including [facility], in outpatient tx with Dr Cobos currently, unemployed, single, with past medical history of T2DM, HLD, obesity, brought in by EMS after verbal aggression at residence.     On evaluation, patient is alert, calm and cooperative w/ interview, focused on food, which was provided to him prior to start of evaluation.  He admits to becoming irritated and loud at the residence and states that he is remorseful about this and that reason for yelling at staff was because he was hungry.  Reports that moving forward should he wants to make his wishes known he will attempt to communicate in a not loud volume and without escalating to verbal aggression.  Reports that this was impulsive and feels it was due to his hunger.  Denies that it was premeditated.  Reports he had no intention of hurting anyone and denies violent / homicidal ideation, intent or plan at this time.  Denies suicidal/ self harming ideation / intent or plan, as well.  Reports that he would be able to maintain emotional regulation if sent back to [facility] and if felt he was escalating he would take a break and talk to staff members.  Reports that he has been compliant with medications and that he skips some days here and there.  Reports he has been showering about once a week and that this is not baseline for him and usually he showers once a day (though as per psychiatrist collateral daily showering is not baseline for patient).      Patient reports overall mood is "fine," denies persistently depressed, sad or tearful mood, decrease in formerly pleasurable activities, guilt/worthlessness, concentration / sleep changes, reports he is frequently hungry but is not able to disclose how long this has been happening for or any idea why.     Denies sx of acute prerna, including euphoria / irritability, decreased need for sleep, increase in risk taking behavior / productive or pressured speech.     Denies sx of acute psychosis, including AVH, ideas of reference, thought insertion / broadcasting, paranoia. Denies HI.     Denies acute anxiety or PTSD Sx.     Collateral obtained by , see  note; reviewed.     Writer spoke with patients outpatient psychiatrist, Dr. Cobos, who reports she last saw patient on Friday, during which time he appeared at baseline and she had no acute safety concerns at this time.  Reports this AM she got a call saying that patient verbally aggressive with staff and aunt on the phone, which is out of character for him.  Other than this, did not observe / pt did not endorse any acute safety concerns or acute sx during follow up on last appointment.  Reports that she has been treating patient x 6-7 years and the hunger issue has been ongoing and wondering if Patient is a 42 yo M, [place] [facility] [place] with a documented psychiatric h/o schizophrenia, w/ previous psychiatric admissions including [facility], no past suicide attempts, in outpatient tx with Dr Cobos currently, unemployed, single, with past medical history of T2DM, HLD, obesity, brought in by EMS after verbal aggression at residence.     On evaluation, patient is alert, calm and cooperative w/ interview, focused on food, which was provided to him prior to start of evaluation.  He admits to becoming irritated and loud at the residence and states that he is remorseful about this and that reason for yelling at staff was because he was hungry.  Reports that moving forward should he wants to make his wishes known he will attempt to communicate in a not loud volume and without escalating to verbal aggression.  Reports that this was impulsive and feels it was due to his hunger.  Denies that it was premeditated.  Reports he had no intention of hurting anyone and denies violent / homicidal ideation, intent or plan at this time.  Denies suicidal/ self harming ideation / intent or plan, as well.  Reports that he would be able to maintain emotional regulation if sent back to [facility] and if felt he was escalating he would take a break and talk to staff members.  Reports that he has been compliant with medications and that he skips some days here and there.  Reports he has been showering about once a week and that this is not baseline for him and usually he showers once a day (though as per psychiatrist collateral daily showering is not baseline for patient).      Patient reports overall mood is "fine," denies persistently depressed, sad or tearful mood, decrease in formerly pleasurable activities, guilt/worthlessness, concentration / sleep changes, reports he is frequently hungry but is not able to disclose how long this has been happening for or any idea why.     Denies sx of acute prerna, including euphoria / irritability, decreased need for sleep, increase in risk taking behavior / productive or pressured speech.     Denies sx of acute psychosis, including AVH, ideas of reference, thought insertion / broadcasting, paranoia. Denies HI.     Denies acute anxiety or PTSD Sx.     Collateral obtained by , see  note; reviewed.     Writer spoke with patients outpatient psychiatrist, Dr. Cobos, for collateral, who reports she last saw patient on Friday, during which time he appeared at baseline and she had no acute safety concerns at this time.  Reports this AM she got a call saying that patient verbally aggressive with staff and aunt on the phone, which is out of character for him.  Other than this, did not observe / pt did not endorse any acute safety concerns or acute sx during follow up on last appointment.  Reports that she has been treating patient x 6-7 years and the hunger issue has been ongoing and wondering if could be Abilify.  States she is able to see patient for an urgent appointment on 4/5/24 at 10AM.

## 2024-04-01 NOTE — ED BEHAVIORAL HEALTH ASSESSMENT NOTE - SUMMARY
36 yo M, domic[place] [employer] [place] with a documented h/o schizophrenia, at least two previous psychiatric admissions including [facility], is referred to the ED by his outpatient psychiatrist for decompensation in the context of medication (psychotropic and medical) non-compliance.  He appears unable to care for himself. Patient is a 42 yo M, domiciled on [employer] grounds with a documented psychiatric h/o schizophrenia, w/ previous psychiatric admissions including [facility], no past suicide attempts, in outpatient tx with Dr Cobos currently, unemployed, single, with past medical history of T2DM, HLD, obesity, brought in by EMS after verbal aggression at residence.     At time of evaluation, patient is alert, calm and cooperative and w/o episodes of verbal or physical aggression or agitation and did not require PRNs during stay in the ED.  He does not endorse sx of nor show signs of acute prerna, psychosis, SI/HI, depression on mental status exam and functional status appears to be at baseline.  Patient's verbal aggression at residence appears to have been secondary to poor frustration tolerance and emotional dysregulation secondary to hunger and requests for food rather than acute decompensation of primary psychotic illness.  Given this, at this time, patient does not meet criteria for involuntary inpatient psychiatric admission and does not wish for voluntary admission and at this time least restrictive means of tx remains continued follow up with outpatient psychiatrist, who provided urgent apt for patient on 4/5/24.      Recommendations:   -treat and release ; no acute psychiatric intervention indicated at this time   -continue outpatient medication regimen   -follow up with established outpatient mental health provider on 4/5/24 at 10AM   -return to the ED is sx worsen

## 2024-04-01 NOTE — ED PROVIDER NOTE - CLINICAL SUMMARY MEDICAL DECISION MAKING FREE TEXT BOX
43-year-old male with schizophrenia, hypertension, ROMMEL, diabetes mellitus type 2, sent in to the emergency room for aggressive behavior with staff.  Patient has been deteriorating in personal hygiene.  to the ER to consult with psychiatry here.  Will get psychiatry consult, labs, and reassess.

## 2024-04-01 NOTE — ED BEHAVIORAL HEALTH ASSESSMENT NOTE - RISK ASSESSMENT
High-unable to care for himself in the community Patient has chronic elevated risk for self harm given gender, h/o psychiatric diagnosis, h/o impulsivity  Patient also has dynamic risk factors due to sporadic medication non compliance, unemployment  Risk factors however are currently mitigated by protective factors of age, race, no current suicidal / violent / homicidal ideation, no history of suicide attempts, intact reality testing, no substance misuse, future orientation, judgement/ insight/impulse control intact, access to care, intact safety plan, committed to safety plan, denies access to weapons, connected to care , strong support system  Given these mitigating factors, the patient does not appear to be at imminent risk for harm to self or others at this time and does not meet criteria for involuntary / emergency inpatient psychiatric hospitalization and does not wish to pursue voluntary admission at this time.

## 2024-04-01 NOTE — ED BEHAVIORAL HEALTH ASSESSMENT NOTE - PAST PSYCHOTROPIC MEDICATION
He has a history of being treated with clozapine and latuda Amlodipine 5mg daily Atorvastatin 10mg qhs Abilify 30mg daily Singulair 10mg qhs Flovent 110mcg BID sertraline 100mg daily, Latuda

## 2024-04-01 NOTE — ED PROVIDER NOTE - OBJECTIVE STATEMENT
43-year-old male with schizophrenia, hypertension, ROMMEL, diabetes mellitus type 2, sent in to the emergency room for aggressive behavior with staff.  Patient has been deteriorating in personal hygiene.  When patient asked why he got aggressive patient describes he wants to eat more  .  Denies pain, weakness, fever chills, or any acute medical complaints.  Denies suicidal ideation, homicidal ideation, or hallucinations.  Patient says he is compliant with medications.  Which include injections of Abilify, Topamax, metformin, albuterol, sertraline, atorvastatin, clozapine, and benztropine.

## 2024-04-01 NOTE — ED BEHAVIORAL HEALTH ASSESSMENT NOTE - OTHER PAST PSYCHIATRIC HISTORY (INCLUDE DETAILS REGARDING ONSET, COURSE OF ILLNESS, INPATIENT/OUTPATIENT TREATMENT)
Currently in treatment at the [facility]-resident Dr. Miramontes [phone] and attending [phone] on Abilify 30mg daily  He has a history of being treated with clozapine and latuda as per HPI

## 2024-04-01 NOTE — ED BEHAVIORAL HEALTH ASSESSMENT NOTE - REASON FOR REFERRAL
Psychiatrist referred for decompensation in the context of non-compliance verbal aggression at residence

## 2024-04-01 NOTE — ED BEHAVIORAL HEALTH ASSESSMENT NOTE - GENERAL APPEARANCE
69 y/o F pt with PMHx of HTN, presents to the ED c/o bee sting. Patient states she was stung by a bee twice last week, on right thigh. The stings are on the right thigh, one on the posterior aspect and one on the anterior aspect of the thigh. Sting on the anterior aspect of thigh, has been itchy and has become more erythematous. Patient is nervous and is now complaining of chest pain. Denies fever.
No deformities present

## 2024-04-01 NOTE — ED ADULT TRIAGE NOTE - NS_BH TRG QUESTION6_ED_ALL_ED
After Visit Summary   3/16/2017    Phi Ortiz Jr.    MRN: [id number]           Patient Information     Date Of Birth          1971        Visit Information        Provider Department      3/16/2017 4:00 PM Ellie Yañez APRN [facility]        Today's Diagnoses     Gastroesophageal reflux disease without esophagitis    -  1      Care Instructions      Blair diet, rest, fluids as tolerated.     Use Medication as directed    Follow up with PCP in 2 weeks.    Go to Emergency Room if sx worsen or change.   May use acetaminophen, ibuprofen prn.    Patient voiced understanding of instructions given.        Follow-up with your primary care provider next week and as needed.    Indications for emergent return to emergency department discussed with patient, who verbalized good understanding and agreement.  Patient understands the limitations of today's evaluation.         Medications for Acid Reflux  Your health care provider has told you that you have acid reflux. This is a condition that causes stomach acid to wash up into your throat. For most people, acid reflux is troubling but not dangerous. However, left untreated, acid reflux sometimes damages the esophagus. Medications can help control acid reflux and limit your risk of future problems.  Medications for Acid Reflux  Your health care provider may prescribe medication to help treat your acid reflux. Medication will be based on your symptoms and the results of any tests. Your health care provider will explain how to take your medication. You will also be told about possible side effects.  Reducing Stomach Acid  Your doctor may suggest antacids that you can buy over the counter. Antacids can give fast relief. Or you may be told to take a type of medication called H2 blockers. These are available over the counter and by prescription (for higher doses).  Blocking Stomach Acid  In more severe cases, your doctor may suggest  stronger medications such as proton pump inhibitors (PPIs). These keep the stomach from making acid. They are often prescribed for long-term use.  Other Medications  If medications to reduce or block stomach acid don t work, you may be switched to another type of medication that helps the stomach empty better.    7143-2879 [employer]. [address] [place] [address]. All rights reserved. This information is not intended as a substitute for professional medical care. Always follow your healthcare professional's instructions.        Tips to Control Acid Reflux  To control acid reflux, you ll need to make some basic diet and lifestyle changes. The simple steps outlined below may be all you ll need to relieve discomfort.  Watch What You Eat      Avoid fatty foods and spicy foods.    Eat fewer acidic foods, such as citrus and tomato-based foods. These can increase symptoms.    Limit drinking alcohol, caffeine, and fizzy beverages. All increase acid reflux.    Try limiting chocolate, peppermint, and spearmint. These can worsen acid reflux in some people.  Watch When You Eat    Avoid lying down for 3 hours after eating.    Do not snack before going to bed.  Raise Your Head    Raising your head and upper body by 4 inches to 6 inches helps limit reflux when you re lying down. Put blocks under the head of the bed frame to raise it.  Other Changes    Lose weight, if you need to.    Don t work out near bedtime.    Avoid tight-fitting clothes.    Limit aspirin and ibuprofen.    Stop smoking.     6109-3030 [employer]. [address] [place] [address]. All rights reserved. This information is not intended as a substitute for professional medical care. Always follow your healthcare professional's instructions.        GERD (Adult)    The esophagus is a tube that carries food from the mouth to the stomach. A valve at the lower end of the esophagus prevents stomach acid from flowing  "upward. When this valve doesn't work properly, stomach contents may repeatedly flow back up (reflux) into the esophagus. This is called gastroesophageal reflux disease (GERD). GERD can irritate the esophagus. It can cause problems with swallowing or breathing. In severe cases, GERD can cause recurrent pneumonia or other serious problems.  Symptoms of reflux include burning, pressure or sharp pain in the upper abdomen or mid to lower chest. The pain can spread to the neck, back, or shoulder. There may be belching, an acid taste in the back of the throat, chronic cough, or sore throat or hoarseness. GERD symptoms often occur during the day after a big meal. They can also occur at night when lying down.   Home care  Lifestyle changes can help reduce symptoms. If needed, medicines may be prescribed. Symptoms often improve with treatment, but if treatment is stopped, the symptoms often return after a few months. So most persons with GERD will need to continue treatment.  Lifestyle changes    Limit or avoid fatty, fried, and spicy foods, as well as coffee, chocolate, mint, and foods with high acid content such as tomatoes and citrus fruit and juices (orange, grapefruit, lemon).    Don t eat large meals, especially at night. Frequent, smaller meals are best. Do not lie down right after eating. And don t eat anything 3 hours before going to bed.    Avoid drinking alcohol and smoking. As much as possible, stay away from second hand smoke.    If you are overweight, losing weight will reduce symptoms.     Avoid wearing tight clothing around your stomach area.    If your symptoms occur during sleep, use a foam wedge to elevate your upper body (not just your head.) Or, place 4\" blocks under the head of your bed.  Medicines  If needed, medicines can help relieve the symptoms of GERD and prevent damage to the esophagus. Discuss a medicine plan with your healthcare provider. This may include one or more of the following " medicines:    Antacids to help neutralize the normal acids in your stomach.    Acid blockers (H2 blockers) to decrease acid production.    Acid inhibitors (PPIs) to decrease acid production in a different way than the blockers. They may work better, but can take a little longer to take effect.  Take an antacid 30-60 minutes after eating and at bedtime, but not at the same time as an acid blocker.  Try not to take medicines such as ibuprofen and aspirin. If you are taking aspirin for your heart or other medical reasons, talk to your healthcare provider about stopping it.  Follow-up care  Follow up with your healthcare provider or as advised by our staff.  When to seek medical advice  Call your healthcare provider if any of the following occur:    Stomach pain gets worse or moves to the lower right abdomen (appendix area)    Chest pain appears or gets worse, or spreads to the back, neck, shoulder, or arm    Frequent vomiting (can t keep down liquids)    Blood in the stool or vomit (red or black in color)    Feeling weak or dizzy    Fever of 100.4 F (38 C) or higher, or as directed by your healthcare provider    5745-5280 [employer]. [address] [place] [address]. All rights reserved. This information is not intended as a substitute for professional medical care. Always follow your healthcare professional's instructions.              Follow-ups after your visit        Who to contact     If you have questions or need follow up information about today's clinic visit or your schedule please contact [facility] directly at [phone].  Normal or non-critical lab and imaging results will be communicated to you by MyChart, letter or phone within 4 business days after the clinic has received the results. If you do not hear from us within 7 days, please contact the clinic through MyChart or phone. If you have a critical or abnormal lab result, we will notify you by phone as soon  "as possible.  Submit refill requests through [employer] or call your pharmacy and they will forward the refill request to us. Please allow 3 business days for your refill to be completed.          Additional Information About Your Visit        [email or web address] Information     [employer] lets you send messages to your doctor, view your test results, renew your prescriptions, schedule appointments and more. To sign up, go to [email or web address] . Click on \"Log in\" on the left side of the screen, which will take you to the Welcome page. Then click on \"Sign up Now\" on the right side of the page.     You will be asked to enter the access code listed below, as well as some personal information. Please follow the directions to create your username and password.     Your access code is: [email or web address]  Expires: 2017  4:44 PM     Your access code will  in 90 days. If you need help or a new code, please call your [facility] clinic or [phone].        Care EveryWhere ID     This is your Care EveryWhere ID. This could be used by other organizations to access your [facility] medical records  [id number]        Your Vitals Were     Pulse Temperature Respirations Height Pulse Oximetry BMI (Body Mass Index)    79 98.7  F (37.1  C) (Tympanic) 20 5' 11\" (1.803 m) 96% 37.1 kg/m2       Blood Pressure from Last 3 Encounters:   17 128/76   17 137/81   16 124/84    Weight from Last 3 Encounters:   17 266 lb (120.7 kg)   16 260 lb 12.8 oz (118.3 kg)   16 261 lb (118.4 kg)              Today, you had the following     No orders found for display         Today's Medication Changes          These changes are accurate as of: 3/16/17  4:44 PM.  If you have any questions, ask your nurse or doctor.               Start taking these medicines.        Dose/Directions    ranitidine 15 MG/ML syrup   Commonly known as:  ZANTAC   Used for:  Gastroesophageal reflux disease without esophagitis   Started by:  Ellie Yañez " MICHELLE Rothman CNP        Dose:  4 mg/kg/day   Take 32 mLs (480 mg) by mouth At Bedtime   Quantity:  60 mL   Refills:  0            Where to get your medicines      These medications [place] [facility] [place] [place] [address] [place] [id number]     Phone:  [phone]     ranitidine 15 MG/ML syrup                Primary Care Provider Office Phone # Fax #    Genet OrtizJONE [phone]       [facility] [address] [facility] [address]        Thank you!     Thank you for choosing [facility]  for your care. Our goal is always to provide you with excellent care. Hearing back from our patients is one way we can continue to improve our services. Please take a few minutes to complete the written survey that you may receive in the mail after your visit with us. Thank you!             Your Updated Medication List - Protect others around you: Learn how to safely use, store and throw away your medicines at www.disposemymeds.org.          This list is accurate as of: 3/16/17  4:44 PM.  Always use your most recent med list.                   Brand Name Dispense Instructions for use    albuterol 108 (90 BASE) MCG/ACT Inhaler    PROAIR HFA/PROVENTIL HFA/VENTOLIN HFA    1 Inhaler    Inhale 2 puffs into the lungs every 6 hours as needed for shortness of breath / dyspnea or wheezing       aspirin 81 MG EC tablet     90 tablet    Take 4 tablets (325 mg) by mouth daily       blood glucose lancing device     30 each    Use to test blood sugars 1 times daily or as directed.       blood glucose monitoring meter device kit     1 kit    Use to test blood sugars 1 times daily or as directed.       blood glucose monitoring test strip    ONE TOUCH ULTRA    1 Box    Use to test blood sugars 1 times daily or as directed.       citalopram 40 MG tablet    celeXA    30 tablet    Take 1 tablet (40 mg) by mouth daily       clonazePAM  1 MG tablet    klonoPIN    60 tablet    Take 1/2 - 1 tablet at bedtime.  If cramps and restless legs improve at night, may increase to BID dosing prn.       lidocaine 8.1819 mL, alum & mag hydroxide-simethicone 16.3637 mL, belladonna alk-PHENobarbital 5.4546 mL GI Cocktail     30 mL    Take 30 mLs by mouth once for 1 dose       lisinopril 2.5 MG tablet    PRINIVIL/Zestril    90 tablet    Take 1 tablet (2.5 mg) by mouth daily       metFORMIN 500 MG 24 hr tablet    GLUCOPHAGE-XR    180 tablet    Take 1 tablet (500 mg) by mouth 2 times daily (with meals)       omeprazole 40 MG capsule    priLOSEC    90 capsule    Take 1 capsule (40 mg) by mouth daily Take 30-60 minutes before a meal.       order for DME      Phi was set up on [employer]  Type [employer] Auto (System One 60 series)  Serial Number: [id number] Modem Number: [id number] Pressure: AUTO-TITRATE CPAP 7-12 cm H20 Mask of choice: AIRFIT F 10 FULL FACE MASK Size: MEDIUM HEATED TUBING AND MODEM PROVIDED       ranitidine 15 MG/ML syrup    ZANTAC    60 mL    Take 32 mLs (480 mg) by mouth At Bedtime       rosuvastatin 40 MG tablet    CRESTOR    90 tablet    Take 1 tablet (40 mg) by mouth daily          No

## 2024-04-01 NOTE — ED BEHAVIORAL HEALTH ASSESSMENT NOTE - DETAILS
Inpatient service to contact outpatient provider Referred to LOW-4; Case discussed with inpatient attending as per HPI; patient w/ verbal aggression at residence; no physical aggression endorsed patient does not present with SI/HI patient denies patient's residence aware

## 2024-04-01 NOTE — ED BEHAVIORAL HEALTH ASSESSMENT NOTE - DESCRIPTION
asthma, Sleep Apnea, tinea cruris, HTN uncooperative with assessment but has not been agitated.  He's slept most of the time in the ED. [facility], single, unemployed on SSD During course of ED visit patient was calm and cooperative. Patient was not aggressive or violent and did not require PRN medications.    ICU Vital Signs Last 24 Hrs  T(C): 37.1 (01 Apr 2024 13:46), Max: 37.1 (01 Apr 2024 13:46)  T(F): 98.7 (01 Apr 2024 13:46), Max: 98.7 (01 Apr 2024 13:46)  HR: 79 (01 Apr 2024 13:46) (79 - 82)  BP: 119/71 (01 Apr 2024 13:46) (119/71 - 126/77)  BP(mean): --  ABP: --  ABP(mean): --  RR: 17 (01 Apr 2024 13:46) (17 - 18)  SpO2: 98% (01 Apr 2024 13:46) (95% - 98%)    O2 Parameters below as of 01 Apr 2024 13:46  Patient On (Oxygen Delivery Method): room air

## 2024-04-01 NOTE — ED BEHAVIORAL HEALTH ASSESSMENT NOTE - VIOLENCE RISK FACTORS:
Anesthesia Evaluation                  Airway   Mallampati: I  TM distance: >3 FB  Neck ROM: full  Dental      Pulmonary    Cardiovascular     ECG reviewed    (+) hypertension,       Neuro/Psych  (+) psychiatric history Anxiety,     GI/Hepatic/Renal/Endo    (+) obesity,       Musculoskeletal     Abdominal    Substance History      OB/GYN          Other      history of cancer                    Anesthesia Plan    ASA 3     general     intravenous induction     Anesthetic plan, all risks, benefits, and alternatives have been provided, discussed and informed consent has been obtained with: patient.    Plan discussed with CRNA.      
Feeling of being under threat and being unable to control threat/Impulsivity

## 2024-08-20 ENCOUNTER — EMERGENCY (EMERGENCY)
Facility: HOSPITAL | Age: 44
LOS: 1 days | Discharge: NURSING, REHAB OR LONG-TERM CARE | End: 2024-08-20
Attending: EMERGENCY MEDICINE | Admitting: EMERGENCY MEDICINE
Payer: MEDICARE

## 2024-08-20 VITALS
DIASTOLIC BLOOD PRESSURE: 82 MMHG | TEMPERATURE: 98 F | SYSTOLIC BLOOD PRESSURE: 116 MMHG | WEIGHT: 315 LBS | HEART RATE: 89 BPM | RESPIRATION RATE: 18 BRPM | OXYGEN SATURATION: 98 % | HEIGHT: 69 IN

## 2024-08-20 PROCEDURE — 99284 EMERGENCY DEPT VISIT MOD MDM: CPT

## 2024-08-20 RX ORDER — OFLOXACIN 0.3 %
2 DROPS OPHTHALMIC (EYE)
Qty: 10 | Refills: 0
Start: 2024-08-20 | End: 2024-08-26

## 2024-08-20 RX ORDER — ERYTHROMYCIN 5 MG/G
2 OINTMENT OPHTHALMIC
Qty: 3.5 | Refills: 0
Start: 2024-08-20 | End: 2024-08-20

## 2024-08-20 NOTE — PROVIDER CONTACT NOTE (OTHER) - ASSESSMENT
er provider, patient is cleared and is able to return to their previous residence, [facility] [phone].  has spoken to [facility] staff ,  confirmed that patients mode of transportation is [employer], and that patient travels INDEPENDENTLY. Clinical provider is in agreement with [facility] back to group home. Verbal huddle regarding coordination of care completed with interdisciplinary team. Arranged Dima chester [phone]. P/U 12:15 AM. [facility] Inv# [id number].

## 2024-08-20 NOTE — ED PROVIDER NOTE - CLINICAL SUMMARY MEDICAL DECISION MAKING FREE TEXT BOX
The patient is a 44y Male who has a past medical and surgery history of Schizophrenia Obstructive sleep apnea Hypertension Type 2 diabetes mellitus PTED with bilateral crusting and itching of eye bilaterally w/o fever chills nausea vomiting HA    Vital Signs Stable   PE: as described;   IMPRESSION/RISK:  Dx=  Bilateral Bact Conjunctivitis   Consideration include ocuflox ggts erythrom at night first dose give for tonight SW contacted to coordinate care [facility]   Plan  as above  RTED PRN

## 2024-08-20 NOTE — ED PROVIDER NOTE - EYES, MLM
bilateral conjunctivitis eyes yellow sticky crusting; able to pry open; PERRL when open, conjunctival redness and purulence seen,

## 2024-08-20 NOTE — ED PROVIDER NOTE - OBJECTIVE STATEMENT
The patient is a 44y Male who has a past medical and surgery history of Schizophrenia Obstructive sleep apnea Hypertension Type 2 diabetes mellitus PTED with bilateral crusting and itching of eye bilaterally w/o fever chills nausea vomiting HA

## 2024-08-20 NOTE — ED ADULT TRIAGE NOTE - CHIEF COMPLAINT QUOTE
pt living with DM type2, schizophrenia, c/o of rt eye swelling and crusty since this am, pt denies any vision changes, sent from [facility] for eval

## 2024-09-19 NOTE — ED PROVIDER NOTE - PATIENT PORTAL LINK FT
Caller: Richelle Arellano    Relationship to patient: Self    Best call back number: [phone]    Chief complaint: EPILEPSY    Type of visit: ANNUAL FOLLOW UP    Requested date: PREFERS PM APPT    If rescheduling, when is the original appointment: 11/1/24    Additional notes: PROVIDER LEFT BUT PATIENT STILL NEEDS TO BE SEEN. PLEASE ASSIGN TO ANOTHER PROVIDER AND SCHEDULE.        You can access the FollowMyHealth Patient Portal offered by [facility] by registering at the following website: [email or web address]. By joining [employer]’s FollowMyHealth portal, you will also be able to view your health information using other applications (apps) compatible with our system.

## 2024-12-20 ENCOUNTER — EMERGENCY (EMERGENCY)
Facility: HOSPITAL | Age: 44
LOS: 1 days | Discharge: HOME | End: 2024-12-20
Attending: STUDENT IN AN ORGANIZED HEALTH CARE EDUCATION/TRAINING PROGRAM
Payer: MEDICARE

## 2024-12-20 VITALS
DIASTOLIC BLOOD PRESSURE: 74 MMHG | OXYGEN SATURATION: 94 % | RESPIRATION RATE: 20 BRPM | HEART RATE: 100 BPM | WEIGHT: 315 LBS | TEMPERATURE: 98 F | SYSTOLIC BLOOD PRESSURE: 108 MMHG | HEIGHT: 69 IN

## 2024-12-20 PROCEDURE — 99284 EMERGENCY DEPT VISIT MOD MDM: CPT

## 2024-12-21 VITALS
SYSTOLIC BLOOD PRESSURE: 131 MMHG | TEMPERATURE: 98 F | OXYGEN SATURATION: 94 % | HEART RATE: 93 BPM | DIASTOLIC BLOOD PRESSURE: 89 MMHG | RESPIRATION RATE: 18 BRPM

## 2024-12-21 PROCEDURE — 99284 EMERGENCY DEPT VISIT MOD MDM: CPT

## 2024-12-21 RX ORDER — OFLOXACIN 0.3 %
2 DROPS OPHTHALMIC (EYE) ONCE
Refills: 0 | Status: COMPLETED | OUTPATIENT
Start: 2024-12-21 | End: 2024-12-21

## 2024-12-21 RX ORDER — ACETAMINOPHEN 500MG 500 MG/1
650 TABLET, COATED ORAL ONCE
Refills: 0 | Status: COMPLETED | OUTPATIENT
Start: 2024-12-21 | End: 2024-12-21

## 2024-12-21 RX ADMIN — ACETAMINOPHEN 500MG 650 MILLIGRAM(S): 500 TABLET, COATED ORAL at 00:26

## 2024-12-21 RX ADMIN — Medication 2 DROP(S): at 00:26

## 2024-12-21 NOTE — ED PROVIDER NOTE - NSFOLLOWUPINSTRUCTIONS_ED_ALL_ED_FT
1. Ofloxacin ophthalmic drops 2 drops in right eye 4 times a day for 7 days   2. FOR PAIN OR FEVER YOU CAN TAKE IBUPROFEN (MOTRIN, ADVIL) 600mg every 6 hours OR ACETAMINOPHEN (TYLENOL) 975mg every 6 hours AS NEEDED, AS DIRECTED ON PACKAGING.  3. FOLLOW UP WITH YOUR PRIMARY DOCTOR WITHIN 5 DAYS AS DIRECTED.  4. IF YOU HAD LABS OR IMAGING DONE, YOU WERE GIVEN COPIES OF ALL LABS AND/OR IMAGING RESULTS FROM YOUR ER VISIT--PLEASE TAKE THEM WITH YOU TO YOUR FOLLOW UP APPOINTMENTS.   To obtain a copy of your medical records or disc, please contact medical records during the hours of operation (Monday - Friday 8am - 7pm; Saturday 8am - 4pm) at Phone: [phone]   5. RETURN TO THE ER FOR ANY WORSENING SYMPTOMS OR CONCERNS.    What is pink eye?    This is a term to describe an infection or irritation of the eye. The medical term for pink eye is "conjunctivitis."    If you have pink eye, your eye (or eyes) might:    ?Turn pink or red    ?Weep or ooze a gooey liquid    ?Become itchy or burn    ?Get stuck shut, especially when you first wake up    Pink eye can be caused by an infection, allergies, or an unknown irritation.    Can you catch pink eye from someone else?    Yes. When pink eye is caused by an infection, it can spread easily. Usually, people catch it from touching something that has been in contact with an infected person's eye. It can also be spread when an infected person touches someone else, and then that person touches their eye.    If someone you know has pink eye, avoid touching their pillowcases, towels, or other personal items.    Can pink eye be treated?    Most cases of pink eye go away on their own without treatment. When pink eye is caused by infection, it is usually caused by a virus, so antibiotics will not help. Still, pink eye caused by a virus can last several days.    Some types of pink eye can be treated:    ?Pink eye caused by an infection with bacteria usually goes away on its own. But it can be treated with antibiotic eye drops, gel, or ointment.    ?Pink eye caused by other problems can be treated with eye drops normally used for allergies. These drops do not cure the pink eye, but they can help with itchiness and irritation.    When using eye drops for infection, it is possible to spread the infection from 1 eye to the other. To avoid this:    ?Do not touch your healthy eye after touching your infected eye.    ?Do not touch the bottle or dropper directly onto 1 eye and then use it in the other.    If your eyelids feel swollen, you can hold a cool, wet cloth on the area.    What if I wear contact lenses?    If you wear contact lenses and you have symptoms of pink eye, it is really important to have a doctor look at your eyes. This is because you might need antibiotics.    During treatment for eye infections:    ?Stop wearing your contacts for a short time.    ?If your contacts are disposable, throw them away and use new ones.    ?If your contacts are not disposable, clean them carefully.    ?Throw away your contact lens case, and get a new one.    When can I go back to work or school?    If you have pink eye caused by an infection, remember that it can spread very easily. The best way to avoid spreading it is to stay away from other people until you no longer have symptoms. If this is not possible, wash your hands often (figure 1). It is also important to avoid touching your eyes and sharing items that could spread the infection.    Schools and day cares usually have rules about when a child with pink eye can return. If they have a bacterial infection, they will probably need to stay home until they have been using antibiotic eye drops or ointment for 24 hours.    How can I help prevent pink eye?    To help prevent pink eye caused by an infection:    ?Wash your hands often with soap and water.    ?Avoid touching your eyes.    ?Avoid sharing towels, bedding, or other personal items with a person who has pink eye.    If your pink eye is caused by allergies, it might help to stay inside with the windows shut as much as possible during peak allergy seasons.    When should I call the doctor?    Call your doctor or nurse if:    ?You have trouble seeing clearly after blinking.    ?Your eye is still red or has drainage after 3 days.    ?You have eye pain that is getting worse.    If you do not have these problems, but think that you might have pink eye, your doctor or nurse might be able to give you advice over the phone.

## 2024-12-21 NOTE — ED PROVIDER NOTE - PATIENT PORTAL LINK FT
You can access the FollowMyHealth Patient Portal offered by [facility] by registering at the following website: [email or web address]. By joining [employer]’s FollowMyHealth portal, you will also be able to view your health information using other applications (apps) compatible with our system.

## 2024-12-21 NOTE — ED PROVIDER NOTE - OBJECTIVE STATEMENT
suzanne attending- 44-year-old male presenting with right eye discharge for last couple days denies trauma or scratch the eye reports having some greenish discharge denies drug allergies

## 2024-12-21 NOTE — ED PROVIDER NOTE - ATTENDING CONTRIBUTION TO CARE
I, Ken Arevalo, performed a history and physical exam of the patient and discussed their management with the resident provider. I reviewed the resident care provider's note and agree with the documented findings and plan of care. I was present and available for all procedures.    See my full note for details

## 2024-12-21 NOTE — ED PROVIDER NOTE - PHYSICAL EXAMINATION
Gen: Well appearing and in NAD  Head: normal appearing atraumatic   Neck: trachea midline  1. Visual acuity intact  2. Pupils without anisocoria, has normal reactivity,   3. Extraocular motility and alignment intact,   4. Intraocular pressure- tonometry not measured,   5. Confrontation visual fields intact  6. External examination, Right eye crusting and greenish discharge, Left eye unremarkable  7. Slit-lamp examination-     Lids/lashes/lacrimal system: Normal anatomy and contours      Conjunctiva/sclera: Substance injected right eye normal white and quiet left eye     Cornea: Clear        Iris: Round pupil     fluorecin stain Not completed    Resp:  No respiratory distress  Abd; soft NT ND  Ext: no visible deformities  Neuro:  Alert and oriented, appears non focal  Skin:  Warm and dry as visualized  Psych:  Normal affect and mood  ~Ken Arevalo D.O, -ED Attending

## 2024-12-21 NOTE — ED PROVIDER NOTE - CLINICAL SUMMARY MEDICAL DECISION MAKING FREE TEXT BOX
suzanne attending- Patient presenting with right eye conjunctivitis otherwise well-appearing extraocular motion intact PERRLA exam supportive of this otherwise antibiotic drops discharge instructed patient proper hygiene patient agreed with plan nonemergent back to facility

## 2024-12-21 NOTE — ED ADULT NURSE NOTE - OBJECTIVE STATEMENT
43 yo M AOx4 from [facility] c/o right eye swelling x 2 days. Pt sent for conjunctivitis. Pt denies pain but endorses drainage from eye. Pt initially presents to [facility] ED in no acute distress with unlabored breathing Stretcher in lowest position locked with blanket given. Comfort care and safety measures provided. Pt denies c/p, sob, abd pain, N/V/D, fevers, chills, headache, dizziness, dysuria, blood in urine and stool.

## 2024-12-21 NOTE — ED ADULT NURSE NOTE - CHIEF COMPLAINT QUOTE
right eye swelling x 2 days, from [facility] sent for right eye conjuctivitis

## 2024-12-21 NOTE — ED ADULT NURSE NOTE - NS ED PATIENT SAFETY CONCERN
Observation goals PRIOR TO DISCHARGE     Comments: -diagnostic tests and consults completed and resulted - not met  -vital signs normal or at patient baseline - met  Nurse to notify provider when observation goals have been met and patient is ready for discharge.        No

## 2025-01-20 ENCOUNTER — EMERGENCY (EMERGENCY)
Facility: HOSPITAL | Age: 45
LOS: 1 days | Discharge: HOME | End: 2025-01-20
Attending: EMERGENCY MEDICINE | Admitting: EMERGENCY MEDICINE
Payer: MEDICARE

## 2025-01-20 VITALS
RESPIRATION RATE: 18 BRPM | OXYGEN SATURATION: 92 % | DIASTOLIC BLOOD PRESSURE: 63 MMHG | HEART RATE: 118 BPM | TEMPERATURE: 99 F | SYSTOLIC BLOOD PRESSURE: 97 MMHG

## 2025-01-20 VITALS
WEIGHT: 315 LBS | DIASTOLIC BLOOD PRESSURE: 58 MMHG | HEIGHT: 69 IN | HEART RATE: 121 BPM | TEMPERATURE: 98 F | RESPIRATION RATE: 20 BRPM | OXYGEN SATURATION: 98 % | SYSTOLIC BLOOD PRESSURE: 94 MMHG

## 2025-01-20 LAB
ALBUMIN SERPL ELPH-MCNC: 3.6 G/DL — SIGNIFICANT CHANGE UP (ref 3.3–5)
ALP SERPL-CCNC: 99 U/L — SIGNIFICANT CHANGE UP (ref 40–120)
ALT FLD-CCNC: 58 U/L — HIGH (ref 4–41)
ANION GAP SERPL CALC-SCNC: 12 MMOL/L — SIGNIFICANT CHANGE UP (ref 7–14)
AST SERPL-CCNC: 57 U/L — HIGH (ref 4–40)
BASOPHILS # BLD AUTO: 0.07 K/UL — SIGNIFICANT CHANGE UP (ref 0–0.2)
BASOPHILS NFR BLD AUTO: 0.5 % — SIGNIFICANT CHANGE UP (ref 0–2)
BILIRUB SERPL-MCNC: 0.6 MG/DL — SIGNIFICANT CHANGE UP (ref 0.2–1.2)
BUN SERPL-MCNC: 20 MG/DL — SIGNIFICANT CHANGE UP (ref 7–23)
CALCIUM SERPL-MCNC: 8.9 MG/DL — SIGNIFICANT CHANGE UP (ref 8.4–10.5)
CHLORIDE SERPL-SCNC: 99 MMOL/L — SIGNIFICANT CHANGE UP (ref 98–107)
CO2 SERPL-SCNC: 24 MMOL/L — SIGNIFICANT CHANGE UP (ref 22–31)
CREAT SERPL-MCNC: 0.95 MG/DL — SIGNIFICANT CHANGE UP (ref 0.5–1.3)
EGFR: 101 ML/MIN/1.73M2 — SIGNIFICANT CHANGE UP
EOSINOPHIL # BLD AUTO: 0.42 K/UL — SIGNIFICANT CHANGE UP (ref 0–0.5)
EOSINOPHIL NFR BLD AUTO: 3.3 % — SIGNIFICANT CHANGE UP (ref 0–6)
GLUCOSE SERPL-MCNC: 99 MG/DL — SIGNIFICANT CHANGE UP (ref 70–99)
HCT VFR BLD CALC: 41.5 % — SIGNIFICANT CHANGE UP (ref 39–50)
HGB BLD-MCNC: 13.3 G/DL — SIGNIFICANT CHANGE UP (ref 13–17)
IANC: 10.08 K/UL — HIGH (ref 1.8–7.4)
IMM GRANULOCYTES NFR BLD AUTO: 0.5 % — SIGNIFICANT CHANGE UP (ref 0–0.9)
LYMPHOCYTES # BLD AUTO: 1.16 K/UL — SIGNIFICANT CHANGE UP (ref 1–3.3)
LYMPHOCYTES # BLD AUTO: 9 % — LOW (ref 13–44)
MCHC RBC-ENTMCNC: 28.7 PG — SIGNIFICANT CHANGE UP (ref 27–34)
MCHC RBC-ENTMCNC: 32 G/DL — SIGNIFICANT CHANGE UP (ref 32–36)
MCV RBC AUTO: 89.4 FL — SIGNIFICANT CHANGE UP (ref 80–100)
MONOCYTES # BLD AUTO: 1.04 K/UL — HIGH (ref 0–0.9)
MONOCYTES NFR BLD AUTO: 8.1 % — SIGNIFICANT CHANGE UP (ref 2–14)
NEUTROPHILS # BLD AUTO: 10.08 K/UL — HIGH (ref 1.8–7.4)
NEUTROPHILS NFR BLD AUTO: 78.6 % — HIGH (ref 43–77)
NRBC # BLD: 0 /100 WBCS — SIGNIFICANT CHANGE UP (ref 0–0)
NRBC # FLD: 0 K/UL — SIGNIFICANT CHANGE UP (ref 0–0)
PLATELET # BLD AUTO: 221 K/UL — SIGNIFICANT CHANGE UP (ref 150–400)
POTASSIUM SERPL-MCNC: 4.6 MMOL/L — SIGNIFICANT CHANGE UP (ref 3.5–5.3)
POTASSIUM SERPL-SCNC: 4.6 MMOL/L — SIGNIFICANT CHANGE UP (ref 3.5–5.3)
PROT SERPL-MCNC: 7.2 G/DL — SIGNIFICANT CHANGE UP (ref 6–8.3)
RBC # BLD: 4.64 M/UL — SIGNIFICANT CHANGE UP (ref 4.2–5.8)
RBC # FLD: 13.4 % — SIGNIFICANT CHANGE UP (ref 10.3–14.5)
SODIUM SERPL-SCNC: 135 MMOL/L — SIGNIFICANT CHANGE UP (ref 135–145)
WBC # BLD: 12.83 K/UL — HIGH (ref 3.8–10.5)
WBC # FLD AUTO: 12.83 K/UL — HIGH (ref 3.8–10.5)

## 2025-01-20 PROCEDURE — 99284 EMERGENCY DEPT VISIT MOD MDM: CPT

## 2025-01-20 PROCEDURE — 93010 ELECTROCARDIOGRAM REPORT: CPT

## 2025-01-20 RX ORDER — SULFAMETHOXAZOLE/TRIMETHOPRIM 800-160 MG
1 TABLET ORAL
Qty: 14 | Refills: 0
Start: 2025-01-20 | End: 2025-01-26

## 2025-01-20 RX ORDER — SULFAMETHOXAZOLE/TRIMETHOPRIM 800-160 MG
1 TABLET ORAL ONCE
Refills: 0 | Status: COMPLETED | OUTPATIENT
Start: 2025-01-20 | End: 2025-01-20

## 2025-01-20 RX ADMIN — Medication 1 TABLET(S): at 15:31

## 2025-01-20 NOTE — PROVIDER CONTACT NOTE (OTHER) - ASSESSMENT
Called residence [phone]; spoke with staff member Primo.  Per Primo, pt is independent in ambulation and can travel via taxi.  SW discussed with provider, who is aware and in agreement with travel via taxi.  Pt is aware and in agreement as well.

## 2025-01-20 NOTE — ED PROVIDER NOTE - ATTENDING CONTRIBUTION TO CARE
I performed a history and physical exam of the patient and discussed their management with the resident and /or advanced care provider. I reviewed the resident and /or ACP's note and agree with the documented findings and plan of care. My medical decision making and observations are found above.  Lungs clear, large mass in back of neck. no fever

## 2025-01-20 NOTE — ED PROVIDER NOTE - PATIENT PORTAL LINK FT
You can access the FollowMyHealth Patient Portal offered by [facility] by registering at the following website: [email or web address]. By joining [email or web address]’s FollowMyHealth portal, you will also be able to view your health information using other applications (apps) compatible with our system.

## 2025-01-20 NOTE — ED ADULT TRIAGE NOTE - CHIEF COMPLAINT QUOTE
c/o right sided neck swelling and redness onset 2 days ago, atraumatic, Pt arrives from [facility]. Phx of schizophrenia, DM type 2. Clam and cooperative in triage.

## 2025-01-20 NOTE — ED PROVIDER NOTE - PHYSICAL EXAMINATION
Constitutional: Morbidly obese. Sedentary. No acute distress.   Head: Normocephalic, atraumatic.   Eyes: PERRL. EOMI. No conjunctival pallor.   ENT: Moist mucous membranes. Uvula midline. No pharyngeal erythema or exudates.  Neck: 6 cm area of swelling, induration, erythema, and fluctuance to the lateral right neck/occiput that is tender to palpation. Concern for abscess with overlying cellulitis.  CVS: Tachycardic rate, regular rhythm. Normal S1, S2. No murmurs, rubs, or gallops. No peripheral edema noted.   Respiratory: No respiratory distress. Clear to auscultation bilaterally. No wheezing, rales, or rhonchi.   Abdomen: Abd is soft and non-distended. No tenderness. No rebound, guarding, or rigidity.   MSK: No CVA tenderness bilaterally.   Neuro: Alert and oriented to person, place, and time. Follows commands. Moves all extremities.   Skin: Warm and dry. See above.   Psych: Normal affect, cooperative.

## 2025-01-20 NOTE — ED ADULT NURSE NOTE - NSFALLUNIVINTERV_ED_ALL_ED
Bed/Stretcher in lowest position, wheels locked, appropriate side rails in place/Call bell, personal items and telephone in reach/Instruct patient to call for assistance before getting out of bed/chair/stretcher/Non-slip footwear applied when patient is off stretcher/[place] to call system/Physically safe environment - no spills, clutter or unnecessary equipment/Purposeful proactive rounding/Room/bathroom lighting operational, light cord in reach

## 2025-01-20 NOTE — ED PROVIDER NOTE - CLINICAL SUMMARY MEDICAL DECISION MAKING FREE TEXT BOX
Susan: 44-year-old gentleman who presents to the emergency department with swelling on the back of his neck.  Patient says it is tender to the touch.  Patient with abscess on the back of his neck.  Will get an ultrasound here in the ED to localize an I&D.  Patient with no fever.  Patient is schizophrenic at baseline and unsure of other medical histories will get baseline labs specifically ensuring that he is not diabetic or having other renal like issues. Susan: 44-year-old gentleman who presents to the emergency department with swelling on the back of his neck.  Patient says it is tender to the touch.  Patient with abscess on the back of his neck.  Will get an ultrasound here in the ED to localize an I&D.  Patient with no fever.  Patient is schizophrenic at baseline and unsure of other medical histories will get baseline labs specifically ensuring that he is not diabetic or having other renal like issues.    Fellow MD Won Weber: 44-year-old male with past medical history of HTN, ROMMEL, schizophrenia, DM, who presents to the ED for evaluation of enlargening right sided neck mass over the past 2 days. As per patient, he notes worsening pain. He denies any injury to the area or drainage. He denies fever, chills, N/V/D, abdominal pain, chest pain, shortness of breath, cough,  and any additional complaints at this time.     On arrival to the ED, the patient is normotensive, tachycardic, afebrile, and satting 99% on room air. On exam, he is morbidly obese, sedentary. There is a 6 cm area of swelling, induration, erythema, and fluctuance to the lateral right neck/occiput that is tender to palpation. Concern for abscess with overlying cellulitis. Discussed concern for abscess and need for drainage with patient and he has vehemently refused any aspiration/I & D at this time. I discussed the case with the patient's  at [facility] in [place] [phone] who confirms that the patient has capacity to refuse. Given hx of DM, sedentary lifestyle, will cover for MRSA with Bactrim DS BID x 7 days. First dose given in the ED.

## 2025-01-20 NOTE — ED ADULT NURSE NOTE - OBJECTIVE STATEMENT
Patient A&o X4, history of schizophrenia and DM2, received in room 25, with complaints of right sided neck swelling. Patient states, "I started noticing swelling to the right side of my neck two days ago". Patient denies any recent falls or trauma to neck. Right side of neck noted to be red, swollen and tender to touch at this time. Patient rates pain at 7 out of 10 currently, denies taking any medication prior to arrival. Patient denies any psych complaints at this time, admits to medication compliance. Patient able to speak in clear sentences, respirations equal and unlabored. Lung sounds clear b/l, equal chest rise and fall noted. Denies CP/SOB, fever, chills, nausea, vomiting and diarrhea at this time. Skin warm and dry. Provider at bedside for eval, pending further orders.

## 2025-01-20 NOTE — ED PROVIDER NOTE - NSFOLLOWUPINSTRUCTIONS_ED_ALL_ED_FT
Please follow up with your PMD within 2-3 days time.     Start Bactrim DS twice daily for 7 days time.     Apply warm compresses three times a day.     Take Ibuprofen 400 mg every 4-6 hours as needed for pain.     Take Acetaminophen 1000 mg every 8 hours as needed for pain.     Return if your symptoms worsen.

## 2025-01-27 ENCOUNTER — INPATIENT (INPATIENT)
Facility: HOSPITAL | Age: 45
LOS: 8 days | Discharge: PSYCHIATRIC OR SUBSTANCE USE FACILITY | End: 2025-02-05
Attending: STUDENT IN AN ORGANIZED HEALTH CARE EDUCATION/TRAINING PROGRAM | Admitting: STUDENT IN AN ORGANIZED HEALTH CARE EDUCATION/TRAINING PROGRAM
Payer: MEDICARE

## 2025-01-27 VITALS
WEIGHT: 242.51 LBS | DIASTOLIC BLOOD PRESSURE: 66 MMHG | HEART RATE: 98 BPM | TEMPERATURE: 98 F | SYSTOLIC BLOOD PRESSURE: 101 MMHG | HEIGHT: 69 IN | OXYGEN SATURATION: 97 % | RESPIRATION RATE: 16 BRPM

## 2025-01-27 LAB
ALBUMIN SERPL ELPH-MCNC: 3.8 G/DL — SIGNIFICANT CHANGE UP (ref 3.3–5)
ALP SERPL-CCNC: 143 U/L — HIGH (ref 40–120)
ALT FLD-CCNC: 115 U/L — HIGH (ref 4–41)
ANION GAP SERPL CALC-SCNC: 15 MMOL/L — HIGH (ref 7–14)
APTT BLD: 32.2 SEC — SIGNIFICANT CHANGE UP (ref 24.5–35.6)
AST SERPL-CCNC: 80 U/L — HIGH (ref 4–40)
BILIRUB SERPL-MCNC: 0.5 MG/DL — SIGNIFICANT CHANGE UP (ref 0.2–1.2)
BLD GP AB SCN SERPL QL: NEGATIVE — SIGNIFICANT CHANGE UP
BLOOD GAS VENOUS COMPREHENSIVE RESULT: SIGNIFICANT CHANGE UP
BUN SERPL-MCNC: 13 MG/DL — SIGNIFICANT CHANGE UP (ref 7–23)
CALCIUM SERPL-MCNC: 9.4 MG/DL — SIGNIFICANT CHANGE UP (ref 8.4–10.5)
CHLORIDE SERPL-SCNC: 100 MMOL/L — SIGNIFICANT CHANGE UP (ref 98–107)
CO2 SERPL-SCNC: 23 MMOL/L — SIGNIFICANT CHANGE UP (ref 22–31)
CREAT SERPL-MCNC: 0.79 MG/DL — SIGNIFICANT CHANGE UP (ref 0.5–1.3)
EGFR: 112 ML/MIN/1.73M2 — SIGNIFICANT CHANGE UP
GLUCOSE SERPL-MCNC: 83 MG/DL — SIGNIFICANT CHANGE UP (ref 70–99)
HCT VFR BLD CALC: 40.4 % — SIGNIFICANT CHANGE UP (ref 39–50)
HGB BLD-MCNC: 12.6 G/DL — LOW (ref 13–17)
IANC: 9.75 K/UL — HIGH (ref 1.8–7.4)
INR BLD: 1.1 RATIO — SIGNIFICANT CHANGE UP (ref 0.85–1.16)
MCHC RBC-ENTMCNC: 28 PG — SIGNIFICANT CHANGE UP (ref 27–34)
MCHC RBC-ENTMCNC: 31.2 G/DL — LOW (ref 32–36)
MCV RBC AUTO: 89.8 FL — SIGNIFICANT CHANGE UP (ref 80–100)
PLATELET # BLD AUTO: 332 K/UL — SIGNIFICANT CHANGE UP (ref 150–400)
POTASSIUM SERPL-MCNC: 4.8 MMOL/L — SIGNIFICANT CHANGE UP (ref 3.5–5.3)
POTASSIUM SERPL-SCNC: 4.8 MMOL/L — SIGNIFICANT CHANGE UP (ref 3.5–5.3)
PROT SERPL-MCNC: 7.8 G/DL — SIGNIFICANT CHANGE UP (ref 6–8.3)
PROTHROM AB SERPL-ACNC: 12.8 SEC — SIGNIFICANT CHANGE UP (ref 9.9–13.4)
RBC # BLD: 4.5 M/UL — SIGNIFICANT CHANGE UP (ref 4.2–5.8)
RBC # FLD: 13.8 % — SIGNIFICANT CHANGE UP (ref 10.3–14.5)
RH IG SCN BLD-IMP: POSITIVE — SIGNIFICANT CHANGE UP
SODIUM SERPL-SCNC: 138 MMOL/L — SIGNIFICANT CHANGE UP (ref 135–145)
WBC # BLD: 15.83 K/UL — HIGH (ref 3.8–10.5)
WBC # FLD AUTO: 15.83 K/UL — HIGH (ref 3.8–10.5)

## 2025-01-27 PROCEDURE — 99285 EMERGENCY DEPT VISIT HI MDM: CPT

## 2025-01-27 RX ORDER — VANCOMYCIN HYDROCHLORIDE 50 MG/ML
1000 KIT ORAL ONCE
Refills: 0 | Status: COMPLETED | OUTPATIENT
Start: 2025-01-27 | End: 2025-01-27

## 2025-01-27 RX ORDER — ACETAMINOPHEN 160 MG/5ML
1000 SUSPENSION ORAL ONCE
Refills: 0 | Status: COMPLETED | OUTPATIENT
Start: 2025-01-27 | End: 2025-01-27

## 2025-01-27 RX ORDER — BACTERIOSTATIC SODIUM CHLORIDE 0.9 %
1000 VIAL (ML) INJECTION ONCE
Refills: 0 | Status: COMPLETED | OUTPATIENT
Start: 2025-01-27 | End: 2025-01-27

## 2025-01-27 RX ORDER — PIPERACILLIN SODIUM AND TAZOBACTAM SODIUM 2; 250 G/50ML; MG/50ML
3.38 INJECTION, POWDER, FOR SOLUTION INTRAVENOUS ONCE
Refills: 0 | Status: COMPLETED | OUTPATIENT
Start: 2025-01-27 | End: 2025-01-27

## 2025-01-27 RX ADMIN — Medication 1000 MILLILITER(S): at 21:02

## 2025-01-27 RX ADMIN — ACETAMINOPHEN 400 MILLIGRAM(S): 160 SUSPENSION ORAL at 21:02

## 2025-01-27 RX ADMIN — PIPERACILLIN SODIUM AND TAZOBACTAM SODIUM 200 GRAM(S): 2; 250 INJECTION, POWDER, FOR SOLUTION INTRAVENOUS at 21:12

## 2025-01-27 RX ADMIN — VANCOMYCIN HYDROCHLORIDE 250 MILLIGRAM(S): KIT at 21:49

## 2025-01-27 NOTE — ED ADULT NURSE NOTE - NS ED NOTE ABUSE RESPONSE YN
Yes [FreeTextEntry2] : Patient receiving psychotherapy to address symptoms of anxiety and depression related to family stressors [Integrative Therapy] : Integrative Therapy  [de-identified] : Patient presented to session on time.  Patient and this writer collaboratively completed patient's treatment plan review, see chart for details.  Session ended with patient emotionally stable and behaviorally in control. [Return in ____ week(s)] : Return in [unfilled] week(s) [FreeTextEntry1] : 1x/weekly psychotherapy and regular psychiatric treatment

## 2025-01-27 NOTE — ED ADULT NURSE REASSESSMENT NOTE - NS ED NURSE REASSESS COMMENT FT1
Pt. received from Grover RN. Pt. A&OX2 and ambulatory c/o abscess in right posterior neck. Pt. medical hx T2DM, Obstructive sleep apnea, HTN, schizophrenia. Pt. denies any pain to the site at this time. Pt. denies HA< dizziness, chest pain, SOB. Pt. respirations even and unlabored, chest rise equal bilaterally. Serosanguinous drainage noted to the site. Comfort measures provided, safety maintained throughout.

## 2025-01-27 NOTE — ED ADULT NURSE NOTE - CHIEF COMPLAINT QUOTE
Pt history of schizophrenia non-compliant with medication, presents from [facility], sent to ED for draining abscess to rt side of neck, abscess is oozing to rt neck. Pt is currently a&ox2, denies any present pain, afebrile.

## 2025-01-27 NOTE — ED PROVIDER NOTE - ATTENDING CONTRIBUTION TO CARE
44-year-old with past medical history of HTN DM2, ROMMEL, schizophrenia, admitted for right neck abscess failing outpatient management 2ndary to compliance/psychiatric issues   PTED with Patient's right posterior neck not involving the cervical spine there is area      Vital Signs Last 24 Hrs  101/66 HR 98 RR 16 T 98.1 97% RA   PE: as described; Pt alert but appears internally preoccupied when observed without his awareness noted to be talking to air gesturing towards invisible objects people but engages with direct verbal and answers questions.  large area of swelling about 6-7 cm poonam w/ swelling induration, erythema, some  fluctuance with active drainage but mostly semi solid consistency.  There is Rt mastoid tenderness to palpation. Evaluation of ears limited by cerumen earwax No vesicular rash. minimal posterior cervical involvment no pain with turning head   DATA:  EKG: pending at time of evaluation  LAB:   IMPRESSION/RISK:  Dx= neck swelling   Consideration include: At least some component of abscess explaining drainage but swelling could be caseous from infection being acute on chronic  given relative lack of drainage given size. Could also represent "tip of iceberg" for infection involving deeper structures (mastoiditis Bezold abscess; doubt cerebral abscess) so will image. Psych status makes outpt managment unlikely to be successful   Plan  Vanco (CAMRSA) Zosyn (+/- anaerobe coverage)  CT max face with IV contrast to evaluate for mastoiditis deeper abscess (bezold).    ENT consult (see below)   Disposition as per results and course   So far patient has not required sedation acute psych mngt but will monitor and manage accordingly will probable require admission given failure of outpt plan and possible need for sedation/OR (ENT) for definitive drainage/excision/culture ( if solid)

## 2025-01-27 NOTE — ED ADULT NURSE NOTE - OBJECTIVE STATEMENT
Pt received in ER for evaluation of right posterior neck abscess secondary to  increase drainage coming from wound .  Pt received alert and awake , oriented x2 , to person and place with calm mood . Pt noted with color good with respirations even and non-labored.   Upon assessment pt observed with abscess to right posterior neck draining  a moderate amount of serosanguineous drainage .  Pt  denies pain or discomfort . Pt poor historian unable to clear details of how he got the wound or current plan of care .  20 g saline lock placed to left hand, labs drawn and pt  medicated as per order.

## 2025-01-27 NOTE — ED ADULT TRIAGE NOTE - CHIEF COMPLAINT QUOTE
Pt history of schizophrenia non-compliant with medication, presents from [facility], sent to ED for draining abscess to rt side of neck, abscess is oozing to rt neck. Pt is currently a&ox2, denies any present pain, afebrile.

## 2025-01-27 NOTE — ED PROVIDER NOTE - PROGRESS NOTE DETAILS
Errol De La Cruz, PGY3 - patient's aunt who has custody called. home [phone], cell [phone]. Sharee Clark. updated her on the status. will reach back as needed for updates. Nuria Lao MD, PGY3:  pt w/ abscess in right posterior neck, ENT called will come see pt Nuria Lao MD, PGY3:  ENT at bedside, states abscess is not large enough to benefit from I&D and is already draining.  Recommends admission for IV antibiotics.  Patient reassessed at bedside has no complaints at this time is denying visual and auditory hallucinations, does not have insight into his psychiatric condition.  Discussed with psych states that they will follow inpatient.  Patient in agreement with plan for admission to the hospital for IV antibiotics.  Patient at this time is calm redirectable not displaying evidence of agitation or aggression however is witnessed to be talking to himself.  Patient does not need any medication at this time for psychiatric condition, does not need a one-to-one at this time however will will reconsider if need arises    voicemail left for pt's aunt requesting callback to provide update Nuria Lao MD, PGY3:  aunt called back, provided update, aunt consents to admission, states that since the abscess she has been concerned about his compliance with his antipsychotics. agrees with plan for psych to see him inpatient    aunt provides [facility] [phone] number for collateral regarding med list

## 2025-01-27 NOTE — ED PROVIDER NOTE - CLINICAL SUMMARY MEDICAL DECISION MAKING FREE TEXT BOX
Errol De La Cruz, PGY3 - This is a 44-year-old with past medical history of hypertension, ROMMEL, schizophrenia, diabetes, presenting today for 1 week or so of symptoms of right-sided posterior neck abscess now with drainage the past day or so.  Denies any fever chills or nausea vomiting diarrhea.  Was in the ED about a week ago and was prescribed Bactrim twice daily for 7 days which patient reports that he has been taking consistently.  Patient of note refused any aspiration or I&D at that time.  Vital signs within normal limits.  Physical exam shows well-appearing male not in acute distress.  Patient is alert and oriented x 2 moving all extremities and answering all questions.  Patient not in acute respiratory distress.  Patient's right posterior neck not involving the cervical spine there is area of large abscess ranging about 6 to 7 cm in diameter with swelling induration and erythema and fluctuance with active drainage.  There is mastoid tenderness to palpation.  Ears were tried to be visualized however with a lot of earwax however no obvious signs of otitis externa.  No obvious signs of vesicular rash.  There is tenderness to palpation of the right mastoid area.  Otherwise no anterior neck involvement.  Concern at this time for mastoiditis leading to abscess formation.  Plan to give Zosyn and vancomycin and draw blood culture and do sepsis labs and give fluids.  Plan to obtain CT max face with IV contrast to evaluate for mastoiditis.  ENT consult may be needed if positive.  Disposition pending labs imaging and reassessment.  Gauze placed on the area of the abscess for now.

## 2025-01-28 DIAGNOSIS — Z29.9 ENCOUNTER FOR PROPHYLACTIC MEASURES, UNSPECIFIED: ICD-10-CM

## 2025-01-28 DIAGNOSIS — F20.9 SCHIZOPHRENIA, UNSPECIFIED: ICD-10-CM

## 2025-01-28 DIAGNOSIS — I10 ESSENTIAL (PRIMARY) HYPERTENSION: ICD-10-CM

## 2025-01-28 DIAGNOSIS — L02.11 CUTANEOUS ABSCESS OF NECK: ICD-10-CM

## 2025-01-28 DIAGNOSIS — E11.9 TYPE 2 DIABETES MELLITUS WITHOUT COMPLICATIONS: ICD-10-CM

## 2025-01-28 LAB
ANISOCYTOSIS BLD QL: SIGNIFICANT CHANGE UP
BASOPHILS # BLD AUTO: 0.28 K/UL — HIGH (ref 0–0.2)
BASOPHILS NFR BLD AUTO: 1.8 % — SIGNIFICANT CHANGE UP (ref 0–2)
EOSINOPHIL # BLD AUTO: 0.84 K/UL — HIGH (ref 0–0.5)
EOSINOPHIL NFR BLD AUTO: 5.3 % — SIGNIFICANT CHANGE UP (ref 0–6)
GLUCOSE BLDC GLUCOMTR-MCNC: 101 MG/DL — HIGH (ref 70–99)
GLUCOSE BLDC GLUCOMTR-MCNC: 102 MG/DL — HIGH (ref 70–99)
GLUCOSE BLDC GLUCOMTR-MCNC: 82 MG/DL — SIGNIFICANT CHANGE UP (ref 70–99)
GLUCOSE BLDC GLUCOMTR-MCNC: 89 MG/DL — SIGNIFICANT CHANGE UP (ref 70–99)
GLUCOSE BLDC GLUCOMTR-MCNC: 96 MG/DL — SIGNIFICANT CHANGE UP (ref 70–99)
GLUCOSE BLDC GLUCOMTR-MCNC: 96 MG/DL — SIGNIFICANT CHANGE UP (ref 70–99)
LYMPHOCYTES # BLD AUTO: 1.68 K/UL — SIGNIFICANT CHANGE UP (ref 1–3.3)
LYMPHOCYTES # BLD AUTO: 10.6 % — LOW (ref 13–44)
MACROCYTES BLD QL: SLIGHT — SIGNIFICANT CHANGE UP
MANUAL SMEAR VERIFICATION: SIGNIFICANT CHANGE UP
METAMYELOCYTES # FLD: 4.4 % — HIGH (ref 0–1)
METAMYELOCYTES NFR BLD: 4.4 % — HIGH (ref 0–1)
MICROCYTES BLD QL: SIGNIFICANT CHANGE UP
MONOCYTES # BLD AUTO: 0.98 K/UL — HIGH (ref 0–0.9)
MONOCYTES NFR BLD AUTO: 6.2 % — SIGNIFICANT CHANGE UP (ref 2–14)
MYELOCYTES NFR BLD: 2.7 % — HIGH (ref 0–0)
NEUTROPHILS # BLD AUTO: 10.37 K/UL — HIGH (ref 1.8–7.4)
NEUTROPHILS NFR BLD AUTO: 57.5 % — SIGNIFICANT CHANGE UP (ref 43–77)
NEUTS BAND # BLD: 8 % — HIGH (ref 0–6)
NEUTS BAND NFR BLD: 8 % — HIGH (ref 0–6)
PLAT MORPH BLD: NORMAL — SIGNIFICANT CHANGE UP
PLATELET COUNT - ESTIMATE: NORMAL — SIGNIFICANT CHANGE UP
POIKILOCYTOSIS BLD QL AUTO: SIGNIFICANT CHANGE UP
POLYCHROMASIA BLD QL SMEAR: SIGNIFICANT CHANGE UP
RBC BLD AUTO: ABNORMAL
STOMATOCYTES BLD QL SMEAR: SIGNIFICANT CHANGE UP
VARIANT LYMPHS # BLD: 3.5 % — SIGNIFICANT CHANGE UP (ref 0–6)
VARIANT LYMPHS NFR BLD MANUAL: 3.5 % — SIGNIFICANT CHANGE UP (ref 0–6)

## 2025-01-28 PROCEDURE — 70487 CT MAXILLOFACIAL W/DYE: CPT | Mod: 26

## 2025-01-28 PROCEDURE — 99223 1ST HOSP IP/OBS HIGH 75: CPT

## 2025-01-28 RX ORDER — ALBUTEROL 90 MCG
2 AEROSOL REFILL (GRAM) INHALATION EVERY 6 HOURS
Refills: 0 | Status: DISCONTINUED | OUTPATIENT
Start: 2025-01-28 | End: 2025-02-05

## 2025-01-28 RX ORDER — FENOFIBRATE 145 MG/1
48 TABLET ORAL DAILY
Refills: 0 | Status: DISCONTINUED | OUTPATIENT
Start: 2025-01-28 | End: 2025-02-05

## 2025-01-28 RX ORDER — CEFTRIAXONE 250 MG/1
1000 INJECTION, POWDER, FOR SOLUTION INTRAMUSCULAR; INTRAVENOUS EVERY 24 HOURS
Refills: 0 | Status: DISCONTINUED | OUTPATIENT
Start: 2025-01-28 | End: 2025-01-31

## 2025-01-28 RX ORDER — MONTELUKAST SODIUM 5 MG/1
0 TABLET, CHEWABLE ORAL
Qty: 0 | Refills: 0 | DISCHARGE

## 2025-01-28 RX ORDER — PIPERACILLIN SODIUM AND TAZOBACTAM SODIUM 2; 250 G/50ML; MG/50ML
3.38 INJECTION, POWDER, FOR SOLUTION INTRAVENOUS ONCE
Refills: 0 | Status: COMPLETED | OUTPATIENT
Start: 2025-01-28 | End: 2025-01-28

## 2025-01-28 RX ORDER — SODIUM CHLORIDE 9 G/ML
1000 INJECTION, SOLUTION INTRAVENOUS
Refills: 0 | Status: DISCONTINUED | OUTPATIENT
Start: 2025-01-28 | End: 2025-02-05

## 2025-01-28 RX ORDER — INSULIN LISPRO 100/ML
VIAL (ML) SUBCUTANEOUS
Refills: 0 | Status: DISCONTINUED | OUTPATIENT
Start: 2025-01-28 | End: 2025-02-05

## 2025-01-28 RX ORDER — MONTELUKAST SODIUM 5 MG/1
10 TABLET, CHEWABLE ORAL DAILY
Refills: 0 | Status: DISCONTINUED | OUTPATIENT
Start: 2025-01-28 | End: 2025-02-05

## 2025-01-28 RX ORDER — CLOZAPINE 50 MG/1
50 TABLET ORAL AT BEDTIME
Refills: 0 | Status: DISCONTINUED | OUTPATIENT
Start: 2025-01-28 | End: 2025-02-05

## 2025-01-28 RX ORDER — DM/PSEUDOEPHED/ACETAMINOPHEN 10-30-250
25 CAPSULE ORAL ONCE
Refills: 0 | Status: DISCONTINUED | OUTPATIENT
Start: 2025-01-28 | End: 2025-02-05

## 2025-01-28 RX ORDER — VANCOMYCIN HYDROCHLORIDE 50 MG/ML
1000 KIT ORAL EVERY 12 HOURS
Refills: 0 | Status: DISCONTINUED | OUTPATIENT
Start: 2025-01-28 | End: 2025-01-30

## 2025-01-28 RX ORDER — DM/PSEUDOEPHED/ACETAMINOPHEN 10-30-250
15 CAPSULE ORAL ONCE
Refills: 0 | Status: DISCONTINUED | OUTPATIENT
Start: 2025-01-28 | End: 2025-02-05

## 2025-01-28 RX ORDER — GLUCAGON 3 MG/1
1 POWDER NASAL ONCE
Refills: 0 | Status: DISCONTINUED | OUTPATIENT
Start: 2025-01-28 | End: 2025-02-05

## 2025-01-28 RX ORDER — ATORVASTATIN CALCIUM 80 MG/1
10 TABLET, FILM COATED ORAL AT BEDTIME
Refills: 0 | Status: DISCONTINUED | OUTPATIENT
Start: 2025-01-28 | End: 2025-02-05

## 2025-01-28 RX ORDER — SEMAGLUTIDE 0.25 MG/.5ML
0 INJECTION, SOLUTION SUBCUTANEOUS
Qty: 0 | Refills: 0 | DISCHARGE

## 2025-01-28 RX ORDER — CLOZAPINE 50 MG/1
100 TABLET ORAL AT BEDTIME
Refills: 0 | Status: DISCONTINUED | OUTPATIENT
Start: 2025-01-28 | End: 2025-01-28

## 2025-01-28 RX ORDER — PANTOPRAZOLE 20 MG/1
40 TABLET, DELAYED RELEASE ORAL
Refills: 0 | Status: DISCONTINUED | OUTPATIENT
Start: 2025-01-28 | End: 2025-02-05

## 2025-01-28 RX ORDER — FENOFIBRATE 145 MG/1
1 TABLET ORAL
Refills: 0 | DISCHARGE

## 2025-01-28 RX ORDER — ENOXAPARIN SODIUM 100 MG/ML
40 INJECTION SUBCUTANEOUS EVERY 24 HOURS
Refills: 0 | Status: DISCONTINUED | OUTPATIENT
Start: 2025-01-28 | End: 2025-02-05

## 2025-01-28 RX ORDER — SERTRALINE HCL 100 MG
100 TABLET ORAL DAILY
Refills: 0 | Status: DISCONTINUED | OUTPATIENT
Start: 2025-01-28 | End: 2025-02-05

## 2025-01-28 RX ORDER — INSULIN LISPRO 100/ML
VIAL (ML) SUBCUTANEOUS AT BEDTIME
Refills: 0 | Status: DISCONTINUED | OUTPATIENT
Start: 2025-01-28 | End: 2025-02-05

## 2025-01-28 RX ORDER — DM/PSEUDOEPHED/ACETAMINOPHEN 10-30-250
12.5 CAPSULE ORAL ONCE
Refills: 0 | Status: DISCONTINUED | OUTPATIENT
Start: 2025-01-28 | End: 2025-02-05

## 2025-01-28 RX ADMIN — Medication 100 MILLIGRAM(S): at 19:04

## 2025-01-28 RX ADMIN — MONTELUKAST SODIUM 10 MILLIGRAM(S): 5 TABLET, CHEWABLE ORAL at 18:28

## 2025-01-28 RX ADMIN — FENOFIBRATE 48 MILLIGRAM(S): 145 TABLET ORAL at 19:44

## 2025-01-28 RX ADMIN — VANCOMYCIN HYDROCHLORIDE 250 MILLIGRAM(S): KIT at 22:45

## 2025-01-28 RX ADMIN — PIPERACILLIN SODIUM AND TAZOBACTAM SODIUM 200 GRAM(S): 2; 250 INJECTION, POWDER, FOR SOLUTION INTRAVENOUS at 05:17

## 2025-01-28 RX ADMIN — ENOXAPARIN SODIUM 40 MILLIGRAM(S): 100 INJECTION SUBCUTANEOUS at 22:46

## 2025-01-28 RX ADMIN — PANTOPRAZOLE 40 MILLIGRAM(S): 20 TABLET, DELAYED RELEASE ORAL at 18:28

## 2025-01-28 RX ADMIN — ATORVASTATIN CALCIUM 10 MILLIGRAM(S): 80 TABLET, FILM COATED ORAL at 22:45

## 2025-01-28 RX ADMIN — CLOZAPINE 50 MILLIGRAM(S): 50 TABLET ORAL at 23:26

## 2025-01-28 NOTE — PATIENT PROFILE ADULT - FALL HARM RISK - HARM RISK INTERVENTIONS

## 2025-01-28 NOTE — ED ADULT NURSE REASSESSMENT NOTE - NS ED NURSE REASSESS COMMENT FT1
report received from overnight RN Yulia. A&Ox3. NAD. PT changed into gown and hospital socks. NAD. PT is pacing but is redirectable. pt denies SOB, chest pain, dizziness, weakness, urinary symptoms, HA, n/v/d, fevers, chills, pain. respirations are even and un labored. safety precautions maintained. awaiting bed assignment.

## 2025-01-28 NOTE — ED ADULT NURSE REASSESSMENT NOTE - NS ED NURSE REASSESS COMMENT FT1
Pt. resting in bed, respirations even and unlabored, chest rise equal bilaterally. Pt. ambulating in hallway, redirected back to bed. Comfort measures provided, safety maintained throughout.

## 2025-01-28 NOTE — H&P ADULT - PROBLEM SELECTOR PLAN 2
Delirium vs decompensated psych  right now behavioraly appropriate  discussed pt with his psychiatrist Dr. Cobos who is requesting psych folllows along. typically he is very agreeable, follows commands, not disoriented   Dr. Cobos's contact : [phone]  c/w sertraline and clozaril (has not missed more than 48 hrs) Delirium vs decompensated psych  right now behavioraly appropriate  discussed pt with his psychiatrist Dr. Cobos who is requesting psych folllows along. typically he is very agreeable, follows commands, not disoriented   Dr. Cobos's contact : [phone]  c/w sertraline and clozaril (has not missed more than 48 hrs) did miss last night  c/w clozaril 50mg qhs (initially verbally was told 100mg but in looking at med list, appears to be 50mg. discussed with Dr. Cobos, will c/w 50mg tonight and confirm with home tomorrow)

## 2025-01-28 NOTE — CONSULT NOTE ADULT - SUBJECTIVE AND OBJECTIVE BOX
HPI:  Patient is a 44y Male with PMH significant for hypertension, ROMMEL, schizophrenia, diabetes, presenting with >1 week of right sided neck swelling. He is unsure when it began but he was seen here a week ago and prescribed Bactrim BID, which patient reports he has been taking consistently. He comes in today after he started having blood drainage from the site. Of note, he reports history of multiple previous "pimples." Denies history of abscesses requiring drainage. No fevers, chills. No difficulty breathing. No voice changes. WBC 15. CT scan w/ 5.9x3.3cm inflammation in subcutaneous tissue on the right side and also w/ 2x.9cm possible prominent retropharyngeal lymph node.     Physical Exam  T(C): 36.4 (01-28-25 @ 02:48), Max: 36.7 (01-27-25 @ 15:34)  HR: 74 (01-28-25 @ 02:48) (74 - 98)  BP: 116/76 (01-28-25 @ 02:48) (100/63 - 116/76)  RR: 19 (01-28-25 @ 02:48) (16 - 19)  SpO2: 93% (01-28-25 @ 02:48) (93% - 98%)    General: NAD, talking to himself  Resp: No respiratory distress, stridor, or stertor on room air  Voice quality: normal  Face:  Symmetric without masses or lesions  OU: EOMI  Right: Pinna wnl  Left: Pinna wnl  Nose: nasal cavity clear bilaterally, inferior turbinates normal, mucosa normal without crusting or bleeding  OC/OP: tongue normal, floor of mouth wnl, no masses or lesions, multiple dental caries, Mallampati 4  Neck: right level 5 w/ ~5cm area of induration w/ crusting visible, without fluctuance, no active drainage expressible, neck ROM intact    **Laryngoscopy Procedure - discussed laryngoscopy with patient given possible retropharyngeal lymph node. He is altered speaking to himself. Does not wish to proceed presently.

## 2025-01-28 NOTE — H&P ADULT - ASSESSMENT
44-year-old with past medical history of hypertension, ROMMEL, schizophrenia, diabetes, admitted for right neck abscess failing outpatient management complicated by delirium vs decompensated psychiatric sate

## 2025-01-28 NOTE — H&P ADULT - NSHPPHYSICALEXAM_GEN_ALL_CORE
GEN: Appears in no acute distress  HEENT: right posterior neck: erythematous, fluctuant but no drainage noted currently extending posterior but not to midline  MOUTH: moist mucous membranes, no exudates or lesions   PULM: Clear to auscultation bilaterally, no wheezes, rales, rhonchi  CV: RRR, S1S2, no murmurs, rubs or gallops  Abdominal: Soft, nontender to palpation, non-distended, normoactive bowel sounds  Extremities: WWP, No edema, + peripheral pulses  NEURO: AAOx2 (aside from date), moving all four extremities spontaneously  Skin: No rashes, lesions, hematomas, ecchymosis

## 2025-01-28 NOTE — H&P ADULT - PROBLEM SELECTOR PLAN 1
high suspicion for staph infection  start vanc and ceftriaxone  MRSA nares  attempted to obtain cultures but no longer draining  appreciate ENT input

## 2025-01-28 NOTE — H&P ADULT - HISTORY OF PRESENT ILLNESS
44-year-old with past medical history of hypertension, ROMMEL, schizophrenia, diabetes, presenting today for 1 week or so of symptoms of right-sided posterior neck abscess now with drainage the past day or so concerning for infection.  Denies any fever chills or nausea vomiting diarrhea.  Pt was seen in the ED about 1 week prior and was prescribed Bactrim twice daily for 7 days which patient reports that he has been taking consistently but in speaking with staff at [facility], unclear if this was the case    Pt reports he's "doing pretty well now" his only complaint is not having enough food. able to move his neck. no other concerns

## 2025-01-28 NOTE — H&P ADULT - NSHPLABSRESULTS_GEN_ALL_CORE
12.6   15.83 )-----------( 332      ( 27 Jan 2025 21:48 )             40.4       01-27    138  |  100  |  13  ----------------------------<  83  4.8   |  23  |  0.79    Ca    9.4      27 Jan 2025 21:48    TPro  7.8  /  Alb  3.8  /  TBili  0.5  /  DBili  x   /  AST  80[H]  /  ALT  115[H]  /  AlkPhos  143[H]  01-27              Urinalysis Basic - ( 27 Jan 2025 21:48 )    Color: x / Appearance: x / SG: x / pH: x  Gluc: 83 mg/dL / Ketone: x  / Bili: x / Urobili: x   Blood: x / Protein: x / Nitrite: x   Leuk Esterase: x / RBC: x / WBC x   Sq Epi: x / Non Sq Epi: x / Bacteria: x        PT/INR - ( 27 Jan 2025 21:48 )   PT: 12.8 sec;   INR: 1.10 ratio         PTT - ( 27 Jan 2025 21:48 )  PTT:32.2 sec          CAPILLARY BLOOD GLUCOSE      POCT Blood Glucose.: 82 mg/dL (28 Jan 2025 17:09)          CT maxfacial:  IMPRESSION:  5.9 x 2.5 x 3.3 cm abscess within the subcutaneous fat of the right   posterior lateral suboccipital neck.    No CT evidence of mastoiditis.    Nonspecific enhancing structure between the medialized bilateral common   carotid arteries may represent a prominent retropharyngeal node, however   is incompletely visualized on current examination. Follow-up nonemergent   contrast-enhanced MRI is recommended for further evaluation, if no   contraindications exist.

## 2025-01-28 NOTE — CONSULT NOTE ADULT - ASSESSMENT
A/P: 44y Male w/ PMH hypertension, ROMMEL, schizophrenia, diabetes, presenting with >1 week of right sided neck swelling s/p 1 week of bactrim, now presents w/ autodrainage. WBC 15. CT scan w/ 5.9x2.5x3.3 area of inflammation w/o rim enhancement/no visible collection and w/o fluctuance on exam. Given report of multiple pimples/abscesses previously, concerning for MRSA infection. No drainable abscess, no plan for ENT intervention at this time.     - recommend admission for IV antibiotics, continue MRSA coverage  - f/u MRSA PCR  - if repeat episode of drainage, plan to obtain culture    --------------------------------------------------------------  Thank you for the consult,    Nuria Webber MD  Resident  Department of Otolaryngology - Head and Neck Surgery  Peds Page #[phone]  Adult Page #[phone]  ---------------------------------------------------------------   A/P: 44y Male w/ PMH hypertension, ROMMEL, schizophrenia, diabetes, presenting with >1 week of right sided neck swelling s/p 1 week of bactrim, now presents w/ autodrainage. WBC 15. CT scan w/ 5.9x2.5x3.3 area of inflammation w/o rim enhancement/no visible collection and w/o fluctuance on exam. Given report of multiple pimples/abscesses previously, concerning for MRSA infection. No drainable abscess, no plan for ENT intervention at this time.     - recommend admission for IV antibiotics, continue MRSA coverage  - f/u MRSA PCR  - if repeat episode of drainage, plan to obtain culture  - f/u with dental outpatient given multiple dental caries    --------------------------------------------------------------  Thank you for the consult,    Nuria Webber MD  Resident  Department of Otolaryngology - Head and Neck Surgery  Peds Page #[phone]  Adult Page #[phone]  ---------------------------------------------------------------   A/P: 44y Male w/ PMH hypertension, ROMMEL, schizophrenia, diabetes, presenting with >1 week of right sided neck swelling s/p 1 week of bactrim, now presents w/ autodrainage. WBC 15. CT scan w/ 5.9x2.5x3.3 area of inflammation w/o rim enhancement/no visible collection and w/o fluctuance on exam. Also notable for chronic appearing depressed fracture of R orbital floor and nonspecific enhancement between medialized carotid arteries, poss retropharyngeal lymph node. Given report of multiple pimples/abscesses previously, concerning for MRSA infection. No drainable abscess, no plan for ENT intervention at this time.     - recommend admission for IV antibiotics, continue MRSA coverage  - f/u MRSA PCR  - if repeat episode of drainage, plan to obtain culture  - f/u with dental outpatient given multiple dental caries    --------------------------------------------------------------  Thank you for the consult,    Nuria Webber MD  Resident  Department of Otolaryngology - Head and Neck Surgery  Peds Page #[phone]  Adult Page #[phone]  ---------------------------------------------------------------   A/P: 44y Male w/ PMH hypertension, ROMMEL, schizophrenia, diabetes, presenting with >1 week of right sided neck swelling s/p 1 week of bactrim, now presents w/ autodrainage. WBC 15. CT scan w/ 5.9x2.5x3.3 area of inflammation w/o rim enhancement/no visible collection and w/o fluctuance on exam. Also notable for chronic appearing depressed fracture of R orbital floor and nonspecific enhancement between medialized carotid arteries, poss retropharyngeal lymph node. Given report of multiple pimples/abscesses previously, concerning for MRSA infection. No drainable abscess, no plan for ENT intervention at this time.     - recommend admission for IV antibiotics, continue MRSA coverage  - f/u MRSA PCR  - if repeat episode of drainage, plan to obtain culture  - f/u with dental outpatient given multiple dental caries  - strict glucose control    --------------------------------------------------------------  Thank you for the consult,    Nuria Webber MD  Resident  Department of Otolaryngology - Head and Neck Surgery  Peds Page #[phone]  Adult Page #[phone]  ---------------------------------------------------------------

## 2025-01-29 DIAGNOSIS — Z91.199 PATIENT'S NONCOMPLIANCE WITH OTHER MEDICAL TREATMENT AND REGIMEN DUE TO UNSPECIFIED REASON: ICD-10-CM

## 2025-01-29 LAB
A1C WITH ESTIMATED AVERAGE GLUCOSE RESULT: 6.5 % — HIGH (ref 4–5.6)
ANION GAP SERPL CALC-SCNC: 14 MMOL/L — SIGNIFICANT CHANGE UP (ref 7–14)
BUN SERPL-MCNC: 13 MG/DL — SIGNIFICANT CHANGE UP (ref 7–23)
CALCIUM SERPL-MCNC: 9 MG/DL — SIGNIFICANT CHANGE UP (ref 8.4–10.5)
CHLORIDE SERPL-SCNC: 104 MMOL/L — SIGNIFICANT CHANGE UP (ref 98–107)
CO2 SERPL-SCNC: 22 MMOL/L — SIGNIFICANT CHANGE UP (ref 22–31)
CREAT SERPL-MCNC: 0.81 MG/DL — SIGNIFICANT CHANGE UP (ref 0.5–1.3)
EGFR: 112 ML/MIN/1.73M2 — SIGNIFICANT CHANGE UP
ESTIMATED AVERAGE GLUCOSE: 140 — SIGNIFICANT CHANGE UP
GLUCOSE BLDC GLUCOMTR-MCNC: 115 MG/DL — HIGH (ref 70–99)
GLUCOSE BLDC GLUCOMTR-MCNC: 121 MG/DL — HIGH (ref 70–99)
GLUCOSE BLDC GLUCOMTR-MCNC: 91 MG/DL — SIGNIFICANT CHANGE UP (ref 70–99)
GLUCOSE BLDC GLUCOMTR-MCNC: 94 MG/DL — SIGNIFICANT CHANGE UP (ref 70–99)
GLUCOSE BLDC GLUCOMTR-MCNC: 96 MG/DL — SIGNIFICANT CHANGE UP (ref 70–99)
GLUCOSE SERPL-MCNC: 99 MG/DL — SIGNIFICANT CHANGE UP (ref 70–99)
GRAM STN FLD: ABNORMAL
HCT VFR BLD CALC: 37.5 % — LOW (ref 39–50)
HGB BLD-MCNC: 12.2 G/DL — LOW (ref 13–17)
MAGNESIUM SERPL-MCNC: 2.3 MG/DL — SIGNIFICANT CHANGE UP (ref 1.6–2.6)
MCHC RBC-ENTMCNC: 28.8 PG — SIGNIFICANT CHANGE UP (ref 27–34)
MCHC RBC-ENTMCNC: 32.5 G/DL — SIGNIFICANT CHANGE UP (ref 32–36)
MCV RBC AUTO: 88.7 FL — SIGNIFICANT CHANGE UP (ref 80–100)
MRSA PCR RESULT.: DETECTED
NRBC # BLD AUTO: 0 K/UL — SIGNIFICANT CHANGE UP (ref 0–0)
NRBC # BLD: 0 /100 WBCS — SIGNIFICANT CHANGE UP (ref 0–0)
NRBC # FLD: 0 K/UL — SIGNIFICANT CHANGE UP (ref 0–0)
NRBC BLD-RTO: 0 /100 WBCS — SIGNIFICANT CHANGE UP (ref 0–0)
PHOSPHATE SERPL-MCNC: 4 MG/DL — SIGNIFICANT CHANGE UP (ref 2.5–4.5)
PLATELET # BLD AUTO: 327 K/UL — SIGNIFICANT CHANGE UP (ref 150–400)
POTASSIUM SERPL-MCNC: 4.1 MMOL/L — SIGNIFICANT CHANGE UP (ref 3.5–5.3)
POTASSIUM SERPL-SCNC: 4.1 MMOL/L — SIGNIFICANT CHANGE UP (ref 3.5–5.3)
RBC # BLD: 4.23 M/UL — SIGNIFICANT CHANGE UP (ref 4.2–5.8)
RBC # FLD: 13.8 % — SIGNIFICANT CHANGE UP (ref 10.3–14.5)
S AUREUS DNA NOSE QL NAA+PROBE: DETECTED
SODIUM SERPL-SCNC: 140 MMOL/L — SIGNIFICANT CHANGE UP (ref 135–145)
SPECIMEN SOURCE: SIGNIFICANT CHANGE UP
WBC # BLD: 13.58 K/UL — HIGH (ref 3.8–10.5)
WBC # FLD AUTO: 13.58 K/UL — HIGH (ref 3.8–10.5)

## 2025-01-29 PROCEDURE — 90792 PSYCH DIAG EVAL W/MED SRVCS: CPT

## 2025-01-29 PROCEDURE — 99232 SBSQ HOSP IP/OBS MODERATE 35: CPT

## 2025-01-29 RX ADMIN — ATORVASTATIN CALCIUM 10 MILLIGRAM(S): 80 TABLET, FILM COATED ORAL at 22:52

## 2025-01-29 RX ADMIN — CEFTRIAXONE 100 MILLIGRAM(S): 250 INJECTION, POWDER, FOR SOLUTION INTRAMUSCULAR; INTRAVENOUS at 16:58

## 2025-01-29 RX ADMIN — PANTOPRAZOLE 40 MILLIGRAM(S): 20 TABLET, DELAYED RELEASE ORAL at 06:41

## 2025-01-29 RX ADMIN — Medication 5 MILLIGRAM(S): at 06:42

## 2025-01-29 RX ADMIN — MONTELUKAST SODIUM 10 MILLIGRAM(S): 5 TABLET, CHEWABLE ORAL at 14:08

## 2025-01-29 RX ADMIN — CLOZAPINE 50 MILLIGRAM(S): 50 TABLET ORAL at 22:52

## 2025-01-29 RX ADMIN — VANCOMYCIN HYDROCHLORIDE 250 MILLIGRAM(S): KIT at 14:09

## 2025-01-29 RX ADMIN — Medication 100 MILLIGRAM(S): at 18:11

## 2025-01-29 RX ADMIN — FENOFIBRATE 48 MILLIGRAM(S): 145 TABLET ORAL at 14:08

## 2025-01-29 RX ADMIN — ENOXAPARIN SODIUM 40 MILLIGRAM(S): 100 INJECTION SUBCUTANEOUS at 22:52

## 2025-01-29 NOTE — PROGRESS NOTE ADULT - SUBJECTIVE AND OBJECTIVE BOX
[facility] Division of Hospital Medicine  Rosi Benito MD  Pager [phone]    Patient is a 44y old  Male who presents with a chief complaint of right neck abscess     SUBJECTIVE / OVERNIGHT EVENTS: pt was walking in the hallway this AM, preferred to speak with me while walking; feels ok; had abscess drained this AM by ENT; d/w pt importance of IV placement for abx; he is agreeable but wants to walk first      MEDICATIONS  (STANDING):  atorvastatin 10 milliGRAM(s) Oral at bedtime  cefTRIAXone   IVPB 1000 milliGRAM(s) IV Intermittent every 24 hours  cloZAPine 50 milliGRAM(s) Oral at bedtime  dextrose 5%. 1000 milliLiter(s) (100 mL/Hr) IV Continuous <Continuous>  dextrose 5%. 1000 milliLiter(s) (50 mL/Hr) IV Continuous <Continuous>  dextrose 50% Injectable 25 Gram(s) IV Push once  dextrose 50% Injectable 12.5 Gram(s) IV Push once  dextrose 50% Injectable 25 Gram(s) IV Push once  enoxaparin Injectable 40 milliGRAM(s) SubCutaneous every 24 hours  fenofibrate Tablet 48 milliGRAM(s) Oral daily  glucagon  Injectable 1 milliGRAM(s) IntraMuscular once  insulin lispro (ADMELOG) corrective regimen sliding scale   SubCutaneous three times a day before meals  insulin lispro (ADMELOG) corrective regimen sliding scale   SubCutaneous at bedtime  lisinopril 5 milliGRAM(s) Oral daily  montelukast 10 milliGRAM(s) Oral daily  pantoprazole    Tablet 40 milliGRAM(s) Oral before breakfast  sertraline 100 milliGRAM(s) Oral daily  vancomycin  IVPB 1000 milliGRAM(s) IV Intermittent every 12 hours    MEDICATIONS  (PRN):  albuterol    90 MICROgram(s) HFA Inhaler 2 Puff(s) Inhalation every 6 hours PRN Shortness of Breath and/or Wheezing  dextrose Oral Gel 15 Gram(s) Oral once PRN Blood Glucose LESS THAN 70 milliGRAM(s)/deciliter      CAPILLARY BLOOD GLUCOSE  POCT Blood Glucose.: 91 mg/dL (29 Jan 2025 12:14)  POCT Blood Glucose.: 115 mg/dL (29 Jan 2025 08:39)  POCT Blood Glucose.: 121 mg/dL (29 Jan 2025 02:16)  POCT Blood Glucose.: 102 mg/dL (28 Jan 2025 22:20)  POCT Blood Glucose.: 82 mg/dL (28 Jan 2025 17:09)      PHYSICAL EXAM:  Vital Signs Last 24 Hrs  T(F): 97.7 (29 Jan 2025 10:00), Max: 98.5 (29 Jan 2025 05:35)  HR: 88 (29 Jan 2025 10:00) (75 - 88)  BP: 137/83 (29 Jan 2025 10:00) (114/69 - 141/70)  RR: 19 (29 Jan 2025 10:00) (17 - 19)  SpO2: 95% (29 Jan 2025 10:00) (95% - 98%)    Parameters below as of 29 Jan 2025 10:00  Patient On (Oxygen Delivery Method): room air      exam limited as pt preferred to walk in hallway  CONSTITUTIONAL: NAD, appears comfortable  EYES: PERRLA; conjunctiva and sclera clear  ENMT: Moist oral mucosa; poor dentition  RESPIRATORY: Normal respiratory effort  CARDIOVASCULAR: No lower extremity edema  ABDOMEN: obese  MUSCULOSKELETAL:  Normal gait; no clubbing or cyanosis of digits; no joint swelling or tenderness to palpation  PSYCH: A+O to person, place, and time; affect flat  NEUROLOGY: CN 2-12 are intact and symmetric; no gross sensory deficits   SKIN: dressing over R post neck    LABS:                        12.2   13.58 )-----------( 327      ( 29 Jan 2025 06:21 )             37.5     01-29    140  |  104  |  13  ----------------------------<  99  4.1   |  22  |  0.81    Ca    9.0      29 Jan 2025 06:21  Phos  4.0     01-29  Mg     2.30     01-29      Culture - Blood (collected 27 Jan 2025 21:00)  Source: .Blood BLOOD  Preliminary Report (29 Jan 2025 04:01):    No growth at 24 hours    Culture - Blood (collected 27 Jan 2025 20:45)  Source: .Blood BLOOD  Preliminary Report (29 Jan 2025 04:01):    No growth at 24 hours

## 2025-01-29 NOTE — DIETITIAN INITIAL EVALUATION ADULT - ADD RECOMMEND
Monitor weights, labs, BM's, skin integrity, p.o. intake.   Defer fluid management to medical team   Please monitor % PO intake on flowsheets  Honor food preferences as able within therapeutic diet order.

## 2025-01-29 NOTE — DIETITIAN INITIAL EVALUATION ADULT - REASON FOR ADMISSION
44-year-old with past medical history of hypertension, ROMMEL, schizophrenia, diabetes, presenting today for 1 week or so of symptoms of right-sided posterior neck abscess now with drainage the past day or so concerning for infection.

## 2025-01-29 NOTE — CHART NOTE - NSCHARTNOTEFT_GEN_A_CORE
[facility] NIGHT COVERAGE    Notified by RN that patient pulled out IV and refusing IV placement at this time    Patient seen and assessed at bedside, AAOX2  Patient refusing IV placement at this time, states he wants "liquor"    Will reattempt IV again  Psych to see patient in AM    Deandre Zepeda PA-C  [id number]

## 2025-01-29 NOTE — BH CONSULTATION LIAISON ASSESSMENT NOTE - MSE UNSTRUCTURED FT
Appearance: Dressed in hospital gown. Poor hygiene and grooming. Malodorous  Attitude / Behavior: cooperative  Eye contact: Fair  Motor: Slow   Speech: Normal rate and tone. Very low volume. Extremely underarticulated.   Mood: "alright"  Affect: constricted  Thought Process: potentially thought blocked, tangential  Thought Content: No SIIP or HIIP reported.   Associations: loose  Perceptual: No AVH observed   Attention: Fair  Insight: Poor  Judgment: Poor

## 2025-01-29 NOTE — BH CONSULTATION LIAISON ASSESSMENT NOTE - SUMMARY
Patient is 44M, residing at [facility] with PPH schizophrenia and PMH of HTN, HLD, ROMMEL, DM, PN, Asthma, Obesity, PPHx who presents to [facility] activated by  for neck abscess. Psychiatry is consulted for pharmacotherapy recommendations.      On evaluation today, patient is in good emotional and behavioral control, though information gathering during interview limited by several factors. Patient is mildly disoriented (to date), very constricted, has extremely poor articulation, and demonstrates potential thought blocking with loose associations, giving often bizarre and unrelated responses to questioning. Patient also presents with very poor hygiene and is malodorous, suggesting decompensated psychosis and inability to care for self in the outpatient setting. This is corroborated by self reports of being off his antipsychotics for at least 2-6 months (Clozapine and Abilify maintena respectively), and no longer following with his psychiatrist for reasons he cannot name. Findings on history and exam consistent with decompensated schizophrenia. Plan to resume medications, will continue to follow and reevaluate. Patient does not have capacity to leave AMA at this time.     PLAN:  - routine obs  - continue sertraline 100mg daily (home regimen)  - continue clozapine 50mg qHS        - Check Orthostatic BP, as pt. is on Clozapine  - patient has documented history of Abilify Maintena injections, unclear last dose and date, requires med rec  - PRNs       - Zyprexa 2.5mg PO/IM Q6H PRN for agitation, if refractory agitation may give additional 2.5mg, hold if qtc >500  - dispo back to [place] grounds pending medical clearance  - patient does not have capacity to leave [place], psychiatry will continue to follow  Patient is 44M, residing at [facility] with PPH schizophrenia and PMH of HTN, HLD, ROMMEL, DM, PN, Asthma, Obesity, PPHx who presents to [facility] activated by  for neck abscess. Psychiatry is consulted for pharmacotherapy recommendations.      On evaluation today, patient is in good emotional and behavioral control, though information gathering during interview limited by several factors. Patient is mildly disoriented (to date), very constricted, has extremely poor articulation, and demonstrates potential thought blocking with loose associations, giving often bizarre and unrelated responses to questioning. Patient also presents with very poor hygiene and is malodorous, suggesting possible decompensated psychosis and inability to care for self in the outpatient setting. This is corroborated by self reports (poor historian?) of being off his antipsychotics for at least 2-6 months (Clozapine and Abilify maintena respectively), and no longer following with his psychiatrist for reasons he cannot name. Findings on history and exam consistent with likely decompensated schizophrenia. Plan to resume medications, will continue to follow and reevaluate. Patient does not have capacity to leave AMA at this time.     PLAN:  - routine obs  - continue sertraline 100mg daily (home regimen)  - continue clozapine 50mg qHS        - Check Orthostatic BP, as pt. is on Clozapine  - patient has documented history of Abilify Maintena injections, unclear last dose and date, requires med rec  - PRNs       - Zyprexa 2.5mg PO/IM Q6H PRN for agitation, if refractory agitation may give additional 2.5mg, hold if qtc >500  - dispo back to [place] grounds pending medical clearance  - patient does not have capacity to leave [place], psychiatry will continue to follow

## 2025-01-29 NOTE — DIETITIAN INITIAL EVALUATION ADULT - PROBLEM SELECTOR PLAN 2
Delirium vs decompensated psych  right now behavioraly appropriate  discussed pt with his psychiatrist Dr. Cobos who is requesting psych folllows along. typically he is very agreeable, follows commands, not disoriented   Dr. Cobos's contact : [phone]  c/w sertraline and clozaril (has not missed more than 48 hrs) did miss last night  c/w clozaril 50mg qhs (initially verbally was told 100mg but in looking at med list, appears to be 50mg. discussed with Dr. Cobos, will c/w 50mg tonight and confirm with home tomorrow)

## 2025-01-29 NOTE — BH CONSULTATION LIAISON ASSESSMENT NOTE - RISK ASSESSMENT
Risk Factors: acute medical condition, history of disorganization, wandering, paranoia, auditory hallucinations, past inpatient psychiatric hospitalizations  Protective Factors: residential stability, supportive family, educated, engaged in treatment, no access to firearms, denies si/sa/hi, no s/s psychosis at this time, compliant with treatment    At this time patient is not a danger to self or others, denies si and HI

## 2025-01-29 NOTE — DIETITIAN INITIAL EVALUATION ADULT - ORAL INTAKE PTA/DIET HISTORY
Pt noted from [facility]. Hx limited due to mental status. Reports good appetite and intake prior to admission. Denied N/V/D or abdominal pain. Denied chewing swallowing concerns. Unable to specify UBW Denied concerns for weight loss. HIE wt hx reviewed (kg) 150 kg (8-20-24), 170.1 kg (12-2) 145.1 kg (1-20-25) Current admit wt noted 110 kg (242 lbs) as of 1/27/25 suggestive of 24% weight loss over 1 week. Questionable accuracy of documented weights. Spoke to mother Sharee Clark over phone call, stated facility was not able to provide updated weights, believes reported wt of 242 lbs likely inaccurate.

## 2025-01-29 NOTE — DIETITIAN INITIAL EVALUATION ADULT - NS FNS DIET ORDER
Diet, Regular:   Consistent Carbohydrate {Evening Snack} (CSTCHOSN)  Soft and Bite Sized (SOFTBTSZ) (01-28-25 @ 18:09)

## 2025-01-29 NOTE — DIETITIAN INITIAL EVALUATION ADULT - PERTINENT LABORATORY DATA
01-29    140  |  104  |  13  ----------------------------<  99  4.1   |  22  |  0.81    Ca    9.0      29 Jan 2025 06:21  Phos  4.0     01-29  Mg     2.30     01-29    TPro  7.8  /  Alb  3.8  /  TBili  0.5  /  DBili  x   /  AST  80[H]  /  ALT  115[H]  /  AlkPhos  143[H]  01-27    A1C with Estimated Average Glucose Result: 6.5 % (01-29-25 @ 06:21)  CAPILLARY BLOOD GLUCOSE  POCT Blood Glucose.: 94 mg/dL (29 Jan 2025 17:20)  POCT Blood Glucose.: 91 mg/dL (29 Jan 2025 12:14)  POCT Blood Glucose.: 115 mg/dL (29 Jan 2025 08:39)  POCT Blood Glucose.: 121 mg/dL (29 Jan 2025 02:16)  POCT Blood Glucose.: 102 mg/dL (28 Jan 2025 22:20)

## 2025-01-29 NOTE — DIETITIAN INITIAL EVALUATION ADULT - OTHER INFO
Pt reports finishing 100% of his meals. No documented meals available per flowsheet review Denied difficulty with chewing or swallowing or N/V/D or abdominal pain at this time. No noted BMs. Briefly communicated recommendations for heart healthy diet. A1c noted 6.5% discouraged intake of concentrated sweets. Pt was acceptable to receiving handouts. Reported preference for yogurt and coke. Following discussion, pt was amenable to having diet coke at meals. Will continue to honor preferences as able.

## 2025-01-29 NOTE — BH CONSULTATION LIAISON ASSESSMENT NOTE - HPI (INCLUDE ILLNESS QUALITY, SEVERITY, DURATION, TIMING, CONTEXT, MODIFYING FACTORS, ASSOCIATED SIGNS AND SYMPTOMS)
Patient is 44M, residing at [facility] with PPH schizophrenia and PMH of HTN, HLD, ROMMEL, DM, PN, Asthma, Obesity, PPHx who presents to [facility] activated by  for neck abscess. Psychiatry is consulted for pharmacotherapy recommendations.      Patient seen this morning. Explains he is in the hospital for his neck, and came in ambulance after EMTs were notified by staff on site at [facility]. Explains that he feels "alright, I don't know," denies any depression or anxiety. Explains he is currently taking sertraline 100mg, but self discontinued his documented clozapine "5 weeks ago" because "it was making my feet smell bad." When asked about Abilify Maintena, patient states he "doesn't know" but "stopped that 6 months ago." Follows with Dr. Soto ( per chart review), has been seeing her for years. He cannot say the last time he saw her, and that he doesn't remember how long it's been. Unable to state how long he has been seeing her either, or what medications he has tried previously ("I don't know"). Patient says "I don't know" when asked if he's been psychiatrically hospitalized before; chart review reveals multiple past hospitalizations including to [facility]. Patient has been living on [facility] for years now, previously had no psychiatric history, states he has limited social support, and an estranged brother. Denies substance use whatsoever, denies current or past SI/HI, denies current A/VH, denies paranoia. When asked about prerna, states he has "never had any experience like that. I want to play football." AAOx3, not date.     Per chart review from previous  evaluation:  Collateral obtained from patient's aunt Sharee Clark [phone] (pt. gave consent) who reports patient has no hx si/sa/hi, no history alcohol/substance abuse, past history paranoia, auditory hallucinations,  reports last inpatient hospitalization 2016, states patient raised by grandmother and her as patient's parents were unable to care for patient and his brother, patient diagnosed with Schizophrenia at age 23, prior high functioning, athlete, in law school.

## 2025-01-29 NOTE — PROGRESS NOTE ADULT - SUBJECTIVE AND OBJECTIVE BOX
ENT ISSUE: Right neck abscess    HPI: Patient lethargic, Not answering questions.         PAST MEDICAL & SURGICAL HISTORY:  Schizophrenia      Obstructive sleep apnea      Hypertension      Type 2 diabetes mellitus      No significant past surgical history        Allergies    No Known Allergies    Intolerances      MEDICATIONS  (STANDING):  atorvastatin 10 milliGRAM(s) Oral at bedtime  cefTRIAXone   IVPB 1000 milliGRAM(s) IV Intermittent every 24 hours  cloZAPine 50 milliGRAM(s) Oral at bedtime  dextrose 5%. 1000 milliLiter(s) (100 mL/Hr) IV Continuous <Continuous>  dextrose 5%. 1000 milliLiter(s) (50 mL/Hr) IV Continuous <Continuous>  dextrose 50% Injectable 25 Gram(s) IV Push once  dextrose 50% Injectable 12.5 Gram(s) IV Push once  dextrose 50% Injectable 25 Gram(s) IV Push once  enoxaparin Injectable 40 milliGRAM(s) SubCutaneous every 24 hours  fenofibrate Tablet 48 milliGRAM(s) Oral daily  glucagon  Injectable 1 milliGRAM(s) IntraMuscular once  insulin lispro (ADMELOG) corrective regimen sliding scale   SubCutaneous three times a day before meals  insulin lispro (ADMELOG) corrective regimen sliding scale   SubCutaneous at bedtime  lisinopril 5 milliGRAM(s) Oral daily  montelukast 10 milliGRAM(s) Oral daily  pantoprazole    Tablet 40 milliGRAM(s) Oral before breakfast  sertraline 100 milliGRAM(s) Oral daily  vancomycin  IVPB 1000 milliGRAM(s) IV Intermittent every 12 hours    MEDICATIONS  (PRN):  albuterol    90 MICROgram(s) HFA Inhaler 2 Puff(s) Inhalation every 6 hours PRN Shortness of Breath and/or Wheezing  dextrose Oral Gel 15 Gram(s) Oral once PRN Blood Glucose LESS THAN 70 milliGRAM(s)/deciliter      Social History: see consult note    Family history: see consult note    ROS:   ENT: all negative except as noted in HPI   Pulm: denies SOB, cough, hemoptysis  Neuro: denies numbness/tingling, loss of sensation  Endo: denies heat/cold intolerance, excessive sweating      Vital Signs Last 24 Hrs  T(C): 36.9 (29 Jan 2025 05:35), Max: 36.9 (29 Jan 2025 05:35)  T(F): 98.5 (29 Jan 2025 05:35), Max: 98.5 (29 Jan 2025 05:35)  HR: 76 (29 Jan 2025 05:35) (75 - 88)  BP: 141/70 (29 Jan 2025 05:35) (114/69 - 141/70)  BP(mean): 93 (28 Jan 2025 10:48) (93 - 93)  RR: 17 (29 Jan 2025 05:35) (17 - 18)  SpO2: 98% (29 Jan 2025 05:35) (96% - 98%)    Parameters below as of 29 Jan 2025 05:35  Patient On (Oxygen Delivery Method): room air                              12.2   13.58 )-----------( 327      ( 29 Jan 2025 06:21 )             37.5    01-29    140  |  104  |  13  ----------------------------<  99  4.1   |  22  |  0.81    Ca    9.0      29 Jan 2025 06:21  Phos  4.0     01-29  Mg     2.30     01-29    TPro  7.8  /  Alb  3.8  /  TBili  0.5  /  DBili  x   /  AST  80[H]  /  ALT  115[H]  /  AlkPhos  143[H]  01-27   PT/INR - ( 27 Jan 2025 21:48 )   PT: 12.8 sec;   INR: 1.10 ratio         PTT - ( 27 Jan 2025 21:48 )  PTT:32.2 sec    PHYSICAL EXAM:  Gen: NAD  Skin: No rashes, bruises, or lesions  Head: Normocephalic, Atraumatic  Face: no edema, erythema, or fluctuance. Parotid glands soft without mass  Eyes: no scleral injection  Ears: Right - ear canal clear, TM intact without effusion or erythema. No evidence of any fluid drainage. No mastoid tenderness, erythema, or ear bulging            Left - ear canal clear, TM intact without effusion or erythema. No evidence of any fluid drainage. No mastoid tenderness, erythema, or ear bulging  Nose: Nares bilaterally patent, no discharge  Mouth: No Stridor / Drooling / Trismus.  Mucosa moist, tongue/uvula midline, oropharynx clear  Neck: Flat, supple, no lymphadenopathy, trachea midline,+ Right posterior neck fluctuance with active purulent drainage.    Lymphatic: No lymphadenopathy  Resp: breathing easily, no stridor  Neuro: facial nerve intact, no facial droop    procedure note:  cleaned with iodine  sterile dressing applied   injected with 7cc of 2 % lidocaine   aspirated 4cc of purulent fluid and placed in culture tube  made 3mm incision at area of discharge and expressed pus   packed with 1/2' strip gauze   covered with guaze and tape

## 2025-01-29 NOTE — BH CONSULTATION LIAISON ASSESSMENT NOTE - CURRENT MEDICATION
MEDICATIONS  (STANDING):  atorvastatin 10 milliGRAM(s) Oral at bedtime  cefTRIAXone   IVPB 1000 milliGRAM(s) IV Intermittent every 24 hours  cloZAPine 50 milliGRAM(s) Oral at bedtime  dextrose 5%. 1000 milliLiter(s) (100 mL/Hr) IV Continuous <Continuous>  dextrose 5%. 1000 milliLiter(s) (50 mL/Hr) IV Continuous <Continuous>  dextrose 50% Injectable 25 Gram(s) IV Push once  dextrose 50% Injectable 12.5 Gram(s) IV Push once  dextrose 50% Injectable 25 Gram(s) IV Push once  enoxaparin Injectable 40 milliGRAM(s) SubCutaneous every 24 hours  fenofibrate Tablet 48 milliGRAM(s) Oral daily  glucagon  Injectable 1 milliGRAM(s) IntraMuscular once  insulin lispro (ADMELOG) corrective regimen sliding scale   SubCutaneous three times a day before meals  insulin lispro (ADMELOG) corrective regimen sliding scale   SubCutaneous at bedtime  lisinopril 5 milliGRAM(s) Oral daily  montelukast 10 milliGRAM(s) Oral daily  pantoprazole    Tablet 40 milliGRAM(s) Oral before breakfast  sertraline 100 milliGRAM(s) Oral daily  vancomycin  IVPB 1000 milliGRAM(s) IV Intermittent every 12 hours    MEDICATIONS  (PRN):  albuterol    90 MICROgram(s) HFA Inhaler 2 Puff(s) Inhalation every 6 hours PRN Shortness of Breath and/or Wheezing  dextrose Oral Gel 15 Gram(s) Oral once PRN Blood Glucose LESS THAN 70 milliGRAM(s)/deciliter

## 2025-01-30 LAB
ANION GAP SERPL CALC-SCNC: 13 MMOL/L — SIGNIFICANT CHANGE UP (ref 7–14)
BUN SERPL-MCNC: 14 MG/DL — SIGNIFICANT CHANGE UP (ref 7–23)
CALCIUM SERPL-MCNC: 9.2 MG/DL — SIGNIFICANT CHANGE UP (ref 8.4–10.5)
CHLORIDE SERPL-SCNC: 100 MMOL/L — SIGNIFICANT CHANGE UP (ref 98–107)
CO2 SERPL-SCNC: 22 MMOL/L — SIGNIFICANT CHANGE UP (ref 22–31)
CREAT SERPL-MCNC: 0.85 MG/DL — SIGNIFICANT CHANGE UP (ref 0.5–1.3)
EGFR: 110 ML/MIN/1.73M2 — SIGNIFICANT CHANGE UP
GLUCOSE BLDC GLUCOMTR-MCNC: 93 MG/DL — SIGNIFICANT CHANGE UP (ref 70–99)
GLUCOSE BLDC GLUCOMTR-MCNC: 94 MG/DL — SIGNIFICANT CHANGE UP (ref 70–99)
GLUCOSE BLDC GLUCOMTR-MCNC: 96 MG/DL — SIGNIFICANT CHANGE UP (ref 70–99)
GLUCOSE BLDC GLUCOMTR-MCNC: 99 MG/DL — SIGNIFICANT CHANGE UP (ref 70–99)
GLUCOSE SERPL-MCNC: 93 MG/DL — SIGNIFICANT CHANGE UP (ref 70–99)
HCT VFR BLD CALC: 39.5 % — SIGNIFICANT CHANGE UP (ref 39–50)
HGB BLD-MCNC: 12.2 G/DL — LOW (ref 13–17)
MAGNESIUM SERPL-MCNC: 2.1 MG/DL — SIGNIFICANT CHANGE UP (ref 1.6–2.6)
MCHC RBC-ENTMCNC: 27.9 PG — SIGNIFICANT CHANGE UP (ref 27–34)
MCHC RBC-ENTMCNC: 30.9 G/DL — LOW (ref 32–36)
MCV RBC AUTO: 90.4 FL — SIGNIFICANT CHANGE UP (ref 80–100)
NRBC # BLD AUTO: 0 K/UL — SIGNIFICANT CHANGE UP (ref 0–0)
NRBC # BLD: 0 /100 WBCS — SIGNIFICANT CHANGE UP (ref 0–0)
NRBC # FLD: 0 K/UL — SIGNIFICANT CHANGE UP (ref 0–0)
NRBC BLD-RTO: 0 /100 WBCS — SIGNIFICANT CHANGE UP (ref 0–0)
PHOSPHATE SERPL-MCNC: 4.2 MG/DL — SIGNIFICANT CHANGE UP (ref 2.5–4.5)
PLATELET # BLD AUTO: 315 K/UL — SIGNIFICANT CHANGE UP (ref 150–400)
POTASSIUM SERPL-MCNC: 3.8 MMOL/L — SIGNIFICANT CHANGE UP (ref 3.5–5.3)
POTASSIUM SERPL-SCNC: 3.8 MMOL/L — SIGNIFICANT CHANGE UP (ref 3.5–5.3)
RBC # BLD: 4.37 M/UL — SIGNIFICANT CHANGE UP (ref 4.2–5.8)
RBC # FLD: 13.8 % — SIGNIFICANT CHANGE UP (ref 10.3–14.5)
SODIUM SERPL-SCNC: 135 MMOL/L — SIGNIFICANT CHANGE UP (ref 135–145)
VANCOMYCIN TROUGH SERPL-MCNC: 4.4 UG/ML — LOW (ref 10–20)
WBC # BLD: 13.72 K/UL — HIGH (ref 3.8–10.5)
WBC # FLD AUTO: 13.72 K/UL — HIGH (ref 3.8–10.5)

## 2025-01-30 PROCEDURE — 99232 SBSQ HOSP IP/OBS MODERATE 35: CPT

## 2025-01-30 RX ORDER — MUPIROCIN 2 G/100G
1 CREAM TOPICAL
Refills: 0 | Status: COMPLETED | OUTPATIENT
Start: 2025-01-30 | End: 2025-02-04

## 2025-01-30 RX ORDER — VANCOMYCIN HYDROCHLORIDE 50 MG/ML
1250 KIT ORAL EVERY 12 HOURS
Refills: 0 | Status: DISCONTINUED | OUTPATIENT
Start: 2025-01-30 | End: 2025-01-31

## 2025-01-30 RX ADMIN — MUPIROCIN 1 APPLICATION(S): 2 CREAM TOPICAL at 17:49

## 2025-01-30 RX ADMIN — CEFTRIAXONE 100 MILLIGRAM(S): 250 INJECTION, POWDER, FOR SOLUTION INTRAMUSCULAR; INTRAVENOUS at 17:46

## 2025-01-30 RX ADMIN — ATORVASTATIN CALCIUM 10 MILLIGRAM(S): 80 TABLET, FILM COATED ORAL at 22:03

## 2025-01-30 RX ADMIN — Medication 100 MILLIGRAM(S): at 12:26

## 2025-01-30 RX ADMIN — VANCOMYCIN HYDROCHLORIDE 166.67 MILLIGRAM(S): KIT at 15:31

## 2025-01-30 RX ADMIN — VANCOMYCIN HYDROCHLORIDE 166.67 MILLIGRAM(S): KIT at 03:47

## 2025-01-30 RX ADMIN — ENOXAPARIN SODIUM 40 MILLIGRAM(S): 100 INJECTION SUBCUTANEOUS at 22:03

## 2025-01-30 RX ADMIN — MONTELUKAST SODIUM 10 MILLIGRAM(S): 5 TABLET, CHEWABLE ORAL at 12:27

## 2025-01-30 RX ADMIN — Medication 5 MILLIGRAM(S): at 06:10

## 2025-01-30 RX ADMIN — FENOFIBRATE 48 MILLIGRAM(S): 145 TABLET ORAL at 12:27

## 2025-01-30 RX ADMIN — PANTOPRAZOLE 40 MILLIGRAM(S): 20 TABLET, DELAYED RELEASE ORAL at 06:10

## 2025-01-30 NOTE — PROVIDER CONTACT NOTE (CRITICAL VALUE NOTIFICATION) - TEST AND RESULT REPORTED:
Abscess Culture:  Numerous polymorphonuclear leukocytes per low power field. Few Gram-Positive cocci in clusters per oil power field.

## 2025-01-30 NOTE — PROVIDER CONTACT NOTE (CRITICAL VALUE NOTIFICATION) - ASSESSMENT
Pt AAOx4, denies pain, SOB, palpitations, headache, no NAD noted. Call bell within reach. Safety maintained.

## 2025-01-30 NOTE — PROGRESS NOTE ADULT - SUBJECTIVE AND OBJECTIVE BOX
[facility] Division of Hospital Medicine  Rosi Benito MD  Pager [phone]    Patient is a 44y old  Male who presents with a chief complaint of right neck abscess      SUBJECTIVE / OVERNIGHT EVENTS: seen earlier this AM, sitting in chair; offers no complaints; limited engagement; has been coop with meds and IV per staff      MEDICATIONS  (STANDING):  atorvastatin 10 milliGRAM(s) Oral at bedtime  cefTRIAXone   IVPB 1000 milliGRAM(s) IV Intermittent every 24 hours  cloZAPine 50 milliGRAM(s) Oral at bedtime  dextrose 5%. 1000 milliLiter(s) (100 mL/Hr) IV Continuous <Continuous>  dextrose 5%. 1000 milliLiter(s) (50 mL/Hr) IV Continuous <Continuous>  dextrose 50% Injectable 25 Gram(s) IV Push once  dextrose 50% Injectable 12.5 Gram(s) IV Push once  dextrose 50% Injectable 25 Gram(s) IV Push once  enoxaparin Injectable 40 milliGRAM(s) SubCutaneous every 24 hours  fenofibrate Tablet 48 milliGRAM(s) Oral daily  glucagon  Injectable 1 milliGRAM(s) IntraMuscular once  insulin lispro (ADMELOG) corrective regimen sliding scale   SubCutaneous three times a day before meals  insulin lispro (ADMELOG) corrective regimen sliding scale   SubCutaneous at bedtime  lisinopril 5 milliGRAM(s) Oral daily  montelukast 10 milliGRAM(s) Oral daily  mupirocin 2% Nasal 1 Application(s) Both Nostrils two times a day  pantoprazole    Tablet 40 milliGRAM(s) Oral before breakfast  sertraline 100 milliGRAM(s) Oral daily  vancomycin  IVPB 1250 milliGRAM(s) IV Intermittent every 12 hours    MEDICATIONS  (PRN):  albuterol    90 MICROgram(s) HFA Inhaler 2 Puff(s) Inhalation every 6 hours PRN Shortness of Breath and/or Wheezing  dextrose Oral Gel 15 Gram(s) Oral once PRN Blood Glucose LESS THAN 70 milliGRAM(s)/deciliter      CAPILLARY BLOOD GLUCOSE  POCT Blood Glucose.: 99 mg/dL (30 Jan 2025 12:10)  POCT Blood Glucose.: 93 mg/dL (30 Jan 2025 08:17)  POCT Blood Glucose.: 96 mg/dL (29 Jan 2025 22:06)  POCT Blood Glucose.: 94 mg/dL (29 Jan 2025 17:20)        PHYSICAL EXAM:  Vital Signs Last 24 Hrs  T(F): 98.2 (30 Jan 2025 10:00), Max: 98.2 (30 Jan 2025 10:00)  HR: 103 (30 Jan 2025 10:00) (66 - 103)  BP: 150/83 (30 Jan 2025 10:00) (113/75 - 150/83)  RR: 18 (30 Jan 2025 10:00) (18 - 20)  SpO2: 96% (30 Jan 2025 10:00) (95% - 96%)    Parameters below as of 30 Jan 2025 10:00  Patient On (Oxygen Delivery Method): room air        CONSTITUTIONAL: NAD, appears comfortable  EYES: PERRLA; conjunctiva and sclera clear  ENMT: Moist oral mucosa; poor dentition  RESPIRATORY: Normal respiratory effort; grossly b/l AE  CARDIOVASCULAR: Regular rate and rhythm; No lower extremity edema  ABDOMEN: Nontender to palpation, normoactive bowel sounds, obese  MUSCULOSKELETAL: no clubbing or cyanosis of digits; no joint swelling or tenderness to palpation  PSYCH: A+O to person, place, and time; affect flat  NEUROLOGY: CN 2-12 are intact and symmetric; no gross sensory deficits   SKIN: R post neck dressing open, packing noted in place, serosang drainage; surrounding induration    LABS:                        12.2   13.72 )-----------( 315      ( 30 Jan 2025 01:34 )             39.5     01-30    135  |  100  |  14  ----------------------------<  93  3.8   |  22  |  0.85    Ca    9.2      30 Jan 2025 01:34  Phos  4.2     01-30  Mg     2.10     01-30              Culture - Abscess with Gram Stain (collected 29 Jan 2025 11:49)  Source: .Abscess  Gram Stain (29 Jan 2025 22:49):    Numerous polymorphonuclear leukocytes per low power field    Few Gram Positive Cocci in Clusters per oil power field    Culture - Blood (collected 27 Jan 2025 21:00)  Source: .Blood BLOOD  Preliminary Report (30 Jan 2025 04:01):    No growth at 48 Hours    Culture - Blood (collected 27 Jan 2025 20:45)  Source: .Blood BLOOD  Preliminary Report (30 Jan 2025 04:01):    No growth at 48 Hours

## 2025-01-30 NOTE — PROGRESS NOTE ADULT - SUBJECTIVE AND OBJECTIVE BOX
ENT ISSUE: Right neck abscess    HPI: Patient not answering questions but states he is feeling better.         PAST MEDICAL & SURGICAL HISTORY:  Schizophrenia      Obstructive sleep apnea      Hypertension      Type 2 diabetes mellitus      No significant past surgical history        Allergies    No Known Allergies    Intolerances      MEDICATIONS  (STANDING):  atorvastatin 10 milliGRAM(s) Oral at bedtime  cefTRIAXone   IVPB 1000 milliGRAM(s) IV Intermittent every 24 hours  cloZAPine 50 milliGRAM(s) Oral at bedtime  dextrose 5%. 1000 milliLiter(s) (100 mL/Hr) IV Continuous <Continuous>  dextrose 5%. 1000 milliLiter(s) (50 mL/Hr) IV Continuous <Continuous>  dextrose 50% Injectable 25 Gram(s) IV Push once  dextrose 50% Injectable 12.5 Gram(s) IV Push once  dextrose 50% Injectable 25 Gram(s) IV Push once  enoxaparin Injectable 40 milliGRAM(s) SubCutaneous every 24 hours  fenofibrate Tablet 48 milliGRAM(s) Oral daily  glucagon  Injectable 1 milliGRAM(s) IntraMuscular once  insulin lispro (ADMELOG) corrective regimen sliding scale   SubCutaneous three times a day before meals  insulin lispro (ADMELOG) corrective regimen sliding scale   SubCutaneous at bedtime  lisinopril 5 milliGRAM(s) Oral daily  montelukast 10 milliGRAM(s) Oral daily  mupirocin 2% Nasal 1 Application(s) Both Nostrils two times a day  pantoprazole    Tablet 40 milliGRAM(s) Oral before breakfast  sertraline 100 milliGRAM(s) Oral daily  vancomycin  IVPB 1250 milliGRAM(s) IV Intermittent every 12 hours    MEDICATIONS  (PRN):  albuterol    90 MICROgram(s) HFA Inhaler 2 Puff(s) Inhalation every 6 hours PRN Shortness of Breath and/or Wheezing  dextrose Oral Gel 15 Gram(s) Oral once PRN Blood Glucose LESS THAN 70 milliGRAM(s)/deciliter      Social History: see consult note    Family history: see consult note    ROS:   ENT: all negative except as noted in HPI   Pulm: denies SOB, cough, hemoptysis  Neuro: denies numbness/tingling, loss of sensation  Endo: denies heat/cold intolerance, excessive sweating      Vital Signs Last 24 Hrs  T(C): 36.5 (30 Jan 2025 05:35), Max: 36.7 (29 Jan 2025 18:10)  T(F): 97.7 (30 Jan 2025 05:35), Max: 98 (29 Jan 2025 18:10)  HR: 66 (30 Jan 2025 05:35) (66 - 95)  BP: 120/81 (30 Jan 2025 05:35) (113/75 - 142/81)  BP(mean): --  RR: 19 (30 Jan 2025 05:35) (18 - 20)  SpO2: 96% (30 Jan 2025 05:35) (95% - 96%)    Parameters below as of 30 Jan 2025 05:35  Patient On (Oxygen Delivery Method): room air                              12.2   13.72 )-----------( 315      ( 30 Jan 2025 01:34 )             39.5    01-30    135  |  100  |  14  ----------------------------<  93  3.8   |  22  |  0.85    Ca    9.2      30 Jan 2025 01:34  Phos  4.2     01-30  Mg     2.10     01-30 1/29 Culture result: Gram + cocci          PHYSICAL EXAM:  Gen: NAD,   Skin: No rashes, bruises, or lesions  Head: Normocephalic, Atraumatic  Face: no edema, erythema, or fluctuance. Parotid glands soft without mass  Eyes: no scleral injection  Ears: Right - ear canal clear, TM intact without effusion or erythema. No evidence of any fluid drainage. No mastoid tenderness, erythema, or ear bulging            Left - ear canal clear, TM intact without effusion or erythema. No evidence of any fluid drainage. No mastoid tenderness, erythema, or ear bulging  Nose: Nares bilaterally patent, no discharge  Mouth: No Stridor / Drooling / Trismus.  Mucosa moist, tongue/uvula midline, oropharynx clear  Neck: Flat, supple, no lymphadenopathy, trachea midline, no masses. Right posterior cervical erythema, Dressing was removed, packing removed. Able to express large amount of pus from wound. Irrigated with 20cc of NS. Repacked with 1/4" plain strip gauze, applied 4x4 gauze dressing and taped in place.   Lymphatic: No lymphadenopathy  Resp: breathing easily, no stridor  Neuro: facial nerve intact, no facial droop

## 2025-01-31 LAB
-  CEFTAROLINE: SIGNIFICANT CHANGE UP
-  CLINDAMYCIN: SIGNIFICANT CHANGE UP
-  ERYTHROMYCIN: SIGNIFICANT CHANGE UP
-  GENTAMICIN: SIGNIFICANT CHANGE UP
-  OXACILLIN: SIGNIFICANT CHANGE UP
-  PENICILLIN: SIGNIFICANT CHANGE UP
-  RIFAMPIN: SIGNIFICANT CHANGE UP
-  TETRACYCLINE: SIGNIFICANT CHANGE UP
-  TRIMETHOPRIM/SULFAMETHOXAZOLE: SIGNIFICANT CHANGE UP
-  VANCOMYCIN: SIGNIFICANT CHANGE UP
ANION GAP SERPL CALC-SCNC: 14 MMOL/L — SIGNIFICANT CHANGE UP (ref 7–14)
BUN SERPL-MCNC: 18 MG/DL — SIGNIFICANT CHANGE UP (ref 7–23)
CALCIUM SERPL-MCNC: 9.5 MG/DL — SIGNIFICANT CHANGE UP (ref 8.4–10.5)
CHLORIDE SERPL-SCNC: 102 MMOL/L — SIGNIFICANT CHANGE UP (ref 98–107)
CO2 SERPL-SCNC: 22 MMOL/L — SIGNIFICANT CHANGE UP (ref 22–31)
CREAT SERPL-MCNC: 0.87 MG/DL — SIGNIFICANT CHANGE UP (ref 0.5–1.3)
EGFR: 109 ML/MIN/1.73M2 — SIGNIFICANT CHANGE UP
GLUCOSE BLDC GLUCOMTR-MCNC: 100 MG/DL — HIGH (ref 70–99)
GLUCOSE BLDC GLUCOMTR-MCNC: 104 MG/DL — HIGH (ref 70–99)
GLUCOSE BLDC GLUCOMTR-MCNC: 91 MG/DL — SIGNIFICANT CHANGE UP (ref 70–99)
GLUCOSE BLDC GLUCOMTR-MCNC: 92 MG/DL — SIGNIFICANT CHANGE UP (ref 70–99)
GLUCOSE SERPL-MCNC: 91 MG/DL — SIGNIFICANT CHANGE UP (ref 70–99)
HCT VFR BLD CALC: 39.3 % — SIGNIFICANT CHANGE UP (ref 39–50)
HGB BLD-MCNC: 12.6 G/DL — LOW (ref 13–17)
MAGNESIUM SERPL-MCNC: 2.2 MG/DL — SIGNIFICANT CHANGE UP (ref 1.6–2.6)
MCHC RBC-ENTMCNC: 28.4 PG — SIGNIFICANT CHANGE UP (ref 27–34)
MCHC RBC-ENTMCNC: 32.1 G/DL — SIGNIFICANT CHANGE UP (ref 32–36)
MCV RBC AUTO: 88.5 FL — SIGNIFICANT CHANGE UP (ref 80–100)
METHOD TYPE: SIGNIFICANT CHANGE UP
NRBC # BLD AUTO: 0 K/UL — SIGNIFICANT CHANGE UP (ref 0–0)
NRBC # BLD: 0 /100 WBCS — SIGNIFICANT CHANGE UP (ref 0–0)
NRBC # FLD: 0 K/UL — SIGNIFICANT CHANGE UP (ref 0–0)
NRBC BLD-RTO: 0 /100 WBCS — SIGNIFICANT CHANGE UP (ref 0–0)
PHOSPHATE SERPL-MCNC: 4.1 MG/DL — SIGNIFICANT CHANGE UP (ref 2.5–4.5)
PLATELET # BLD AUTO: 318 K/UL — SIGNIFICANT CHANGE UP (ref 150–400)
POTASSIUM SERPL-MCNC: 4.2 MMOL/L — SIGNIFICANT CHANGE UP (ref 3.5–5.3)
POTASSIUM SERPL-SCNC: 4.2 MMOL/L — SIGNIFICANT CHANGE UP (ref 3.5–5.3)
RBC # BLD: 4.44 M/UL — SIGNIFICANT CHANGE UP (ref 4.2–5.8)
RBC # FLD: 13.9 % — SIGNIFICANT CHANGE UP (ref 10.3–14.5)
SODIUM SERPL-SCNC: 138 MMOL/L — SIGNIFICANT CHANGE UP (ref 135–145)
WBC # BLD: 11.34 K/UL — HIGH (ref 3.8–10.5)
WBC # FLD AUTO: 11.34 K/UL — HIGH (ref 3.8–10.5)

## 2025-01-31 PROCEDURE — 99233 SBSQ HOSP IP/OBS HIGH 50: CPT

## 2025-01-31 PROCEDURE — 99232 SBSQ HOSP IP/OBS MODERATE 35: CPT

## 2025-01-31 RX ORDER — CEFAZOLIN SODIUM IN 0.9 % NACL 2 G/10 ML
2000 SYRINGE (ML) INTRAVENOUS EVERY 8 HOURS
Refills: 0 | Status: DISCONTINUED | OUTPATIENT
Start: 2025-01-31 | End: 2025-02-02

## 2025-01-31 RX ORDER — ARIPIPRAZOLE 5 MG/1
5 TABLET ORAL DAILY
Refills: 0 | Status: DISCONTINUED | OUTPATIENT
Start: 2025-01-31 | End: 2025-01-31

## 2025-01-31 RX ORDER — ARIPIPRAZOLE 5 MG/1
5 TABLET ORAL EVERY 24 HOURS
Refills: 0 | Status: DISCONTINUED | OUTPATIENT
Start: 2025-02-01 | End: 2025-02-03

## 2025-01-31 RX ADMIN — MONTELUKAST SODIUM 10 MILLIGRAM(S): 5 TABLET, CHEWABLE ORAL at 12:43

## 2025-01-31 RX ADMIN — MUPIROCIN 1 APPLICATION(S): 2 CREAM TOPICAL at 05:50

## 2025-01-31 RX ADMIN — Medication 100 MILLIGRAM(S): at 21:01

## 2025-01-31 RX ADMIN — PANTOPRAZOLE 40 MILLIGRAM(S): 20 TABLET, DELAYED RELEASE ORAL at 05:48

## 2025-01-31 RX ADMIN — Medication 5 MILLIGRAM(S): at 05:48

## 2025-01-31 RX ADMIN — MUPIROCIN 1 APPLICATION(S): 2 CREAM TOPICAL at 17:37

## 2025-01-31 RX ADMIN — Medication 100 MILLIGRAM(S): at 12:41

## 2025-01-31 RX ADMIN — Medication 100 MILLIGRAM(S): at 12:43

## 2025-01-31 RX ADMIN — ENOXAPARIN SODIUM 40 MILLIGRAM(S): 100 INJECTION SUBCUTANEOUS at 21:50

## 2025-01-31 RX ADMIN — FENOFIBRATE 48 MILLIGRAM(S): 145 TABLET ORAL at 12:43

## 2025-01-31 RX ADMIN — ATORVASTATIN CALCIUM 10 MILLIGRAM(S): 80 TABLET, FILM COATED ORAL at 21:38

## 2025-01-31 RX ADMIN — VANCOMYCIN HYDROCHLORIDE 166.67 MILLIGRAM(S): KIT at 02:36

## 2025-01-31 NOTE — DISCHARGE NOTE PROVIDER - NSDCMRMEDTOKEN_GEN_ALL_CORE_FT
Abilify Maintena 400 mg intramuscular injection, extended release: 400 milligram(s) intramuscular once every four weeks, given 4/27  acetaminophen 325 mg oral tablet: 2 tab(s) orally every 4 hours, As needed, Temp greater or equal to 38.5C (101.3F)  albuterol 90 mcg/inh inhalation aerosol: 2 puff(s) inhaled every 4 hours, As needed, Shortness of Breath and/or Wheezing  atorvastatin 10 mg oral tablet: 1 tab(s) orally once a day (at bedtime)  cloZAPine 25 mg oral tablet: 2 tab(s) orally once a day at bedtime   Docusil 100 mg oral capsule: 1 cap(s) orally 2 times a day- hold for loose stool/diarrhea  FENOFIBRATE 48 MG TABLET: 1 tab(s) orally once a day  LISINOPRIL 5 MG TABLET: 1 tab(s) orally once a day  metFORMIN 500 mg oral tablet: 1 tab(s) orally 2 times a day  MONTELUKAST SOD 10 MG TABLET:   pantoprazole 40 mg oral delayed release tablet: 1 tab(s) orally once a day (before a meal)  RYBELSUS 3 MG TABLET:   sertraline 100 mg oral tablet: 1 tab(s) orally once a day   acetaminophen 325 mg oral tablet: 2 tab(s) orally every 4 hours, As needed, Temp greater or equal to 38.5C (101.3F)  albuterol 90 mcg/inh inhalation aerosol: 2 puff(s) inhaled every 4 hours, As needed, Shortness of Breath and/or Wheezing  atorvastatin 10 mg oral tablet: 1 tab(s) orally once a day (at bedtime)  cloZAPine 25 mg oral tablet: 2 tab(s) orally once a day at bedtime   Docusil 100 mg oral capsule: 1 cap(s) orally 2 times a day- hold for loose stool/diarrhea  FENOFIBRATE 48 MG TABLET: 1 tab(s) orally once a day  LISINOPRIL 5 MG TABLET: 1 tab(s) orally once a day  metFORMIN 500 mg oral tablet: 1 tab(s) orally 2 times a day  MONTELUKAST SOD 10 MG TABLET:   pantoprazole 40 mg oral delayed release tablet: 1 tab(s) orally once a day (before a meal)  RYBELSUS 3 MG TABLET:   sertraline 100 mg oral tablet: 1 tab(s) orally once a day   albuterol 90 mcg/inh inhalation aerosol: 2 puff(s) inhaled every 4 hours, As needed, Shortness of Breath and/or Wheezing  ARIPiprazole 10 mg oral tablet: 1 tab(s) orally once a day (before a meal)  atorvastatin 10 mg oral tablet: 1 tab(s) orally once a day (at bedtime)  cloZAPine 25 mg oral tablet: 2 tab(s) orally once a day at bedtime   FENOFIBRATE 48 MG TABLET: 1 tab(s) orally once a day  LISINOPRIL 5 MG TABLET: 1 tab(s) orally once a day  metFORMIN 500 mg oral tablet: 1 tab(s) orally 2 times a day  MONTELUKAST SOD 10 MG TABLET:   pantoprazole 40 mg oral delayed release tablet: 1 tab(s) orally once a day (before a meal)  RYBELSUS 3 MG TABLET:   sertraline 100 mg oral tablet: 1 tab(s) orally once a day  sulfamethoxazole-trimethoprim 800 mg-160 mg oral tablet: 2 tab(s) orally 2 times a day through 2/12/2025

## 2025-01-31 NOTE — PROGRESS NOTE ADULT - SUBJECTIVE AND OBJECTIVE BOX
[facility] Division of Hospital Medicine  Rosi Benito MD  Pager [phone]    Patient is a 44y old  Male who presents with a chief complaint of right neck abscess       SUBJECTIVE / OVERNIGHT EVENTS: seen earlier this AM, ENT at bedside packing wound, reporting still sig amount of pus; pt denies pain, states he is ok      MEDICATIONS  (STANDING):  atorvastatin 10 milliGRAM(s) Oral at bedtime  ceFAZolin   IVPB 2000 milliGRAM(s) IV Intermittent every 8 hours  cloZAPine 50 milliGRAM(s) Oral at bedtime  dextrose 5%. 1000 milliLiter(s) (100 mL/Hr) IV Continuous <Continuous>  dextrose 5%. 1000 milliLiter(s) (50 mL/Hr) IV Continuous <Continuous>  dextrose 50% Injectable 25 Gram(s) IV Push once  dextrose 50% Injectable 12.5 Gram(s) IV Push once  dextrose 50% Injectable 25 Gram(s) IV Push once  enoxaparin Injectable 40 milliGRAM(s) SubCutaneous every 24 hours  fenofibrate Tablet 48 milliGRAM(s) Oral daily  glucagon  Injectable 1 milliGRAM(s) IntraMuscular once  insulin lispro (ADMELOG) corrective regimen sliding scale   SubCutaneous three times a day before meals  insulin lispro (ADMELOG) corrective regimen sliding scale   SubCutaneous at bedtime  lisinopril 5 milliGRAM(s) Oral daily  montelukast 10 milliGRAM(s) Oral daily  mupirocin 2% Nasal 1 Application(s) Both Nostrils two times a day  pantoprazole    Tablet 40 milliGRAM(s) Oral before breakfast  sertraline 100 milliGRAM(s) Oral daily    MEDICATIONS  (PRN):  albuterol    90 MICROgram(s) HFA Inhaler 2 Puff(s) Inhalation every 6 hours PRN Shortness of Breath and/or Wheezing  dextrose Oral Gel 15 Gram(s) Oral once PRN Blood Glucose LESS THAN 70 milliGRAM(s)/deciliter      CAPILLARY BLOOD GLUCOSE  POCT Blood Glucose.: 92 mg/dL (31 Jan 2025 12:13)  POCT Blood Glucose.: 100 mg/dL (31 Jan 2025 08:28)  POCT Blood Glucose.: 96 mg/dL (30 Jan 2025 22:26)  POCT Blood Glucose.: 94 mg/dL (30 Jan 2025 17:31)    PHYSICAL EXAM:  Vital Signs Last 24 Hrs  T(F): 98.2 (31 Jan 2025 10:30), Max: 98.5 (31 Jan 2025 05:33)  HR: 80 (31 Jan 2025 05:33) (79 - 89)  BP: 133/80 (31 Jan 2025 05:33) (103/67 - 137/86)  RR: 18 (31 Jan 2025 10:30) (17 - 18)  SpO2: 98% (31 Jan 2025 10:30) (94% - 98%)    Parameters below as of 31 Jan 2025 10:30  Patient On (Oxygen Delivery Method): room air      exam limited as pt preferred to sleep  CONSTITUTIONAL: NAD, appears comfortable  EYES: PERRLA; conjunctiva and sclera clear  ENMT: Moist oral mucosa; normal dentition  RESPIRATORY: Normal respiratory effort;   CARDIOVASCULAR: No lower extremity edema  ABDOMEN: obese  MUSCULOSKELETAL:  no clubbing or cyanosis of digits; no joint swelling or tenderness to palpation  PSYCH: calm, coop; affect flat  NEUROLOGY: CN 2-12 are intact and symmetric; no gross sensory deficits   SKIN: R post neck open abscess with packing    LABS:                        12.6   11.34 )-----------( 318      ( 31 Jan 2025 06:10 )             39.3     01-31    138  |  102  |  18  ----------------------------<  91  4.2   |  22  |  0.87    Ca    9.5      31 Jan 2025 06:10  Phos  4.1     01-31  Mg     2.20     01-31      Culture - Abscess with Gram Stain (collected 29 Jan 2025 11:49)  Source: .Abscess  Gram Stain (29 Jan 2025 22:49):    Numerous polymorphonuclear leukocytes per low power field    Few Gram Positive Cocci in Clusters per oil power field  Preliminary Report (30 Jan 2025 15:25):    Few Staphylococcus aureus  Organism: Staphylococcus aureus (31 Jan 2025 07:43)  Organism: Staphylococcus aureus (31 Jan 2025 07:43)

## 2025-01-31 NOTE — DISCHARGE NOTE PROVIDER - ATTENDING DISCHARGE PHYSICAL EXAMINATION:
Vital Signs Last 24 Hrs  T(C): 36.4 (05 Feb 2025 10:32), Max: 36.8 (05 Feb 2025 05:05)  T(F): 97.6 (05 Feb 2025 10:32), Max: 98.2 (05 Feb 2025 05:05)  HR: 88 (05 Feb 2025 10:32) (74 - 94)  BP: 128/70 (05 Feb 2025 10:32) (110/60 - 140/80)  BP(mean): --  RR: 18 (05 Feb 2025 10:32) (17 - 18)  SpO2: 97% (05 Feb 2025 10:32) (96% - 100%)    Parameters below as of 05 Feb 2025 05:05  Patient On (Oxygen Delivery Method): room air        CONSTITUTIONAL: Well-groomed, in no apparent distress, obese  EYES: No conjunctival or scleral injection, non-icteric;   ENMT: No external nasal lesions; MMM  NECK: Trachea midline without palpable neck mass; thyroid not enlarged and non-tender  RESPIRATORY: Breathing comfortably; no dullness to percussion; lungs CTA without wheeze/rhonchi/rales  CARDIOVASCULAR: +S1S2, RRR, no M/G/R; pedal pulses full and symmetric; no lower extremity edema  GASTROINTESTINAL: No palpable masses or tenderness, +BS throughout, no rebound/guarding; no hepatosplenomegaly; no hernia palpated  LYMPHATIC: No cervical LAD or tenderness  SKIN: No rashes or ulcers noted  NEUROLOGIC: CN II-XII intact; sensation intact in LEs b/l to light touch  PSYCHIATRIC: A+O x 3; mood and affect appropriate; appropriate insight and judgment

## 2025-01-31 NOTE — PROVIDER CONTACT NOTE (OTHER) - REASON
Pt /62, hold clozapine as per parameter?
Pt removed IV and refused further IV medication
pt /67, , hold clozapine as per parameter?

## 2025-01-31 NOTE — BH CONSULTATION LIAISON PROGRESS NOTE - NSBHASSESSMENTFT_PSY_ALL_CORE
Patient is 44M, residing at [facility] with PPH schizophrenia and PMH of HTN, HLD, ROMMEL, DM, PN, Asthma, Obesity, PPHx who presents to [facility] activated by  for neck abscess. Psychiatry is consulted for pharmacotherapy recommendations.      1/29: no overt hallucinations or paranoia but very disoriented, thought blocked, loose associations, bizarre. restarted on home regimen. pending clarification for Abilify Maintena dose/date  1/31: patient with less bizarre thought content and disorganization today, though is very concrete and with hypoproductive speech. collateral obtained from outpatient psychiatrist  Patient is 44M, residing at [facility] with PPH schizophrenia and PMH of HTN, HLD, ROMMEL, DM, PN, Asthma, Obesity, PPHx who presents to [facility] activated by  for neck abscess. Psychiatry is consulted for pharmacotherapy recommendations.      1/29: no overt hallucinations or paranoia but very disoriented, thought blocked, loose associations, bizarre. restarted on home regimen. pending clarification for Abilify Maintena dose/date  1/31: patient with less bizarre thought content and disorganization today, though is very concrete and with hypoproductive speech. collateral obtained from outpatient psychiatrist, plan to bridge with oral Abilify pending LINARES by outpatient team    PLAN:  - routine obs  - continue sertraline 100mg daily (home regimen)  - continue clozapine 50mg qHS (home regimen)       - Check Orthostatic BP, as pt. is on Clozapine  - START abilify PO 5mg daily for psychosis  - patient has documented history of Abilify Maintena injections       - last dose 12/30/24 at 400mg qMonthly       - next dose originally due 1/29/25, defer while inpatient  - PRNs       - Zyprexa 2.5mg PO/IM Q6H PRN for agitation, if refractory agitation may give additional 2.5mg, hold if qtc >500  - dispo back to [place] grounds pending medical clearance  - patient does not have capacity to leave [place], psychiatry will continue to follow Patient is 44M, residing at [facility] with PPH schizophrenia and PMH of HTN, HLD, ROMMEL, DM, PN, Asthma, Obesity, PPHx who presents to [facility] activated by  for neck abscess. Psychiatry is consulted for pharmacotherapy recommendations.      1/29: no overt hallucinations or paranoia but very disoriented, thought blocked, loose associations, bizarre. restarted on home regimen. pending clarification for Abilify Maintena dose/date  1/31: patient with less bizarre thought content and disorganization today, though is very concrete and with hypoproductive speech. collateral obtained from outpatient psychiatrist, plan to bridge with oral Abilify pending LINARES by outpatient team    PLAN:  - routine obs  - continue sertraline 100mg daily (home regimen)  - continue clozapine 50mg qHS (home regimen)       - Check Orthostatic BP, as pt. is on Clozapine  - START abilify PO 5mg daily for psychosis  - patient has documented history of Abilify Maintena injections       - last dose 12/30/24 at 400mg qMonthly       - next dose originally due 1/29/25------ defer while inpatient  - PRNs       - Zyprexa 2.5mg PO/IM Q6H PRN for agitation, if refractory agitation may give additional 2.5mg, hold if qtc >500  - dispo back to [place] grounds pending medical clearance  - patient does not have capacity to leave [place], psychiatry will continue to follow Patient is 44M, residing at [facility] with PPH schizophrenia and PMH of HTN, HLD, ROMMEL, DM, PN, Asthma, Obesity, PPHx who presents to [facility] activated by  for neck abscess. Psychiatry is consulted for pharmacotherapy recommendations.      1/29: no overt hallucinations or paranoia but very disoriented, thought blocked, loose associations, bizarre. restarted on home regimen. pending clarification for Abilify Maintena dose/date  1/31: patient with less bizarre thought content and disorganization today, though is very concrete and with hypoproductive speech. collateral obtained from outpatient psychiatrist, plan to bridge with oral Abilify pending LINARES by outpatient team    PLAN:  - routine obs  - continue sertraline 100mg daily (home regimen)  - continue clozapine 50mg qHS (home regimen)       - Check Orthostatic BP, as pt. is on Clozapine    - START abilify PO 5mg at 7AM  for psychosis---2/1/2025    - patient has documented history of Abilify Maintena injections       - last dose 12/30/24 at 400mg qMonthly       - next dose originally due 1/29/25------ defer while inpatient  - PRNs       - Zyprexa 2.5mg PO/IM Q6H PRN for agitation, if refractory agitation may give additional 2.5mg, hold if qtc >500  - dispo back to [place] grounds pending medical clearance  - patient does not have capacity to leave [place], psychiatry will continue to follow

## 2025-01-31 NOTE — PROGRESS NOTE ADULT - SUBJECTIVE AND OBJECTIVE BOX
ENT ISSUE/POD: right neck abscess    HPI: Patient seen and examined at bedside this morning. Patient not answering questions.         PAST MEDICAL & SURGICAL HISTORY:  Schizophrenia      Obstructive sleep apnea      Hypertension      Type 2 diabetes mellitus      No significant past surgical history        Allergies    No Known Allergies    Intolerances      MEDICATIONS  (STANDING):  atorvastatin 10 milliGRAM(s) Oral at bedtime  cefTRIAXone   IVPB 1000 milliGRAM(s) IV Intermittent every 24 hours  cloZAPine 50 milliGRAM(s) Oral at bedtime  dextrose 5%. 1000 milliLiter(s) (100 mL/Hr) IV Continuous <Continuous>  dextrose 5%. 1000 milliLiter(s) (50 mL/Hr) IV Continuous <Continuous>  dextrose 50% Injectable 25 Gram(s) IV Push once  dextrose 50% Injectable 12.5 Gram(s) IV Push once  dextrose 50% Injectable 25 Gram(s) IV Push once  enoxaparin Injectable 40 milliGRAM(s) SubCutaneous every 24 hours  fenofibrate Tablet 48 milliGRAM(s) Oral daily  glucagon  Injectable 1 milliGRAM(s) IntraMuscular once  insulin lispro (ADMELOG) corrective regimen sliding scale   SubCutaneous three times a day before meals  insulin lispro (ADMELOG) corrective regimen sliding scale   SubCutaneous at bedtime  lisinopril 5 milliGRAM(s) Oral daily  montelukast 10 milliGRAM(s) Oral daily  mupirocin 2% Nasal 1 Application(s) Both Nostrils two times a day  pantoprazole    Tablet 40 milliGRAM(s) Oral before breakfast  sertraline 100 milliGRAM(s) Oral daily  vancomycin  IVPB 1250 milliGRAM(s) IV Intermittent every 12 hours    MEDICATIONS  (PRN):  albuterol    90 MICROgram(s) HFA Inhaler 2 Puff(s) Inhalation every 6 hours PRN Shortness of Breath and/or Wheezing  dextrose Oral Gel 15 Gram(s) Oral once PRN Blood Glucose LESS THAN 70 milliGRAM(s)/deciliter      Social History: see consult note    Family history: see consult note    ROS:   ENT: all negative except as noted in HPI   Pulm: denies SOB, cough, hemoptysis  Neuro: denies numbness/tingling, loss of sensation  Endo: denies heat/cold intolerance, excessive sweating      Vital Signs Last 24 Hrs  T(C): 36.9 (31 Jan 2025 05:33), Max: 36.9 (30 Jan 2025 14:00)  T(F): 98.5 (31 Jan 2025 05:33), Max: 98.5 (31 Jan 2025 05:33)  HR: 80 (31 Jan 2025 05:33) (79 - 103)  BP: 133/80 (31 Jan 2025 05:33) (103/67 - 150/83)  BP(mean): --  RR: 18 (31 Jan 2025 05:33) (17 - 18)  SpO2: 96% (31 Jan 2025 05:33) (94% - 97%)    Parameters below as of 31 Jan 2025 05:33  Patient On (Oxygen Delivery Method): room air                              12.6   11.34 )-----------( 318      ( 31 Jan 2025 06:10 )             39.3    01-30    135  |  100  |  14  ----------------------------<  93  3.8   |  22  |  0.85    Ca    9.2      30 Jan 2025 01:34  Phos  4.2     01-30  Mg     2.10     01-30         PHYSICAL EXAM:  Gen: NAD  Skin: No rashes, bruises, or lesions  Head: Normocephalic, Atraumatic  Eyes: no scleral injection  Neck: Flat, supple, no lymphadenopathy, trachea midline, no masses. Right posterior cervical erythema, dressing and 1/4 inch packing was removed. Able to express moderate amount of pus from wound. Irrigated with 10cc of NS. Repacked with 1/4" plain strip gauze, applied 4x4 gauze dressing and taped in place.   Resp: breathing easily, no stridor

## 2025-01-31 NOTE — BH CONSULTATION LIAISON PROGRESS NOTE - NSBHATTESTCOMMENTATTENDFT_PSY_A_CORE
Met with the patient. Tired appearing, denies any mood symptoms, denies any si or hi or psychosis.

## 2025-01-31 NOTE — PROVIDER CONTACT NOTE (OTHER) - ASSESSMENT
Pt A&Ox3. VS 98.1 temp, HR 82, 121/62 BP, 17 RR, 95% SPO2
Pt A&Ox2, agitated and confused.
pt A&ox4, pt vitals 97.6 temp, 79 HR, 103/67 BP, 17 RR, 97 spo2

## 2025-01-31 NOTE — DISCHARGE NOTE PROVIDER - NSDCFUADDINST_GEN_ALL_CORE_FT
3x a week packing changes/irrigation   Instructions:     1. Remove gauze and 1/4" packing from the wound site.     2. Express the wound to help drain any remaining purulence.     3. Using a q-tip, repack the wound with 1/4" packing until you feel resistance and are not able to pack anymore.     4. Cover the wound with 4x4 gauze and tape.

## 2025-01-31 NOTE — BH CONSULTATION LIAISON PROGRESS NOTE - NSBHATTESTBILLBASEDTIME_PSY_A_CORE
[address] [facility],[address] [address]  Dept: [phone]              [address]Johanna Quintero, [address]  Tel.  [phone]  Fax. [address] [facility], [address] [facility]  Tel.  [phone]  Fax. [phone] [facility], [address] [facility]  Tel.  [phone]  Fax. [phone]         Chief Complaint       Chief Complaint   Patient presents with    Sleep Apnea     Yearly PAP follow up         HPI        Antione Velasquez is a 61 y.o. female seen for follow-up. The patient reported an initial evaluation at a  sleep lab in [facility]. She was told that she had severe sleep apnea and was started on CPAP. Presenting symptoms at that time included: Snoring, nocturnal awakening, nonrestorative sleep and daytime  fatigue. Records requested  at the time; not obtained. Currently CPAP at 10 cm. During past 3 days, CPAP use during 30 days with CMS compliance 100%. Average usage 8 hours 27 minutes. Average AHI 0.3/h. CPAP mask: AirFit (S)FFM; Device set up date: 3/17/2017      Allergies   Allergen Reactions    Amoxicillin Other (See Comments)    Cefdinir Other (See Comments)    Erythromycin Other (See Comments)    Other Other (See Comments)    Shellfish-Derived Products      Other reaction(s): Unknown (comments)       No current facility-administered medications for this visit. She  has a past medical history of Hypertension, Psychiatric disorder, and Thyroid disease. She  has no past surgical history on file. She family history includes Breast Cancer in her maternal grandmother and mother. She was adopted. She  reports that she has never smoked. She has never used smokeless tobacco. She reports that she does not drink alcohol and does not use drugs.      Review of Systems:  Unchanged per patient      Objective:   BP (!) 141/89 (Site: Left Lower Time based billing

## 2025-01-31 NOTE — PROVIDER CONTACT NOTE (OTHER) - BACKGROUND
pt admitted for cutaneous abscess of the neck
Pt admitted for cutaneous abscess in neck.
pt admitted for cutaneous abscess of the neck

## 2025-01-31 NOTE — BH CONSULTATION LIAISON PROGRESS NOTE - NSBHFUPINTERVALHXFT_PSY_A_CORE
Chart reviewed. There were no events overnight, and the patient did not require psychotropic PRNs. Patient has been taking their scheduled medication without issue. Vital signs demonstrating mild hypertension.     Patient seen this morning, laying down. States he feels "ok," slept well, eating without issue, denies pain or any somatic concerns. Denies any anxiety or depression, states he feels safe in the hospital, staff are taking good care of him. Oriented to person, place, situation, and "Jan 2025" but unable to report the day. Denies any other concerns at this time.     Collateral is obtained from outpatient psychiatrist Dr. Cobos [phone].  States she has been following the patient for the past 9 years. 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 States that the patient at baseline is "very sweet, very adherent" to treatment and medications, though he typically speaks very little and can be quiet. ----------------------------------------------------------- Chart reviewed. There were no events overnight, and the patient did not require psychotropic PRNs. Patient has been taking their scheduled medication without issue. Vital signs demonstrating mild hypertension.     Patient seen this morning, laying down. States he feels "ok," slept well, eating without issue, denies pain or any somatic concerns. Denies any anxiety or depression, states he feels safe in the hospital, staff are taking good care of him. Oriented to person, place, situation, and "Jan 2025" but unable to report the day. Denies any other concerns at this time.     Collateral is obtained from outpatient psychiatrist Dr. Cobos [phone].  States she has been following the patient for the past 9 years. 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 States that the patient at baseline is "very sweet, very adherent" to treatment and medications, though he typically speaks very little and can be quiet. In all these years, patient has never been psychiatrically hospitalized, consistently takes his medications and attends his appts (there may be 48 hours at times where he forgets meds but then quickly gets back on track). Endorses increased disorganization and disorientation over the past few weeks (says his claims of not seeing psychiatrist for months, self discontinuing clozapine, and not receiving Maintena are all untrue and further evidence of delirium/psychosis as "he never lies intentionally"); only change that she can think of is recently being trialled on Rybelsus for his T2DM per his PCP. Reports that the patient last had an appt with Dr. Cobos on Humberto 15, during which he was consistent with his clozapine 50mg and was scheduled for repeat Abilify Maintena LINARES on Jan 29. However, patient presented to the ED on Jan 28 and thus missed his next LINARES injection. States his outpatient team will continue his injections once he's discharged, recommend oral Abilify in the meantime while inpatient.

## 2025-01-31 NOTE — DISCHARGE NOTE PROVIDER - NSDCCPCAREPLAN_GEN_ALL_CORE_FT
PRINCIPAL DISCHARGE DIAGNOSIS  Diagnosis: Neck abscess  Assessment and Plan of Treatment: You were found with a neck abscess that was drained by ENT and required wound care with packing changes daily.  You were also treated with antibiotics      SECONDARY DISCHARGE DIAGNOSES  Diagnosis: Hypertension  Assessment and Plan of Treatment:     Diagnosis: Diabetes type 2  Assessment and Plan of Treatment:     Diagnosis: Schizophrenia  Assessment and Plan of Treatment:      PRINCIPAL DISCHARGE DIAGNOSIS  Diagnosis: Neck abscess  Assessment and Plan of Treatment: You were found with a neck abscess that was drained by ENT and required wound care with packing changes daily in the hospital.  You were also treated with antibiotics.  You will be discharged with oral bactrim to take twice per day until 2/12  You will require packing changes 3 times per week per ENT:     1. Remove gauze and 1/4" packing from the wound site.     2. Express the wound to help drain any remaining purulence.     3. Using a q-tip, repack the wound with 1/4" packing until you feel resistance and are not able to pack anymore.     4. Cover the wound with 4x4 gauze and tape.        SECONDARY DISCHARGE DIAGNOSES  Diagnosis: Schizophrenia  Assessment and Plan of Treatment: Continue medications as prescribed  Resume ability maintenance injections as outpatient  Follow-up with your psychiatrist Dr. Cobos at [facility]

## 2025-01-31 NOTE — DISCHARGE NOTE PROVIDER - HOSPITAL COURSE
Pt is a 43 yo male with ho schizophrenia, DM, HTN, ROMMEL HTN p/w R post neck abscess.  Was prescribed PO bactrim as outpt but unclear compliance.  Here for IV abx and ENt eval for  I&D, cx grew MSSA  ENT changing packing daily until decrease drainage 44-year-old with past medical history of hypertension, ROMMEL, schizophrenia, diabetes, admitted for right neck abscess failing outpatient management complicated by delirium vs decompensated psychiatric state. Patient was s/p I&D by ENT for neck abscess. Patient s/p Zosyn, abscess Mx +MSSA and patient was transitioned to cefazolin. ID evaluated recommending to treat with bactrim until 2/12 as outpatient. ENT performed daily packing changes however recommended that it could be performed 3 times a week as outpatient. SW/CM arranged agency to perform 3 times a week as outpatient upon dispo to [facility]. Patient was evaluated by psych recommending abilify 10mg daily (of note patient is on abilify maintenance injections as outpatient which he should resume), and to continue sertraline and clozaril. Patient is to follow-up with psychiatry at [facility] Dr. Cobos. Per psych no contraindications to discharge.

## 2025-01-31 NOTE — PROVIDER CONTACT NOTE (OTHER) - ACTION/TREATMENT ORDERED:
Provider made aware, provider stated ok to hold clozapine, no further actions at this time.
Provider made aware and assessed pt at bedside.
provider notified, stated to hold clozapine as per parameter

## 2025-01-31 NOTE — DISCHARGE NOTE PROVIDER - CARE PROVIDER_API CALL
Lorie Fountain  Family Medicine  [address] [facility], [address]  [place] [address]  Phone: ([phone]  Fax: ()-  Follow Up Time: 1 month

## 2025-02-01 LAB
ANION GAP SERPL CALC-SCNC: 12 MMOL/L — SIGNIFICANT CHANGE UP (ref 7–14)
BUN SERPL-MCNC: 20 MG/DL — SIGNIFICANT CHANGE UP (ref 7–23)
CALCIUM SERPL-MCNC: 9.2 MG/DL — SIGNIFICANT CHANGE UP (ref 8.4–10.5)
CHLORIDE SERPL-SCNC: 102 MMOL/L — SIGNIFICANT CHANGE UP (ref 98–107)
CO2 SERPL-SCNC: 25 MMOL/L — SIGNIFICANT CHANGE UP (ref 22–31)
CREAT SERPL-MCNC: 0.87 MG/DL — SIGNIFICANT CHANGE UP (ref 0.5–1.3)
EGFR: 109 ML/MIN/1.73M2 — SIGNIFICANT CHANGE UP
GLUCOSE BLDC GLUCOMTR-MCNC: 91 MG/DL — SIGNIFICANT CHANGE UP (ref 70–99)
GLUCOSE BLDC GLUCOMTR-MCNC: 92 MG/DL — SIGNIFICANT CHANGE UP (ref 70–99)
GLUCOSE BLDC GLUCOMTR-MCNC: 92 MG/DL — SIGNIFICANT CHANGE UP (ref 70–99)
GLUCOSE BLDC GLUCOMTR-MCNC: 97 MG/DL — SIGNIFICANT CHANGE UP (ref 70–99)
GLUCOSE SERPL-MCNC: 95 MG/DL — SIGNIFICANT CHANGE UP (ref 70–99)
HCT VFR BLD CALC: 40.8 % — SIGNIFICANT CHANGE UP (ref 39–50)
HGB BLD-MCNC: 13 G/DL — SIGNIFICANT CHANGE UP (ref 13–17)
MAGNESIUM SERPL-MCNC: 2.1 MG/DL — SIGNIFICANT CHANGE UP (ref 1.6–2.6)
MCHC RBC-ENTMCNC: 28.6 PG — SIGNIFICANT CHANGE UP (ref 27–34)
MCHC RBC-ENTMCNC: 31.9 G/DL — LOW (ref 32–36)
MCV RBC AUTO: 89.9 FL — SIGNIFICANT CHANGE UP (ref 80–100)
NRBC # BLD AUTO: 0 K/UL — SIGNIFICANT CHANGE UP (ref 0–0)
NRBC # BLD: 0 /100 WBCS — SIGNIFICANT CHANGE UP (ref 0–0)
NRBC # FLD: 0 K/UL — SIGNIFICANT CHANGE UP (ref 0–0)
NRBC BLD-RTO: 0 /100 WBCS — SIGNIFICANT CHANGE UP (ref 0–0)
PHOSPHATE SERPL-MCNC: 3.9 MG/DL — SIGNIFICANT CHANGE UP (ref 2.5–4.5)
PLATELET # BLD AUTO: 298 K/UL — SIGNIFICANT CHANGE UP (ref 150–400)
POTASSIUM SERPL-MCNC: 4.4 MMOL/L — SIGNIFICANT CHANGE UP (ref 3.5–5.3)
POTASSIUM SERPL-SCNC: 4.4 MMOL/L — SIGNIFICANT CHANGE UP (ref 3.5–5.3)
RBC # BLD: 4.54 M/UL — SIGNIFICANT CHANGE UP (ref 4.2–5.8)
RBC # FLD: 13.7 % — SIGNIFICANT CHANGE UP (ref 10.3–14.5)
SODIUM SERPL-SCNC: 139 MMOL/L — SIGNIFICANT CHANGE UP (ref 135–145)
WBC # BLD: 12.18 K/UL — HIGH (ref 3.8–10.5)
WBC # FLD AUTO: 12.18 K/UL — HIGH (ref 3.8–10.5)

## 2025-02-01 PROCEDURE — 99232 SBSQ HOSP IP/OBS MODERATE 35: CPT

## 2025-02-01 PROCEDURE — 99222 1ST HOSP IP/OBS MODERATE 55: CPT | Mod: GC

## 2025-02-01 PROCEDURE — G0545: CPT

## 2025-02-01 RX ADMIN — Medication 100 MILLIGRAM(S): at 12:30

## 2025-02-01 RX ADMIN — PANTOPRAZOLE 40 MILLIGRAM(S): 20 TABLET, DELAYED RELEASE ORAL at 07:37

## 2025-02-01 RX ADMIN — FENOFIBRATE 48 MILLIGRAM(S): 145 TABLET ORAL at 12:29

## 2025-02-01 RX ADMIN — Medication 100 MILLIGRAM(S): at 19:53

## 2025-02-01 RX ADMIN — Medication 5 MILLIGRAM(S): at 07:36

## 2025-02-01 RX ADMIN — MUPIROCIN 1 APPLICATION(S): 2 CREAM TOPICAL at 07:36

## 2025-02-01 RX ADMIN — ARIPIPRAZOLE 5 MILLIGRAM(S): 5 TABLET ORAL at 07:37

## 2025-02-01 RX ADMIN — Medication 100 MILLIGRAM(S): at 12:29

## 2025-02-01 RX ADMIN — ENOXAPARIN SODIUM 40 MILLIGRAM(S): 100 INJECTION SUBCUTANEOUS at 22:01

## 2025-02-01 RX ADMIN — ATORVASTATIN CALCIUM 10 MILLIGRAM(S): 80 TABLET, FILM COATED ORAL at 22:01

## 2025-02-01 RX ADMIN — CLOZAPINE 50 MILLIGRAM(S): 50 TABLET ORAL at 22:00

## 2025-02-01 RX ADMIN — MUPIROCIN 1 APPLICATION(S): 2 CREAM TOPICAL at 18:14

## 2025-02-01 RX ADMIN — Medication 100 MILLIGRAM(S): at 03:46

## 2025-02-01 RX ADMIN — MONTELUKAST SODIUM 10 MILLIGRAM(S): 5 TABLET, CHEWABLE ORAL at 12:28

## 2025-02-01 NOTE — PROGRESS NOTE ADULT - SUBJECTIVE AND OBJECTIVE BOX
[facility] Division of Hospital Medicine  Rosi Benito MD  Pager [phone]    Patient is a 44y old  Male who presents with a chief complaint of right neck abscess      SUBJECTIVE / OVERNIGHT EVENTS: pt seen earlier; without complaints; dressing open, still draining, packing in place      MEDICATIONS  (STANDING):  ARIPiprazole 5 milliGRAM(s) Oral every 24 hours  atorvastatin 10 milliGRAM(s) Oral at bedtime  ceFAZolin   IVPB 2000 milliGRAM(s) IV Intermittent every 8 hours  cloZAPine 50 milliGRAM(s) Oral at bedtime  dextrose 5%. 1000 milliLiter(s) (100 mL/Hr) IV Continuous <Continuous>  dextrose 5%. 1000 milliLiter(s) (50 mL/Hr) IV Continuous <Continuous>  dextrose 50% Injectable 25 Gram(s) IV Push once  dextrose 50% Injectable 12.5 Gram(s) IV Push once  dextrose 50% Injectable 25 Gram(s) IV Push once  enoxaparin Injectable 40 milliGRAM(s) SubCutaneous every 24 hours  fenofibrate Tablet 48 milliGRAM(s) Oral daily  glucagon  Injectable 1 milliGRAM(s) IntraMuscular once  insulin lispro (ADMELOG) corrective regimen sliding scale   SubCutaneous three times a day before meals  insulin lispro (ADMELOG) corrective regimen sliding scale   SubCutaneous at bedtime  lisinopril 5 milliGRAM(s) Oral daily  montelukast 10 milliGRAM(s) Oral daily  mupirocin 2% Nasal 1 Application(s) Both Nostrils two times a day  pantoprazole    Tablet 40 milliGRAM(s) Oral before breakfast  sertraline 100 milliGRAM(s) Oral daily    MEDICATIONS  (PRN):  albuterol    90 MICROgram(s) HFA Inhaler 2 Puff(s) Inhalation every 6 hours PRN Shortness of Breath and/or Wheezing  dextrose Oral Gel 15 Gram(s) Oral once PRN Blood Glucose LESS THAN 70 milliGRAM(s)/deciliter      CAPILLARY BLOOD GLUCOSE  POCT Blood Glucose.: 92 mg/dL (01 Feb 2025 12:40)  POCT Blood Glucose.: 92 mg/dL (01 Feb 2025 08:26)  POCT Blood Glucose.: 104 mg/dL (31 Jan 2025 22:12)  POCT Blood Glucose.: 91 mg/dL (31 Jan 2025 17:13)      PHYSICAL EXAM:  Vital Signs Last 24 Hrs  T(F): 97.5 (01 Feb 2025 10:18), Max: 98.2 (01 Feb 2025 07:30)  HR: 85 (01 Feb 2025 10:18) (71 - 85)  BP: 115/68 (01 Feb 2025 10:18) (102/82 - 148/77)  RR: 18 (01 Feb 2025 10:18) (17 - 18)  SpO2: 95% (01 Feb 2025 10:18) (95% - 98%)    Parameters below as of 01 Feb 2025 10:18  Patient On (Oxygen Delivery Method): room air        CONSTITUTIONAL: NAD, appears comfortable  EYES: PERRLA; conjunctiva and sclera clear  ENMT: Moist oral mucosa; normal dentition  RESPIRATORY: Normal respiratory effort; b/l AE  CARDIOVASCULAR: Regular rate and rhythm; No lower extremity edema  ABDOMEN: Nontender to palpation, normoactive bowel sounds  MUSCULOSKELETAL: no clubbing or cyanosis of digits; no joint swelling or tenderness to palpation  PSYCH: calm, coop; affect appropriate  NEUROLOGY: CN 2-12 are intact and symmetric; no gross sensory deficits   SKIN: R post neck, abscess still draining, packing in place    LABS:                        13.0   12.18 )-----------( 298      ( 01 Feb 2025 06:50 )             40.8     02-01    139  |  102  |  20  ----------------------------<  95  4.4   |  25  |  0.87    Ca    9.2      01 Feb 2025 06:50  Phos  3.9     02-01  Mg     2.10     02-01

## 2025-02-01 NOTE — CONSULT NOTE ADULT - ATTENDING COMMENTS
44-yo M w/ PMH of schizophrenia, DM, class II obesity, and ROMMEL, admitted w/ R dorsal neck abscess. Recently presented to ED and was prescribed Bactrim, however unclear if patient took them. Presenting again w/ CT evidence of 5.9 x 2.5 x 3.3 cm abscess w/in the SQ fat of the R posterior lateral suboccipital neck. S/p bedside I&D (1/29), culture growing MSSA. Of note, patient has MRSA nasal swab positivity.    Cultures:  1/27 BCx neg  1/29 MRSA swab pos  1/29 wound Cx MSSA    Antimicrobials:  Vancomycin 1/27, 1/30-1/31  Zosyn 1/27-1/28  Ceftriaxone 1/29-1/30  Cefazolin 1/31-    #MSSA infection  #Abscess  #Leukocytosis    Recommendations:  - D/c cefazolin  - Start Bactrim DS 2 tabs PO Q12H  - Would treat through 2/12/25.  - Warm compress and pus expression per primary team.  - Extensive wound care    Plan discussed with primary team provider.  Thank you for this consult.    Edward Booker MD, PhD  Attending Physician  Division of Infectious Diseases  Department of Medicine    Please contact through MS Teams message.  Office: [phone] (after 5 PM or weekend)
97.4

## 2025-02-01 NOTE — PROGRESS NOTE ADULT - SUBJECTIVE AND OBJECTIVE BOX
ENT ISSUE/POD: right neck abscess    HPI: Patient seen and examined at bedside this morning. Patient reports improvement in neck pain.         PAST MEDICAL & SURGICAL HISTORY:  Schizophrenia      Obstructive sleep apnea      Hypertension      Type 2 diabetes mellitus      No significant past surgical history        Allergies    No Known Allergies    Intolerances        Vital Signs Last 24 Hrs  T(C): 36.8 (01 Feb 2025 07:30), Max: 36.8 (01 Feb 2025 07:30)  T(F): 98.2 (01 Feb 2025 07:30), Max: 98.2 (01 Feb 2025 07:30)  HR: 71 (01 Feb 2025 07:30) (71 - 84)  BP: 122/65 (01 Feb 2025 07:30) (102/82 - 148/77)  BP(mean): --  RR: 17 (01 Feb 2025 07:30) (17 - 18)  SpO2: 95% (01 Feb 2025 07:30) (95% - 98%)    Parameters below as of 01 Feb 2025 07:30  Patient On (Oxygen Delivery Method): room air                       PHYSICAL EXAM:  Gen: NAD  Skin: No rashes, bruises, or lesions  Head: Normocephalic, Atraumatic  Eyes: no scleral injection  Neck: Flat, supple, no lymphadenopathy, trachea midline, no masses. Right posterior cervical erythema, dressing and 1/4 inch packing was removed - decreased purulence on packing. Still Able to express moderate amount of pus from wound. Irrigated with 10cc of NS. Repacked with 1/4" plain strip gauze, applied 4x4 gauze dressing and taped in place.   Resp: breathing easily, no stridor

## 2025-02-01 NOTE — CONSULT NOTE ADULT - SUBJECTIVE AND OBJECTIVE BOX
INFECTIOUS DISEASE CONSULT NOTE    Patient is a 44y old  Male who presents with a chief complaint of right neck abscess (01 Feb 2025 12:42)    HPI:  44-year-old with past medical history of hypertension, ROMMEL, schizophrenia, diabetes, presenting today for 1 week or so of symptoms of right-sided posterior neck abscess now with drainage the past day or so concerning for infection.  Denies any fever chills or nausea vomiting diarrhea.  Pt was seen in the ED about 1 week prior and was prescribed Bactrim twice daily for 7 days which patient reports that he has been taking consistently but in speaking with staff at [facility], unclear if this was the case    Pt reports he's "doing pretty well now" his only complaint is not having enough food. able to move his neck. no other concerns (28 Jan 2025 17:55)       REVIEW OF SYSTEMS:      Prior hospital charts reviewed [Yes]  Primary team notes reviewed [Yes]  Other consultant notes reviewed [Yes]    PAST MEDICAL & SURGICAL HISTORY:  Schizophrenia      Obstructive sleep apnea      Hypertension      Type 2 diabetes mellitus      No significant past surgical history          SOCIAL HISTORY:      FAMILY HISTORY:      Allergies:  No Known Allergies      ANTIMICROBIALS:  ceFAZolin   IVPB 2000 every 8 hours      ANTIMICROBIALS (past 90 days):  MEDICATIONS  (STANDING):  ceFAZolin   IVPB   100 mL/Hr IV Intermittent (02-01-25 @ 12:30)   100 mL/Hr IV Intermittent (02-01-25 @ 03:46)   100 mL/Hr IV Intermittent (01-31-25 @ 21:01)   100 mL/Hr IV Intermittent (01-31-25 @ 12:41)    cefTRIAXone   IVPB   100 mL/Hr IV Intermittent (01-30-25 @ 17:46)   100 mL/Hr IV Intermittent (01-29-25 @ 16:58)    piperacillin/tazobactam IVPB...   200 mL/Hr IV Intermittent (01-28-25 @ 05:17)    piperacillin/tazobactam IVPB...   200 mL/Hr IV Intermittent (01-27-25 @ 21:12)    vancomycin  IVPB   250 mL/Hr IV Intermittent (01-29-25 @ 14:09)   250 mL/Hr IV Intermittent (01-28-25 @ 22:45)    vancomycin  IVPB   166.67 mL/Hr IV Intermittent (01-31-25 @ 02:36)   166.67 mL/Hr IV Intermittent (01-30-25 @ 15:31)   166.67 mL/Hr IV Intermittent (01-30-25 @ 03:47)    vancomycin  IVPB.   250 mL/Hr IV Intermittent (01-27-25 @ 21:49)        OTHER MEDS:   MEDICATIONS  (STANDING):  albuterol    90 MICROgram(s) HFA Inhaler 2 every 6 hours PRN  ARIPiprazole 5 every 24 hours  atorvastatin 10 at bedtime  cloZAPine 50 at bedtime  dextrose 50% Injectable 25 once  dextrose 50% Injectable 12.5 once  dextrose 50% Injectable 25 once  dextrose Oral Gel 15 once PRN  enoxaparin Injectable 40 every 24 hours  fenofibrate Tablet 48 daily  glucagon  Injectable 1 once  insulin lispro (ADMELOG) corrective regimen sliding scale  three times a day before meals  insulin lispro (ADMELOG) corrective regimen sliding scale  at bedtime  lisinopril 5 daily  montelukast 10 daily  pantoprazole    Tablet 40 before breakfast  sertraline 100 daily      VITALS:  Vital Signs Last 24 Hrs  T(F): 97.5 (02-01-25 @ 10:18), Max: 98.5 (01-29-25 @ 05:35)    Vital Signs Last 24 Hrs  HR: 85 (02-01-25 @ 10:18) (71 - 85)  BP: 115/68 (02-01-25 @ 10:18) (102/82 - 148/77)  RR: 18 (02-01-25 @ 10:18)  SpO2: 95% (02-01-25 @ 10:18) (95% - 98%)  Wt(kg): --    EXAM:      Labs:                        13.0   12.18 )-----------( 298      ( 01 Feb 2025 06:50 )             40.8     02-01    139  |  102  |  20  ----------------------------<  95  4.4   |  25  |  0.87    Ca    9.2      01 Feb 2025 06:50  Phos  3.9     02-01  Mg     2.10     02-01        WBC Trend:  WBC Count: 12.18 (02-01-25 @ 06:50)  WBC Count: 11.34 (01-31-25 @ 06:10)  WBC Count: 13.72 (01-30-25 @ 01:34)  WBC Count: 13.58 (01-29-25 @ 06:21)      Auto Neutrophil #: 10.37 K/uL (01-27-25 @ 21:48)  Band Neutrophils %: 8.0 % (01-27-25 @ 21:48)  Auto Neutrophil #: 10.08 K/uL (01-20-25 @ 14:48)      Creatine Trend:  Creatinine: 0.87 (02-01)  Creatinine: 0.87 (01-31)  Creatinine: 0.85 (01-30)  Creatinine: 0.81 (01-29)      Liver Biochemical Testing Trend:  Alanine Aminotransferase (ALT/SGPT): 115 *H* (01-27)  Alanine Aminotransferase (ALT/SGPT): 58 *H* (01-20)  Aspartate Aminotransferase (AST/SGOT): 80 (01-27-25 @ 21:48)  Aspartate Aminotransferase (AST/SGOT): 57 (01-20-25 @ 14:48)  Bilirubin Total: 0.5 (01-27)  Bilirubin Total: 0.6 (01-20)      Trend LDH          Urinalysis Basic - ( 01 Feb 2025 06:50 )    Color: x / Appearance: x / SG: x / pH: x  Gluc: 95 mg/dL / Ketone: x  / Bili: x / Urobili: x   Blood: x / Protein: x / Nitrite: x   Leuk Esterase: x / RBC: x / WBC x   Sq Epi: x / Non Sq Epi: x / Bacteria: x        MICROBIOLOGY:  Vancomycin Level, Trough: 4.4 (01-30 @ 01:34)    MRSA PCR Result.: Detected (01-29-25 @ 06:15)      Culture - Abscess with Gram Stain (collected 29 Jan 2025 11:49)  Source: .Abscess  Preliminary Report:    Few Staphylococcus aureus  Organism: Staphylococcus aureus  Organism: Staphylococcus aureus    Sensitivities:      Method Type: ALIYA      -  Ceftaroline: S <=0.5      -  Clindamycin: R <=0.25 This isolate is presumed to be clindamycin resistant based on detection of inducible resistance. Clindamycin may still be effective in some patients.      -  Erythromycin: R >4      -  Gentamicin: I 8 Should not be used as monotherapy      -  Oxacillin: S <=0.25 Oxacillin predicts susceptibility for dicloxacillin, methicillin, and nafcillin      -  Penicillin: R >2      -  Rifampin: S <=1 Should not be used as monotherapy      -  Tetracycline: S <=4      -  Trimethoprim/Sulfamethoxazole: S <=0.5/9.5      -  Vancomycin: S 1    Culture - Blood (collected 27 Jan 2025 21:00)  Source: .Blood BLOOD  Preliminary Report:    No growth at 4 days    Culture - Blood (collected 27 Jan 2025 20:45)  Source: .Blood BLOOD  Preliminary Report:    No growth at 4 days                                                A1C with Estimated Average Glucose Result: 6.5 % (01-29-25 @ 06:21)      RADIOLOGY:  < from: CT Maxillofacial w/ IV Cont (01.28.25 @ 01:34) >  IMPRESSION:  5.9 x 2.5 x 3.3 cm abscess within the subcutaneous fat of the right   posterior lateral suboccipital neck.    No CT evidence of mastoiditis.    < end of copied text >  imaging below personally reviewed   INFECTIOUS DISEASE CONSULT NOTE    Patient is a 44y old  Male who presents with a chief complaint of right neck abscess (01 Feb 2025 12:42)    History obtained from chart review    HPI: 44-year-old with past medical history of hypertension, ROMMEL, schizophrenia, diabetes, presenting with complain for right neck swelling with drainage.   Pt was seen in the ED about 1 week prior and was prescribed Bactrim 1 DS twice daily for 7 days but unclear if patient took the medication. Patient is afebrile and  hemodynamically stable. Lab notable for leukocytosis. CT with 5.9 x 2.5 x 3.3 cm abscess within the subcutaneous fat of the right posterior lateral suboccipital neck . He is s/p bedside ID with packing by ENT 1/29. Abscess culture growing MSSA. ID asked to help manage.      REVIEW OF SYSTEMS:  unable to obtain     Prior hospital charts reviewed [Yes]  Primary team notes reviewed [Yes]  Other consultant notes reviewed [Yes]    PAST MEDICAL & SURGICAL HISTORY:  Schizophrenia  Obstructive sleep apnea  Hypertension  Type 2 diabetes mellitus  No significant past surgical history          SOCIAL HISTORY:  From [facility]    FAMILY HISTORY:      Allergies:  No Known Allergies      ANTIMICROBIALS:  ceFAZolin   IVPB 2000 every 8 hours      ANTIMICROBIALS (past 90 days):  MEDICATIONS  (STANDING):  ceFAZolin   IVPB   100 mL/Hr IV Intermittent (02-01-25 @ 12:30)   100 mL/Hr IV Intermittent (02-01-25 @ 03:46)   100 mL/Hr IV Intermittent (01-31-25 @ 21:01)   100 mL/Hr IV Intermittent (01-31-25 @ 12:41)    cefTRIAXone   IVPB   100 mL/Hr IV Intermittent (01-30-25 @ 17:46)   100 mL/Hr IV Intermittent (01-29-25 @ 16:58)    piperacillin/tazobactam IVPB...   200 mL/Hr IV Intermittent (01-28-25 @ 05:17)    piperacillin/tazobactam IVPB...   200 mL/Hr IV Intermittent (01-27-25 @ 21:12)    vancomycin  IVPB   250 mL/Hr IV Intermittent (01-29-25 @ 14:09)   250 mL/Hr IV Intermittent (01-28-25 @ 22:45)    vancomycin  IVPB   166.67 mL/Hr IV Intermittent (01-31-25 @ 02:36)   166.67 mL/Hr IV Intermittent (01-30-25 @ 15:31)   166.67 mL/Hr IV Intermittent (01-30-25 @ 03:47)    vancomycin  IVPB.   250 mL/Hr IV Intermittent (01-27-25 @ 21:49)        OTHER MEDS:   MEDICATIONS  (STANDING):  albuterol    90 MICROgram(s) HFA Inhaler 2 every 6 hours PRN  ARIPiprazole 5 every 24 hours  atorvastatin 10 at bedtime  cloZAPine 50 at bedtime  dextrose 50% Injectable 25 once  dextrose 50% Injectable 12.5 once  dextrose 50% Injectable 25 once  dextrose Oral Gel 15 once PRN  enoxaparin Injectable 40 every 24 hours  fenofibrate Tablet 48 daily  glucagon  Injectable 1 once  insulin lispro (ADMELOG) corrective regimen sliding scale  three times a day before meals  insulin lispro (ADMELOG) corrective regimen sliding scale  at bedtime  lisinopril 5 daily  montelukast 10 daily  pantoprazole    Tablet 40 before breakfast  sertraline 100 daily      VITALS:  Vital Signs Last 24 Hrs  T(F): 97.5 (02-01-25 @ 10:18), Max: 98.5 (01-29-25 @ 05:35)    Vital Signs Last 24 Hrs  HR: 85 (02-01-25 @ 10:18) (71 - 85)  BP: 115/68 (02-01-25 @ 10:18) (102/82 - 148/77)  RR: 18 (02-01-25 @ 10:18)  SpO2: 95% (02-01-25 @ 10:18) (95% - 98%)  Wt(kg): --    EXAM:  General:  in no acute distress  Neck: Supple  CV: +S1/S2, RRR, no murmurs heard  Lungs: No respiratory distress  Abd:  BS4+, Soft  : No suprapubic tenderness  Ext:  no edema  Skin: right posterior neck wound induration, with some purulent dranaige       Labs:                        13.0   12.18 )-----------( 298      ( 01 Feb 2025 06:50 )             40.8     02-01    139  |  102  |  20  ----------------------------<  95  4.4   |  25  |  0.87    Ca    9.2      01 Feb 2025 06:50  Phos  3.9     02-01  Mg     2.10     02-01        WBC Trend:  WBC Count: 12.18 (02-01-25 @ 06:50)  WBC Count: 11.34 (01-31-25 @ 06:10)  WBC Count: 13.72 (01-30-25 @ 01:34)  WBC Count: 13.58 (01-29-25 @ 06:21)      Auto Neutrophil #: 10.37 K/uL (01-27-25 @ 21:48)  Band Neutrophils %: 8.0 % (01-27-25 @ 21:48)  Auto Neutrophil #: 10.08 K/uL (01-20-25 @ 14:48)      Creatine Trend:  Creatinine: 0.87 (02-01)  Creatinine: 0.87 (01-31)  Creatinine: 0.85 (01-30)  Creatinine: 0.81 (01-29)      Liver Biochemical Testing Trend:  Alanine Aminotransferase (ALT/SGPT): 115 *H* (01-27)  Alanine Aminotransferase (ALT/SGPT): 58 *H* (01-20)  Aspartate Aminotransferase (AST/SGOT): 80 (01-27-25 @ 21:48)  Aspartate Aminotransferase (AST/SGOT): 57 (01-20-25 @ 14:48)  Bilirubin Total: 0.5 (01-27)  Bilirubin Total: 0.6 (01-20)      Trend LDH          MICROBIOLOGY:  Vancomycin Level, Trough: 4.4 (01-30 @ 01:34)    MRSA PCR Result.: Detected (01-29-25 @ 06:15)      Culture - Abscess with Gram Stain (collected 29 Jan 2025 11:49)  Source: .Abscess  Preliminary Report:    Few Staphylococcus aureus  Organism: Staphylococcus aureus  Organism: Staphylococcus aureus    Sensitivities:      Method Type: ALIYA      -  Ceftaroline: S <=0.5      -  Clindamycin: R <=0.25 This isolate is presumed to be clindamycin resistant based on detection of inducible resistance. Clindamycin may still be effective in some patients.      -  Erythromycin: R >4      -  Gentamicin: I 8 Should not be used as monotherapy      -  Oxacillin: S <=0.25 Oxacillin predicts susceptibility for dicloxacillin, methicillin, and nafcillin      -  Penicillin: R >2      -  Rifampin: S <=1 Should not be used as monotherapy      -  Tetracycline: S <=4      -  Trimethoprim/Sulfamethoxazole: S <=0.5/9.5      -  Vancomycin: S 1    Culture - Blood (collected 27 Jan 2025 21:00)  Source: .Blood BLOOD  Preliminary Report:    No growth at 4 days    Culture - Blood (collected 27 Jan 2025 20:45)  Source: .Blood BLOOD  Preliminary Report:    No growth at 4 days            A1C with Estimated Average Glucose Result: 6.5 % (01-29-25 @ 06:21)      RADIOLOGY:  < from: CT Maxillofacial w/ IV Cont (01.28.25 @ 01:34) >  IMPRESSION:  5.9 x 2.5 x 3.3 cm abscess within the subcutaneous fat of the right   posterior lateral suboccipital neck.    No CT evidence of mastoiditis.    < end of copied text >  imaging below personally reviewed   INFECTIOUS DISEASE CONSULT NOTE    Patient is a 44y old  Male who presents with a chief complaint of right neck abscess (01 Feb 2025 12:42)    History obtained from chart review    HPI: 44-year-old with past medical history of hypertension, ROMMEL, schizophrenia, diabetes, presenting with complain for right neck swelling with drainage.   Pt was seen in the ED about 1 week prior and was prescribed Bactrim 1 DS twice daily for 7 days but unclear if patient took the medication. Patient is afebrile and  hemodynamically stable. Lab notable for leukocytosis. CT with 5.9 x 2.5 x 3.3 cm abscess within the subcutaneous fat of the right posterior lateral suboccipital neck . He is s/p bedside ID with packing by ENT 1/29. Abscess culture growing MSSA. ID asked to help manage.      REVIEW OF SYSTEMS:  unable to obtain     Prior hospital charts reviewed [Yes]  Primary team notes reviewed [Yes]  Other consultant notes reviewed [Yes]    PAST MEDICAL & SURGICAL HISTORY:  Schizophrenia  Obstructive sleep apnea  Hypertension  Type 2 diabetes mellitus  No significant past surgical history          SOCIAL HISTORY:  From [facility]  No known tobacco or illicit drug history    FAMILY HISTORY:  No known family history of Staph skin infection      Allergies:  No Known Allergies      ANTIMICROBIALS:  ceFAZolin   IVPB 2000 every 8 hours      ANTIMICROBIALS (past 90 days):  MEDICATIONS  (STANDING):  ceFAZolin   IVPB   100 mL/Hr IV Intermittent (02-01-25 @ 12:30)   100 mL/Hr IV Intermittent (02-01-25 @ 03:46)   100 mL/Hr IV Intermittent (01-31-25 @ 21:01)   100 mL/Hr IV Intermittent (01-31-25 @ 12:41)    cefTRIAXone   IVPB   100 mL/Hr IV Intermittent (01-30-25 @ 17:46)   100 mL/Hr IV Intermittent (01-29-25 @ 16:58)    piperacillin/tazobactam IVPB...   200 mL/Hr IV Intermittent (01-28-25 @ 05:17)    piperacillin/tazobactam IVPB...   200 mL/Hr IV Intermittent (01-27-25 @ 21:12)    vancomycin  IVPB   250 mL/Hr IV Intermittent (01-29-25 @ 14:09)   250 mL/Hr IV Intermittent (01-28-25 @ 22:45)    vancomycin  IVPB   166.67 mL/Hr IV Intermittent (01-31-25 @ 02:36)   166.67 mL/Hr IV Intermittent (01-30-25 @ 15:31)   166.67 mL/Hr IV Intermittent (01-30-25 @ 03:47)    vancomycin  IVPB.   250 mL/Hr IV Intermittent (01-27-25 @ 21:49)        OTHER MEDS:   MEDICATIONS  (STANDING):  albuterol    90 MICROgram(s) HFA Inhaler 2 every 6 hours PRN  ARIPiprazole 5 every 24 hours  atorvastatin 10 at bedtime  cloZAPine 50 at bedtime  dextrose 50% Injectable 25 once  dextrose 50% Injectable 12.5 once  dextrose 50% Injectable 25 once  dextrose Oral Gel 15 once PRN  enoxaparin Injectable 40 every 24 hours  fenofibrate Tablet 48 daily  glucagon  Injectable 1 once  insulin lispro (ADMELOG) corrective regimen sliding scale  three times a day before meals  insulin lispro (ADMELOG) corrective regimen sliding scale  at bedtime  lisinopril 5 daily  montelukast 10 daily  pantoprazole    Tablet 40 before breakfast  sertraline 100 daily      VITALS:  Vital Signs Last 24 Hrs  T(F): 97.5 (02-01-25 @ 10:18), Max: 98.5 (01-29-25 @ 05:35)    Vital Signs Last 24 Hrs  HR: 85 (02-01-25 @ 10:18) (71 - 85)  BP: 115/68 (02-01-25 @ 10:18) (102/82 - 148/77)  RR: 18 (02-01-25 @ 10:18)  SpO2: 95% (02-01-25 @ 10:18) (95% - 98%)  Wt(kg): --    EXAM:  General:  in no acute distress  Neck: Supple  CV: +S1/S2, RRR, no murmurs heard  Lungs: No respiratory distress  Abd:  BS4+, Soft  : No suprapubic tenderness  Ext:  no edema  Skin: right posterior neck wound induration, with some purulent dranaige       Labs:                        13.0   12.18 )-----------( 298      ( 01 Feb 2025 06:50 )             40.8     02-01    139  |  102  |  20  ----------------------------<  95  4.4   |  25  |  0.87    Ca    9.2      01 Feb 2025 06:50  Phos  3.9     02-01  Mg     2.10     02-01        WBC Trend:  WBC Count: 12.18 (02-01-25 @ 06:50)  WBC Count: 11.34 (01-31-25 @ 06:10)  WBC Count: 13.72 (01-30-25 @ 01:34)  WBC Count: 13.58 (01-29-25 @ 06:21)      Auto Neutrophil #: 10.37 K/uL (01-27-25 @ 21:48)  Band Neutrophils %: 8.0 % (01-27-25 @ 21:48)  Auto Neutrophil #: 10.08 K/uL (01-20-25 @ 14:48)      Creatine Trend:  Creatinine: 0.87 (02-01)  Creatinine: 0.87 (01-31)  Creatinine: 0.85 (01-30)  Creatinine: 0.81 (01-29)      Liver Biochemical Testing Trend:  Alanine Aminotransferase (ALT/SGPT): 115 *H* (01-27)  Alanine Aminotransferase (ALT/SGPT): 58 *H* (01-20)  Aspartate Aminotransferase (AST/SGOT): 80 (01-27-25 @ 21:48)  Aspartate Aminotransferase (AST/SGOT): 57 (01-20-25 @ 14:48)  Bilirubin Total: 0.5 (01-27)  Bilirubin Total: 0.6 (01-20)      Trend LDH          MICROBIOLOGY:  Vancomycin Level, Trough: 4.4 (01-30 @ 01:34)    MRSA PCR Result.: Detected (01-29-25 @ 06:15)      Culture - Abscess with Gram Stain (collected 29 Jan 2025 11:49)  Source: .Abscess  Preliminary Report:    Few Staphylococcus aureus  Organism: Staphylococcus aureus  Organism: Staphylococcus aureus    Sensitivities:      Method Type: ALIYA      -  Ceftaroline: S <=0.5      -  Clindamycin: R <=0.25 This isolate is presumed to be clindamycin resistant based on detection of inducible resistance. Clindamycin may still be effective in some patients.      -  Erythromycin: R >4      -  Gentamicin: I 8 Should not be used as monotherapy      -  Oxacillin: S <=0.25 Oxacillin predicts susceptibility for dicloxacillin, methicillin, and nafcillin      -  Penicillin: R >2      -  Rifampin: S <=1 Should not be used as monotherapy      -  Tetracycline: S <=4      -  Trimethoprim/Sulfamethoxazole: S <=0.5/9.5      -  Vancomycin: S 1    Culture - Blood (collected 27 Jan 2025 21:00)  Source: .Blood BLOOD  Preliminary Report:    No growth at 4 days    Culture - Blood (collected 27 Jan 2025 20:45)  Source: .Blood BLOOD  Preliminary Report:    No growth at 4 days            A1C with Estimated Average Glucose Result: 6.5 % (01-29-25 @ 06:21)      RADIOLOGY:  < from: CT Maxillofacial w/ IV Cont (01.28.25 @ 01:34) >  IMPRESSION:  5.9 x 2.5 x 3.3 cm abscess within the subcutaneous fat of the right   posterior lateral suboccipital neck.    No CT evidence of mastoiditis.    < end of copied text >  imaging below personally reviewed

## 2025-02-01 NOTE — CONSULT NOTE ADULT - ASSESSMENT
Impression/Hospital Course: 44-year-old with past medical history of hypertension, ROMMEL, schizophrenia, diabetes, presenting with complain for right neck swelling with drainage.   Pt was seen in the ED about 1 week prior and was prescribed Bactrim 1 DS twice daily for 7 days but unclear if patient took the medication. Patient is afebrile and  hemodynamically stable. Lab notable for leukocytosis. CT with 5.9 x 2.5 x 3.3 cm abscess within the subcutaneous fat of the right posterior lateral suboccipital neck . He is s/p bedside ID with packing by ENT 1/29. Abscess culture growing MSSA. ID asked to help manage.      Antimicrobials:  Vancomycin 1/27x1, 1/30-1/31  Zosyn 1/27-1/28  Ceftriaxone1/29-1/30  Cefazolin 1/31-    Assessment:  #Right neck abscess  #Leukocytosis    Recommendations: PLEASE DEFER ALL CHANGES IN PLAN UNTIL SIGNED BY ATTENDING. All recommendations are tentative pending Attending Attestation.  - Discontinue Cefazolin  - Start Bactrim 2 DS BID through 2/12/25  - Wound care per ENT    Case discussed with attending.     Leonarda Young DO, PGY-4  Infectious Disease Fellow  [email or web address] Teams Preferred  After 5pm/weekends call [phone]   Impression/Hospital Course: 44-year-old with past medical history of hypertension, ROMMEL, schizophrenia, diabetes, presenting with complain for right neck swelling with drainage.   Pt was seen in the ED about 1 week prior and was prescribed Bactrim 1 DS twice daily for 7 days but unclear if patient took the medication. Patient is afebrile and  hemodynamically stable. Lab notable for leukocytosis. CT with 5.9 x 2.5 x 3.3 cm abscess within the subcutaneous fat of the right posterior lateral suboccipital neck . He is s/p bedside ID with packing by ENT 1/29. Abscess culture growing MSSA. ID asked to help manage.      Antimicrobials:  Vancomycin 1/27x1, 1/30-1/31  Zosyn 1/27-1/28  Ceftriaxone1/29-1/30  Cefazolin 1/31-    Assessment:  #Right neck abscess  #Leukocytosis    Recommendations:   - Discontinue Cefazolin  - Start Bactrim 2 DS BID through 2/12/25  - Wound care per ENT    Case discussed with attending.     Leonarda Young DO, PGY-4  Infectious Disease Fellow  [facility] Teams Preferred  After 5pm/weekends call [phone]

## 2025-02-02 LAB
ANION GAP SERPL CALC-SCNC: 12 MMOL/L — SIGNIFICANT CHANGE UP (ref 7–14)
BUN SERPL-MCNC: 18 MG/DL — SIGNIFICANT CHANGE UP (ref 7–23)
CALCIUM SERPL-MCNC: 9.8 MG/DL — SIGNIFICANT CHANGE UP (ref 8.4–10.5)
CHLORIDE SERPL-SCNC: 103 MMOL/L — SIGNIFICANT CHANGE UP (ref 98–107)
CO2 SERPL-SCNC: 25 MMOL/L — SIGNIFICANT CHANGE UP (ref 22–31)
CREAT SERPL-MCNC: 0.86 MG/DL — SIGNIFICANT CHANGE UP (ref 0.5–1.3)
CULTURE RESULTS: SIGNIFICANT CHANGE UP
CULTURE RESULTS: SIGNIFICANT CHANGE UP
EGFR: 110 ML/MIN/1.73M2 — SIGNIFICANT CHANGE UP
GLUCOSE BLDC GLUCOMTR-MCNC: 101 MG/DL — HIGH (ref 70–99)
GLUCOSE BLDC GLUCOMTR-MCNC: 101 MG/DL — HIGH (ref 70–99)
GLUCOSE BLDC GLUCOMTR-MCNC: 107 MG/DL — HIGH (ref 70–99)
GLUCOSE BLDC GLUCOMTR-MCNC: 155 MG/DL — HIGH (ref 70–99)
GLUCOSE SERPL-MCNC: 95 MG/DL — SIGNIFICANT CHANGE UP (ref 70–99)
HCT VFR BLD CALC: 41.8 % — SIGNIFICANT CHANGE UP (ref 39–50)
HGB BLD-MCNC: 13.4 G/DL — SIGNIFICANT CHANGE UP (ref 13–17)
MAGNESIUM SERPL-MCNC: 2.1 MG/DL — SIGNIFICANT CHANGE UP (ref 1.6–2.6)
MCHC RBC-ENTMCNC: 28.4 PG — SIGNIFICANT CHANGE UP (ref 27–34)
MCHC RBC-ENTMCNC: 32.1 G/DL — SIGNIFICANT CHANGE UP (ref 32–36)
MCV RBC AUTO: 88.6 FL — SIGNIFICANT CHANGE UP (ref 80–100)
NRBC # BLD AUTO: 0 K/UL — SIGNIFICANT CHANGE UP (ref 0–0)
NRBC # BLD: 0 /100 WBCS — SIGNIFICANT CHANGE UP (ref 0–0)
NRBC # FLD: 0 K/UL — SIGNIFICANT CHANGE UP (ref 0–0)
NRBC BLD-RTO: 0 /100 WBCS — SIGNIFICANT CHANGE UP (ref 0–0)
PHOSPHATE SERPL-MCNC: 3.9 MG/DL — SIGNIFICANT CHANGE UP (ref 2.5–4.5)
PLATELET # BLD AUTO: 288 K/UL — SIGNIFICANT CHANGE UP (ref 150–400)
POTASSIUM SERPL-MCNC: 4.3 MMOL/L — SIGNIFICANT CHANGE UP (ref 3.5–5.3)
POTASSIUM SERPL-SCNC: 4.3 MMOL/L — SIGNIFICANT CHANGE UP (ref 3.5–5.3)
RBC # BLD: 4.72 M/UL — SIGNIFICANT CHANGE UP (ref 4.2–5.8)
RBC # FLD: 13.7 % — SIGNIFICANT CHANGE UP (ref 10.3–14.5)
SODIUM SERPL-SCNC: 140 MMOL/L — SIGNIFICANT CHANGE UP (ref 135–145)
SPECIMEN SOURCE: SIGNIFICANT CHANGE UP
SPECIMEN SOURCE: SIGNIFICANT CHANGE UP
WBC # BLD: 9.38 K/UL — SIGNIFICANT CHANGE UP (ref 3.8–10.5)
WBC # FLD AUTO: 9.38 K/UL — SIGNIFICANT CHANGE UP (ref 3.8–10.5)

## 2025-02-02 PROCEDURE — 99232 SBSQ HOSP IP/OBS MODERATE 35: CPT

## 2025-02-02 RX ORDER — SULFAMETHOXAZOLE AND TRIMETHOPRIM 400; 80 MG/1; MG/1
1 TABLET ORAL EVERY 12 HOURS
Refills: 0 | Status: DISCONTINUED | OUTPATIENT
Start: 2025-02-02 | End: 2025-02-02

## 2025-02-02 RX ORDER — SULFAMETHOXAZOLE AND TRIMETHOPRIM 400; 80 MG/1; MG/1
1 TABLET ORAL
Refills: 0 | Status: DISCONTINUED | OUTPATIENT
Start: 2025-02-02 | End: 2025-02-02

## 2025-02-02 RX ORDER — SULFAMETHOXAZOLE AND TRIMETHOPRIM 400; 80 MG/1; MG/1
1 TABLET ORAL ONCE
Refills: 0 | Status: COMPLETED | OUTPATIENT
Start: 2025-02-02 | End: 2025-02-02

## 2025-02-02 RX ORDER — SULFAMETHOXAZOLE AND TRIMETHOPRIM 400; 80 MG/1; MG/1
1 TABLET ORAL ONCE
Refills: 0 | Status: DISCONTINUED | OUTPATIENT
Start: 2025-02-02 | End: 2025-02-02

## 2025-02-02 RX ORDER — SULFAMETHOXAZOLE AND TRIMETHOPRIM 400; 80 MG/1; MG/1
2 TABLET ORAL
Refills: 0 | Status: DISCONTINUED | OUTPATIENT
Start: 2025-02-02 | End: 2025-02-05

## 2025-02-02 RX ADMIN — SULFAMETHOXAZOLE AND TRIMETHOPRIM 1 TABLET(S): 400; 80 TABLET ORAL at 14:14

## 2025-02-02 RX ADMIN — SULFAMETHOXAZOLE AND TRIMETHOPRIM 2 TABLET(S): 400; 80 TABLET ORAL at 18:17

## 2025-02-02 RX ADMIN — CLOZAPINE 50 MILLIGRAM(S): 50 TABLET ORAL at 21:56

## 2025-02-02 RX ADMIN — ARIPIPRAZOLE 5 MILLIGRAM(S): 5 TABLET ORAL at 06:32

## 2025-02-02 RX ADMIN — Medication 100 MILLIGRAM(S): at 12:55

## 2025-02-02 RX ADMIN — MUPIROCIN 1 APPLICATION(S): 2 CREAM TOPICAL at 18:16

## 2025-02-02 RX ADMIN — ENOXAPARIN SODIUM 40 MILLIGRAM(S): 100 INJECTION SUBCUTANEOUS at 21:56

## 2025-02-02 RX ADMIN — Medication 5 MILLIGRAM(S): at 05:52

## 2025-02-02 RX ADMIN — SULFAMETHOXAZOLE AND TRIMETHOPRIM 1 TABLET(S): 400; 80 TABLET ORAL at 05:52

## 2025-02-02 RX ADMIN — FENOFIBRATE 48 MILLIGRAM(S): 145 TABLET ORAL at 12:55

## 2025-02-02 RX ADMIN — MONTELUKAST SODIUM 10 MILLIGRAM(S): 5 TABLET, CHEWABLE ORAL at 12:55

## 2025-02-02 RX ADMIN — PANTOPRAZOLE 40 MILLIGRAM(S): 20 TABLET, DELAYED RELEASE ORAL at 05:53

## 2025-02-02 RX ADMIN — MUPIROCIN 1 APPLICATION(S): 2 CREAM TOPICAL at 05:54

## 2025-02-02 RX ADMIN — ATORVASTATIN CALCIUM 10 MILLIGRAM(S): 80 TABLET, FILM COATED ORAL at 21:56

## 2025-02-02 NOTE — PROGRESS NOTE ADULT - SUBJECTIVE AND OBJECTIVE BOX
ENT ISSUE/POD: right neck abscess    HPI: Patient seen and examined at bedside this morning. Patient reports improvement in neck pain.        PAST MEDICAL & SURGICAL HISTORY:  Schizophrenia      Obstructive sleep apnea      Hypertension      Type 2 diabetes mellitus      No significant past surgical history        Allergies    No Known Allergies    Intolerances        Vital Signs Last 24 Hrs  T(C): 36.8 (01 Feb 2025 07:30), Max: 36.8 (01 Feb 2025 07:30)  T(F): 98.2 (01 Feb 2025 07:30), Max: 98.2 (01 Feb 2025 07:30)  HR: 71 (01 Feb 2025 07:30) (71 - 84)  BP: 122/65 (01 Feb 2025 07:30) (102/82 - 148/77)  BP(mean): --  RR: 17 (01 Feb 2025 07:30) (17 - 18)  SpO2: 95% (01 Feb 2025 07:30) (95% - 98%)    Parameters below as of 01 Feb 2025 07:30  Patient On (Oxygen Delivery Method): room air                       PHYSICAL EXAM:  Gen: NAD  Skin: No rashes, bruises, or lesions  Head: Normocephalic, Atraumatic  Eyes: no scleral injection  Neck: Flat, supple, no lymphadenopathy, trachea midline, no masses. Right posterior cervical erythema, dressing and 1/4 inch packing was removed - decreased purulence on packing. Minimal purulence expressed when milking. Irrigated with 10cc of NS. Repacked with 1/4" plain strip gauze, applied 4x4 gauze dressing and taped in place.   Resp: breathing easily, no stridor

## 2025-02-02 NOTE — PROGRESS NOTE ADULT - SUBJECTIVE AND OBJECTIVE BOX
[facility] Division of Hospital Medicine  Rosi Benito MD  Pager [phone]    Patient is a 44y old  Male who presents with a chief complaint of right neck abscess      SUBJECTIVE / OVERNIGHT EVENTS: seen this AM, offers no complaints, wound still draining      MEDICATIONS  (STANDING):  ARIPiprazole 5 milliGRAM(s) Oral every 24 hours  atorvastatin 10 milliGRAM(s) Oral at bedtime  cloZAPine 50 milliGRAM(s) Oral at bedtime  dextrose 5%. 1000 milliLiter(s) (100 mL/Hr) IV Continuous <Continuous>  dextrose 5%. 1000 milliLiter(s) (50 mL/Hr) IV Continuous <Continuous>  dextrose 50% Injectable 25 Gram(s) IV Push once  dextrose 50% Injectable 12.5 Gram(s) IV Push once  dextrose 50% Injectable 25 Gram(s) IV Push once  enoxaparin Injectable 40 milliGRAM(s) SubCutaneous every 24 hours  fenofibrate Tablet 48 milliGRAM(s) Oral daily  glucagon  Injectable 1 milliGRAM(s) IntraMuscular once  insulin lispro (ADMELOG) corrective regimen sliding scale   SubCutaneous three times a day before meals  insulin lispro (ADMELOG) corrective regimen sliding scale   SubCutaneous at bedtime  lisinopril 5 milliGRAM(s) Oral daily  montelukast 10 milliGRAM(s) Oral daily  mupirocin 2% Nasal 1 Application(s) Both Nostrils two times a day  pantoprazole    Tablet 40 milliGRAM(s) Oral before breakfast  sertraline 100 milliGRAM(s) Oral daily  trimethoprim  160 mG/sulfamethoxazole 800 mG 1 Tablet(s) Oral once  trimethoprim  160 mG/sulfamethoxazole 800 mG 2 Tablet(s) Oral two times a day    MEDICATIONS  (PRN):  albuterol    90 MICROgram(s) HFA Inhaler 2 Puff(s) Inhalation every 6 hours PRN Shortness of Breath and/or Wheezing  dextrose Oral Gel 15 Gram(s) Oral once PRN Blood Glucose LESS THAN 70 milliGRAM(s)/deciliter      CAPILLARY BLOOD GLUCOSE  POCT Blood Glucose.: 101 mg/dL (02 Feb 2025 08:23)  POCT Blood Glucose.: 97 mg/dL (01 Feb 2025 22:16)  POCT Blood Glucose.: 91 mg/dL (01 Feb 2025 17:32)  POCT Blood Glucose.: 92 mg/dL (01 Feb 2025 12:40)      PHYSICAL EXAM:  Vital Signs Last 24 Hrs  T(F): 97.5 (02 Feb 2025 05:15), Max: 97.6 (01 Feb 2025 14:34)  HR: 63 (02 Feb 2025 05:15) (63 - 81)  BP: 131/64 (02 Feb 2025 05:15) (107/54 - 131/64)  RR: 18 (02 Feb 2025 05:15) (18 - 19)  SpO2: 96% (02 Feb 2025 05:15) (95% - 100%)    Parameters below as of 02 Feb 2025 05:15  Patient On (Oxygen Delivery Method): room air        CONSTITUTIONAL: NAD, appears comfortable  EYES: PERRLA; conjunctiva and sclera clear  ENMT: Moist oral mucosa; normal dentition  RESPIRATORY: Normal respiratory effort; grossly b/l AE  CARDIOVASCULAR: No lower extremity edema  ABDOMEN: + BS, soft, obese  MUSCULOSKELETAL:  no clubbing or cyanosis of digits; no joint swelling or tenderness to palpation  PSYCH: A+O to person, place, and time; affect flat  NEUROLOGY: CN 2-12 are intact and symmetric; no gross sensory deficits   SKIN: No rashes; no palpable lesions    LABS:                        13.4   9.38  )-----------( 288      ( 02 Feb 2025 06:48 )             41.8     02-02    140  |  103  |  18  ----------------------------<  95  4.3   |  25  |  0.86    Ca    9.8      02 Feb 2025 06:48  Phos  3.9     02-02  Mg     2.10     02-02

## 2025-02-03 LAB
ANION GAP SERPL CALC-SCNC: 13 MMOL/L — SIGNIFICANT CHANGE UP (ref 7–14)
BUN SERPL-MCNC: 23 MG/DL — SIGNIFICANT CHANGE UP (ref 7–23)
CALCIUM SERPL-MCNC: 9.8 MG/DL — SIGNIFICANT CHANGE UP (ref 8.4–10.5)
CHLORIDE SERPL-SCNC: 100 MMOL/L — SIGNIFICANT CHANGE UP (ref 98–107)
CO2 SERPL-SCNC: 24 MMOL/L — SIGNIFICANT CHANGE UP (ref 22–31)
CREAT SERPL-MCNC: 1.11 MG/DL — SIGNIFICANT CHANGE UP (ref 0.5–1.3)
CULTURE RESULTS: ABNORMAL
EGFR: 84 ML/MIN/1.73M2 — SIGNIFICANT CHANGE UP
GLUCOSE BLDC GLUCOMTR-MCNC: 101 MG/DL — HIGH (ref 70–99)
GLUCOSE BLDC GLUCOMTR-MCNC: 126 MG/DL — HIGH (ref 70–99)
GLUCOSE BLDC GLUCOMTR-MCNC: 97 MG/DL — SIGNIFICANT CHANGE UP (ref 70–99)
GLUCOSE BLDC GLUCOMTR-MCNC: 99 MG/DL — SIGNIFICANT CHANGE UP (ref 70–99)
GLUCOSE SERPL-MCNC: 98 MG/DL — SIGNIFICANT CHANGE UP (ref 70–99)
HCT VFR BLD CALC: 43.3 % — SIGNIFICANT CHANGE UP (ref 39–50)
HGB BLD-MCNC: 13.3 G/DL — SIGNIFICANT CHANGE UP (ref 13–17)
MAGNESIUM SERPL-MCNC: 2.2 MG/DL — SIGNIFICANT CHANGE UP (ref 1.6–2.6)
MCHC RBC-ENTMCNC: 28.1 PG — SIGNIFICANT CHANGE UP (ref 27–34)
MCHC RBC-ENTMCNC: 30.7 G/DL — LOW (ref 32–36)
MCV RBC AUTO: 91.4 FL — SIGNIFICANT CHANGE UP (ref 80–100)
NRBC # BLD AUTO: 0 K/UL — SIGNIFICANT CHANGE UP (ref 0–0)
NRBC # BLD: 0 /100 WBCS — SIGNIFICANT CHANGE UP (ref 0–0)
NRBC # FLD: 0 K/UL — SIGNIFICANT CHANGE UP (ref 0–0)
NRBC BLD-RTO: 0 /100 WBCS — SIGNIFICANT CHANGE UP (ref 0–0)
ORGANISM # SPEC MICROSCOPIC CNT: ABNORMAL
ORGANISM # SPEC MICROSCOPIC CNT: ABNORMAL
PHOSPHATE SERPL-MCNC: 3.9 MG/DL — SIGNIFICANT CHANGE UP (ref 2.5–4.5)
PLATELET # BLD AUTO: 306 K/UL — SIGNIFICANT CHANGE UP (ref 150–400)
POTASSIUM SERPL-MCNC: 4.5 MMOL/L — SIGNIFICANT CHANGE UP (ref 3.5–5.3)
POTASSIUM SERPL-SCNC: 4.5 MMOL/L — SIGNIFICANT CHANGE UP (ref 3.5–5.3)
RBC # BLD: 4.74 M/UL — SIGNIFICANT CHANGE UP (ref 4.2–5.8)
RBC # FLD: 14.2 % — SIGNIFICANT CHANGE UP (ref 10.3–14.5)
SODIUM SERPL-SCNC: 137 MMOL/L — SIGNIFICANT CHANGE UP (ref 135–145)
SPECIMEN SOURCE: SIGNIFICANT CHANGE UP
WBC # BLD: 9.95 K/UL — SIGNIFICANT CHANGE UP (ref 3.8–10.5)
WBC # FLD AUTO: 9.95 K/UL — SIGNIFICANT CHANGE UP (ref 3.8–10.5)

## 2025-02-03 PROCEDURE — G0545: CPT

## 2025-02-03 PROCEDURE — 99232 SBSQ HOSP IP/OBS MODERATE 35: CPT

## 2025-02-03 PROCEDURE — 99233 SBSQ HOSP IP/OBS HIGH 50: CPT

## 2025-02-03 RX ORDER — ARIPIPRAZOLE 5 MG/1
10 TABLET ORAL
Refills: 0 | Status: DISCONTINUED | OUTPATIENT
Start: 2025-02-04 | End: 2025-02-05

## 2025-02-03 RX ORDER — ARIPIPRAZOLE 5 MG/1
10 TABLET ORAL EVERY 24 HOURS
Refills: 0 | Status: DISCONTINUED | OUTPATIENT
Start: 2025-02-03 | End: 2025-02-03

## 2025-02-03 RX ADMIN — MUPIROCIN 1 APPLICATION(S): 2 CREAM TOPICAL at 17:34

## 2025-02-03 RX ADMIN — MUPIROCIN 1 APPLICATION(S): 2 CREAM TOPICAL at 05:16

## 2025-02-03 RX ADMIN — ENOXAPARIN SODIUM 40 MILLIGRAM(S): 100 INJECTION SUBCUTANEOUS at 21:30

## 2025-02-03 RX ADMIN — MONTELUKAST SODIUM 10 MILLIGRAM(S): 5 TABLET, CHEWABLE ORAL at 12:53

## 2025-02-03 RX ADMIN — FENOFIBRATE 48 MILLIGRAM(S): 145 TABLET ORAL at 12:53

## 2025-02-03 RX ADMIN — ATORVASTATIN CALCIUM 10 MILLIGRAM(S): 80 TABLET, FILM COATED ORAL at 21:30

## 2025-02-03 RX ADMIN — SULFAMETHOXAZOLE AND TRIMETHOPRIM 2 TABLET(S): 400; 80 TABLET ORAL at 05:18

## 2025-02-03 RX ADMIN — ARIPIPRAZOLE 5 MILLIGRAM(S): 5 TABLET ORAL at 07:05

## 2025-02-03 RX ADMIN — CLOZAPINE 50 MILLIGRAM(S): 50 TABLET ORAL at 21:30

## 2025-02-03 RX ADMIN — Medication 100 MILLIGRAM(S): at 12:53

## 2025-02-03 RX ADMIN — PANTOPRAZOLE 40 MILLIGRAM(S): 20 TABLET, DELAYED RELEASE ORAL at 05:18

## 2025-02-03 RX ADMIN — SULFAMETHOXAZOLE AND TRIMETHOPRIM 2 TABLET(S): 400; 80 TABLET ORAL at 17:36

## 2025-02-03 RX ADMIN — Medication 5 MILLIGRAM(S): at 05:18

## 2025-02-03 NOTE — BH CONSULTATION LIAISON PROGRESS NOTE - MSE UNSTRUCTURED FT
Appearance: Dressed in hospital gown, laying on hospital bed. Poor hygiene and grooming. Malodorous.   Attitude / Behavior: cooperative  Eye contact: none. eyes closed  Motor: Slow  Speech: Normal rate and tone. Hypoproductive. Very low volume. Extremely underarticulated.   Mood: "ok"  Affect: constricted  Thought Process: Very concrete. Less thought blocked and tangential today.   Thought Content: No SIIP or HIIP reported.   Associations: fair  Perceptual: No AVH observed   Attention: Fair  Insight: Poor  Judgment: Poor
Appearance: Dressed in hospital gown, sitting on edge of hospital bed. Fair hygiene and grooming.  Attitude / Behavior: cooperative, calm, fairly related, poor eye contact.  Motor: Mild psychomotor slowing, otherwise no tremors or other abnormal movements noted.  Speech: Normal rate, volume, and tone. Decreased production.   Mood: "Good."  Affect: Neutral, constricted range.  Thought Process: Linear, very concrete.  Thought Content: No SIIP or HIIP reported. No delusional thought content elicited.  Associations: Fair  Perceptual: No auditory or visual hallucinations.  Attention: Fair  Insight: Poor  Judgment: Poor

## 2025-02-03 NOTE — BH CONSULTATION LIAISON PROGRESS NOTE - NSBHASSESSMENTFT_PSY_ALL_CORE
Patient is 44M, residing at [facility] with PPH schizophrenia and PMH of HTN, HLD, ROMMEL, DM, PN, Asthma, Obesity, PPHx who presents to [facility] activated by  for neck abscess. Psychiatry is consulted for pharmacotherapy recommendations.      1/29: no overt hallucinations or paranoia but very disoriented, thought blocked, loose associations, bizarre. restarted on home regimen. pending clarification for Abilify Maintena dose/date  1/31: patient with less bizarre thought content and disorganization today, though is very concrete and with hypoproductive speech. collateral obtained from outpatient psychiatrist, plan to bridge with oral Abilify pending LINARES by outpatient team  2/3: calm, cooperative. Remains concrete with poor eye contact, though reports good mood, feels safe. Denying mood or psychotic sx, denies SI/HI/SIB. Adherent with and tolerating standing meds.    PLAN:  1. Routine observation, as denying SI/HI/SIB.  2. Medications:   - continue sertraline 100mg daily (home regimen)  - continue clozapine 50mg qHS (home regimen)       - Check Orthostatic BP, as pt. is on clozapine    - Increase Abilify PO to 7.5 mg at 7AM for psychosis    - patient has documented history of Abilify Maintena injections       - last dose 12/30/24 at 400mg qMonthly       - next dose originally due 1/29/25------ defer while inpatient  - PRNs for agitation:       - Zyprexa 2.5mg PO/IM Q6H PRN for agitation, if refractory agitation may give additional 2.5mg, hold if qtc >500  3. Dispo back to [place] grounds pending medical clearance. Patient will f/u with Dr. Cobos (psychiatrist) at [place].  - No indication for inpatient psych at this time.  Patient is 44M, residing at [facility] with PPH schizophrenia and PMH of HTN, HLD, ROMMEL, DM, PN, Asthma, Obesity, PPHx who presents to [facility] activated by  for neck abscess. Psychiatry is consulted for pharmacotherapy recommendations.      1/29: no overt hallucinations or paranoia but very disoriented, thought blocked, loose associations, bizarre. restarted on home regimen. pending clarification for Abilify Maintena dose/date  1/31: patient with less bizarre thought content and disorganization today, though is very concrete and with hypoproductive speech. collateral obtained from outpatient psychiatrist, plan to bridge with oral Abilify pending LINARES by outpatient team  2/3: calm, cooperative. Remains concrete with poor eye contact, though reports good mood, feels safe. Denying mood or psychotic sx, denies SI/HI/SIB. Adherent with and tolerating standing meds.    PLAN:  1. Routine observation, as denying SI/HI/SIB.  2. Medications:   - continue sertraline 100mg daily (home regimen)  - continue clozapine 50mg qHS (home regimen)       - Check Orthostatic BP, as pt. is on clozapine    - Increase Abilify PO to 10 mg at 7AM for psychosis----2/4/2025    - patient has documented history of Abilify Maintena injections       - last dose 12/30/24 at 400mg qMonthly       - next dose originally due 1/29/25------ defer while inpatient  - PRNs for agitation:       - Zyprexa 2.5mg PO/IM Q6H PRN for agitation, if refractory agitation may give additional 2.5mg, hold if qtc >500  3. Dispo back to [facility] pending medical clearance. Patient will f/u with Dr. Cobos (psychiatrist) at [place].  - No indication for inpatient psych at this time.

## 2025-02-03 NOTE — PROGRESS NOTE ADULT - SUBJECTIVE AND OBJECTIVE BOX
[facility] Division of Hospital Medicine  Josue Sena DO  Available via MS Teams  Pager:[phone]    SUBJECTIVE / OVERNIGHT EVENTS: No acute events overnight. Patient denies any symptoms.    ADDITIONAL REVIEW OF SYSTEMS:  Review of Systems:   CONSTITUTIONAL: No fever, weight loss  EYES: No eye pain, visual disturbances, or discharge  ENMT:  No difficulty hearing, tinnitus, vertigo; No sinus or throat pain  RESPIRATORY: No SOB. No cough, wheezing, chills or hemoptysis  CARDIOVASCULAR: No chest pain, palpitations, dizziness  GASTROINTESTINAL: No abdominal or epigastric pain. No nausea, vomiting, or hematemesis; No diarrhea or constipation. No melena or hematochezia.  GENITOURINARY: No dysuria, frequency, hematuria, or incontinence  NEUROLOGICAL: No headaches, memory loss, loss of strength, numbness, or tremors  SKIN: No itching, burning, rashes, or lesions   LYMPH NODES: No enlarged glands  ENDOCRINE: No heat or cold intolerance; No hair loss  MUSCULOSKELETAL: No joint pain or swelling; No muscle, back pain  HEME/LYMPH: No easy bruising, or bleeding gums      MEDICATIONS  (STANDING):  atorvastatin 10 milliGRAM(s) Oral at bedtime  cloZAPine 50 milliGRAM(s) Oral at bedtime  dextrose 5%. 1000 milliLiter(s) (100 mL/Hr) IV Continuous <Continuous>  dextrose 5%. 1000 milliLiter(s) (50 mL/Hr) IV Continuous <Continuous>  dextrose 50% Injectable 25 Gram(s) IV Push once  dextrose 50% Injectable 12.5 Gram(s) IV Push once  dextrose 50% Injectable 25 Gram(s) IV Push once  enoxaparin Injectable 40 milliGRAM(s) SubCutaneous every 24 hours  fenofibrate Tablet 48 milliGRAM(s) Oral daily  glucagon  Injectable 1 milliGRAM(s) IntraMuscular once  insulin lispro (ADMELOG) corrective regimen sliding scale   SubCutaneous three times a day before meals  insulin lispro (ADMELOG) corrective regimen sliding scale   SubCutaneous at bedtime  lisinopril 5 milliGRAM(s) Oral daily  montelukast 10 milliGRAM(s) Oral daily  mupirocin 2% Nasal 1 Application(s) Both Nostrils two times a day  pantoprazole    Tablet 40 milliGRAM(s) Oral before breakfast  sertraline 100 milliGRAM(s) Oral daily  trimethoprim  160 mG/sulfamethoxazole 800 mG 2 Tablet(s) Oral two times a day    MEDICATIONS  (PRN):  albuterol    90 MICROgram(s) HFA Inhaler 2 Puff(s) Inhalation every 6 hours PRN Shortness of Breath and/or Wheezing  dextrose Oral Gel 15 Gram(s) Oral once PRN Blood Glucose LESS THAN 70 milliGRAM(s)/deciliter      I&O's Summary      PHYSICAL EXAM:  Vital Signs Last 24 Hrs  T(C): 37.1 (03 Feb 2025 10:34), Max: 37.1 (02 Feb 2025 18:29)  T(F): 98.7 (03 Feb 2025 10:34), Max: 98.7 (02 Feb 2025 18:29)  HR: 87 (03 Feb 2025 10:34) (80 - 88)  BP: 113/65 (03 Feb 2025 10:34) (113/65 - 139/70)  BP(mean): --  RR: 18 (03 Feb 2025 10:34) (18 - 18)  SpO2: 95% (03 Feb 2025 10:34) (95% - 98%)    Parameters below as of 03 Feb 2025 04:57  Patient On (Oxygen Delivery Method): room air      CONSTITUTIONAL: NAD, well-groomed, obese  EYES: PERRLA; conjunctiva and sclera clear  ENMT: Moist oral mucosa, no pharyngeal injection or exudates; normal dentition  NECK: Supple, no palpable masses; no thyromegaly  RESPIRATORY: Normal respiratory effort; lungs are clear to auscultation bilaterally  CARDIOVASCULAR: Regular rate and rhythm, normal S1 and S2, no murmur/rub/gallop; No lower extremity edema; Peripheral pulses are 2+ bilaterally  ABDOMEN: Nontender to palpation, normoactive bowel sounds, no rebound/guarding; No hepatosplenomegaly  MUSCULOSKELETAL:  No clubbing or cyanosis of digits; no joint swelling or tenderness to palpation  PSYCH: A+O to person, place, and time; affect appropriate  NEUROLOGY: CN 2-12 are intact and symmetric; no gross sensory deficits   SKIN: No rashes; no palpable lesions    LABS:                        13.3   9.95  )-----------( 306      ( 03 Feb 2025 08:17 )             43.3     02-03    137  |  100  |  23  ----------------------------<  98  4.5   |  24  |  1.11    Ca    9.8      03 Feb 2025 08:17  Phos  3.9     02-03  Mg     2.20     02-03            Urinalysis Basic - ( 03 Feb 2025 08:17 )    Color: x / Appearance: x / SG: x / pH: x  Gluc: 98 mg/dL / Ketone: x  / Bili: x / Urobili: x   Blood: x / Protein: x / Nitrite: x   Leuk Esterase: x / RBC: x / WBC x   Sq Epi: x / Non Sq Epi: x / Bacteria: x            RADIOLOGY & ADDITIONAL TESTS:  New Imaging Personally Reviewed Today: Yes  New Electrocardiogram Personally Reviewed Today: Yes  Other Results Reviewed Today: Yes  Prior or Outpatient Records Reviewed Today with Summary: Yes    COORDINATION OF CARE:  Consultant Communication and Details of Discussion (where applicable): Yes     [facility] Division of Hospital Medicine  Josue Sena DO  Available via MS Teams  Pager:[phone]    SUBJECTIVE / OVERNIGHT EVENTS: No acute events overnight. Patient denies any symptoms.    ADDITIONAL REVIEW OF SYSTEMS:  Review of Systems:   CONSTITUTIONAL: No fever, weight loss  EYES: No eye pain, visual disturbances, or discharge  ENMT:  No difficulty hearing, tinnitus, vertigo; No sinus or throat pain  RESPIRATORY: No SOB. No cough, wheezing, chills or hemoptysis  CARDIOVASCULAR: No chest pain, palpitations, dizziness  GASTROINTESTINAL: No abdominal or epigastric pain. No nausea, vomiting, or hematemesis; No diarrhea or constipation. No melena or hematochezia.  GENITOURINARY: No dysuria, frequency, hematuria, or incontinence  NEUROLOGICAL: No headaches, memory loss, loss of strength, numbness, or tremors  SKIN: No itching, burning, rashes, or lesions   LYMPH NODES: No enlarged glands  ENDOCRINE: No heat or cold intolerance; No hair loss  MUSCULOSKELETAL: No joint pain or swelling; No muscle, back pain  HEME/LYMPH: No easy bruising, or bleeding gums      MEDICATIONS  (STANDING):  atorvastatin 10 milliGRAM(s) Oral at bedtime  cloZAPine 50 milliGRAM(s) Oral at bedtime  dextrose 5%. 1000 milliLiter(s) (100 mL/Hr) IV Continuous <Continuous>  dextrose 5%. 1000 milliLiter(s) (50 mL/Hr) IV Continuous <Continuous>  dextrose 50% Injectable 25 Gram(s) IV Push once  dextrose 50% Injectable 12.5 Gram(s) IV Push once  dextrose 50% Injectable 25 Gram(s) IV Push once  enoxaparin Injectable 40 milliGRAM(s) SubCutaneous every 24 hours  fenofibrate Tablet 48 milliGRAM(s) Oral daily  glucagon  Injectable 1 milliGRAM(s) IntraMuscular once  insulin lispro (ADMELOG) corrective regimen sliding scale   SubCutaneous three times a day before meals  insulin lispro (ADMELOG) corrective regimen sliding scale   SubCutaneous at bedtime  lisinopril 5 milliGRAM(s) Oral daily  montelukast 10 milliGRAM(s) Oral daily  mupirocin 2% Nasal 1 Application(s) Both Nostrils two times a day  pantoprazole    Tablet 40 milliGRAM(s) Oral before breakfast  sertraline 100 milliGRAM(s) Oral daily  trimethoprim  160 mG/sulfamethoxazole 800 mG 2 Tablet(s) Oral two times a day    MEDICATIONS  (PRN):  albuterol    90 MICROgram(s) HFA Inhaler 2 Puff(s) Inhalation every 6 hours PRN Shortness of Breath and/or Wheezing  dextrose Oral Gel 15 Gram(s) Oral once PRN Blood Glucose LESS THAN 70 milliGRAM(s)/deciliter      I&O's Summary      PHYSICAL EXAM:  Vital Signs Last 24 Hrs  T(C): 37.1 (03 Feb 2025 10:34), Max: 37.1 (02 Feb 2025 18:29)  T(F): 98.7 (03 Feb 2025 10:34), Max: 98.7 (02 Feb 2025 18:29)  HR: 87 (03 Feb 2025 10:34) (80 - 88)  BP: 113/65 (03 Feb 2025 10:34) (113/65 - 139/70)  BP(mean): --  RR: 18 (03 Feb 2025 10:34) (18 - 18)  SpO2: 95% (03 Feb 2025 10:34) (95% - 98%)    Parameters below as of 03 Feb 2025 04:57  Patient On (Oxygen Delivery Method): room air      CONSTITUTIONAL: NAD, well-groomed, obese  EYES: PERRLA; conjunctiva and sclera clear  ENMT: Moist oral mucosa, no pharyngeal injection or exudates; normal dentition. nasal packing intact   NECK: Supple, no palpable masses; no thyromegaly  RESPIRATORY: Normal respiratory effort; lungs are clear to auscultation bilaterally  CARDIOVASCULAR: Regular rate and rhythm, normal S1 and S2, no murmur/rub/gallop; No lower extremity edema; Peripheral pulses are 2+ bilaterally  ABDOMEN: Nontender to palpation, normoactive bowel sounds, no rebound/guarding; No hepatosplenomegaly  MUSCULOSKELETAL:  No clubbing or cyanosis of digits; no joint swelling or tenderness to palpation  PSYCH: A+O to person, place, and time; affect appropriate  NEUROLOGY: CN 2-12 are intact and symmetric; no gross sensory deficits   SKIN: No rashes; no palpable lesions    LABS:                        13.3   9.95  )-----------( 306      ( 03 Feb 2025 08:17 )             43.3     02-03    137  |  100  |  23  ----------------------------<  98  4.5   |  24  |  1.11    Ca    9.8      03 Feb 2025 08:17  Phos  3.9     02-03  Mg     2.20     02-03            Urinalysis Basic - ( 03 Feb 2025 08:17 )    Color: x / Appearance: x / SG: x / pH: x  Gluc: 98 mg/dL / Ketone: x  / Bili: x / Urobili: x   Blood: x / Protein: x / Nitrite: x   Leuk Esterase: x / RBC: x / WBC x   Sq Epi: x / Non Sq Epi: x / Bacteria: x            RADIOLOGY & ADDITIONAL TESTS:  New Imaging Personally Reviewed Today: Yes  New Electrocardiogram Personally Reviewed Today: Yes  Other Results Reviewed Today: Yes  Prior or Outpatient Records Reviewed Today with Summary: Yes    COORDINATION OF CARE:  Consultant Communication and Details of Discussion (where applicable): Yes

## 2025-02-03 NOTE — BH CONSULTATION LIAISON PROGRESS NOTE - NSBHCONSULTFOLLOWAFTERCARE_PSY_A_CORE FT
Discharge back to [facility]. No indication for inpatient psych.  Continue seeing Dr. Cobos, psychiatrist at [place] for medication management.

## 2025-02-03 NOTE — BH CONSULTATION LIAISON PROGRESS NOTE - CURRENT MEDICATION
MEDICATIONS  (STANDING):  atorvastatin 10 milliGRAM(s) Oral at bedtime  ceFAZolin   IVPB 2000 milliGRAM(s) IV Intermittent every 8 hours  cloZAPine 50 milliGRAM(s) Oral at bedtime  dextrose 5%. 1000 milliLiter(s) (100 mL/Hr) IV Continuous <Continuous>  dextrose 5%. 1000 milliLiter(s) (50 mL/Hr) IV Continuous <Continuous>  dextrose 50% Injectable 25 Gram(s) IV Push once  dextrose 50% Injectable 12.5 Gram(s) IV Push once  dextrose 50% Injectable 25 Gram(s) IV Push once  enoxaparin Injectable 40 milliGRAM(s) SubCutaneous every 24 hours  fenofibrate Tablet 48 milliGRAM(s) Oral daily  glucagon  Injectable 1 milliGRAM(s) IntraMuscular once  insulin lispro (ADMELOG) corrective regimen sliding scale   SubCutaneous three times a day before meals  insulin lispro (ADMELOG) corrective regimen sliding scale   SubCutaneous at bedtime  lisinopril 5 milliGRAM(s) Oral daily  montelukast 10 milliGRAM(s) Oral daily  mupirocin 2% Nasal 1 Application(s) Both Nostrils two times a day  pantoprazole    Tablet 40 milliGRAM(s) Oral before breakfast  sertraline 100 milliGRAM(s) Oral daily    MEDICATIONS  (PRN):  albuterol    90 MICROgram(s) HFA Inhaler 2 Puff(s) Inhalation every 6 hours PRN Shortness of Breath and/or Wheezing  dextrose Oral Gel 15 Gram(s) Oral once PRN Blood Glucose LESS THAN 70 milliGRAM(s)/deciliter  
MEDICATIONS  (STANDING):  ARIPiprazole 5 milliGRAM(s) Oral every 24 hours  atorvastatin 10 milliGRAM(s) Oral at bedtime  cloZAPine 50 milliGRAM(s) Oral at bedtime  dextrose 5%. 1000 milliLiter(s) (100 mL/Hr) IV Continuous <Continuous>  dextrose 5%. 1000 milliLiter(s) (50 mL/Hr) IV Continuous <Continuous>  dextrose 50% Injectable 25 Gram(s) IV Push once  dextrose 50% Injectable 12.5 Gram(s) IV Push once  dextrose 50% Injectable 25 Gram(s) IV Push once  enoxaparin Injectable 40 milliGRAM(s) SubCutaneous every 24 hours  fenofibrate Tablet 48 milliGRAM(s) Oral daily  glucagon  Injectable 1 milliGRAM(s) IntraMuscular once  insulin lispro (ADMELOG) corrective regimen sliding scale   SubCutaneous three times a day before meals  insulin lispro (ADMELOG) corrective regimen sliding scale   SubCutaneous at bedtime  lisinopril 5 milliGRAM(s) Oral daily  montelukast 10 milliGRAM(s) Oral daily  mupirocin 2% Nasal 1 Application(s) Both Nostrils two times a day  pantoprazole    Tablet 40 milliGRAM(s) Oral before breakfast  sertraline 100 milliGRAM(s) Oral daily  trimethoprim  160 mG/sulfamethoxazole 800 mG 2 Tablet(s) Oral two times a day    MEDICATIONS  (PRN):  albuterol    90 MICROgram(s) HFA Inhaler 2 Puff(s) Inhalation every 6 hours PRN Shortness of Breath and/or Wheezing  dextrose Oral Gel 15 Gram(s) Oral once PRN Blood Glucose LESS THAN 70 milliGRAM(s)/deciliter

## 2025-02-03 NOTE — BH CONSULTATION LIAISON PROGRESS NOTE - NSBHFUPINTERVALHXFT_PSY_A_CORE
Chart reviewed. No acute events overnight, no psychotropic PRNs required. Adherent with standing meds. VSS.    Patient seen this morning, sitting on the bed, completed breakfast on tray. Calm and cooperative, though poor eye contact, alert and oriented x4. Reports feeling well, mood described as "good." Reports sleeping and eating well. Denies pain or any somatic concerns. Denies any anxiety or depression, states he feels safe in the hospital, staff are taking good care of him. Denies any auditory or visual hallucinations, paranoia, or grandiosity; no delusional content elicited. Pt denies any passive or active suicidal ideation, intent, or plan. Denies urges to self-harm. Denies aggressive/homicidal ideation, intent, or plan. Pt tolerating standing meds well. Denies any restlessness or adverse effects.

## 2025-02-03 NOTE — BH CONSULTATION LIAISON PROGRESS NOTE - NSBHCHARTREVIEWVS_PSY_A_CORE FT
Vital Signs Last 24 Hrs  T(C): 37.1 (03 Feb 2025 10:34), Max: 37.1 (02 Feb 2025 18:29)  T(F): 98.7 (03 Feb 2025 10:34), Max: 98.7 (02 Feb 2025 18:29)  HR: 87 (03 Feb 2025 10:34) (80 - 88)  BP: 113/65 (03 Feb 2025 10:34) (113/65 - 139/70)  BP(mean): --  RR: 18 (03 Feb 2025 10:34) (18 - 18)  SpO2: 95% (03 Feb 2025 10:34) (95% - 98%)    Parameters below as of 03 Feb 2025 04:57  Patient On (Oxygen Delivery Method): room air    
Vital Signs Last 24 Hrs  T(C): 36.8 (31 Jan 2025 10:30), Max: 36.9 (30 Jan 2025 14:00)  T(F): 98.2 (31 Jan 2025 10:30), Max: 98.5 (31 Jan 2025 05:33)  HR: 80 (31 Jan 2025 05:33) (79 - 89)  BP: 133/80 (31 Jan 2025 05:33) (103/67 - 137/86)  BP(mean): --  RR: 18 (31 Jan 2025 10:30) (17 - 18)  SpO2: 98% (31 Jan 2025 10:30) (94% - 98%)    Parameters below as of 31 Jan 2025 10:30  Patient On (Oxygen Delivery Method): room air

## 2025-02-03 NOTE — BH CONSULTATION LIAISON PROGRESS NOTE - NSBHCHARTREVIEWLAB_PSY_A_CORE FT
12.6   11.34 )-----------( 318      ( 31 Jan 2025 06:10 )             39.3   
 LABS:                         13.3   9.95  )-----------( 306      ( 03 Feb 2025 08:17 )             43.3     02-03    137  |  100  |  23  ----------------------------<  98  4.5   |  24  |  1.11    Ca    9.8      03 Feb 2025 08:17  Phos  3.9     02-03  Mg     2.20     02-03          Urinalysis Basic - ( 03 Feb 2025 08:17 )    Color: x / Appearance: x / SG: x / pH: x  Gluc: 98 mg/dL / Ketone: x  / Bili: x / Urobili: x   Blood: x / Protein: x / Nitrite: x   Leuk Esterase: x / RBC: x / WBC x   Sq Epi: x / Non Sq Epi: x / Bacteria: x            RADIOLOGY, EKG & ADDITIONAL TESTS: Reviewed.

## 2025-02-03 NOTE — PROGRESS NOTE ADULT - SUBJECTIVE AND OBJECTIVE BOX
Follow Up:    Abscess    Interval History/ROS:  VSS. Patient was seen and examined at bedside. Denies fever. OOB. No neck pain.    Allergies  No Known Allergies        ANTIMICROBIALS:  trimethoprim  160 mG/sulfamethoxazole 800 mG 2 two times a day      OTHER MEDS:  MEDICATIONS  (STANDING):  albuterol    90 MICROgram(s) HFA Inhaler 2 every 6 hours PRN  atorvastatin 10 at bedtime  cloZAPine 50 at bedtime  dextrose 50% Injectable 25 once  dextrose 50% Injectable 12.5 once  dextrose 50% Injectable 25 once  dextrose Oral Gel 15 once PRN  enoxaparin Injectable 40 every 24 hours  fenofibrate Tablet 48 daily  glucagon  Injectable 1 once  insulin lispro (ADMELOG) corrective regimen sliding scale  three times a day before meals  insulin lispro (ADMELOG) corrective regimen sliding scale  at bedtime  lisinopril 5 daily  montelukast 10 daily  pantoprazole    Tablet 40 before breakfast  sertraline 100 daily      Vital Signs Last 24 Hrs  T(C): 36.6 (03 Feb 2025 18:05), Max: 37.1 (03 Feb 2025 10:34)  T(F): 97.9 (03 Feb 2025 18:05), Max: 98.7 (03 Feb 2025 10:34)  HR: 94 (03 Feb 2025 18:05) (80 - 94)  BP: 134/75 (03 Feb 2025 18:05) (113/65 - 134/75)  BP(mean): --  RR: 18 (03 Feb 2025 18:05) (18 - 18)  SpO2: 95% (03 Feb 2025 18:05) (95% - 98%)    Parameters below as of 03 Feb 2025 18:05  Patient On (Oxygen Delivery Method): room air        PHYSICAL EXAM:  Constitutional: non-toxic, no distress  HEENT:  supple, R side of the posterior aspect of the neck w/ induration, packed, no discharge, nontender  Cardiovascular:   normal S1, S2, no murmur, RRR  Respiratory:  clear BS bilaterally, no wheezes  GI:  soft, non-tender, normal bowel sounds  :  no del rio                                13.3   9.95  )-----------( 306      ( 03 Feb 2025 08:17 )             43.3       02-03    137  |  100  |  23  ----------------------------<  98  4.5   |  24  |  1.11    Ca    9.8      03 Feb 2025 08:17  Phos  3.9     02-03  Mg     2.20     02-03        Urinalysis Basic - ( 03 Feb 2025 08:17 )    Color: x / Appearance: x / SG: x / pH: x  Gluc: 98 mg/dL / Ketone: x  / Bili: x / Urobili: x   Blood: x / Protein: x / Nitrite: x   Leuk Esterase: x / RBC: x / WBC x   Sq Epi: x / Non Sq Epi: x / Bacteria: x        MICROBIOLOGY:  v  .Abscess  01-29-25   Few Staphylococcus aureus  --  Staphylococcus aureus      .Blood BLOOD  01-27-25   No growth at 5 days  --  --      .Blood BLOOD  01-27-25   No growth at 5 days  --  --                RADIOLOGY:  Imaging below independently reviewed.  < from: CT Maxillofacial w/ IV Cont (01.28.25 @ 01:34) >    ACC: [id number] EXAM:  CT MAXILLOFACIAL  IC   ORDERED BY: ANGELIKA WATSON     PROCEDURE DATE:  01/28/2025          INTERPRETATION:  CLINICAL INFORMATION: eval for R mastoiditis. overlying   cellulitis and abscess    COMPARISON: None.    CONTRAST:  IV Contrast: Omnipaque 350  90 cc administered  10 cc discarded  .    TECHNIQUE:  Axial images were obtained through the maxillofacial bones   after the administration of intravenous contrast material. Sagittal and   coronal reformatted images were obtained.    FINDINGS:    AERODIGESTIVE TRACT:  Nasopharynx, oropharynx and hypopharynx are normal   in appearance. Larynx is normal.    LYMPH NODES:  Elongated enhancing structure between the medial line is   common carotid arteries measuring 2.2 x 0.9 cm may represent a prominent   retropharyngeal node.    PAROTID GLANDS:  Normal.    SUBMANDIBULAR GLANDS:  Normal.    VASCULAR STRUCTURES: Major vessels are patent. Bilateral retropharyngeal   course of the common carotid arteries.    SINUSES:  Mild scatteredmucosal thickening.    TYMPANOMASTOID CAVITIES: Clear. Filling defects within the external   auditory canals, likely representing cerumen.    BONES:  Unremarkable.    MISCELLANEOUS: 5.9 x 2.5 x 3.3 cm collection within the right posterior   lateral suboccipital neck, with surrounding inflammation and small nodes.   Chronic appearing depressed fracture deformity of the right orbital   floor.. Conclusions involving the right first mandibular molar tooth.   Additional scattered dental caries and periapical lucencies most   prominent involving the left second premolar and molar teeth.      IMPRESSION:  5.9 x 2.5 x 3.3 cm abscess within the subcutaneous fat of the right   posterior lateral suboccipital neck.    No CT evidence of mastoiditis.    Nonspecific enhancing structure between the medialized bilateral common   carotid arteries may represent a prominent retropharyngeal node, however   is incompletely visualized on current examination. Follow-up nonemergent   contrast-enhanced MRI is recommended for further evaluation, if no   contraindications exist.        --- End of Report ---            FARRUKH RIVERA MD; Attending Radiologist  This document has been electronically signed. Jan 28 2025  2:06AM    < end of copied text >

## 2025-02-03 NOTE — PROGRESS NOTE ADULT - SUBJECTIVE AND OBJECTIVE BOX
ENT ISSUE: Right neck abscess    HPI: Patient is letharic and not answering questions         PAST MEDICAL & SURGICAL HISTORY:  Schizophrenia      Obstructive sleep apnea      Hypertension      Type 2 diabetes mellitus      No significant past surgical history        Allergies    No Known Allergies    Intolerances      MEDICATIONS  (STANDING):  ARIPiprazole 5 milliGRAM(s) Oral every 24 hours  atorvastatin 10 milliGRAM(s) Oral at bedtime  cloZAPine 50 milliGRAM(s) Oral at bedtime  dextrose 5%. 1000 milliLiter(s) (100 mL/Hr) IV Continuous <Continuous>  dextrose 5%. 1000 milliLiter(s) (50 mL/Hr) IV Continuous <Continuous>  dextrose 50% Injectable 25 Gram(s) IV Push once  dextrose 50% Injectable 12.5 Gram(s) IV Push once  dextrose 50% Injectable 25 Gram(s) IV Push once  enoxaparin Injectable 40 milliGRAM(s) SubCutaneous every 24 hours  fenofibrate Tablet 48 milliGRAM(s) Oral daily  glucagon  Injectable 1 milliGRAM(s) IntraMuscular once  insulin lispro (ADMELOG) corrective regimen sliding scale   SubCutaneous three times a day before meals  insulin lispro (ADMELOG) corrective regimen sliding scale   SubCutaneous at bedtime  lisinopril 5 milliGRAM(s) Oral daily  montelukast 10 milliGRAM(s) Oral daily  mupirocin 2% Nasal 1 Application(s) Both Nostrils two times a day  pantoprazole    Tablet 40 milliGRAM(s) Oral before breakfast  sertraline 100 milliGRAM(s) Oral daily  trimethoprim  160 mG/sulfamethoxazole 800 mG 2 Tablet(s) Oral two times a day    MEDICATIONS  (PRN):  albuterol    90 MICROgram(s) HFA Inhaler 2 Puff(s) Inhalation every 6 hours PRN Shortness of Breath and/or Wheezing  dextrose Oral Gel 15 Gram(s) Oral once PRN Blood Glucose LESS THAN 70 milliGRAM(s)/deciliter      Social History: see consult note    Family history: see consult note    ROS:   ENT: all negative except as noted in HPI   Pulm: denies SOB, cough, hemoptysis  Neuro: denies numbness/tingling, loss of sensation  Endo: denies heat/cold intolerance, excessive sweating      Vital Signs Last 24 Hrs  T(C): 36.4 (03 Feb 2025 04:57), Max: 37.1 (02 Feb 2025 18:29)  T(F): 97.5 (03 Feb 2025 04:57), Max: 98.7 (02 Feb 2025 18:29)  HR: 85 (03 Feb 2025 04:57) (80 - 92)  BP: 116/63 (03 Feb 2025 04:57) (115/53 - 139/70)  BP(mean): --  RR: 18 (03 Feb 2025 04:57) (18 - 18)  SpO2: 98% (03 Feb 2025 04:57) (96% - 98%)    Parameters below as of 03 Feb 2025 04:57  Patient On (Oxygen Delivery Method): room air                              13.4   9.38  )-----------( 288      ( 02 Feb 2025 06:48 )             41.8    02-02    140  |  103  |  18  ----------------------------<  95  4.3   |  25  |  0.86    Ca    9.8      02 Feb 2025 06:48  Phos  3.9     02-02  Mg     2.10     02-02         PHYSICAL EXAM:  Gen: NAD  Skin: No rashes, bruises, or lesions  Head: Normocephalic, Atraumatic  Face: no edema, erythema, or fluctuance. Parotid glands soft without mass  Eyes: no scleral injection  Ears: Right - ear canal clear, TM intact without effusion or erythema. No evidence of any fluid drainage. No mastoid tenderness, erythema, or ear bulging            Left - ear canal clear, TM intact without effusion or erythema. No evidence of any fluid drainage. No mastoid tenderness, erythema, or ear bulging  Nose: Nares bilaterally patent, no discharge  Mouth: No Stridor / Drooling / Trismus.  Mucosa moist, tongue/uvula midline, oropharynx clear  Neck: Dressing and packing was removed. No purulence, no lymphadenopathy, trachea midline, no masses  Irriagted with N/S  Packed with 1/2 plain gauze  applied 4x4 gauze and taped into place.    Lymphatic: No lymphadenopathy  Resp: breathing easily, no stridor  Neuro: facial nerve intact, no facial droop

## 2025-02-04 LAB
ANION GAP SERPL CALC-SCNC: 9 MMOL/L — SIGNIFICANT CHANGE UP (ref 7–14)
BUN SERPL-MCNC: 20 MG/DL — SIGNIFICANT CHANGE UP (ref 7–23)
CALCIUM SERPL-MCNC: 9.7 MG/DL — SIGNIFICANT CHANGE UP (ref 8.4–10.5)
CHLORIDE SERPL-SCNC: 102 MMOL/L — SIGNIFICANT CHANGE UP (ref 98–107)
CO2 SERPL-SCNC: 26 MMOL/L — SIGNIFICANT CHANGE UP (ref 22–31)
CREAT SERPL-MCNC: 1.09 MG/DL — SIGNIFICANT CHANGE UP (ref 0.5–1.3)
EGFR: 86 ML/MIN/1.73M2 — SIGNIFICANT CHANGE UP
GLUCOSE BLDC GLUCOMTR-MCNC: 105 MG/DL — HIGH (ref 70–99)
GLUCOSE BLDC GLUCOMTR-MCNC: 132 MG/DL — HIGH (ref 70–99)
GLUCOSE BLDC GLUCOMTR-MCNC: 152 MG/DL — HIGH (ref 70–99)
GLUCOSE BLDC GLUCOMTR-MCNC: 93 MG/DL — SIGNIFICANT CHANGE UP (ref 70–99)
GLUCOSE SERPL-MCNC: 101 MG/DL — HIGH (ref 70–99)
HCT VFR BLD CALC: 40.3 % — SIGNIFICANT CHANGE UP (ref 39–50)
HGB BLD-MCNC: 13 G/DL — SIGNIFICANT CHANGE UP (ref 13–17)
MAGNESIUM SERPL-MCNC: 2.1 MG/DL — SIGNIFICANT CHANGE UP (ref 1.6–2.6)
MCHC RBC-ENTMCNC: 28.8 PG — SIGNIFICANT CHANGE UP (ref 27–34)
MCHC RBC-ENTMCNC: 32.3 G/DL — SIGNIFICANT CHANGE UP (ref 32–36)
MCV RBC AUTO: 89.4 FL — SIGNIFICANT CHANGE UP (ref 80–100)
NRBC # BLD AUTO: 0 K/UL — SIGNIFICANT CHANGE UP (ref 0–0)
NRBC # BLD: 0 /100 WBCS — SIGNIFICANT CHANGE UP (ref 0–0)
NRBC # FLD: 0 K/UL — SIGNIFICANT CHANGE UP (ref 0–0)
NRBC BLD-RTO: 0 /100 WBCS — SIGNIFICANT CHANGE UP (ref 0–0)
PHOSPHATE SERPL-MCNC: 3.8 MG/DL — SIGNIFICANT CHANGE UP (ref 2.5–4.5)
PLATELET # BLD AUTO: 285 K/UL — SIGNIFICANT CHANGE UP (ref 150–400)
POTASSIUM SERPL-MCNC: 4.8 MMOL/L — SIGNIFICANT CHANGE UP (ref 3.5–5.3)
POTASSIUM SERPL-SCNC: 4.8 MMOL/L — SIGNIFICANT CHANGE UP (ref 3.5–5.3)
RBC # BLD: 4.51 M/UL — SIGNIFICANT CHANGE UP (ref 4.2–5.8)
RBC # FLD: 14.1 % — SIGNIFICANT CHANGE UP (ref 10.3–14.5)
SODIUM SERPL-SCNC: 137 MMOL/L — SIGNIFICANT CHANGE UP (ref 135–145)
WBC # BLD: 8.44 K/UL — SIGNIFICANT CHANGE UP (ref 3.8–10.5)
WBC # FLD AUTO: 8.44 K/UL — SIGNIFICANT CHANGE UP (ref 3.8–10.5)

## 2025-02-04 PROCEDURE — 99232 SBSQ HOSP IP/OBS MODERATE 35: CPT

## 2025-02-04 PROCEDURE — G0545: CPT

## 2025-02-04 RX ADMIN — ARIPIPRAZOLE 10 MILLIGRAM(S): 5 TABLET ORAL at 08:27

## 2025-02-04 RX ADMIN — MONTELUKAST SODIUM 10 MILLIGRAM(S): 5 TABLET, CHEWABLE ORAL at 08:27

## 2025-02-04 RX ADMIN — CLOZAPINE 50 MILLIGRAM(S): 50 TABLET ORAL at 21:05

## 2025-02-04 RX ADMIN — Medication 1: at 17:22

## 2025-02-04 RX ADMIN — MUPIROCIN 1 APPLICATION(S): 2 CREAM TOPICAL at 05:49

## 2025-02-04 RX ADMIN — SULFAMETHOXAZOLE AND TRIMETHOPRIM 2 TABLET(S): 400; 80 TABLET ORAL at 05:49

## 2025-02-04 RX ADMIN — Medication 5 MILLIGRAM(S): at 05:49

## 2025-02-04 RX ADMIN — FENOFIBRATE 48 MILLIGRAM(S): 145 TABLET ORAL at 08:27

## 2025-02-04 RX ADMIN — ENOXAPARIN SODIUM 40 MILLIGRAM(S): 100 INJECTION SUBCUTANEOUS at 21:05

## 2025-02-04 RX ADMIN — SULFAMETHOXAZOLE AND TRIMETHOPRIM 2 TABLET(S): 400; 80 TABLET ORAL at 17:10

## 2025-02-04 RX ADMIN — ATORVASTATIN CALCIUM 10 MILLIGRAM(S): 80 TABLET, FILM COATED ORAL at 21:05

## 2025-02-04 RX ADMIN — PANTOPRAZOLE 40 MILLIGRAM(S): 20 TABLET, DELAYED RELEASE ORAL at 05:49

## 2025-02-04 RX ADMIN — Medication 100 MILLIGRAM(S): at 08:27

## 2025-02-04 NOTE — PROGRESS NOTE ADULT - SUBJECTIVE AND OBJECTIVE BOX
Follow Up:    Abscess    Interval History/ROS:  VSS. Patient was seen and examined at bedside. Denies fever. No neck pain. OOB. No acute subjective complaints    Allergies  No Known Allergies        ANTIMICROBIALS:  trimethoprim  160 mG/sulfamethoxazole 800 mG 2 two times a day      OTHER MEDS:  MEDICATIONS  (STANDING):  albuterol    90 MICROgram(s) HFA Inhaler 2 every 6 hours PRN  ARIPiprazole 10 with breakfast  atorvastatin 10 at bedtime  cloZAPine 50 at bedtime  dextrose 50% Injectable 25 once  dextrose 50% Injectable 12.5 once  dextrose 50% Injectable 25 once  dextrose Oral Gel 15 once PRN  enoxaparin Injectable 40 every 24 hours  fenofibrate Tablet 48 daily  glucagon  Injectable 1 once  insulin lispro (ADMELOG) corrective regimen sliding scale  three times a day before meals  insulin lispro (ADMELOG) corrective regimen sliding scale  at bedtime  lisinopril 5 daily  montelukast 10 daily  pantoprazole    Tablet 40 before breakfast  sertraline 100 daily      Vital Signs Last 24 Hrs  T(C): 36.4 (04 Feb 2025 10:33), Max: 36.6 (03 Feb 2025 18:05)  T(F): 97.6 (04 Feb 2025 10:33), Max: 97.9 (03 Feb 2025 18:05)  HR: 96 (04 Feb 2025 10:33) (60 - 96)  BP: 115/71 (04 Feb 2025 10:33) (106/62 - 134/75)  BP(mean): --  RR: 18 (04 Feb 2025 10:33) (18 - 19)  SpO2: 95% (04 Feb 2025 10:33) (95% - 97%)    Parameters below as of 04 Feb 2025 04:50  Patient On (Oxygen Delivery Method): room air      PHYSICAL EXAM:  Constitutional: non-toxic, no distress  HEENT:  supple, R side of the posterior aspect of the neck w/ induration, packed, no discharge, nontender  Cardiovascular:   normal S1, S2, no murmur, RRR  Respiratory:  clear BS bilaterally, no wheezes  GI:  soft, non-tender, normal bowel sounds  :  no del rio                            13.0   8.44  )-----------( 285      ( 04 Feb 2025 07:30 )             40.3       02-04    137  |  102  |  20  ----------------------------<  101[H]  4.8   |  26  |  1.09    Ca    9.7      04 Feb 2025 07:30  Phos  3.8     02-04  Mg     2.10     02-04        Urinalysis Basic - ( 04 Feb 2025 07:30 )    Color: x / Appearance: x / SG: x / pH: x  Gluc: 101 mg/dL / Ketone: x  / Bili: x / Urobili: x   Blood: x / Protein: x / Nitrite: x   Leuk Esterase: x / RBC: x / WBC x   Sq Epi: x / Non Sq Epi: x / Bacteria: x        MICROBIOLOGY:  v  .Abscess  01-29-25   Few Staphylococcus aureus  --  Staphylococcus aureus      .Blood BLOOD  01-27-25   No growth at 5 days  --  --      .Blood BLOOD  01-27-25   No growth at 5 days  --  --                RADIOLOGY:  Imaging below independently reviewed.  < from: CT Maxillofacial w/ IV Cont (01.28.25 @ 01:34) >    ACC: [id number] EXAM:  CT MAXILLOFACIAL  IC   ORDERED BY: ANGELIKA WATSON     PROCEDURE DATE:  01/28/2025          INTERPRETATION:  CLINICAL INFORMATION: eval for R mastoiditis. overlying   cellulitis and abscess    COMPARISON: None.    CONTRAST:  IV Contrast: Omnipaque 350  90 cc administered  10 cc discarded  .    TECHNIQUE:  Axial images were obtained through the maxillofacial bones   after the administration of intravenous contrast material. Sagittal and   coronal reformatted images were obtained.    FINDINGS:    AERODIGESTIVE TRACT:  Nasopharynx, oropharynx and hypopharynx are normal   in appearance. Larynx is normal.    LYMPH NODES:  Elongated enhancing structure between the medial line is   common carotid arteries measuring 2.2 x 0.9 cm may represent a prominent   retropharyngeal node.    PAROTID GLANDS:  Normal.    SUBMANDIBULAR GLANDS:  Normal.    VASCULAR STRUCTURES: Major vessels are patent. Bilateral retropharyngeal   course of the common carotid arteries.    SINUSES:  Mild scatteredmucosal thickening.    TYMPANOMASTOID CAVITIES: Clear. Filling defects within the external   auditory canals, likely representing cerumen.    BONES:  Unremarkable.    MISCELLANEOUS: 5.9 x 2.5 x 3.3 cm collection within the right posterior   lateral suboccipital neck, with surrounding inflammation and small nodes.   Chronic appearing depressed fracture deformity of the right orbital   floor.. Conclusions involving the right first mandibular molar tooth.   Additional scattered dental caries and periapical lucencies most   prominent involving the left second premolar and molar teeth.      IMPRESSION:  5.9 x 2.5 x 3.3 cm abscess within the subcutaneous fat of the right   posterior lateral suboccipital neck.    No CT evidence of mastoiditis.    Nonspecific enhancing structure between the medialized bilateral common   carotid arteries may represent a prominent retropharyngeal node, however   is incompletely visualized on current examination. Follow-up nonemergent   contrast-enhanced MRI is recommended for further evaluation, if no   contraindications exist.        --- End of Report ---            FARRUKH RIVERA MD; Attending Radiologist  This document has been electronically signed. Jan 28 2025  2:06AM    < end of copied text >

## 2025-02-04 NOTE — PROGRESS NOTE ADULT - SUBJECTIVE AND OBJECTIVE BOX
ENT ISSUE/POD: right neck abscess    HPI: Patient seen and examined at bedside this morning. No overnight events. Patient reports improvement in symptoms and is reporting minimal pain at this time.         PAST MEDICAL & SURGICAL HISTORY:  Schizophrenia      Obstructive sleep apnea      Hypertension      Type 2 diabetes mellitus      No significant past surgical history        Allergies    No Known Allergies    Intolerances      MEDICATIONS  (STANDING):  ARIPiprazole 10 milliGRAM(s) Oral with breakfast  atorvastatin 10 milliGRAM(s) Oral at bedtime  cloZAPine 50 milliGRAM(s) Oral at bedtime  dextrose 5%. 1000 milliLiter(s) (100 mL/Hr) IV Continuous <Continuous>  dextrose 5%. 1000 milliLiter(s) (50 mL/Hr) IV Continuous <Continuous>  dextrose 50% Injectable 25 Gram(s) IV Push once  dextrose 50% Injectable 12.5 Gram(s) IV Push once  dextrose 50% Injectable 25 Gram(s) IV Push once  enoxaparin Injectable 40 milliGRAM(s) SubCutaneous every 24 hours  fenofibrate Tablet 48 milliGRAM(s) Oral daily  glucagon  Injectable 1 milliGRAM(s) IntraMuscular once  insulin lispro (ADMELOG) corrective regimen sliding scale   SubCutaneous three times a day before meals  insulin lispro (ADMELOG) corrective regimen sliding scale   SubCutaneous at bedtime  lisinopril 5 milliGRAM(s) Oral daily  montelukast 10 milliGRAM(s) Oral daily  pantoprazole    Tablet 40 milliGRAM(s) Oral before breakfast  sertraline 100 milliGRAM(s) Oral daily  trimethoprim  160 mG/sulfamethoxazole 800 mG 2 Tablet(s) Oral two times a day    MEDICATIONS  (PRN):  albuterol    90 MICROgram(s) HFA Inhaler 2 Puff(s) Inhalation every 6 hours PRN Shortness of Breath and/or Wheezing  dextrose Oral Gel 15 Gram(s) Oral once PRN Blood Glucose LESS THAN 70 milliGRAM(s)/deciliter      Social History: see consult note    Family history: see consult note    ROS:   ENT: all negative except as noted in HPI   Pulm: denies SOB, cough, hemoptysis  Neuro: denies numbness/tingling, loss of sensation  Endo: denies heat/cold intolerance, excessive sweating      Vital Signs Last 24 Hrs  T(C): 36.3 (04 Feb 2025 04:50), Max: 37.1 (03 Feb 2025 10:34)  T(F): 97.3 (04 Feb 2025 04:50), Max: 98.7 (03 Feb 2025 10:34)  HR: 60 (04 Feb 2025 04:50) (60 - 94)  BP: 132/97 (04 Feb 2025 04:50) (106/62 - 134/75)  BP(mean): --  RR: 18 (04 Feb 2025 04:50) (18 - 19)  SpO2: 97% (04 Feb 2025 04:50) (95% - 97%)    Parameters below as of 04 Feb 2025 04:50  Patient On (Oxygen Delivery Method): room air                              13.3   9.95  )-----------( 306      ( 03 Feb 2025 08:17 )             43.3    02-03    137  |  100  |  23  ----------------------------<  98  4.5   |  24  |  1.11    Ca    9.8      03 Feb 2025 08:17  Phos  3.9     02-03  Mg     2.20     02-03        PHYSICAL EXAM:  Gen: NAD  Skin: No rashes, bruises, or lesions  Head: Normocephalic, Atraumatic  Eyes: no scleral injection  Neck: Right posterior cervical erythema, dressing and 1/4 inch packing was removed. Mild amount of purulence was expressed from wound, much improved as compared to prior. Irrigated with 10cc of NS. Repacked with 1/4" plain strip gauze, applied 4x4 gauze dressing and taped in place.  Resp: breathing easily, no stridor

## 2025-02-04 NOTE — PROGRESS NOTE ADULT - SUBJECTIVE AND OBJECTIVE BOX
[facility] Division of Hospital Medicine  Josue Sena DO  Available via MS Teams  Pager:[phone]    SUBJECTIVE / OVERNIGHT EVENTS: No acute events overnight. Patient denies any symptoms.    ADDITIONAL REVIEW OF SYSTEMS:  Review of Systems:   CONSTITUTIONAL: No fever, weight loss  EYES: No eye pain, visual disturbances, or discharge  ENMT:  No difficulty hearing, tinnitus, vertigo; No sinus or throat pain  RESPIRATORY: No SOB. No cough, wheezing, chills or hemoptysis  CARDIOVASCULAR: No chest pain, palpitations, dizziness  GASTROINTESTINAL: No abdominal or epigastric pain. No nausea, vomiting, or hematemesis; No diarrhea or constipation. No melena or hematochezia.  GENITOURINARY: No dysuria, frequency, hematuria, or incontinence  NEUROLOGICAL: No headaches, memory loss, loss of strength, numbness, or tremors  SKIN: No itching, burning, rashes, or lesions   LYMPH NODES: No enlarged glands  ENDOCRINE: No heat or cold intolerance; No hair loss  MUSCULOSKELETAL: No joint pain or swelling; No muscle, back pain  HEME/LYMPH: No easy bruising, or bleeding gums      MEDICATIONS  (STANDING):  ARIPiprazole 10 milliGRAM(s) Oral with breakfast  atorvastatin 10 milliGRAM(s) Oral at bedtime  cloZAPine 50 milliGRAM(s) Oral at bedtime  dextrose 5%. 1000 milliLiter(s) (100 mL/Hr) IV Continuous <Continuous>  dextrose 5%. 1000 milliLiter(s) (50 mL/Hr) IV Continuous <Continuous>  dextrose 50% Injectable 25 Gram(s) IV Push once  dextrose 50% Injectable 12.5 Gram(s) IV Push once  dextrose 50% Injectable 25 Gram(s) IV Push once  enoxaparin Injectable 40 milliGRAM(s) SubCutaneous every 24 hours  fenofibrate Tablet 48 milliGRAM(s) Oral daily  glucagon  Injectable 1 milliGRAM(s) IntraMuscular once  insulin lispro (ADMELOG) corrective regimen sliding scale   SubCutaneous three times a day before meals  insulin lispro (ADMELOG) corrective regimen sliding scale   SubCutaneous at bedtime  lisinopril 5 milliGRAM(s) Oral daily  montelukast 10 milliGRAM(s) Oral daily  pantoprazole    Tablet 40 milliGRAM(s) Oral before breakfast  sertraline 100 milliGRAM(s) Oral daily  trimethoprim  160 mG/sulfamethoxazole 800 mG 2 Tablet(s) Oral two times a day    MEDICATIONS  (PRN):  albuterol    90 MICROgram(s) HFA Inhaler 2 Puff(s) Inhalation every 6 hours PRN Shortness of Breath and/or Wheezing  dextrose Oral Gel 15 Gram(s) Oral once PRN Blood Glucose LESS THAN 70 milliGRAM(s)/deciliter      I&O's Summary      PHYSICAL EXAM:  Vital Signs Last 24 Hrs  T(C): 36.4 (04 Feb 2025 10:33), Max: 36.6 (03 Feb 2025 18:05)  T(F): 97.6 (04 Feb 2025 10:33), Max: 97.9 (03 Feb 2025 18:05)  HR: 96 (04 Feb 2025 10:33) (60 - 96)  BP: 115/71 (04 Feb 2025 10:33) (106/62 - 134/75)  BP(mean): --  RR: 18 (04 Feb 2025 10:33) (18 - 19)  SpO2: 95% (04 Feb 2025 10:33) (95% - 97%)    Parameters below as of 04 Feb 2025 04:50  Patient On (Oxygen Delivery Method): room air      CONSTITUTIONAL: NAD, well-groomed, obese  EYES: PERRLA; conjunctiva and sclera clear  ENMT: Moist oral mucosa, no pharyngeal injection or exudates; normal dentition  NECK: Supple, no palpable masses; no thyromegaly  RESPIRATORY: Normal respiratory effort; lungs are clear to auscultation bilaterally  CARDIOVASCULAR: Regular rate and rhythm, normal S1 and S2, no murmur/rub/gallop; No lower extremity edema; Peripheral pulses are 2+ bilaterally  ABDOMEN: Nontender to palpation, normoactive bowel sounds, no rebound/guarding; No hepatosplenomegaly  MUSCULOSKELETAL:  No clubbing or cyanosis of digits; no joint swelling or tenderness to palpation  PSYCH: A+O to person, place, and time; affect appropriate  NEUROLOGY: CN 2-12 are intact and symmetric; no gross sensory deficits     LABS:                        13.0   8.44  )-----------( 285      ( 04 Feb 2025 07:30 )             40.3     02-04    137  |  102  |  20  ----------------------------<  101[H]  4.8   |  26  |  1.09    Ca    9.7      04 Feb 2025 07:30  Phos  3.8     02-04  Mg     2.10     02-04            Urinalysis Basic - ( 04 Feb 2025 07:30 )    Color: x / Appearance: x / SG: x / pH: x  Gluc: 101 mg/dL / Ketone: x  / Bili: x / Urobili: x   Blood: x / Protein: x / Nitrite: x   Leuk Esterase: x / RBC: x / WBC x   Sq Epi: x / Non Sq Epi: x / Bacteria: x            RADIOLOGY & ADDITIONAL TESTS:  New Imaging Personally Reviewed Today: Yes  New Electrocardiogram Personally Reviewed Today: Yes  Other Results Reviewed Today: Yes  Prior or Outpatient Records Reviewed Today with Summary: Yes    COORDINATION OF CARE:  Consultant Communication and Details of Discussion (where applicable): Yes

## 2025-02-05 ENCOUNTER — TRANSCRIPTION ENCOUNTER (OUTPATIENT)
Age: 45
End: 2025-02-05

## 2025-02-05 VITALS
DIASTOLIC BLOOD PRESSURE: 70 MMHG | HEART RATE: 88 BPM | SYSTOLIC BLOOD PRESSURE: 128 MMHG | TEMPERATURE: 98 F | OXYGEN SATURATION: 97 % | RESPIRATION RATE: 18 BRPM

## 2025-02-05 LAB
ANION GAP SERPL CALC-SCNC: 14 MMOL/L — SIGNIFICANT CHANGE UP (ref 7–14)
BUN SERPL-MCNC: 21 MG/DL — SIGNIFICANT CHANGE UP (ref 7–23)
CALCIUM SERPL-MCNC: 9.6 MG/DL — SIGNIFICANT CHANGE UP (ref 8.4–10.5)
CHLORIDE SERPL-SCNC: 98 MMOL/L — SIGNIFICANT CHANGE UP (ref 98–107)
CO2 SERPL-SCNC: 22 MMOL/L — SIGNIFICANT CHANGE UP (ref 22–31)
CREAT SERPL-MCNC: 1.08 MG/DL — SIGNIFICANT CHANGE UP (ref 0.5–1.3)
EGFR: 87 ML/MIN/1.73M2 — SIGNIFICANT CHANGE UP
GLUCOSE BLDC GLUCOMTR-MCNC: 86 MG/DL — SIGNIFICANT CHANGE UP (ref 70–99)
GLUCOSE BLDC GLUCOMTR-MCNC: 90 MG/DL — SIGNIFICANT CHANGE UP (ref 70–99)
GLUCOSE SERPL-MCNC: 142 MG/DL — HIGH (ref 70–99)
HCT VFR BLD CALC: 41.2 % — SIGNIFICANT CHANGE UP (ref 39–50)
HGB BLD-MCNC: 13.2 G/DL — SIGNIFICANT CHANGE UP (ref 13–17)
MAGNESIUM SERPL-MCNC: 2.1 MG/DL — SIGNIFICANT CHANGE UP (ref 1.6–2.6)
MCHC RBC-ENTMCNC: 28.6 PG — SIGNIFICANT CHANGE UP (ref 27–34)
MCHC RBC-ENTMCNC: 32 G/DL — SIGNIFICANT CHANGE UP (ref 32–36)
MCV RBC AUTO: 89.2 FL — SIGNIFICANT CHANGE UP (ref 80–100)
NRBC # BLD AUTO: 0 K/UL — SIGNIFICANT CHANGE UP (ref 0–0)
NRBC # BLD: 0 /100 WBCS — SIGNIFICANT CHANGE UP (ref 0–0)
NRBC # FLD: 0 K/UL — SIGNIFICANT CHANGE UP (ref 0–0)
NRBC BLD-RTO: 0 /100 WBCS — SIGNIFICANT CHANGE UP (ref 0–0)
PHOSPHATE SERPL-MCNC: 3.1 MG/DL — SIGNIFICANT CHANGE UP (ref 2.5–4.5)
PLATELET # BLD AUTO: 286 K/UL — SIGNIFICANT CHANGE UP (ref 150–400)
POTASSIUM SERPL-MCNC: 4.4 MMOL/L — SIGNIFICANT CHANGE UP (ref 3.5–5.3)
POTASSIUM SERPL-SCNC: 4.4 MMOL/L — SIGNIFICANT CHANGE UP (ref 3.5–5.3)
RBC # BLD: 4.62 M/UL — SIGNIFICANT CHANGE UP (ref 4.2–5.8)
RBC # FLD: 14.2 % — SIGNIFICANT CHANGE UP (ref 10.3–14.5)
SODIUM SERPL-SCNC: 134 MMOL/L — LOW (ref 135–145)
WBC # BLD: 8.99 K/UL — SIGNIFICANT CHANGE UP (ref 3.8–10.5)
WBC # FLD AUTO: 8.99 K/UL — SIGNIFICANT CHANGE UP (ref 3.8–10.5)

## 2025-02-05 PROCEDURE — 99239 HOSP IP/OBS DSCHRG MGMT >30: CPT

## 2025-02-05 RX ORDER — SULFAMETHOXAZOLE AND TRIMETHOPRIM 400; 80 MG/1; MG/1
2 TABLET ORAL
Qty: 28 | Refills: 0
Start: 2025-02-05 | End: 2025-02-11

## 2025-02-05 RX ORDER — ARIPIPRAZOLE 5 MG/1
400 TABLET ORAL
Qty: 1 | Refills: 0
Start: 2025-02-05 | End: 2025-02-05

## 2025-02-05 RX ORDER — ARIPIPRAZOLE 5 MG/1
1 TABLET ORAL
Qty: 14 | Refills: 0
Start: 2025-02-05 | End: 2025-02-18

## 2025-02-05 RX ADMIN — PANTOPRAZOLE 40 MILLIGRAM(S): 20 TABLET, DELAYED RELEASE ORAL at 05:30

## 2025-02-05 RX ADMIN — FENOFIBRATE 48 MILLIGRAM(S): 145 TABLET ORAL at 12:10

## 2025-02-05 RX ADMIN — MONTELUKAST SODIUM 10 MILLIGRAM(S): 5 TABLET, CHEWABLE ORAL at 12:10

## 2025-02-05 RX ADMIN — SULFAMETHOXAZOLE AND TRIMETHOPRIM 2 TABLET(S): 400; 80 TABLET ORAL at 05:29

## 2025-02-05 RX ADMIN — ARIPIPRAZOLE 10 MILLIGRAM(S): 5 TABLET ORAL at 09:01

## 2025-02-05 RX ADMIN — Medication 100 MILLIGRAM(S): at 12:10

## 2025-02-05 RX ADMIN — Medication 5 MILLIGRAM(S): at 05:30

## 2025-02-05 NOTE — DISCHARGE NOTE NURSING/CASE MANAGEMENT/SOCIAL WORK - NSDCCRNAME_GEN_ALL_CORE_FT
[facility] [address], [address]  [place] [address]

## 2025-02-05 NOTE — PROGRESS NOTE ADULT - ASSESSMENT
44 year old male with PMHx HTN, ROMMEL, schizophrenia, DM with R sided neck abscess. WBC 11.34. Patient on vanc/ceftriaxone. On exam, slight improvement in amount of purulence on packing, now moderate amount of pus expressed from wound, appears to still be draining moderate amount. Irrigated with 10cc of NS and repacked wound with 1/4" plain strip gauze, applied 4x4 gauze dressing and taped in place. 
44-year-old with past medical history of hypertension, ROMMEL, schizophrenia, diabetes, admitted for right neck abscess failing outpatient management complicated by delirium vs decompensated psychiatric sate 
44-yo M w/ PMH of schizophrenia, DM, class II obesity, and ROMMEL, admitted w/ R dorsal neck abscess. Recently presented to ED and was prescribed Bactrim, however unclear if patient took them. Presenting again w/ CT evidence of 5.9 x 2.5 x 3.3 cm abscess w/in the SQ fat of the R posterior lateral suboccipital neck. S/p bedside I&D (1/29), culture growing MSSA. Of note, patient has MRSA nasal swab positivity. Currently clinically stable.    Cultures:  1/27 BCx neg  1/29 MRSA swab pos  1/29 wound Cx MSSA    Antimicrobials:  Vancomycin 1/27, 1/30-1/31  Zosyn 1/27-1/28  Ceftriaxone 1/29-1/30  Cefazolin 1/31-2/1  Bactrim 2/1-    #MSSA infection  #Abscess  #Leukocytosis    Recommendations:  - Continue with Bactrim DS 2 tabs PO Q12H  - Would treat through 2/12/25.  - Warm compress and pus expression per primary team.  - Extensive wound care    Plan discussed with primary team provider.  Thank you for this consult.    Edward Booker MD, PhD  Attending Physician  Division of Infectious Diseases  Department of Medicine    Please contact through MS Teams message.  Office: [phone] (after 5 PM or weekend) .    
Right neck abscess
44 year old male with PMHx HTN, ROMMEL, schizophrenia, DM with R sided neck abscess. WBC 11.34. Patient on vanc/ceftriaxone. On exam, moderate amount of pus expressed from wound, appears to still be draining moderate amount. Irrigated with 10cc of NS and repacked wound with 1/4" plain strip gauze, applied 4x4 gauze dressing and taped in place. 
44-yo M w/ PMH of schizophrenia, DM, class II obesity, and ROMMEL, admitted w/ R dorsal neck abscess. Recently presented to ED and was prescribed Bactrim, however unclear if patient took them. Presenting again w/ CT evidence of 5.9 x 2.5 x 3.3 cm abscess w/in the SQ fat of the R posterior lateral suboccipital neck. S/p bedside I&D (1/29), culture growing MSSA. Of note, patient has MRSA nasal swab positivity. Currently clinically stable.    Cultures:  1/27 BCx neg  1/29 MRSA swab pos  1/29 wound Cx MSSA    Antimicrobials:  Vancomycin 1/27, 1/30-1/31  Zosyn 1/27-1/28  Ceftriaxone 1/29-1/30  Cefazolin 1/31-2/1  Bactrim 2/1-    #MSSA infection  #Abscess  #Leukocytosis    Recommendations:  - Continue with Bactrim DS 2 tabs PO Q12H  - Would treat through 2/12/25.  - Warm compress and pus expression per primary team.  - Extensive wound care    Plan discussed with primary team provider.  Thank you for this consult.    Edward Booker MD, PhD  Attending Physician  Division of Infectious Diseases  Department of Medicine    Please contact through MS Teams message.  Office: [phone] (after 5 PM or weekend) .    
44 year old male with PMHx HTN, OSAS, schizophrenia, DM with R sided neck abscess. Patient demonstrating significant improvement in terms of drainage and symptoms, currently on Bactrim BID per ID reccs. On exam,  1/4" packing removed, and mild amount of purulence expressed from wound site. Irrigated with 10cc of NS and repacked wound with 1/4" plain strip gauze, applied 4x4 gauze dressing and taped in place. Given improvement, can decrease daily packing changes/irrigation to 3x/week.
44-year-old with past medical history of hypertension, ROMMEL, schizophrenia, diabetes, admitted for right neck abscess failing outpatient management complicated by delirium vs decompensated psychiatric sate 
44 year old male with PMHx HTN, ROMMEL, schizophrenia, DM with R sided neck abscess. Patient with significant improvement, currently on Bactrim and being treated with daily irrigation/packing changes. On exam, 1/4" packing removed, and mild amount of purulence expressed from wound site. Irrigated with 10cc of NS and repacked wound with 1/4" plain strip gauze, applied 4x4 gauze dressing and taped in place. 
44 year old male with neck abscess 
44 year old male with PMHx HTN, ROMMEL, schizophrenia, DM with R sided neck abscess. WBC 11.34. Patient on vanc/ceftriaxone. On exam, slight improvement in amount of purulence on packing, now moderate amount of pus expressed from wound, appears to still be draining moderate amount. Irrigated with 10cc of NS and repacked wound with 1/4" plain strip gauze, applied 4x4 gauze dressing and taped in place. 
Right neck abscess 
44-year-old with past medical history of hypertension, ROMEML, schizophrenia, diabetes, admitted for right neck abscess failing outpatient management complicated by delirium vs decompensated psychiatric sate 
44-year-old with past medical history of hypertension, ROMMEL, schizophrenia, diabetes, admitted for right neck abscess failing outpatient management complicated by delirium vs decompensated psychiatric sate 

## 2025-02-05 NOTE — PROGRESS NOTE ADULT - TIME BILLING
40 minutes of total time dedicated to this patient visit today including preparing to see the patient (eg. review of tests), obtaining and/or reviewing separately obtained history, obtaining/reviewing vitals, performing a medically appropriate examination and/or evaluation, counseling and educating the patient/family/caregiver, reviewing previous notes and test results, and procedures, communicating with other health professionals (when not separately reported), and documenting clinical information in the electronic health record. This time excludes teaching and separately reported services.
40 minutes of total time dedicated to this patient visit today including preparing to see the patient (eg. review of tests), obtaining and/or reviewing separately obtained history, obtaining/reviewing vitals, performing a medically appropriate examination and/or evaluation, counseling and educating the patient/family/caregiver, reviewing previous notes and test results, and procedures, communicating with other health professionals (when not separately reported), and documenting clinical information in the electronic health record. This time excludes teaching and separately reported services.
50 minutes of total time dedicated to this patient visit today including preparing to see the patient (eg. review of tests), obtaining and/or reviewing separately obtained history, obtaining/reviewing vitals, performing a medically appropriate examination and/or evaluation, counseling and educating the patient/family/caregiver, reviewing previous notes and test results, and procedures, communicating with other health professionals (when not separately reported), and documenting clinical information in the electronic health record. This time excludes teaching and separately reported services.

## 2025-02-05 NOTE — PROGRESS NOTE ADULT - PROBLEM SELECTOR PROBLEM 1
Neck abscess

## 2025-02-05 NOTE — PROGRESS NOTE ADULT - PROBLEM SELECTOR PLAN 5
DVT proph: Lovenox
DVT ppx: lovenox  Dispo: [facility], f/u SW/CM
DVT ppx: lovenox  Dispo: [facility], f/u SW/CM
DVT proph: Lovenox
DVT proph: Lovenox
DVT ppx: lovenox  Dispo: [facility]

## 2025-02-05 NOTE — PROGRESS NOTE ADULT - PROBLEM SELECTOR PLAN 4
a1c 6.5  on semeglutide and metformin at home  ISS  FS TID AC
a1c 6.5  on semeglutide and metformin at home  ISS  FS TID AC
a1c 6.5  on semaglutide and metformin at home  ISS  FS TID AC
a1c 6.5  on semaglutide and metformin at home  ISS  FS TID AC
a1c 6.5  on semeglutide and metformin at home  ISS  FS TID AC

## 2025-02-05 NOTE — DISCHARGE NOTE NURSING/CASE MANAGEMENT/SOCIAL WORK - FINANCIAL ASSISTANCE
[facility] provides services at a reduced cost to those who are determined to be eligible through [facility]’s financial assistance program. Information regarding [facility]’s financial assistance program can be found by going to [email or web address] or by calling [phone].

## 2025-02-05 NOTE — PROGRESS NOTE ADULT - PROBLEM SELECTOR PROBLEM 4
Diabetes type 2

## 2025-02-05 NOTE — PROGRESS NOTE ADULT - SUBJECTIVE AND OBJECTIVE BOX
ENT ISSUE/POD: right neck abscess    HPI: Patient seen and examined at bedside this morning. No overnight events. Patient reports improvement in symptoms and is reporting minimal pain at this time.        PAST MEDICAL & SURGICAL HISTORY:  Schizophrenia      Obstructive sleep apnea      Hypertension      Type 2 diabetes mellitus      No significant past surgical history        Allergies    No Known Allergies    Intolerances      MEDICATIONS  (STANDING):  ARIPiprazole 10 milliGRAM(s) Oral with breakfast  atorvastatin 10 milliGRAM(s) Oral at bedtime  cloZAPine 50 milliGRAM(s) Oral at bedtime  dextrose 5%. 1000 milliLiter(s) (100 mL/Hr) IV Continuous <Continuous>  dextrose 5%. 1000 milliLiter(s) (50 mL/Hr) IV Continuous <Continuous>  dextrose 50% Injectable 25 Gram(s) IV Push once  dextrose 50% Injectable 12.5 Gram(s) IV Push once  dextrose 50% Injectable 25 Gram(s) IV Push once  enoxaparin Injectable 40 milliGRAM(s) SubCutaneous every 24 hours  fenofibrate Tablet 48 milliGRAM(s) Oral daily  glucagon  Injectable 1 milliGRAM(s) IntraMuscular once  insulin lispro (ADMELOG) corrective regimen sliding scale   SubCutaneous three times a day before meals  insulin lispro (ADMELOG) corrective regimen sliding scale   SubCutaneous at bedtime  lisinopril 5 milliGRAM(s) Oral daily  montelukast 10 milliGRAM(s) Oral daily  pantoprazole    Tablet 40 milliGRAM(s) Oral before breakfast  sertraline 100 milliGRAM(s) Oral daily  trimethoprim  160 mG/sulfamethoxazole 800 mG 2 Tablet(s) Oral two times a day    MEDICATIONS  (PRN):  albuterol    90 MICROgram(s) HFA Inhaler 2 Puff(s) Inhalation every 6 hours PRN Shortness of Breath and/or Wheezing  dextrose Oral Gel 15 Gram(s) Oral once PRN Blood Glucose LESS THAN 70 milliGRAM(s)/deciliter      Social History: see consult note    Family history: see consult note    ROS:   ENT: all negative except as noted in HPI   Pulm: denies SOB, cough, hemoptysis  Neuro: denies numbness/tingling, loss of sensation  Endo: denies heat/cold intolerance, excessive sweating      Vital Signs Last 24 Hrs  T(C): 36.8 (05 Feb 2025 05:05), Max: 36.8 (05 Feb 2025 05:05)  T(F): 98.2 (05 Feb 2025 05:05), Max: 98.2 (05 Feb 2025 05:05)  HR: 74 (05 Feb 2025 05:05) (74 - 96)  BP: 131/71 (05 Feb 2025 05:05) (110/60 - 140/80)  BP(mean): --  RR: 17 (05 Feb 2025 05:05) (17 - 18)  SpO2: 96% (05 Feb 2025 05:05) (95% - 100%)    Parameters below as of 05 Feb 2025 05:05  Patient On (Oxygen Delivery Method): room air                              13.0   8.44  )-----------( 285      ( 04 Feb 2025 07:30 )             40.3    02-04    137  |  102  |  20  ----------------------------<  101[H]  4.8   |  26  |  1.09    Ca    9.7      04 Feb 2025 07:30  Phos  3.8     02-04  Mg     2.10     02-04       PHYSICAL EXAM:  Gen: NAD  Skin: No rashes, bruises, or lesions  Head: Normocephalic, Atraumatic  Eyes: no scleral injection  Neck: Right posterior cervical erythema, dressing and 1/4 inch packing removed. Mild amount of purulence was expressed from wound, significant improved in terms of drainage as compared to prior exam. Irrigated with 10cc of NS. Repacked with 1/4" plain strip gauze, applied 4x4 gauze dressing and taped in place.  Resp: breathing easily, no stridor

## 2025-02-05 NOTE — PROGRESS NOTE ADULT - PROBLEM SELECTOR PLAN 3
c/w lisinopril  monitor BP
c/w lisinopril
c/w lisinopril  monitor BP
c/w lisinopril
c/w lisinopril  monitor BP
c/w lisinopril

## 2025-02-05 NOTE — PROGRESS NOTE ADULT - PROBLEM SELECTOR PLAN 2
Delirium vs decompensated psych  right now behaviorally appropriate  discussed pt with his psychiatrist Dr. Cobos who is requesting psych follow along. typically he is very agreeable, follows commands, not disoriented   Dr. Cobos's contact : [phone]  c/w sertraline and clozaril, increased abilify to 10mg daily per psych
Delirium vs decompensated psych  right now behavioraly appropriate  discussed pt with his psychiatrist Dr. Cobos who is requesting psych follow along. typically he is very agreeable, follows commands, not disoriented   Dr. Cobos's contact : [phone]  c/w sertraline and clozaril (has not missed more than 48 hrs) did miss last night  c/w clozaril 50mg qhs (initially verbally was told 100mg but in looking at med list, appears to be 50mg. discussed with Dr. Cobos, will c/w 50mg tonight and confirm with home tomorrow)
Delirium vs decompensated psych  right now behaviorally appropriate  discussed pt with his psychiatrist Dr. Cobos who is requesting psych follow along. typically he is very agreeable, follows commands, not disoriented   Dr. Cobos's contact : [phone]  c/w sertraline and clozaril, increase ability to 7.5mg qAM per psych  psych assisting with clozaril mgmt
Delirium vs decompensated psych  right now behavioraly appropriate  discussed pt with his psychiatrist Dr. Cobos who is requesting psych follow along. typically he is very agreeable, follows commands, not disoriented   Dr. Cobos's contact : [phone]  c/w sertraline and clozaril (has not missed more than 48 hrs) did miss last night  c/w clozaril 50mg qhs (initially verbally was told 100mg but in looking at med list, appears to be 50mg. discussed with Dr. Cobos, will c/w 50mg tonight and confirm with home tomorrow)  psych assisting with clozaril mgmt
Delirium vs decompensated psych  right now behavioraly appropriate  discussed pt with his psychiatrist Dr. Cobos who is requesting psych follow along. typically he is very agreeable, follows commands, not disoriented   Dr. Cobos's contact : [phone]  c/w sertraline and clozaril (has not missed more than 48 hrs) did miss last night  c/w clozaril 50mg qhs (initially verbally was told 100mg but in looking at med list, appears to be 50mg. discussed with Dr. Cobos, will c/w 50mg tonight and confirm with home tomorrow)  psych assisting with clozaril mgmt
Delirium vs decompensated psych  right now behavioraly appropriate  discussed pt with his psychiatrist Dr. Cobos who is requesting psych follow along. typically he is very agreeable, follows commands, not disoriented   Dr. Cobos's contact : [phone]  c/w sertraline and clozaril (has not missed more than 48 hrs) did miss last night  c/w clozaril 50mg qhs (initially verbally was told 100mg but in looking at med list, appears to be 50mg. discussed with Dr. Cobos, will c/w 50mg tonight and confirm with home tomorrow)
Delirium vs decompensated psych  right now behavioraly appropriate  discussed pt with his psychiatrist Dr. Cobos who is requesting psych follow along. typically he is very agreeable, follows commands, not disoriented   Dr. Cobos's contact : [phone]  c/w sertraline and clozaril (has not missed more than 48 hrs) did miss last night  c/w clozaril 50mg qhs (initially verbally was told 100mg but in looking at med list, appears to be 50mg. discussed with Dr. Cobos, will c/w 50mg tonight and confirm with home tomorrow)  psych assisting with clozaril mgmt
Delirium vs decompensated psych  right now behaviorally appropriate  discussed pt with his psychiatrist Dr. Cobos who is requesting psych follow along. typically he is very agreeable, follows commands, not disoriented   Dr. Cobos's contact : [phone]  c/w sertraline and clozaril, increased ability to 10mg daily per psych  psych assisting with clozaril mgmt

## 2025-02-05 NOTE — PROGRESS NOTE ADULT - SUBJECTIVE AND OBJECTIVE BOX
[facility] Division of Hospital Medicine  Josue Sena DO  Available via MS Teams  Pager:[phone]    SUBJECTIVE / OVERNIGHT EVENTS: No acute events overnight. Patient denies any symptoms.    ADDITIONAL REVIEW OF SYSTEMS:    MEDICATIONS  (STANDING):  ARIPiprazole 10 milliGRAM(s) Oral with breakfast  atorvastatin 10 milliGRAM(s) Oral at bedtime  cloZAPine 50 milliGRAM(s) Oral at bedtime  dextrose 5%. 1000 milliLiter(s) (100 mL/Hr) IV Continuous <Continuous>  dextrose 5%. 1000 milliLiter(s) (50 mL/Hr) IV Continuous <Continuous>  dextrose 50% Injectable 25 Gram(s) IV Push once  dextrose 50% Injectable 12.5 Gram(s) IV Push once  dextrose 50% Injectable 25 Gram(s) IV Push once  enoxaparin Injectable 40 milliGRAM(s) SubCutaneous every 24 hours  fenofibrate Tablet 48 milliGRAM(s) Oral daily  glucagon  Injectable 1 milliGRAM(s) IntraMuscular once  insulin lispro (ADMELOG) corrective regimen sliding scale   SubCutaneous three times a day before meals  insulin lispro (ADMELOG) corrective regimen sliding scale   SubCutaneous at bedtime  lisinopril 5 milliGRAM(s) Oral daily  montelukast 10 milliGRAM(s) Oral daily  pantoprazole    Tablet 40 milliGRAM(s) Oral before breakfast  sertraline 100 milliGRAM(s) Oral daily  trimethoprim  160 mG/sulfamethoxazole 800 mG 2 Tablet(s) Oral two times a day    MEDICATIONS  (PRN):  albuterol    90 MICROgram(s) HFA Inhaler 2 Puff(s) Inhalation every 6 hours PRN Shortness of Breath and/or Wheezing  dextrose Oral Gel 15 Gram(s) Oral once PRN Blood Glucose LESS THAN 70 milliGRAM(s)/deciliter      I&O's Summary      PHYSICAL EXAM:  Vital Signs Last 24 Hrs  T(C): 36.4 (05 Feb 2025 10:32), Max: 36.8 (05 Feb 2025 05:05)  T(F): 97.6 (05 Feb 2025 10:32), Max: 98.2 (05 Feb 2025 05:05)  HR: 88 (05 Feb 2025 10:32) (74 - 94)  BP: 128/70 (05 Feb 2025 10:32) (110/60 - 140/80)  BP(mean): --  RR: 18 (05 Feb 2025 10:32) (17 - 18)  SpO2: 97% (05 Feb 2025 10:32) (96% - 100%)    Parameters below as of 05 Feb 2025 05:05  Patient On (Oxygen Delivery Method): room air      CONSTITUTIONAL: NAD, well-groomed, obese  EYES: PERRLA; conjunctiva and sclera clear  ENMT: Moist oral mucosa, no pharyngeal injection or exudates; normal dentition  NECK: Supple, no palpable masses; no thyromegaly  RESPIRATORY: Normal respiratory effort; lungs are clear to auscultation bilaterally  CARDIOVASCULAR: Regular rate and rhythm, normal S1 and S2, no murmur/rub/gallop; No lower extremity edema; Peripheral pulses are 2+ bilaterally  ABDOMEN: Nontender to palpation, normoactive bowel sounds, no rebound/guarding; No hepatosplenomegaly  MUSCULOSKELETAL:  No clubbing or cyanosis of digits; no joint swelling or tenderness to palpation  PSYCH: A+O to person, place, and time; affect appropriate  NEUROLOGY: CN 2-12 are intact and symmetric; no gross sensory deficits   SKIN: No rashes; no palpable lesions    LABS:                        13.2   8.99  )-----------( 286      ( 05 Feb 2025 06:35 )             41.2     02-05    134[L]  |  98  |  21  ----------------------------<  142[H]  4.4   |  22  |  1.08    Ca    9.6      05 Feb 2025 06:35  Phos  3.1     02-05  Mg     2.10     02-05            Urinalysis Basic - ( 05 Feb 2025 06:35 )    Color: x / Appearance: x / SG: x / pH: x  Gluc: 142 mg/dL / Ketone: x  / Bili: x / Urobili: x   Blood: x / Protein: x / Nitrite: x   Leuk Esterase: x / RBC: x / WBC x   Sq Epi: x / Non Sq Epi: x / Bacteria: x            RADIOLOGY & ADDITIONAL TESTS:  New Imaging Personally Reviewed Today: Yes  New Electrocardiogram Personally Reviewed Today: Yes  Other Results Reviewed Today: Yes  Prior or Outpatient Records Reviewed Today with Summary: Yes    COORDINATION OF CARE:  Consultant Communication and Details of Discussion (where applicable): Yes

## 2025-02-05 NOTE — PROGRESS NOTE ADULT - PROVIDER SPECIALTY LIST ADULT
ENT
Hospitalist
Infectious Disease
Infectious Disease
ENT
Hospitalist
ENT
Hospitalist
ENT
Hospitalist

## 2025-02-05 NOTE — DISCHARGE NOTE NURSING/CASE MANAGEMENT/SOCIAL WORK - PATIENT PORTAL LINK FT
You can access the FollowMyHealth Patient Portal offered by [facility] by registering at the following website: [email or web address]. By joining [employer]’s FollowMyHealth portal, you will also be able to view your health information using other applications (apps) compatible with our system.

## 2025-02-05 NOTE — DISCHARGE NOTE NURSING/CASE MANAGEMENT/SOCIAL WORK - NSDCVIVACCINE_GEN_ALL_CORE_FT
Tdap; 20-May-2015 00:52; Juancho Jackson); [employer]; [id number]; IntraMuscular; Deltoid Right.; 0.5 milliLiter(s); VIS (VIS Published: 09-May-2013, VIS Presented: 20-May-2015);

## 2025-02-05 NOTE — PROGRESS NOTE ADULT - REASON FOR ADMISSION
right neck abscess

## 2025-02-05 NOTE — PROGRESS NOTE ADULT - PROBLEM SELECTOR PLAN 1
1. IV abx as per primary team, consider ID consult  2. BS control per primary team, consider endocrine consult  3. Send cultures at bedside., asked nurse to page primary team to order and then send for testing  4. ENT will follow and change packing
- Continue with IV antibiotics as per primary team  - ID reccs appreciated: recommending Bactrim DS 2 tabs PO Q12H through 2/12/25.  - Upon discharge, please send with the following instructions:     1. Remove 1/4" packing from the wound site.     2. Express the wound to help drain any remaining purulence     3. Using a q-tip, pack the wound with 1/4" packing until you feel resistance and are not able to pack anymore     4. Cover the wound with gauze and tape.
1. IV antibiotics as per primary team, recommend ID consult given persistent leukocytosis and unclear if sensitive to ancef given sensitivities  2. FBS control  3. ENT will continue to follow and change packing daily.
PRINCIPAL DISCHARGE DIAGNOSIS  Diagnosis: Ureteral stricture, left  Assessment and Plan of Treatment:       
1. Abx as per primary team and ID  2. Packing changes qd, if patient is discharged will require VNS for dressing changes until wound is closed   3. ENT will follow while patient is admitted.
high suspicion for staph infection  s/p vanc and ceftriaxone, zosyn  s/p I&D by ENT  +abscess cx MSSA, abx changed to cefazolin  still with mod amount of pus, cont daily packing per ENT, f/u SW/CM to see if [facility] can perform daily packing changes until wound is closed  ENT rec ID eval, abx changed to PO bactrim, plan to continue until 2/12
- Continue with IV antibiotics as per primary team  - ID reccs appreciated: recommending Bactrim DS 2 tabs PO Q12H through 2/12/25.  - Given patient's clinical improvement, can decrease frequency of daily packing changes/irrigation to 3x a week upon discharge  - On discharge, please send patient with the following instructions:     1. Remove gauze and 1/4" packing from the wound site.     2. Express the wound to help drain any remaining purulence.     3. Using a q-tip, repack the wound with 1/4" packing until you feel resistance and are not able to pack anymore.     4. Cover the wound with 4x4 gauze and tape.
1. IV antibiotics as per primary team, recommend ID consult given persistent leukocytosis and unclear if sensitive to ancef given sensitivities  2. FBS control  3. ENT will continue to follow and change packing daily.
1. IV abx as per primary team, consider ID consult  2. FBS control  3. ENT will continue to follow and change packing daily.
1. IV antibiotics as per primary team, consider ID consult  2. FBS control  3. ENT will continue to follow and change packing daily.
high suspicion for staph infection  s/p vanc and ceftriaxone, zosyn  s/p I&D by ENT  +abscess cx MSSA, abx changed to cefazolin  still with mod amount of pus, cont daily packing per ENT, SW/CM to follow-up to see if [facility] can perform daily dressing changes until wound is closed  ENT rec ID eval, abx changed to PO bactrim, plan to continue until 2/12
high suspicion for staph infection  s/p vanc and ceftriaxone, zosyn  s/p I&D by ENT  +abscess cx MSSA, abx changed to cefazolin  still with mod amount of pus, decreasing packing to 3 times per week per ENT, d/w SW/CM fatou is arranging transport to wound center from [facility] for packing 3 times a week, cannot per performed at [facility]  ENT rec ID eval, abx changed to PO bactrim, plan to continue until 2/12
high suspicion for staph infection  start vanc and ceftriaxone  MRSA nares  s/p I&D by ENT this AM, cx sent  MSSA, abx changed to cefazolin  still with mod amount of pus, cont daily packing per ENT  ENT rec ID eval, abx changed to PO bactrim
high suspicion for staph infection  start vanc and ceftriaxone  MRSA nares  s/p I&D by ENT this AM, cx sent  await cx result for ID eval
high suspicion for staph infection  start vanc and ceftriaxone  MRSA nares  s/p I&D by ENT this AM, cx sent  MSSA, abx changed to cefazolin  still with mod amount of pus, cont daily packing per ENT
high suspicion for staph infection  start vanc and ceftriaxone  MRSA nares  s/p I&D by ENT this AM, cx sent  MSSA, abx changed to cefazolin  still with mod amount of pus, cont daily packing per ENT  ENT rec ID eval
high suspicion for staph infection  start vanc and ceftriaxone  MRSA nares  s/p I&D by ENT this AM, cx sent

## 2025-02-10 ENCOUNTER — EMERGENCY (EMERGENCY)
Facility: HOSPITAL | Age: 45
LOS: 1 days | Discharge: HOME | End: 2025-02-10
Attending: EMERGENCY MEDICINE | Admitting: EMERGENCY MEDICINE
Payer: MEDICARE

## 2025-02-10 VITALS
OXYGEN SATURATION: 94 % | RESPIRATION RATE: 18 BRPM | SYSTOLIC BLOOD PRESSURE: 104 MMHG | WEIGHT: 300.05 LBS | HEART RATE: 94 BPM | TEMPERATURE: 98 F | DIASTOLIC BLOOD PRESSURE: 65 MMHG | HEIGHT: 69 IN

## 2025-02-10 PROCEDURE — 93010 ELECTROCARDIOGRAM REPORT: CPT

## 2025-02-10 PROCEDURE — 99285 EMERGENCY DEPT VISIT HI MDM: CPT

## 2025-02-10 RX ORDER — ACETAMINOPHEN 160 MG/5ML
1000 SUSPENSION ORAL ONCE
Refills: 0 | Status: COMPLETED | OUTPATIENT
Start: 2025-02-10 | End: 2025-02-10

## 2025-02-10 RX ORDER — BACTERIOSTATIC SODIUM CHLORIDE 0.9 %
1000 VIAL (ML) INJECTION ONCE
Refills: 0 | Status: COMPLETED | OUTPATIENT
Start: 2025-02-10 | End: 2025-02-10

## 2025-02-10 NOTE — ED ADULT NURSE NOTE - OBJECTIVE STATEMENT
Pt presents to ED A&04 ambulatory at baseline coming in complaining of generalized weakness, nausea x1 day. Respirations even and unlabored. Lung sounds clear with equal chest rise bilaterally. ABD is soft, non tender, non distended with normal active bowel sounds No complaints of chest pain, headache, nausea, dizziness, vomiting  SOB, fever, chills verbalized.

## 2025-02-10 NOTE — ED ADULT TRIAGE NOTE - CHIEF COMPLAINT QUOTE
Pt  from [facility] c/o generalized weakness, nausea x1 day. Denies vomiting, chest pain, SOB, numbness, fevers. No neuro deficits noted. Hx of Dm2, HTN, schizophrenia Pt  from [facility] c/o generalized weakness, nausea x1 day. Denies vomiting, chest pain, SOB, numbness, fevers. No neuro deficits noted. Hx of Dm2, HTN, schizophrenia    *pt now hypoxic - 88% on RA. Placed on 2L NC*

## 2025-02-10 NOTE — ED ADULT NURSE NOTE - CHIEF COMPLAINT QUOTE
Pt  from [facility] c/o generalized weakness, nausea x1 day. Denies vomiting, chest pain, SOB, numbness, fevers. No neuro deficits noted. Hx of Dm2, HTN, schizophrenia    *pt now hypoxic - 88% on RA. Placed on 2L NC*

## 2025-02-10 NOTE — ED ADULT NURSE NOTE - EXTENSIONS OF SELF_ADULT
RT Protocol Note  Janice Yung 89 y.o. female MRN: [id number]  Unit/Bed#: Z2 H2 Encounter: [id number]    Assessment    Principal Problem:    Cystitis  Active Problems:    Hypertension    Neuropathy    Gastroesophageal reflux disease without esophagitis    Mixed hyperlipidemia    Generalized weakness    Chronic diastolic heart failure (HCC)    Paroxysmal atrial fibrillation ([facility])    Pulmonary nodules    Chronic renal disease, stage III ([facility])      Home Pulmonary Medications:     02/10/25 1838   Inhalation Therapy Tx   $ Demo/Eval of Neb, MDI, IPPB, CPT Yes   Pre-Treatment Pulse 78   Breath Sounds Pre-Treatment Bilateral Clear;Diminished            Past Medical History:   Diagnosis Date    SHERLYN (acute kidney injury) ([facility]) 05/08/2022    Balance disorder     CHF (congestive heart failure) ([facility])     Chronic pain     GERD (gastroesophageal reflux disease)     Hard of hearing     Hyperlipidemia     Hypertension     Hyponatremia 12/05/2020    Low serum cortisol level 12/07/2020    Neuropathy     Pneumonia     Pneumonia due to COVID-19 virus 12/05/2020    Sepsis ([facility]) 05/22/2018    Tinnitus     Unspecified adrenocortical insufficiency ([facility]) 02/28/2023    Felt to be related to COVID-19 infection.  No current symptoms.    UTI (urinary tract infection)      Social History     Socioeconomic History    Marital status:      Spouse name: None    Number of children: None    Years of education: None    Highest education level: None   Occupational History    None   Tobacco Use    Smoking status: Never     Passive exposure: Never    Smokeless tobacco: Never   Vaping Use    Vaping status: Never Used   Substance and Sexual Activity    Alcohol use: Not Currently     Comment: rarely    Drug use: No    Sexual activity: Not Currently   Other Topics Concern    None   Social History Narrative    None     Social Drivers of Health     Financial Resource Strain: Low Risk  (8/28/2023)    Overall Financial Resource Strain (CARDIA)      Difficulty of Paying Living Expenses: Not very hard   Food Insecurity: No Food Insecurity (10/21/2024)    Nursing - Inadequate Food Risk Classification     Worried About Running Out of Food in the Last Year: Never true     Ran Out of Food in the Last Year: Never true     Ran Out of Food in the Last Year: Not on file   Transportation Needs: No Transportation Needs (10/21/2024)    PRAPARE - Transportation     Lack of Transportation (Medical): No     Lack of Transportation (Non-Medical): No   Physical Activity: Not on file   Stress: Not on file   Social Connections: Not on file   Intimate Partner Violence: Not on file   Housing Stability: Low Risk  (10/21/2024)    Housing Stability Vital Sign     Unable to Pay for Housing in the Last Year: No     Number of Times Moved in the Last Year: 0     Homeless in the Last Year: No       Subjective         Objective    Physical Exam:   Assessment Type: Assess only  General Appearance: Alert, Awake  Respiratory Pattern: Normal  Chest Assessment: Chest expansion symmetrical  Bilateral Breath Sounds: Clear, Diminished    Vitals:  Blood pressure 124/61, pulse 72, temperature 97.8 °F (36.6 °C), temperature source Oral, resp. rate 20, SpO2 95%.          Imaging and other studies:           Plan    Respiratory Plan: No distress/Pulmonary history        Resp Comments: no nebs needed    None

## 2025-02-11 VITALS — OXYGEN SATURATION: 96 % | HEART RATE: 74 BPM

## 2025-02-11 LAB
ALBUMIN SERPL ELPH-MCNC: 3.9 G/DL — SIGNIFICANT CHANGE UP (ref 3.3–5)
ALP SERPL-CCNC: 61 U/L — SIGNIFICANT CHANGE UP (ref 40–120)
ALT FLD-CCNC: 34 U/L — SIGNIFICANT CHANGE UP (ref 4–41)
ANION GAP SERPL CALC-SCNC: 14 MMOL/L — SIGNIFICANT CHANGE UP (ref 7–14)
AST SERPL-CCNC: 23 U/L — SIGNIFICANT CHANGE UP (ref 4–40)
BASE EXCESS BLDV CALC-SCNC: -1 MMOL/L — SIGNIFICANT CHANGE UP (ref -2–3)
BASOPHILS # BLD AUTO: 0.11 K/UL — SIGNIFICANT CHANGE UP (ref 0–0.2)
BASOPHILS NFR BLD AUTO: 1.2 % — SIGNIFICANT CHANGE UP (ref 0–2)
BILIRUB SERPL-MCNC: 0.2 MG/DL — SIGNIFICANT CHANGE UP (ref 0.2–1.2)
BLOOD GAS VENOUS COMPREHENSIVE RESULT: SIGNIFICANT CHANGE UP
BUN SERPL-MCNC: 21 MG/DL — SIGNIFICANT CHANGE UP (ref 7–23)
CALCIUM SERPL-MCNC: 9.3 MG/DL — SIGNIFICANT CHANGE UP (ref 8.4–10.5)
CHLORIDE SERPL-SCNC: 100 MMOL/L — SIGNIFICANT CHANGE UP (ref 98–107)
CO2 BLDV-SCNC: 28 MMOL/L — HIGH (ref 22–26)
CO2 SERPL-SCNC: 22 MMOL/L — SIGNIFICANT CHANGE UP (ref 22–31)
CREAT SERPL-MCNC: 1.25 MG/DL — SIGNIFICANT CHANGE UP (ref 0.5–1.3)
EGFR: 73 ML/MIN/1.73M2 — SIGNIFICANT CHANGE UP
EOSINOPHIL # BLD AUTO: 0.21 K/UL — SIGNIFICANT CHANGE UP (ref 0–0.5)
EOSINOPHIL NFR BLD AUTO: 2.4 % — SIGNIFICANT CHANGE UP (ref 0–6)
FLUAV AG NPH QL: SIGNIFICANT CHANGE UP
FLUBV AG NPH QL: SIGNIFICANT CHANGE UP
GLUCOSE SERPL-MCNC: 96 MG/DL — SIGNIFICANT CHANGE UP (ref 70–99)
HCO3 BLDV-SCNC: 27 MMOL/L — SIGNIFICANT CHANGE UP (ref 22–29)
HCT VFR BLD CALC: 39.5 % — SIGNIFICANT CHANGE UP (ref 39–50)
HGB BLD-MCNC: 12.1 G/DL — LOW (ref 13–17)
IANC: 4.49 K/UL — SIGNIFICANT CHANGE UP (ref 1.8–7.4)
IMM GRANULOCYTES NFR BLD AUTO: 0.3 % — SIGNIFICANT CHANGE UP (ref 0–0.9)
LYMPHOCYTES # BLD AUTO: 3.43 K/UL — HIGH (ref 1–3.3)
LYMPHOCYTES # BLD AUTO: 38.5 % — SIGNIFICANT CHANGE UP (ref 13–44)
MCHC RBC-ENTMCNC: 28.3 PG — SIGNIFICANT CHANGE UP (ref 27–34)
MCHC RBC-ENTMCNC: 30.6 G/DL — LOW (ref 32–36)
MCV RBC AUTO: 92.3 FL — SIGNIFICANT CHANGE UP (ref 80–100)
MONOCYTES # BLD AUTO: 0.63 K/UL — SIGNIFICANT CHANGE UP (ref 0–0.9)
MONOCYTES NFR BLD AUTO: 7.1 % — SIGNIFICANT CHANGE UP (ref 2–14)
NEUTROPHILS # BLD AUTO: 4.49 K/UL — SIGNIFICANT CHANGE UP (ref 1.8–7.4)
NEUTROPHILS NFR BLD AUTO: 50.5 % — SIGNIFICANT CHANGE UP (ref 43–77)
NRBC # BLD AUTO: 0 K/UL — SIGNIFICANT CHANGE UP (ref 0–0)
NRBC # BLD: 0 /100 WBCS — SIGNIFICANT CHANGE UP (ref 0–0)
NRBC # FLD: 0 K/UL — SIGNIFICANT CHANGE UP (ref 0–0)
NRBC BLD-RTO: 0 /100 WBCS — SIGNIFICANT CHANGE UP (ref 0–0)
NT-PROBNP SERPL-SCNC: 46 PG/ML — SIGNIFICANT CHANGE UP
PCO2 BLDV: 57 MMHG — HIGH (ref 42–55)
PH BLDV: 7.28 — LOW (ref 7.32–7.43)
PLATELET # BLD AUTO: 264 K/UL — SIGNIFICANT CHANGE UP (ref 150–400)
PO2 BLDV: 75 MMHG — HIGH (ref 25–45)
POTASSIUM SERPL-MCNC: 4.4 MMOL/L — SIGNIFICANT CHANGE UP (ref 3.5–5.3)
POTASSIUM SERPL-SCNC: 4.4 MMOL/L — SIGNIFICANT CHANGE UP (ref 3.5–5.3)
PROT SERPL-MCNC: 6.8 G/DL — SIGNIFICANT CHANGE UP (ref 6–8.3)
RBC # BLD: 4.28 M/UL — SIGNIFICANT CHANGE UP (ref 4.2–5.8)
RBC # FLD: 14.5 % — SIGNIFICANT CHANGE UP (ref 10.3–14.5)
RSV RNA NPH QL NAA+NON-PROBE: SIGNIFICANT CHANGE UP
SAO2 % BLDV: 93.1 % — HIGH (ref 67–88)
SARS-COV-2 RNA SPEC QL NAA+PROBE: SIGNIFICANT CHANGE UP
SODIUM SERPL-SCNC: 136 MMOL/L — SIGNIFICANT CHANGE UP (ref 135–145)
TROPONIN T, HIGH SENSITIVITY RESULT: 8 NG/L — SIGNIFICANT CHANGE UP
WBC # BLD: 8.9 K/UL — SIGNIFICANT CHANGE UP (ref 3.8–10.5)
WBC # FLD AUTO: 8.9 K/UL — SIGNIFICANT CHANGE UP (ref 3.8–10.5)

## 2025-02-11 PROCEDURE — 71046 X-RAY EXAM CHEST 2 VIEWS: CPT | Mod: 26

## 2025-02-11 RX ORDER — BACTERIOSTATIC SODIUM CHLORIDE 0.9 %
1000 VIAL (ML) INJECTION ONCE
Refills: 0 | Status: COMPLETED | OUTPATIENT
Start: 2025-02-11 | End: 2025-02-11

## 2025-02-11 RX ADMIN — Medication 1000 MILLILITER(S): at 02:47

## 2025-02-11 RX ADMIN — ACETAMINOPHEN 400 MILLIGRAM(S): 160 SUSPENSION ORAL at 00:22

## 2025-02-11 RX ADMIN — Medication 1000 MILLILITER(S): at 00:21

## 2025-02-11 NOTE — ED ADULT NURSE REASSESSMENT NOTE - REASSESS COMMUNICATION
MD Bond aware pt hx of OBSTRUCTIVE Sleep apnea . will cont to monitor closely/ED physician notified MD oBnd aware pt hx of OBSTRUCTIVE Sleep apnea. Pt to go on CPAP . will cont to monitor closely/ED physician notified

## 2025-02-11 NOTE — ED PROVIDER NOTE - NSFOLLOWUPINSTRUCTIONS_ED_ALL_ED_FT
Today in the emergency department you were evaluated for your weakness.  You likely have a mild viral illness.  This could also be caused by some of your medications.    Please follow up with your primary care physician within 1-2 weeks of discharge from the emergency department.  Please bring a copy of your results with you.  Please return to the emergency department for worsening of your symptoms.    You may take Acetaminophen over the counter as needed for pain and/or fever. Use as directed and see medication warnings.  You may take Ibuprofen over the counter as needed for pain and/or fever. Use as directed and see medication warnings.

## 2025-02-11 NOTE — ED PROVIDER NOTE - CLINICAL SUMMARY MEDICAL DECISION MAKING FREE TEXT BOX
44-year-old male with history of hypertension, ROMMEL, schizophrenia, T2DM, s/p recent admission for right neck abscess presenting with full body weakness. Concern for viral syndrome. Labs, CXR, UA ordered.

## 2025-02-11 NOTE — ED PROVIDER NOTE - ATTENDING CONTRIBUTION TO CARE
43 y/o M with h/o HTN, DM, schizophrenia, ROMMEL, recently hospitalized for an abscess to his neck from [facility] for generalized weakness.  Per report, pt noted to be weak and c/o nausea x 1 day.  Pt is sleeping on evaluation, but arousable.  He is denying any pain, vomiting, abd pain, diarrhea, difficulty breathing or other complaints at this time.    Morbidly obese, lying in stretcher, awake and alert, nontoxic.  AF/VSS.  NCAT neck supple without rigidity.  Lungs cta bl.  Cards nl S1/S2, RRR, no MRG.  Abd soft obese ntnd.  No pedal edema or calf tenderness.    Consider underlying infection, metabolic disturbance, medication reaction (pt recently had psych meds adjusted/changed in hospital due to agitation and psychosis).  Plan for ekg, labs, cxr, ua, reassess.

## 2025-02-11 NOTE — ED ADULT NURSE REASSESSMENT NOTE - STATUS
dispo
as per handoff pt arrives from [facility] with c/o nausea x 1 day. Pt presently denies any discomfort. Received pt on 2lnc as per handoff pt pox to 88% on room air. Denies feeling short of breath. REsp even unlabored. Placed on cm,

## 2025-02-11 NOTE — ED PROVIDER NOTE - PHYSICAL EXAMINATION
GENERAL: well appearing, obese body habitus  HEAD: normocephalic, atraumatic  HEENT: normal conjunctiva, oral mucosa moist  CARDIAC: regular rate and rhythm, normal S1/S2  PULM: speaking in full sentences, no increased work of breathing, CTAB  GI: abdomen distended, soft, nontender  : no suprapubic tenderness  NEURO: moving all 4 extremities, answering questions appropriately  MSK: trace b/l peripheral edema  SKIN: extremities warm

## 2025-02-11 NOTE — ED ADULT NURSE REASSESSMENT NOTE - NS ED NURSE REASSESS COMMENT FT1
pt resting comfortably at this time, respirations even and unlabored, appears in NAD. No complaints of chest pain, headache, nausea, dizziness, vomiting  SOB, fever, chills verbalized. Patient discharged, pending senior care ride back to [facility]. safety measures in place.

## 2025-02-11 NOTE — ED PROVIDER NOTE - PATIENT PORTAL LINK FT
You can access the FollowMyHealth Patient Portal offered by [facility] by registering at the following website: [email or web address]. By joining [facility]’s FollowMyHealth portal, you will also be able to view your health information using other applications (apps) compatible with our system.

## 2025-02-11 NOTE — ED ADULT NURSE REASSESSMENT NOTE - CONDITION
Breakcoverag RN JMP report from [facility]
Pt noted to be sleeping soundly snooring will  desat to 87% off oxygen easily awakens pox returns quickly to 97 % on room air.

## 2025-02-11 NOTE — ED PROVIDER NOTE - PROGRESS NOTE DETAILS
[facility] PGY3 - Lab work grossly unremarkable.  CXR shows clear lungs.  Likely some combination of viral illness and medication side effect.  Dispo back to [facility].

## 2025-02-11 NOTE — PROVIDER CONTACT NOTE (OTHER) - ASSESSMENT
Called residence; spoke with King.  Per King, pt can take a taxi home if needed.  Discussed with provider-will send pt in an ambulette to ensure pt safely gets home due to fall risk, unsteady gait.  Called residence and informed them of above transport change.  All in agreement.  Verbal huddle complete.

## 2025-02-11 NOTE — ED PROVIDER NOTE - OBJECTIVE STATEMENT
44-year-old male with history of hypertension, ROMMEL, schizophrenia, T2DM, s/p recent admission for right neck abscess presenting with full body weakness.  Per patient over the last 2 days he has felt increasingly weak.  He had an episode of nausea and vomiting earlier today.  He otherwise has no significant headache, chest pain, abdominal pain, dysuria, peripheral edema, fever/chills.

## 2025-03-04 NOTE — PROGRESS NOTE BEHAVIORAL HEALTH - NS ED BHA MSE SPEECH ARTICULATION
Please let Alexis know I sent a letter to take with her if has symptoms again.  I put in CRP order so she can get rechecked anytime.  Will call when throat culture is done. 
Normal

## 2025-04-22 NOTE — ED ADULT TRIAGE NOTE - BEFAST FACE
Radha Rodriges is a 18 year old female presenting to the walk-in clinic today for headache, cough, and sore throat, feverish, and sinus congestion. Pt reports that these sx have started about 4 days ago. Pt thought it was allergies early on but it has been getting worse. Pt reports taking Dayquil/Nyquil. Last dose was last night. Pt reports that she had a fever of 102 two nights ago, temp of 100.1 during rooming.     Swabs/Specimens collected during triage process:  None    BP greater than 140/90: NA   Recheck completed: NA    PPE worn during room process  Writer: Level 3 face mask and gloves  Patient: Mask     Triaged to: room 36    Patient would like communication of their results via:      Cell Phone:   Telephone Information:   Mobile [phone]     Okay to leave a message containing results? Yes    Pt advised that staff will contact them with positive results only for URI testing. Pt verbalised understanding. Pt aware results will be available on [email or web address].   No

## 2025-04-29 ENCOUNTER — INPATIENT (INPATIENT)
Facility: HOSPITAL | Age: 45
LOS: 8 days | Discharge: HOME | End: 2025-05-08
Attending: INTERNAL MEDICINE | Admitting: INTERNAL MEDICINE
Payer: MEDICARE

## 2025-04-29 VITALS
RESPIRATION RATE: 18 BRPM | DIASTOLIC BLOOD PRESSURE: 80 MMHG | TEMPERATURE: 100 F | HEART RATE: 100 BPM | WEIGHT: 315 LBS | OXYGEN SATURATION: 97 % | SYSTOLIC BLOOD PRESSURE: 120 MMHG

## 2025-04-29 DIAGNOSIS — L02.11 CUTANEOUS ABSCESS OF NECK: ICD-10-CM

## 2025-04-29 DIAGNOSIS — L02.91 CUTANEOUS ABSCESS, UNSPECIFIED: ICD-10-CM

## 2025-04-29 LAB
A1C WITH ESTIMATED AVERAGE GLUCOSE RESULT: 6.3 % — HIGH (ref 4–5.6)
ADD ON TEST-SPECIMEN IN LAB: SIGNIFICANT CHANGE UP
ALBUMIN SERPL ELPH-MCNC: 3.9 G/DL — SIGNIFICANT CHANGE UP (ref 3.3–5)
ALP SERPL-CCNC: 108 U/L — SIGNIFICANT CHANGE UP (ref 40–120)
ALT FLD-CCNC: 35 U/L — SIGNIFICANT CHANGE UP (ref 4–41)
ANION GAP SERPL CALC-SCNC: 12 MMOL/L — SIGNIFICANT CHANGE UP (ref 7–14)
ANION GAP SERPL CALC-SCNC: 14 MMOL/L — SIGNIFICANT CHANGE UP (ref 7–14)
AST SERPL-CCNC: 25 U/L — SIGNIFICANT CHANGE UP (ref 4–40)
BASOPHILS # BLD AUTO: 0.09 K/UL — SIGNIFICANT CHANGE UP (ref 0–0.2)
BASOPHILS NFR BLD AUTO: 0.6 % — SIGNIFICANT CHANGE UP (ref 0–2)
BILIRUB SERPL-MCNC: 0.4 MG/DL — SIGNIFICANT CHANGE UP (ref 0.2–1.2)
BLOOD GAS VENOUS COMPREHENSIVE RESULT: SIGNIFICANT CHANGE UP
BUN SERPL-MCNC: 16 MG/DL — SIGNIFICANT CHANGE UP (ref 7–23)
BUN SERPL-MCNC: 18 MG/DL — SIGNIFICANT CHANGE UP (ref 7–23)
CALCIUM SERPL-MCNC: 9.2 MG/DL — SIGNIFICANT CHANGE UP (ref 8.4–10.5)
CALCIUM SERPL-MCNC: 9.6 MG/DL — SIGNIFICANT CHANGE UP (ref 8.4–10.5)
CHLORIDE SERPL-SCNC: 100 MMOL/L — SIGNIFICANT CHANGE UP (ref 98–107)
CHLORIDE SERPL-SCNC: 99 MMOL/L — SIGNIFICANT CHANGE UP (ref 98–107)
CO2 SERPL-SCNC: 26 MMOL/L — SIGNIFICANT CHANGE UP (ref 22–31)
CO2 SERPL-SCNC: 28 MMOL/L — SIGNIFICANT CHANGE UP (ref 22–31)
CREAT SERPL-MCNC: 0.91 MG/DL — SIGNIFICANT CHANGE UP (ref 0.5–1.3)
CREAT SERPL-MCNC: 0.92 MG/DL — SIGNIFICANT CHANGE UP (ref 0.5–1.3)
EGFR: 105 ML/MIN/1.73M2 — SIGNIFICANT CHANGE UP
EGFR: 105 ML/MIN/1.73M2 — SIGNIFICANT CHANGE UP
EGFR: 106 ML/MIN/1.73M2 — SIGNIFICANT CHANGE UP
EGFR: 106 ML/MIN/1.73M2 — SIGNIFICANT CHANGE UP
EOSINOPHIL # BLD AUTO: 0.47 K/UL — SIGNIFICANT CHANGE UP (ref 0–0.5)
EOSINOPHIL NFR BLD AUTO: 3.2 % — SIGNIFICANT CHANGE UP (ref 0–6)
ESTIMATED AVERAGE GLUCOSE: 134 — SIGNIFICANT CHANGE UP
GLUCOSE BLDC GLUCOMTR-MCNC: 109 MG/DL — HIGH (ref 70–99)
GLUCOSE SERPL-MCNC: 101 MG/DL — HIGH (ref 70–99)
GLUCOSE SERPL-MCNC: 116 MG/DL — HIGH (ref 70–99)
HCT VFR BLD CALC: 38.6 % — LOW (ref 39–50)
HCT VFR BLD CALC: 40.1 % — SIGNIFICANT CHANGE UP (ref 39–50)
HGB BLD-MCNC: 12.5 G/DL — LOW (ref 13–17)
HGB BLD-MCNC: 12.9 G/DL — LOW (ref 13–17)
IANC: 10.6 K/UL — HIGH (ref 1.8–7.4)
IMM GRANULOCYTES NFR BLD AUTO: 0.4 % — SIGNIFICANT CHANGE UP (ref 0–0.9)
LYMPHOCYTES # BLD AUTO: 16.6 % — SIGNIFICANT CHANGE UP (ref 13–44)
LYMPHOCYTES # BLD AUTO: 2.45 K/UL — SIGNIFICANT CHANGE UP (ref 1–3.3)
MAGNESIUM SERPL-MCNC: 1.7 MG/DL — SIGNIFICANT CHANGE UP (ref 1.6–2.6)
MCHC RBC-ENTMCNC: 28.5 PG — SIGNIFICANT CHANGE UP (ref 27–34)
MCHC RBC-ENTMCNC: 28.8 PG — SIGNIFICANT CHANGE UP (ref 27–34)
MCHC RBC-ENTMCNC: 32.2 G/DL — SIGNIFICANT CHANGE UP (ref 32–36)
MCHC RBC-ENTMCNC: 32.4 G/DL — SIGNIFICANT CHANGE UP (ref 32–36)
MCV RBC AUTO: 88.5 FL — SIGNIFICANT CHANGE UP (ref 80–100)
MCV RBC AUTO: 88.9 FL — SIGNIFICANT CHANGE UP (ref 80–100)
MONOCYTES # BLD AUTO: 1.09 K/UL — HIGH (ref 0–0.9)
MONOCYTES NFR BLD AUTO: 7.4 % — SIGNIFICANT CHANGE UP (ref 2–14)
NEUTROPHILS # BLD AUTO: 10.6 K/UL — HIGH (ref 1.8–7.4)
NEUTROPHILS NFR BLD AUTO: 71.8 % — SIGNIFICANT CHANGE UP (ref 43–77)
NRBC # BLD AUTO: 0 K/UL — SIGNIFICANT CHANGE UP (ref 0–0)
NRBC # BLD AUTO: 0 K/UL — SIGNIFICANT CHANGE UP (ref 0–0)
NRBC # FLD: 0 K/UL — SIGNIFICANT CHANGE UP (ref 0–0)
NRBC # FLD: 0 K/UL — SIGNIFICANT CHANGE UP (ref 0–0)
NRBC BLD AUTO-RTO: 0 /100 WBCS — SIGNIFICANT CHANGE UP (ref 0–0)
NRBC BLD AUTO-RTO: 0 /100 WBCS — SIGNIFICANT CHANGE UP (ref 0–0)
PHOSPHATE SERPL-MCNC: 3.3 MG/DL — SIGNIFICANT CHANGE UP (ref 2.5–4.5)
PLATELET # BLD AUTO: 267 K/UL — SIGNIFICANT CHANGE UP (ref 150–400)
PLATELET # BLD AUTO: 274 K/UL — SIGNIFICANT CHANGE UP (ref 150–400)
POTASSIUM SERPL-MCNC: 3.8 MMOL/L — SIGNIFICANT CHANGE UP (ref 3.5–5.3)
POTASSIUM SERPL-MCNC: 4.1 MMOL/L — SIGNIFICANT CHANGE UP (ref 3.5–5.3)
POTASSIUM SERPL-SCNC: 3.8 MMOL/L — SIGNIFICANT CHANGE UP (ref 3.5–5.3)
POTASSIUM SERPL-SCNC: 4.1 MMOL/L — SIGNIFICANT CHANGE UP (ref 3.5–5.3)
PROT SERPL-MCNC: 8 G/DL — SIGNIFICANT CHANGE UP (ref 6–8.3)
RBC # BLD: 4.34 M/UL — SIGNIFICANT CHANGE UP (ref 4.2–5.8)
RBC # BLD: 4.53 M/UL — SIGNIFICANT CHANGE UP (ref 4.2–5.8)
RBC # FLD: 13.8 % — SIGNIFICANT CHANGE UP (ref 10.3–14.5)
RBC # FLD: 13.9 % — SIGNIFICANT CHANGE UP (ref 10.3–14.5)
SODIUM SERPL-SCNC: 137 MMOL/L — SIGNIFICANT CHANGE UP (ref 135–145)
SODIUM SERPL-SCNC: 142 MMOL/L — SIGNIFICANT CHANGE UP (ref 135–145)
WBC # BLD: 14.76 K/UL — HIGH (ref 3.8–10.5)
WBC # BLD: 15.6 K/UL — HIGH (ref 3.8–10.5)
WBC # FLD AUTO: 14.76 K/UL — HIGH (ref 3.8–10.5)
WBC # FLD AUTO: 15.6 K/UL — HIGH (ref 3.8–10.5)

## 2025-04-29 PROCEDURE — 99285 EMERGENCY DEPT VISIT HI MDM: CPT | Mod: FS

## 2025-04-29 PROCEDURE — 99223 1ST HOSP IP/OBS HIGH 75: CPT

## 2025-04-29 PROCEDURE — 70491 CT SOFT TISSUE NECK W/DYE: CPT | Mod: 26

## 2025-04-29 RX ORDER — SODIUM CHLORIDE 9 G/1000ML
1000 INJECTION, SOLUTION INTRAVENOUS
Refills: 0 | Status: DISCONTINUED | OUTPATIENT
Start: 2025-04-29 | End: 2025-05-08

## 2025-04-29 RX ORDER — ALBUTEROL SULFATE 2.5 MG/3ML
2 VIAL, NEBULIZER (ML) INHALATION EVERY 6 HOURS
Refills: 0 | Status: DISCONTINUED | OUTPATIENT
Start: 2025-04-29 | End: 2025-05-08

## 2025-04-29 RX ORDER — DEXTROSE 50 % IN WATER 50 %
12.5 SYRINGE (ML) INTRAVENOUS ONCE
Refills: 0 | Status: DISCONTINUED | OUTPATIENT
Start: 2025-04-29 | End: 2025-05-08

## 2025-04-29 RX ORDER — DEXTROSE 50 % IN WATER 50 %
25 SYRINGE (ML) INTRAVENOUS ONCE
Refills: 0 | Status: DISCONTINUED | OUTPATIENT
Start: 2025-04-29 | End: 2025-05-08

## 2025-04-29 RX ORDER — VANCOMYCIN HCL IN 5 % DEXTROSE 1.5G/250ML
1000 PLASTIC BAG, INJECTION (ML) INTRAVENOUS ONCE
Refills: 0 | Status: COMPLETED | OUTPATIENT
Start: 2025-04-29 | End: 2025-04-29

## 2025-04-29 RX ORDER — LISINOPRIL 5 MG/1
5 TABLET ORAL DAILY
Refills: 0 | Status: DISCONTINUED | OUTPATIENT
Start: 2025-04-29 | End: 2025-05-08

## 2025-04-29 RX ORDER — ENOXAPARIN SODIUM 100 MG/ML
40 INJECTION SUBCUTANEOUS EVERY 12 HOURS
Refills: 0 | Status: DISCONTINUED | OUTPATIENT
Start: 2025-04-29 | End: 2025-05-08

## 2025-04-29 RX ORDER — FENOFIBRATE 160 MG/1
48 TABLET ORAL DAILY
Refills: 0 | Status: DISCONTINUED | OUTPATIENT
Start: 2025-04-29 | End: 2025-05-08

## 2025-04-29 RX ORDER — GLUCAGON 3 MG/1
1 POWDER NASAL ONCE
Refills: 0 | Status: DISCONTINUED | OUTPATIENT
Start: 2025-04-29 | End: 2025-05-08

## 2025-04-29 RX ORDER — ACETAMINOPHEN 500 MG/5ML
650 LIQUID (ML) ORAL EVERY 6 HOURS
Refills: 0 | Status: DISCONTINUED | OUTPATIENT
Start: 2025-04-29 | End: 2025-05-08

## 2025-04-29 RX ORDER — ACETAMINOPHEN 500 MG/5ML
1000 LIQUID (ML) ORAL ONCE
Refills: 0 | Status: COMPLETED | OUTPATIENT
Start: 2025-04-29 | End: 2025-04-29

## 2025-04-29 RX ORDER — CEFTRIAXONE 500 MG/1
1000 INJECTION, POWDER, FOR SOLUTION INTRAMUSCULAR; INTRAVENOUS ONCE
Refills: 0 | Status: COMPLETED | OUTPATIENT
Start: 2025-04-29 | End: 2025-04-29

## 2025-04-29 RX ORDER — ENOXAPARIN SODIUM 100 MG/ML
40 INJECTION SUBCUTANEOUS EVERY 24 HOURS
Refills: 0 | Status: DISCONTINUED | OUTPATIENT
Start: 2025-04-29 | End: 2025-04-29

## 2025-04-29 RX ORDER — DEXTROSE 50 % IN WATER 50 %
15 SYRINGE (ML) INTRAVENOUS ONCE
Refills: 0 | Status: DISCONTINUED | OUTPATIENT
Start: 2025-04-29 | End: 2025-05-08

## 2025-04-29 RX ORDER — LIDOCAINE HCL/EPINEPHRINE/PF 1 %-1:200K
20 AMPUL (ML) INJECTION ONCE
Refills: 0 | Status: COMPLETED | OUTPATIENT
Start: 2025-04-29 | End: 2025-04-29

## 2025-04-29 RX ORDER — INSULIN LISPRO 100 U/ML
INJECTION, SOLUTION INTRAVENOUS; SUBCUTANEOUS
Refills: 0 | Status: DISCONTINUED | OUTPATIENT
Start: 2025-04-29 | End: 2025-05-08

## 2025-04-29 RX ORDER — INSULIN LISPRO 100 U/ML
INJECTION, SOLUTION INTRAVENOUS; SUBCUTANEOUS AT BEDTIME
Refills: 0 | Status: DISCONTINUED | OUTPATIENT
Start: 2025-04-29 | End: 2025-05-08

## 2025-04-29 RX ORDER — ENOXAPARIN SODIUM 100 MG/ML
40 INJECTION SUBCUTANEOUS EVERY 12 HOURS
Refills: 0 | Status: DISCONTINUED | OUTPATIENT
Start: 2025-04-29 | End: 2025-04-29

## 2025-04-29 RX ORDER — SERTRALINE 100 MG/1
100 TABLET, FILM COATED ORAL DAILY
Refills: 0 | Status: DISCONTINUED | OUTPATIENT
Start: 2025-04-29 | End: 2025-05-08

## 2025-04-29 RX ORDER — AMPICILLIN SODIUM AND SULBACTAM SODIUM 1; .5 G/1; G/1
3 INJECTION, POWDER, FOR SOLUTION INTRAMUSCULAR; INTRAVENOUS EVERY 6 HOURS
Refills: 0 | Status: DISCONTINUED | OUTPATIENT
Start: 2025-04-30 | End: 2025-04-30

## 2025-04-29 RX ORDER — ATORVASTATIN CALCIUM 80 MG/1
10 TABLET, FILM COATED ORAL AT BEDTIME
Refills: 0 | Status: DISCONTINUED | OUTPATIENT
Start: 2025-04-29 | End: 2025-05-08

## 2025-04-29 RX ORDER — CLOZAPINE 100 MG
50 TABLET ORAL DAILY
Refills: 0 | Status: DISCONTINUED | OUTPATIENT
Start: 2025-04-29 | End: 2025-05-08

## 2025-04-29 RX ORDER — ENOXAPARIN SODIUM 100 MG/ML
60 INJECTION SUBCUTANEOUS EVERY 12 HOURS
Refills: 0 | Status: DISCONTINUED | OUTPATIENT
Start: 2025-04-29 | End: 2025-04-29

## 2025-04-29 RX ORDER — AMPICILLIN SODIUM AND SULBACTAM SODIUM 1; .5 G/1; G/1
3 INJECTION, POWDER, FOR SOLUTION INTRAMUSCULAR; INTRAVENOUS ONCE
Refills: 0 | Status: COMPLETED | OUTPATIENT
Start: 2025-04-29 | End: 2025-04-29

## 2025-04-29 RX ORDER — ARIPIPRAZOLE 2 MG/1
10 TABLET ORAL DAILY
Refills: 0 | Status: DISCONTINUED | OUTPATIENT
Start: 2025-04-29 | End: 2025-05-08

## 2025-04-29 RX ORDER — MONTELUKAST SODIUM 10 MG/1
10 TABLET ORAL DAILY
Refills: 0 | Status: DISCONTINUED | OUTPATIENT
Start: 2025-04-29 | End: 2025-05-08

## 2025-04-29 RX ADMIN — Medication 400 MILLIGRAM(S): at 21:05

## 2025-04-29 RX ADMIN — Medication 20 MILLILITER(S): at 19:52

## 2025-04-29 RX ADMIN — Medication 250 MILLIGRAM(S): at 19:50

## 2025-04-29 RX ADMIN — AMPICILLIN SODIUM AND SULBACTAM SODIUM 200 GRAM(S): 1; .5 INJECTION, POWDER, FOR SOLUTION INTRAMUSCULAR; INTRAVENOUS at 21:05

## 2025-04-29 RX ADMIN — CEFTRIAXONE 100 MILLIGRAM(S): 500 INJECTION, POWDER, FOR SOLUTION INTRAMUSCULAR; INTRAVENOUS at 19:30

## 2025-04-29 NOTE — ED PROVIDER NOTE - PROGRESS NOTE DETAILS
(Maty Raymond MD-PGY1): Signed out to night team pending ENT recs/eval and medicine admission. Got IV Abx and cbc/cmp sent. No imaging ordered pending ent recs. MARYANN Elliott: received pt in sign out pending ENT eval  ENT attempted bedside I&D, recommending med admission and CT scan  discussed with hospitalist for admission

## 2025-04-29 NOTE — ED ADULT TRIAGE NOTE - CHIEF COMPLAINT QUOTE
pt brought in by EMS from [facility] for abscess on R side back of neck. pt denies chest pain, sob, n/v/d, fever or chills.

## 2025-04-29 NOTE — H&P ADULT - ASSESSMENT
45M w/ PMHx of HTN, schizophrenia, DM2, and ROMMEL who presents w/ right sided neck abscess. Admitted to medicine for further management and monitoring. Detailed plan as below:

## 2025-04-29 NOTE — CHART NOTE - NSCHARTNOTEFT_GEN_A_CORE
Overnight Medicine ACP Coverage    NOTE incomplete      Casey Reddy PA-C  Department of Medicine  [facility]  [id number] Overnight Medicine ACP Coverage    Notified by RN patient 88% on RA. Patient placed on 2L NC then titrated to 6L NC satting 95% as per RN. Patient assessed bedside. On arrival patient satting 99% on 6L NC. No increase work of breathing. Patient speaking in full sentences, A&Ox4. While examining patient oxygen down tritiated to RA. Lungs clear to auscultation. Patient satting >94% on RA after 5 minutes. Given hypoxia VBG, CXR ordered. Will continue  for now. Discussed with admitting attending Dr. Zepeda agree with plan. Attending later examined patient who was 97% on RA.     Casey Reddy PA-C  Department of Medicine  [facility]  [id number]

## 2025-04-29 NOTE — H&P ADULT - HISTORY OF PRESENT ILLNESS
45M w/ PMHx of HTN, schizophrenia, DM2, and ROMMEL who presents to the ER for evaluation of a right sided neck abscess. Patient has been dealing with the same neck abscess since Jan 2025 and was hospitalized and discharged on PO antibiotics (Bactrim). Patient now return with the same abscess and is endorsing increased neck pain, swelling, warmth for the last several days.  Patient has a prior bedside I&D (1/29/25) w/ cultures growing MSSA. Patient also has a history of MRSA nasal swab positivity. Denies fever, chills, N/V, dysphagia, respiratory distress, SOB, Chest pain or any other concerns at this time.

## 2025-04-29 NOTE — ED PROVIDER NOTE - NS ED ATTENDING STATEMENT MOD
This was a shared visit with the MARY KAY. I reviewed and verified the documentation. Attending with

## 2025-04-29 NOTE — H&P ADULT - NSHPLABSRESULTS_GEN_ALL_CORE
12.5   15.60 )-----------( 267      ( 29 Apr 2025 22:22 )             38.6     137  |  99  |  18  ----------------------------<  116[H]     04-29  3.8   |  26  |  0.91    Ca    9.2      29 Apr 2025 22:22  Phos  3.3     04-29  Mg     1.70     04-29    TPro  8.0  /  Alb  3.9  /  TBili  0.4  /  DBili  x   /  AST  25  /  ALT  35  /  AlkPhos  108  04-29        22:22 - VBG - pH: 7.41  | pCO2: 50    | pO2: 58    | Lactate: 1.0                    Hgb A1c: 6.3 (04-29-25 @ 22:22)

## 2025-04-29 NOTE — H&P ADULT - PROBLEM SELECTOR PLAN 5
VTE PPx: Lovenox  Nutrition: CC Diet  Fluids: PRN  Electrolytes: Maintain K>4, Mag >2, Phos > 3  Access: PIV  Code Status: FULL  Dispo: pending clinical improvement

## 2025-04-29 NOTE — H&P ADULT - PROBLEM SELECTOR PLAN 2
-Home medication:     -c/w home meds with hold parameters (SBP <110, DBP <60)   -Monitor BP closely and adjust medications if clinically indicated  -DASH Diet -Home medication: Lisinopril 5 mg QD  -c/w home meds with hold parameters (SBP <110, DBP <60)   -Monitor BP closely and adjust medications if clinically indicated  -DASH Diet

## 2025-04-29 NOTE — ED ADULT NURSE REASSESSMENT NOTE - NS ED NURSE REASSESS COMMENT FT1
Pt is A&Ox4. Pt has no complaints at this time. VS as noted, SPO2 noted to be 90-91% on RA. Pt denies difficulty breathing. Respirations even and unlabored. Pt placed on 3L NC for comfort. ACP made aware. Pt is A&Ox4. Pt has no complaints at this time. VS as noted, SPO2 noted to be 90-91% on RA. Pt denies difficulty breathing. Respirations even and unlabored. Pt placed on 3L NC for comfort. ACP Ramdat made aware.

## 2025-04-29 NOTE — ED PROVIDER NOTE - ATTENDING CONTRIBUTION TO CARE
46 y/o M hx of HTN, schizophrenia, DM presents for abscess over neck. had recent admission for similar issue per patient but states it is worse now. states pain/swelling has gotten worse over the past few days. denies fever/chills. denies trauma to the region. denies chest pain/sob. denies nausea/vomiting.     large area over posterior neck R side w/ erythema, area of fluctuance , no crepitus  overall patient is well appearing, not in acute distress. airway is patent, speaking in full sentences. no stridor/no drooling/no tripoding. clinically is stable.     likely abscess w/ significant cellulitis  ENT w/ prior drainage. will consult  IV abx   check labs  will require medicine admission for iv abx     I performed a history and physical exam of the patient and discussed their management with the resident. I have reviewed the resident note and agree with the documented findings and plan of care, except as noted. This was a shared visit with an resident. I reviewed and verified the documentation and independently performed my own history/exam/medical decision making. My medical decision making and observations are found above. Please refer to any progress notes for updates on clinical course.

## 2025-04-29 NOTE — H&P ADULT - PROBLEM SELECTOR PLAN 3
-Home Regimen:   -A1c: %  -Hold home oral diabetic medications while inpatient   -Corrective SSI (Mild) TID before meals/Bedtime  -Goal -180  -FS Blood glucose monitoring Premeal/Bedtime   -Hypoglycemia precautions   -Carb Consistent Diet   -Nutrition consult -Home Regimen: Metformin, Rybelsus   -A1c: 6.3%  -Hold home oral diabetic medications while inpatient   -Corrective SSI (Mild) TID before meals/Bedtime  -Goal -180  -FS Blood glucose monitoring Premeal/Bedtime   -Hypoglycemia precautions   -Carb Consistent Diet

## 2025-04-29 NOTE — H&P ADULT - NSHPREVIEWOFSYSTEMS_GEN_ALL_CORE
- CONSTITUTIONAL: Denies weight loss, fever and chills.  - HEENT: As per HPI   - RESPIRATORY: Denies SOB and cough.  - CV: Denies palpitations and CP.  - GI: Denies abdominal pain, nausea, vomiting and diarrhea.  - : Denies dysuria and urinary frequency.  - MSK: Denies myalgia and joint pain.  - SKIN: Denies rash and pruritus.  - NEUROLOGICAL: Denies headache and syncope.  - PSYCHIATRIC: Denies recent changes in mood. Denies anxiety and depression.    A 12-point review of systems was completed and is otherwise negative except as noted in the HPI.

## 2025-04-29 NOTE — H&P ADULT - NSHPPHYSICALEXAM_GEN_ALL_CORE
- GENERAL: Alert and oriented x 3.  - EYES: EOMI. Anicteric.   - HENT: No scleral icterus. +Right neck abscess   - LUNGS: Clear to auscultation bilaterally. No accessory muscle use.  - CARDIOVASCULAR: Regular rate and rhythm. No murmur. No JVD.  - ABDOMEN: Soft, non-tender and non-distended. No palpable masses.  - EXTREMITIES: No edema. Non-tender.  - SKIN: No rashes or lesions. Warm.  - NEUROLOGIC: No focal neurological deficits. CN II-XII grossly intact, but not individually tested.  - PSYCHIATRIC: Cooperative. Appropriate mood and affect.

## 2025-04-29 NOTE — ED ADULT NURSE REASSESSMENT NOTE - NS ED NURSE REASSESS COMMENT FT1
Pt still only satting 95% on 6L NC. ACP made aware. Pt placed on tele monitor, NSR. Respirations even and unlabored. Safety maintained. Pt still only satting 95% on 6L NC. ACP Ramdat made aware. Pt placed on tele monitor, NSR. Respirations even and unlabored. Safety maintained.

## 2025-04-29 NOTE — ED ADULT NURSE NOTE - CHIEF COMPLAINT QUOTE
pt brought in by EMS from [facility] for abscess on R side back of neck. pt denies chest pain, sob, n/v/d, fever or chills.

## 2025-04-29 NOTE — ED PROVIDER NOTE - CLINICAL SUMMARY MEDICAL DECISION MAKING FREE TEXT BOX
(Maty Raymond MD-PGY1): 45-year-old man with a history of HTN, schizophrenia, DM2, and ROMMEL who presents to the ER for evaluation of an abscess.  He previously was admitted from 1/28 to 2/5 for right sided neck abscess. States that the abscess improved after hospitalization and almost went away.  He reports that over the last 5 days he noticed that the right side of his neck was enlarging and painful and red, similar to prior presentation. He denies fevers, chest pain, shortness of breath, belly pain.  He denies medical history.  He also denies any other concerns today aside from neck abscess.    Vitals on arrival notable for heart rate 100, T99.7 Orally, normotensive, normal oxygenation on room air.  Physical exam notable for significant abscess on posterior R scalp near neck.  It was mildly tender to palpation, quite firm, not mobile, erythematous, a few scattered white pustules, increased warmth. No erythema extending beyond abscess/soft tissue infection.  Plan for ENT consult and likely admission.  Will do CBC CMP and give vancomycin and ceftriaxone.  Updates in progress note section.

## 2025-04-29 NOTE — CONSULT NOTE ADULT - PROBLEM SELECTOR RECOMMENDATION 9
1. IV abx, unasyn  2. Follow up culture results   3. Consider ID consult  4. Follow up CT results  5. medicine admit for blood sugar control  6. will d/w ENT attending, ENT to follow

## 2025-04-29 NOTE — H&P ADULT - PROBLEM SELECTOR PLAN 1
::Skin and soft tissue infection of right neck with evidence of abscess. No reg flag conditions such as Toxic shock syndrome, necrotizing infection, compartment syndrome.  ::MRSA Coverage Indications: Known MRSA colonization  -ENT consulted. Appreciate Recs.   -s/p Vanc/CTX/Unasyn in ED   -Abx: Will continue with Vancomycin / Ampicillin/Sulbactam 3g IV Q6H targeting beta-hemolytic streptococcus, MSSA/MRSA given prior wound cultures with MSSA and MRSA colonization  -Afebrile, WBC 14.76, Lactate 1  -Pain control: Acetaminophen 650 mg Q6H PRN  -Monitor response to therapy and deescalate to oral antibiotics as clinically indicated  -Wound care consultation placed  -Warm compresses   [ ] ID consultation in AM   [ ] f/u wound cultures   [ ] f/u CT neck

## 2025-04-29 NOTE — ED ADULT NURSE NOTE - NSFALLUNIVINTERV_ED_ALL_ED
Bed/Stretcher in lowest position, wheels locked, appropriate side rails in place/Call bell, personal items and telephone in reach/Instruct patient to call for assistance before getting out of bed/chair/stretcher/Non-slip footwear applied when patient is off stretcher/[place] to call system/Physically safe environment - no spills, clutter or unnecessary equipment/Purposeful proactive rounding/Room/bathroom lighting operational, light cord in reach

## 2025-04-29 NOTE — ED PROVIDER NOTE - ATTENDING APP SHARED VISIT CONTRIBUTION OF CARE
46 y/o M hx of HTN, schizophrenia, DM presents for abscess over neck. had recent admission for similar issue per patient but states it is worse now. states pain/swelling has gotten worse over the past few days. denies fever/chills. denies trauma to the region. denies chest pain/sob. denies nausea/vomiting.     large area over posterior neck R side w/ erythema, area of fluctuance , no crepitus  overall patient is well appearing, not in acute distress.     likely abscess w/ significant cellulitis  ENT w/ prior drainage will consult  IV abx   check labs  will require medicine admission for iv abx     I performed a history and physical exam of the patient and discussed their management with the resident. I have reviewed the resident note and agree with the documented findings and plan of care, except as noted. This was a shared visit with an resident. I reviewed and verified the documentation and independently performed my own history/exam/medical decision making. My medical decision making and observations are found above. Please refer to any progress notes for updates on clinical course.

## 2025-04-29 NOTE — ED ADULT NURSE NOTE - OBJECTIVE STATEMENT
patient  Alert & ox4.pt brought in by EMS from [facility] for abscess on R side back of neck.  pt . evaluated by MD.Placed 20g angiocath Lt. wrist, labs drawn and sent. .patient will be waiting for results, further evaluation and disposition.  made comfortable.will continue to monitor.

## 2025-04-30 DIAGNOSIS — Z29.9 ENCOUNTER FOR PROPHYLACTIC MEASURES, UNSPECIFIED: ICD-10-CM

## 2025-04-30 DIAGNOSIS — E11.9 TYPE 2 DIABETES MELLITUS WITHOUT COMPLICATIONS: ICD-10-CM

## 2025-04-30 DIAGNOSIS — I10 ESSENTIAL (PRIMARY) HYPERTENSION: ICD-10-CM

## 2025-04-30 DIAGNOSIS — F20.9 SCHIZOPHRENIA, UNSPECIFIED: ICD-10-CM

## 2025-04-30 DIAGNOSIS — G47.33 OBSTRUCTIVE SLEEP APNEA (ADULT) (PEDIATRIC): ICD-10-CM

## 2025-04-30 LAB
ALBUMIN SERPL ELPH-MCNC: 3.7 G/DL — SIGNIFICANT CHANGE UP (ref 3.3–5)
ALP SERPL-CCNC: 103 U/L — SIGNIFICANT CHANGE UP (ref 40–120)
ALT FLD-CCNC: 35 U/L — SIGNIFICANT CHANGE UP (ref 4–41)
ANION GAP SERPL CALC-SCNC: 15 MMOL/L — HIGH (ref 7–14)
AST SERPL-CCNC: 25 U/L — SIGNIFICANT CHANGE UP (ref 4–40)
BILIRUB SERPL-MCNC: 0.6 MG/DL — SIGNIFICANT CHANGE UP (ref 0.2–1.2)
BUN SERPL-MCNC: 20 MG/DL — SIGNIFICANT CHANGE UP (ref 7–23)
CALCIUM SERPL-MCNC: 9.4 MG/DL — SIGNIFICANT CHANGE UP (ref 8.4–10.5)
CHLORIDE SERPL-SCNC: 97 MMOL/L — LOW (ref 98–107)
CO2 SERPL-SCNC: 26 MMOL/L — SIGNIFICANT CHANGE UP (ref 22–31)
CREAT SERPL-MCNC: 0.9 MG/DL — SIGNIFICANT CHANGE UP (ref 0.5–1.3)
EGFR: 107 ML/MIN/1.73M2 — SIGNIFICANT CHANGE UP
EGFR: 107 ML/MIN/1.73M2 — SIGNIFICANT CHANGE UP
GLUCOSE BLDC GLUCOMTR-MCNC: 100 MG/DL — HIGH (ref 70–99)
GLUCOSE BLDC GLUCOMTR-MCNC: 110 MG/DL — HIGH (ref 70–99)
GLUCOSE BLDC GLUCOMTR-MCNC: 111 MG/DL — HIGH (ref 70–99)
GLUCOSE BLDC GLUCOMTR-MCNC: 112 MG/DL — HIGH (ref 70–99)
GLUCOSE BLDC GLUCOMTR-MCNC: 91 MG/DL — SIGNIFICANT CHANGE UP (ref 70–99)
GLUCOSE SERPL-MCNC: 106 MG/DL — HIGH (ref 70–99)
GRAM STN FLD: ABNORMAL
HCT VFR BLD CALC: 38.4 % — LOW (ref 39–50)
HGB BLD-MCNC: 12.5 G/DL — LOW (ref 13–17)
MAGNESIUM SERPL-MCNC: 1.9 MG/DL — SIGNIFICANT CHANGE UP (ref 1.6–2.6)
MCHC RBC-ENTMCNC: 29 PG — SIGNIFICANT CHANGE UP (ref 27–34)
MCHC RBC-ENTMCNC: 32.6 G/DL — SIGNIFICANT CHANGE UP (ref 32–36)
MCV RBC AUTO: 89.1 FL — SIGNIFICANT CHANGE UP (ref 80–100)
MRSA PCR RESULT.: SIGNIFICANT CHANGE UP
NRBC # BLD AUTO: 0 K/UL — SIGNIFICANT CHANGE UP (ref 0–0)
NRBC # FLD: 0 K/UL — SIGNIFICANT CHANGE UP (ref 0–0)
NRBC BLD AUTO-RTO: 0 /100 WBCS — SIGNIFICANT CHANGE UP (ref 0–0)
PHOSPHATE SERPL-MCNC: 3.5 MG/DL — SIGNIFICANT CHANGE UP (ref 2.5–4.5)
PLATELET # BLD AUTO: 286 K/UL — SIGNIFICANT CHANGE UP (ref 150–400)
POTASSIUM SERPL-MCNC: 3.9 MMOL/L — SIGNIFICANT CHANGE UP (ref 3.5–5.3)
POTASSIUM SERPL-SCNC: 3.9 MMOL/L — SIGNIFICANT CHANGE UP (ref 3.5–5.3)
PROT SERPL-MCNC: 7.6 G/DL — SIGNIFICANT CHANGE UP (ref 6–8.3)
RBC # BLD: 4.31 M/UL — SIGNIFICANT CHANGE UP (ref 4.2–5.8)
RBC # FLD: 14.1 % — SIGNIFICANT CHANGE UP (ref 10.3–14.5)
S AUREUS DNA NOSE QL NAA+PROBE: DETECTED
SODIUM SERPL-SCNC: 138 MMOL/L — SIGNIFICANT CHANGE UP (ref 135–145)
SPECIMEN SOURCE: SIGNIFICANT CHANGE UP
VANCOMYCIN TROUGH SERPL-MCNC: 7.3 UG/ML — LOW (ref 10–20)
WBC # BLD: 16.14 K/UL — HIGH (ref 3.8–10.5)
WBC # FLD AUTO: 16.14 K/UL — HIGH (ref 3.8–10.5)

## 2025-04-30 PROCEDURE — 71045 X-RAY EXAM CHEST 1 VIEW: CPT | Mod: 26

## 2025-04-30 PROCEDURE — 99222 1ST HOSP IP/OBS MODERATE 55: CPT

## 2025-04-30 PROCEDURE — G0545: CPT

## 2025-04-30 PROCEDURE — 99233 SBSQ HOSP IP/OBS HIGH 50: CPT | Mod: FS,GC

## 2025-04-30 RX ORDER — VANCOMYCIN HCL IN 5 % DEXTROSE 1.5G/250ML
2000 PLASTIC BAG, INJECTION (ML) INTRAVENOUS EVERY 12 HOURS
Refills: 0 | Status: DISCONTINUED | OUTPATIENT
Start: 2025-04-30 | End: 2025-04-30

## 2025-04-30 RX ORDER — VANCOMYCIN HCL IN 5 % DEXTROSE 1.5G/250ML
1500 PLASTIC BAG, INJECTION (ML) INTRAVENOUS EVERY 12 HOURS
Refills: 0 | Status: DISCONTINUED | OUTPATIENT
Start: 2025-04-30 | End: 2025-05-01

## 2025-04-30 RX ORDER — PIPERACILLIN-TAZO-DEXTROSE,ISO 2.25G/50ML
3.38 IV SOLUTION, PIGGYBACK PREMIX FROZEN(ML) INTRAVENOUS EVERY 8 HOURS
Refills: 0 | Status: DISCONTINUED | OUTPATIENT
Start: 2025-04-30 | End: 2025-04-30

## 2025-04-30 RX ORDER — PIPERACILLIN-TAZO-DEXTROSE,ISO 2.25G/50ML
3.38 IV SOLUTION, PIGGYBACK PREMIX FROZEN(ML) INTRAVENOUS ONCE
Refills: 0 | Status: COMPLETED | OUTPATIENT
Start: 2025-04-30 | End: 2025-04-30

## 2025-04-30 RX ADMIN — Medication 50 MILLIGRAM(S): at 17:34

## 2025-04-30 RX ADMIN — MONTELUKAST SODIUM 10 MILLIGRAM(S): 10 TABLET ORAL at 17:34

## 2025-04-30 RX ADMIN — ENOXAPARIN SODIUM 40 MILLIGRAM(S): 100 INJECTION SUBCUTANEOUS at 18:44

## 2025-04-30 RX ADMIN — Medication 25 GRAM(S): at 12:15

## 2025-04-30 RX ADMIN — AMPICILLIN SODIUM AND SULBACTAM SODIUM 200 GRAM(S): 1; .5 INJECTION, POWDER, FOR SOLUTION INTRAMUSCULAR; INTRAVENOUS at 03:08

## 2025-04-30 RX ADMIN — LISINOPRIL 5 MILLIGRAM(S): 5 TABLET ORAL at 05:16

## 2025-04-30 RX ADMIN — ENOXAPARIN SODIUM 40 MILLIGRAM(S): 100 INJECTION SUBCUTANEOUS at 05:16

## 2025-04-30 RX ADMIN — Medication 300 MILLIGRAM(S): at 18:44

## 2025-04-30 RX ADMIN — ATORVASTATIN CALCIUM 10 MILLIGRAM(S): 80 TABLET, FILM COATED ORAL at 22:35

## 2025-04-30 RX ADMIN — SERTRALINE 100 MILLIGRAM(S): 100 TABLET, FILM COATED ORAL at 17:33

## 2025-04-30 RX ADMIN — Medication 300 MILLIGRAM(S): at 06:07

## 2025-04-30 RX ADMIN — Medication 200 GRAM(S): at 08:31

## 2025-04-30 RX ADMIN — Medication 1 APPLICATION(S): at 17:36

## 2025-04-30 RX ADMIN — FENOFIBRATE 48 MILLIGRAM(S): 160 TABLET ORAL at 17:33

## 2025-04-30 RX ADMIN — ARIPIPRAZOLE 10 MILLIGRAM(S): 2 TABLET ORAL at 17:33

## 2025-04-30 NOTE — PROGRESS NOTE ADULT - ASSESSMENT
44 y/o M with a history of Schizophrenia, DM2, HTN, had recent admission Feb. for R posterior suboccipital neck abscess, required packing for 1wk, on Vanc/Zosyn, Ceftriaxone, then Ancef while admitted and discharged on Bactrim (cx + for MSSA). Completed abx 2 weeks ago then had recurrence of pain and swelling for past 6 days s/p I&D with 15cc pus 4/29. Post I&D CT neck shows no well-defined peripherally enhancing drainable collection. Patient seen this morning with R posterior suboccipital neck erythema and firm, significant amount of pus mixed with blood expressed at bedside, irrigated the wound with NS and packed with 1/2 plain gauze strip and covered by 4x4 guaze and tape. Currently on Zosyn and Vancomycin. WBC 16.14, , Ha1c 6.3. Afebrile.

## 2025-04-30 NOTE — PROGRESS NOTE ADULT - PROBLEM SELECTOR PLAN 1
- Please continue zosyn and vancomycin while pending ID consult  - Please obtain ID consult given recurrent abscess, might need long term antibiotics  - Culture obtained at bedside and handed to primary team resident to sent to lab   - ENT will manage the wound packing BID for now while inpatient  - Patient might need home care RN for wound care when close to discharge.   - Wound care instruction: express the wound to check if any purulence, follow by irrigated the wound with NS and packed with 1/2 plain gauze strip and covered by 4x4 guaze and tape.  - Continue monitor WBC  - Tight FSG control  - Case discussed with Dr. Mitchell 51

## 2025-04-30 NOTE — PATIENT PROFILE ADULT - FALL HARM RISK - RISK INTERVENTIONS
Assistance OOB with selected safe patient handling equipment/Assistance with ambulation/Communicate Fall Risk and Risk Factors to all staff, patient, and family/Discuss with provider need for PT consult/Monitor for mental status changes/Monitor gait and stability/Reinforce activity limits and safety measures with patient and family/Reorient to person, place and time as needed/Review medications for side effects contributing to fall risk/Sit up slowly, dangle for a short time, stand at bedside before walking/Toileting schedule using arm’s reach rule for commode and bathroom/Use of alarms - bed, chair and/or voice tab/Visual Cue: Yellow wristband/Bed in lowest position, wheels locked, appropriate side rails in place/Call bell, personal items and telephone in reach/Instruct patient to call for assistance before getting out of bed or chair/Non-slip footwear when patient is out of bed/[place] to call system/Physically safe environment - no spills, clutter or unnecessary equipment/Purposeful Proactive Rounding/Room/bathroom lighting operational, light cord in reach

## 2025-04-30 NOTE — CONSULT NOTE ADULT - SUBJECTIVE AND OBJECTIVE BOX
CC: neck abscess     HPI: 44 year old male with HTN, Schizophrenia, DM 2 , ROMMEL and recent neck abscess that was admitted to [facility] for BS control, neck packing and IV Anx. Patient was discharged home on oral antibiotics that he finished several weeks ago. No presents to [facility] ED c/o increased neck pain, swelling, warmth for several days. States hes Blood sugar has been controlled. Denies any drainage, fevers, chills, N/V, rigors, sweats.      PAST MEDICAL & SURGICAL HISTORY:  Schizophrenia      Obstructive sleep apnea      Hypertension      Type 2 diabetes mellitus      No significant past surgical history        Allergies    No Known Allergies    Intolerances      MEDICATIONS  (STANDING):    MEDICATIONS  (PRN):        ROS:   ENT: all negative except as noted in HPI   CV: denies palpitations  Pulm: denies SOB, cough, hemoptysis  GI: denies change in apetite, indigestion, n/v  : denies pertinent urinary symptoms, urgency  Neuro: denies numbness/tingling, loss of sensation  Psych: denies anxiety  MS: denies muscle weakness, instability  Heme: denies easy bruising or bleeding  Endo: denies heat/cold intolerance, excessive sweating  Vascular: denies LE edema    Vital Signs Last 24 Hrs  T(C): 37.6 (29 Apr 2025 15:55), Max: 37.6 (29 Apr 2025 15:55)  T(F): 99.7 (29 Apr 2025 15:55), Max: 99.7 (29 Apr 2025 15:55)  HR: 100 (29 Apr 2025 15:55) (100 - 100)  BP: 120/80 (29 Apr 2025 15:55) (120/80 - 120/80)  BP(mean): --  RR: 18 (29 Apr 2025 15:55) (18 - 18)  SpO2: 97% (29 Apr 2025 15:55) (97% - 97%)                              12.9   14.76 )-----------( 274      ( 29 Apr 2025 19:20 )             40.1             PHYSICAL EXAM:  Gen: NAD  Skin: No rashes, bruises, or lesions  Head: Normocephalic, Atraumatic  Face: no edema, erythema, or fluctuance. Parotid glands soft without mass  Eyes: no scleral injection  Ears: Right - ear canal clear, TM intact without effusion or erythema. No evidence of any fluid drainage. No mastoid tenderness, erythema, or ear bulging            Left - ear canal clear, TM intact without effusion or erythema. No evidence of any fluid drainage. No mastoid tenderness, erythema, or ear bulging  Nose: Nares bilaterally patent, no discharge  Mouth: No Stridor / Drooling / Trismus.  Mucosa moist, tongue/uvula midline, oropharynx clear  Neck: Flat, supple, no lymphadenopathy, trachea midline, no masses  Lymphatic: No lymphadenopathy  Resp: breathing easily, no stridor  CV: no peripheral edema/cyanosis  GI: nondistended   Peripheral vascular: no JVD or edema  Neuro: facial nerve intact, no facial droop    procedure:  Injected with 7cc of lidocaine 1%/epi  attempted FNA x 4, able to aspirate 2cc of purulence   incised in 3 aresa x 3mm, able to express large amount of pus  unable to locate pocket to pack with strip gauze using blunt end of q tip  dressed wound with 4x4 gauze and taped into place.     IMAGING/ADDITIONAL STUDIES:   CT pending   
Patient is a 45y old  Male who presents with a chief complaint of Right neck abscess (30 Apr 2025 08:25)    HPI:  45-year-old male with a past medical Struve hypertension, schizophrenia, diabetes, ROMMEL who presented to hospital due to a right neck abscess.    Patient previously been admitted to the hospital in January 2025 under similar circumstances with a similar abscess to the neck, discharged with a course of p.o. TMP–SMX.  Now returning with same abscess development, worsening neck pain, swelling and warmth over the past several days.    Prior cultures obtained had grown MSSA.  Patient also with MRSA nares positive in the past.  Patient otherwise denies any systemic symptoms such as fever, chills, denies any localizing symptoms such as respiratory distress, dysphagia, difficulty swallowing.    In the ED, patient is afebrile.  Otherwise hemodynamically stable.  Labs show leukocytosis, latest of 16.1, anemia 12.5/30.4, BMP with renal function within normal limits, hepatic function within normal limits.  Abscess wound was cultured, GPC in clusters on Gram stain, cultures pending.  CT of the soft tissue neck shows extensive information valving the right posterior neck with areas of complex fluid and/or phlegmon.  Focal continuous defect and localized gas in hyperattenuating material also noted, no well-defined enhancing drainable collection noted.  Bilateral tonsils enlarged noticed.  ENT consulted on admission.  Patient started on piperacillin/tazobactam and vancomycin.            prior hospital charts reviewed [x  ]  primary team notes reviewed [ x ]  other consultant notes reviewed [ x ]    PAST MEDICAL & SURGICAL HISTORY:  Schizophrenia      Obstructive sleep apnea      Hypertension      Type 2 diabetes mellitus      No significant past surgical history          Allergies  No Known Allergies    ANTIMICROBIALS (past 90 days)  MEDICATIONS  (STANDING):  ampicillin/sulbactam  IVPB   200 mL/Hr IV Intermittent (04-30-25 @ 03:08)    ampicillin/sulbactam  IVPB   200 mL/Hr IV Intermittent (04-29-25 @ 21:05)    cefTRIAXone   IVPB   100 mL/Hr IV Intermittent (04-29-25 @ 19:30)    piperacillin/tazobactam IVPB.   200 mL/Hr IV Intermittent (04-30-25 @ 08:31)    piperacillin/tazobactam IVPB.-   25 mL/Hr IV Intermittent (04-30-25 @ 12:15)    vancomycin  IVPB   300 mL/Hr IV Intermittent (04-30-25 @ 06:07)    vancomycin  IVPB.   250 mL/Hr IV Intermittent (04-29-25 @ 19:50)        piperacillin/tazobactam IVPB.. 3.375 every 8 hours  vancomycin  IVPB 1500 every 12 hours    MEDICATIONS  (STANDING):  acetaminophen     Tablet .. 650 every 6 hours PRN  albuterol    90 MICROgram(s) HFA Inhaler 2 every 6 hours PRN  ARIPiprazole 10 daily  atorvastatin 10 at bedtime  cloZAPine 50 daily  dextrose 50% Injectable 25 once  dextrose 50% Injectable 12.5 once  dextrose 50% Injectable 25 once  dextrose Oral Gel 15 once PRN  enoxaparin Injectable 40 every 12 hours  fenofibrate Tablet 48 daily  glucagon  Injectable 1 once  insulin lispro (ADMELOG) corrective regimen sliding scale  three times a day before meals  insulin lispro (ADMELOG) corrective regimen sliding scale  at bedtime  lisinopril 5 daily  montelukast 10 daily  sertraline 100 daily    SOCIAL HISTORY:     Lives at home.  FAMILY HISTORY:  No pertinent family history in first degree relatives  REVIEW OF SYSTEMS  [  ] ROS unobtainable because:    [ x ] All other systems negative except as noted below:	    Constitutional:  [ ] fever [ x] chills  [ ] weight loss  [ ] weakness  Skin:  [ ] rash [ ] phlebitis [x]wound	  Eyes: [ ] icterus [ ] pain  [ ] discharge	  ENMT: [ ] sore throat  [ ] thrush [ ] ulcers [ ] exudates  Respiratory: [ ] dyspnea [ ] hemoptysis [ ] cough [ ] sputum	  Cardiovascular:  [ ] chest pain [ ] palpitations [ ] edema	  Gastrointestinal:  [ ] nausea [ ] vomiting [ ] diarrhea [ ] constipation [ ] pain	  Genitourinary:  [ ] dysuria [ ] frequency [ ] hematuria [ ] discharge [ ] flank pain  [ ] incontinence  Musculoskeletal:  [ ] myalgias [ ] arthralgias [ ] arthritis  [ ] back pain  Neurological:  [ ] headache [ ] seizures  [ ] confusion/altered mental status  Psychiatric:  [ ] anxiety [ ] depression	  Hematology/Lymphatics:  [ ] lymphadenopathy  Endocrine:  [ ] adrenal [ ] thyroid  Allergic/Immunologic:	 [ ] transplant [ ] seasonal    Vital Signs Last 24 Hrs  T(F): 97.6 (04-30-25 @ 14:34), Max: 99.7 (04-29-25 @ 15:55)  Vital Signs Last 24 Hrs  HR: 89 (04-30-25 @ 14:34) (89 - 101)  BP: 115/69 (04-30-25 @ 14:34) (105/70 - 141/100)  RR: 17 (04-30-25 @ 14:34)  SpO2: 97% (04-30-25 @ 14:34) (91% - 99%)  Wt(kg): --    PHYSICAL EXAM:  Constitutional: non-toxic, no distress  HEAD/EYES: anicteric, no conjunctival injection  ENT:  Right posterior swelling, wound   Cardiovascular:   normal S1, S2, no murmur, no edema  Respiratory:  clear BS bilaterally, no wheezes, no rales  GI:  soft, non-tender, normal bowel sounds  :  no [place], no CVA tenderness  Musculoskeletal:  no synovitis, normal ROM  Neurologic: awake and alert, normal strength, no focal findings  Skin:  no rash, no erythema, no phlebitis  Heme/Onc: no lymphadenopathy   Psychiatric:  awake, alert, appropriate mood                            12.5   16.14 )-----------( 286      ( 30 Apr 2025 06:10 )             38.4   04-30    138  |  97[L]  |  20  ----------------------------<  106[H]  3.9   |  26  |  0.90    Ca    9.4      30 Apr 2025 06:10  Phos  3.5     04-30  Mg     1.90     04-30    TPro  7.6  /  Alb  3.7  /  TBili  0.6  /  DBili  x   /  AST  25  /  ALT  35  /  AlkPhos  103  04-30    Urinalysis Basic - ( 30 Apr 2025 06:10 )    Color: x / Appearance: x / SG: x / pH: x  Gluc: 106 mg/dL / Ketone: x  / Bili: x / Urobili: x   Blood: x / Protein: x / Nitrite: x   Leuk Esterase: x / RBC: x / WBC x   Sq Epi: x / Non Sq Epi: x / Bacteria: x    MICROBIOLOGY:  Culture - Abscess with Gram Stain (collected 30 Apr 2025 09:46)  Source: Abscess neck  Gram Stain (30 Apr 2025 13:56):    Few polymorphonuclear leukocytes per low power field    Few Gram Positive Cocci in Clusters per oil power field                  RADIOLOGY:  imaging below personally reviewed and agree with findings

## 2025-04-30 NOTE — PROGRESS NOTE ADULT - ASSESSMENT
45M w/ PMHx of HTN, schizophrenia, DM2, and ROMMEL who presents w/ right sided neck abscess. Admitted to medicine for further management and monitoring. s/p I+D by ENT in ED.

## 2025-04-30 NOTE — PROGRESS NOTE ADULT - SUBJECTIVE AND OBJECTIVE BOX
Medicine Progress Note  --------------------  Jacinta Ku M.D.  Internal Medicine  PGY-1  --------------------      Patient: ELKE INTERIANO, MRN: [id number], : 1980  Admitted on 25 for Abscess of skin      LANGUAGE - English            ------------------------------------------------------------------------------------------------------------  SUMMARY (from last progress note through 25 @ 08:09):   ------------------------------------------------------------------------------------------------------------  OVERNIGHT EVENTS  NAEON    SUBJECTIVE  -       ROS negative unless noted above.  ------------------------------------------------------------------------------------------------------------  OBJECTIVE:  Physical Exam  CONST:     NAD, well-appearing; well-developed; appears stated age  EYES:         Conjunctiva clear; PERRL; no conjunctival pallor; no lid lag  ENMT:       MMM, no pharyngeal injection or exudates; normal dentition/dentures present  NECK:        Supple, no LAD; no palpable masses; no thyromegaly  RESP :        Normal respiratory effort; CTA bilaterally; no W/R/R  CHEST:       No TTP, no lines/ports; symmetric chest expansion  CARDIO:     RRR, normal S1 and S2, no M/R/G; apical impulse at MCL  VASC:         No JVD/AJR, no peripheral edema, pulses 2+ B/L, Cap refill <2s  ABD:           Soft, NT/ND, norm bowel sounds; no R/G; No HSM; No CVAT  MSK:          No clubbing of digits; no joint swelling or TTP  EXT:           WWP, no reduction in body hair, no LE skin changes  PSYCH        A&O to person, place, and time; affect appropriate  NEURO:     Non-focal; no gross sensory deficits; moving all extremities   SKIN:          No rashes; no palpable lesions; axillae not dry    Vital Signs Last 24 Hrs  T(F): 99.5, Max: 99.7 (25 @ 15:55)  HR: 93 (93 - 101)  BP: 137/98 (105/70 - 141/100)  RR: 18 (18 - 19)  SpO2: 99% (91% - 99%)        DAILY MEASUREMENTS:  I&O's Summary    Daily Height in cm: 175.26 (2025 03:59)    Daily   Weight (kg): 166 (25 @ 15:55)  Orthostatic VS        LABS:                        12.5   16.14 )-----------( 286      ( 2025 06:10 )             38.4     Hgb Trend: 12.5<--, 12.5<--, 12.9<--      138  |  97[L]  |  20  ----------------------------<  106[H]  3.9   |  26  |  0.90    Ca    9.4      2025 06:10  Phos  3.5     -  Mg     1.90         TPro  7.6  /  Alb  3.7  /  TBili  0.6  /  DBili  x   /  AST  25  /  ALT  35  /  AlkPhos  103        CAPILLARY BLOOD GLUCOSE      POCT Blood Glucose.: 110 mg/dL (2025 03:20)  POCT Blood Glucose.: 109 mg/dL (2025 21:50)        Urinalysis Basic - ( 2025 06:10 )    Color: x / Appearance: x / SG: x / pH: x  Gluc: 106 mg/dL / Ketone: x  / Bili: x / Urobili: x   Blood: x / Protein: x / Nitrite: x   Leuk Esterase: x / RBC: x / WBC x   Sq Epi: x / Non Sq Epi: x / Bacteria: x          Venous Blood Gas:  25 @ 22:22  7.41/50/58/32/88.4  VBG Lactate: 1.0      RADIOLOGY & ADDITIONAL TESTS:  Results Reviewed:   CT: < from: CT Neck Soft Tissue w/ IV Cont (25 @ 23:33) >    IMPRESSION:    Extensive inflammation involving the right posterior neck with areas of   complex fluid and/or phlegmon. Focal cutaneous defect and localized gas   and hyperattenuating material, possibly blood products, tracking deep to   the defect. No well-defined peripherally enhancing drainable collection.    Enlarged bilateral palatine tonsils, nonspecific.    < end of copied text >        ------------------------------------------------------------------------------------------------------------  MEDICATIONS  (STANDING):  ampicillin/sulbactam  IVPB 3 Gram(s) IV Intermittent every 6 hours  ARIPiprazole 10 milliGRAM(s) Oral daily  atorvastatin 10 milliGRAM(s) Oral at bedtime  chlorhexidine 2% Cloths 1 Application(s) Topical daily  cloZAPine 50 milliGRAM(s) Oral daily  dextrose 5%. 1000 milliLiter(s) (100 mL/Hr) IV Continuous <Continuous>  dextrose 5%. 1000 milliLiter(s) (50 mL/Hr) IV Continuous <Continuous>  dextrose 50% Injectable 25 Gram(s) IV Push once  dextrose 50% Injectable 12.5 Gram(s) IV Push once  dextrose 50% Injectable 25 Gram(s) IV Push once  enoxaparin Injectable 40 milliGRAM(s) SubCutaneous every 12 hours  fenofibrate Tablet 48 milliGRAM(s) Oral daily  glucagon  Injectable 1 milliGRAM(s) IntraMuscular once  insulin lispro (ADMELOG) corrective regimen sliding scale   SubCutaneous three times a day before meals  insulin lispro (ADMELOG) corrective regimen sliding scale   SubCutaneous at bedtime  lisinopril 5 milliGRAM(s) Oral daily  montelukast 10 milliGRAM(s) Oral daily  sertraline 100 milliGRAM(s) Oral daily  vancomycin  IVPB 1500 milliGRAM(s) IV Intermittent every 12 hours    MEDICATIONS  (PRN):  acetaminophen     Tablet .. 650 milliGRAM(s) Oral every 6 hours PRN Temp greater or equal to 38C (100.4F), Mild Pain (1 - 3)  albuterol    90 MICROgram(s) HFA Inhaler 2 Puff(s) Inhalation every 6 hours PRN Shortness of Breath and/or Wheezing  dextrose Oral Gel 15 Gram(s) Oral once PRN Blood Glucose LESS THAN 70 milliGRAM(s)/deciliter    ------------------------------------------------------------------------------------------------------------  COORDINATION OF CARE:  Care discussed with consultants/other providers and notes reviewed [Y]     Medicine Progress Note  --------------------  Jacinta Ku M.D.  Internal Medicine  PGY-1  --------------------      Patient: ELKE INTERIANO, MRN: [id number], : 1980  Admitted on 25 for Abscess of skin      LANGUAGE - English            ------------------------------------------------------------------------------------------------------------  SUMMARY (from last progress note through 25 @ 08:09): s/p I+D by ENT  ------------------------------------------------------------------------------------------------------------  OVERNIGHT EVENTS  NAEON    SUBJECTIVE  - endorses R posterior neck pain, denies fever/chills. No dyspnea or CP    ROS negative unless noted above.  ------------------------------------------------------------------------------------------------------------  OBJECTIVE:  Physical Exam  CONST:     NAD, well-appearing; well-developed; appears stated age  EYES:         Conjunctiva clear; PERRL; no conjunctival pallor; no lid lag  ENMT:       MMM, no pharyngeal injection or exudates; normal dentition/dentures present  NECK:        Supple, no LAD; no palpable masses; no thyromegaly  RESP :        Normal respiratory effort; CTA bilaterally; no W/R/R  CHEST:       No TTP, no lines/ports; symmetric chest expansion  CARDIO:     RRR, normal S1 and S2, no M/R/G; apical impulse at MCL  VASC:         No JVD/AJR, no peripheral edema, pulses 2+ B/L, Cap refill <2s  ABD:           Morbid obese, +BS, soft  MSK:          No clubbing of digits; no joint swelling or TTP  EXT:           WWP, no reduction in body hair, no LE skin changes  PSYCH        A&O to person, place, and time; affect appropriate  NEURO:     Non-focal; no gross sensory deficits; moving all extremities   SKIN:         R posterior neck erythema, draining purulence     Vital Signs Last 24 Hrs  T(F): 99.5, Max: 99.7 (25 @ 15:55)  HR: 93 (93 - 101)  BP: 137/98 (105/70 - 141/100)  RR: 18 (18 - 19)  SpO2: 99% (91% - 99%) on 2L      DAILY MEASUREMENTS:  I&O's Summary    Daily Height in cm: 175.26 (2025 03:59)    Daily   Weight (kg): 166 (25 @ 15:55)  Orthostatic VS        LABS:                        12.5   16.14 )-----------( 286      ( 2025 06:10 )             38.4     Hgb Trend: 12.5<--, 12.5<--, 12.9<--      138  |  97[L]  |  20  ----------------------------<  106[H]  3.9   |  26  |  0.90    Ca    9.4      2025 06:10  Phos  3.5       Mg     1.90         TPro  7.6  /  Alb  3.7  /  TBili  0.6  /  DBili  x   /  AST  25  /  ALT  35  /  AlkPhos  103        CAPILLARY BLOOD GLUCOSE      POCT Blood Glucose.: 110 mg/dL (2025 03:20)  POCT Blood Glucose.: 109 mg/dL (2025 21:50)        Urinalysis Basic - ( 2025 06:10 )    Color: x / Appearance: x / SG: x / pH: x  Gluc: 106 mg/dL / Ketone: x  / Bili: x / Urobili: x   Blood: x / Protein: x / Nitrite: x   Leuk Esterase: x / RBC: x / WBC x   Sq Epi: x / Non Sq Epi: x / Bacteria: x          Venous Blood Gas:  25 @ 22:22  7.41/50/58/32/88.4  VBG Lactate: 1.0      RADIOLOGY & ADDITIONAL TESTS:  Results Reviewed:   CT: < from: CT Neck Soft Tissue w/ IV Cont (25 @ 23:33) >    IMPRESSION:    Extensive inflammation involving the right posterior neck with areas of   complex fluid and/or phlegmon. Focal cutaneous defect and localized gas   and hyperattenuating material, possibly blood products, tracking deep to   the defect. No well-defined peripherally enhancing drainable collection.    Enlarged bilateral palatine tonsils, nonspecific.    < end of copied text >        ------------------------------------------------------------------------------------------------------------  MEDICATIONS  (STANDING):  ampicillin/sulbactam  IVPB 3 Gram(s) IV Intermittent every 6 hours  ARIPiprazole 10 milliGRAM(s) Oral daily  atorvastatin 10 milliGRAM(s) Oral at bedtime  chlorhexidine 2% Cloths 1 Application(s) Topical daily  cloZAPine 50 milliGRAM(s) Oral daily  dextrose 5%. 1000 milliLiter(s) (100 mL/Hr) IV Continuous <Continuous>  dextrose 5%. 1000 milliLiter(s) (50 mL/Hr) IV Continuous <Continuous>  dextrose 50% Injectable 25 Gram(s) IV Push once  dextrose 50% Injectable 12.5 Gram(s) IV Push once  dextrose 50% Injectable 25 Gram(s) IV Push once  enoxaparin Injectable 40 milliGRAM(s) SubCutaneous every 12 hours  fenofibrate Tablet 48 milliGRAM(s) Oral daily  glucagon  Injectable 1 milliGRAM(s) IntraMuscular once  insulin lispro (ADMELOG) corrective regimen sliding scale   SubCutaneous three times a day before meals  insulin lispro (ADMELOG) corrective regimen sliding scale   SubCutaneous at bedtime  lisinopril 5 milliGRAM(s) Oral daily  montelukast 10 milliGRAM(s) Oral daily  sertraline 100 milliGRAM(s) Oral daily  vancomycin  IVPB 1500 milliGRAM(s) IV Intermittent every 12 hours    MEDICATIONS  (PRN):  acetaminophen     Tablet .. 650 milliGRAM(s) Oral every 6 hours PRN Temp greater or equal to 38C (100.4F), Mild Pain (1 - 3)  albuterol    90 MICROgram(s) HFA Inhaler 2 Puff(s) Inhalation every 6 hours PRN Shortness of Breath and/or Wheezing  dextrose Oral Gel 15 Gram(s) Oral once PRN Blood Glucose LESS THAN 70 milliGRAM(s)/deciliter    ------------------------------------------------------------------------------------------------------------  COORDINATION OF CARE:  Care discussed with consultants/other providers and notes reviewed [Y]

## 2025-04-30 NOTE — PROGRESS NOTE ADULT - PROBLEM SELECTOR PLAN 6
VTE PPx: Lovenox  Nutrition: CC Diet  Access: PIV  Code Status: FULL  Dispo: pending clinical improvement

## 2025-04-30 NOTE — PROGRESS NOTE ADULT - PROBLEM SELECTOR PLAN 5
VTE PPx: Lovenox  Nutrition: CC Diet  Fluids: PRN  Electrolytes: Maintain K>4, Mag >2, Phos > 3  Access: PIV  Code Status: FULL  Dispo: pending clinical improvement c/w nocturnal bipap 10/5 40% as written

## 2025-04-30 NOTE — PROGRESS NOTE ADULT - PROBLEM SELECTOR PLAN 1
Recurrent abscess, previously I+D by ENT in Jan/Feb. Cultures grew MSSA. Was discharged with bactrim. Now recurring abscess without obvious drainable collection on imaging. He is a resident of [facility]    -c/w Vanco/Zosyn pending culture data (history of MRSA colonization)  -Abscess culture sent  -Check blood cultures  -Wound consult  -ID consult pending  -Appreciate ENT recs

## 2025-04-30 NOTE — PROGRESS NOTE ADULT - SUBJECTIVE AND OBJECTIVE BOX
ENT ISSUE/POD: R posterior suboccipital neck abscess    HPI: Patient seen and examined at bedside. No acute event overnight. No acute complaints.        PAST MEDICAL & SURGICAL HISTORY:  Schizophrenia      Obstructive sleep apnea      Hypertension      Type 2 diabetes mellitus      No significant past surgical history        Allergies    No Known Allergies    Intolerances      MEDICATIONS  (STANDING):  ARIPiprazole 10 milliGRAM(s) Oral daily  atorvastatin 10 milliGRAM(s) Oral at bedtime  chlorhexidine 2% Cloths 1 Application(s) Topical daily  cloZAPine 50 milliGRAM(s) Oral daily  dextrose 5%. 1000 milliLiter(s) (100 mL/Hr) IV Continuous <Continuous>  dextrose 5%. 1000 milliLiter(s) (50 mL/Hr) IV Continuous <Continuous>  dextrose 50% Injectable 25 Gram(s) IV Push once  dextrose 50% Injectable 12.5 Gram(s) IV Push once  dextrose 50% Injectable 25 Gram(s) IV Push once  enoxaparin Injectable 40 milliGRAM(s) SubCutaneous every 12 hours  fenofibrate Tablet 48 milliGRAM(s) Oral daily  glucagon  Injectable 1 milliGRAM(s) IntraMuscular once  insulin lispro (ADMELOG) corrective regimen sliding scale   SubCutaneous three times a day before meals  insulin lispro (ADMELOG) corrective regimen sliding scale   SubCutaneous at bedtime  lisinopril 5 milliGRAM(s) Oral daily  montelukast 10 milliGRAM(s) Oral daily  piperacillin/tazobactam IVPB. 3.375 Gram(s) IV Intermittent once  piperacillin/tazobactam IVPB.- 3.375 Gram(s) IV Intermittent once  piperacillin/tazobactam IVPB.. 3.375 Gram(s) IV Intermittent every 8 hours  sertraline 100 milliGRAM(s) Oral daily  vancomycin  IVPB 1500 milliGRAM(s) IV Intermittent every 12 hours    MEDICATIONS  (PRN):  acetaminophen     Tablet .. 650 milliGRAM(s) Oral every 6 hours PRN Temp greater or equal to 38C (100.4F), Mild Pain (1 - 3)  albuterol    90 MICROgram(s) HFA Inhaler 2 Puff(s) Inhalation every 6 hours PRN Shortness of Breath and/or Wheezing  dextrose Oral Gel 15 Gram(s) Oral once PRN Blood Glucose LESS THAN 70 milliGRAM(s)/deciliter      Social History: see consult note    Family history: see consult note    ROS:   ENT: all negative except as noted in HPI   Pulm: denies SOB, cough, hemoptysis  Neuro: denies numbness/tingling, loss of sensation  Endo: denies heat/cold intolerance, excessive sweating      Vital Signs Last 24 Hrs  T(C): 37.5 (30 Apr 2025 03:59), Max: 37.6 (29 Apr 2025 15:55)  T(F): 99.5 (30 Apr 2025 03:59), Max: 99.7 (29 Apr 2025 15:55)  HR: 93 (30 Apr 2025 05:04) (93 - 101)  BP: 137/98 (30 Apr 2025 05:04) (105/70 - 141/100)  BP(mean): --  RR: 18 (30 Apr 2025 05:04) (18 - 19)  SpO2: 99% (30 Apr 2025 05:04) (91% - 99%)    Parameters below as of 30 Apr 2025 05:04  Patient On (Oxygen Delivery Method): nasal cannula  O2 Flow (L/min): 2                            12.5   16.14 )-----------( 286      ( 30 Apr 2025 06:10 )             38.4    04-30    138  |  97[L]  |  20  ----------------------------<  106[H]  3.9   |  26  |  0.90    Ca    9.4      30 Apr 2025 06:10  Phos  3.5     04-30  Mg     1.90     04-30    TPro  7.6  /  Alb  3.7  /  TBili  0.6  /  DBili  x   /  AST  25  /  ALT  35  /  AlkPhos  103  04-30       PHYSICAL EXAM:  Gen: NAD  Skin: No rashes, bruises, or lesions  Head: Normocephalic, Atraumatic  Face: no edema, erythema, or fluctuance. Parotid glands soft without mass  Eyes: no scleral injection  Ears: No evidence of any fluid drainage. No mastoid tenderness, erythema, or ear bulging  Nose: Nares bilaterally patent, no discharge  Mouth: No Stridor / Drooling / Trismus.  Mucosa moist, tongue/uvula midline, oropharynx clear  Neck: R posterior suboccipital neck erythema and firm, significant amount of pus mixed with blood expressed at bedside, irrigated the wound with NS and packed with 1/2 plain gauze strip and covered by 4x4 guaze and tape. Rest of neck is flat, supple, no lymphadenopathy, trachea midline, no masses  Lymphatic: No lymphadenopathy  Resp: breathing easily, no stridor  Neuro: facial nerve intact, no facial droop

## 2025-04-30 NOTE — PATIENT PROFILE ADULT - HOW PATIENT ADDRESSED, PROFILE
1) glargine insulin reduced to 40 units daily.  Reduce further to 30 units if continued low sugar  2) Keep log sheets and try to get us some data in 2 weeks and then at follow-up  3) Aspart insulin should be injected 15 min before meal if you can't get Fiasp  4) Count 10 sec after insulin has been injected completed before removing injection device  5) Fiasp 10 units for each meal, hold if not eating, hold if sugar <80.  Plus correction for high sugar  150-200:2 units/ 201-250:4units/ 251-300:6units/ >300units: 8 units   6) Stop the Novolog when using the Fiasp    Please seek medical attention for any worsening medical condition or lack of improvement.             Dr. Luis Griffin, Endocrinologist  [address] [address]  Ph: [phone]  Fax: [phone]         Hours:   Monday  - 8:30-4:30  Tuesday - 8:30- 4:30  Wednesday - 8:30- 4:30  Friday - 8:30-4:30  Saturday - OFF  Sunday -OFF         [facility]   [id number] [place] [address]. [address]  Ph: [phone]  Fax: [phone]        Hours:   Thursday 8:30-4:30 only         Requirements for follow-up visit, please have your blood work done 5-7 days prior to seeing Dr. Griffin if indicated.         If you need a refill on your prescription, please call your pharmacy and let them know. Please be proactive and call before your medication runs out. The pharmacy will then contact us for the refill. Please allow 24-48 hours for the refill to be processed.      If your physician has ordered additional laboratory or radiology testing as part of your ongoing plan of care, please allow 5-7 business days from the day of your lab draw or test for the results to be sent and reviewed by your provider. If your results are critical and require more immediate intervention, you will be contacted sooner. Your results will be conveyed to you via a phone call or letter.      You may be receiving a  patient satisfaction survey in the mail or in your email. If you receive an email survey, please look for the subject line of: \" Your provider name\" would like your feedback\". Please take the time to complete your survey either via the mail or email, as your feedback is very important to us. We strive to make your experience exceptional and your comments help us with that goal. We look forward to hearing from you.          CALL clinic if you experience recurrent/frequent low blood sugar less than 70.    ROUTINE DIABETES INSTRUCTIONS  Please bring your meter to all clinic visits. Do not share your meter with others.    Glucose/sugar goals as below:  Fasting in the morning and times other than after a meal: < 140  2 hours after meals= glucose <180      You should rotate your injection sites to avoid developing scar or fatty build-up in the area of insulin injections. If you develop scar or fatty build up in the area you will not absorb the insulin well.    Do not reuse the needles. Clean the skin with alcohol before the injection.    You should always carry medical alert ID stating that you have diabetes.  You should always carry sugar (glucose tablets) to correct lows sugars.  You should have a glucagon rescue kit if on insulin or medications that increase insulin    DRIVING  Check glucose/sugar levels prior to driving and every 2 hours during long car trips and correct and low glucose/sugar levels before driving.     PREGNANCY  You should let us know immediately if you are pregnant.    DIABETES COMPLICATIONS- PREVENTION MEASURES  You should have a yearly dilated eye exam. Please have the results sent to the Endocrinology Clinic.    You should either perform self foot care or see a podiatrist regularly for foot care. If you develop any sores on your feet that are not healing, call clinic for further instructions.    You should follow a diabetic diet.    You should aim to get 2.5 hours of exercise a week (ie 30  minutes 5 times a week) as you are able.     If you develop muscle aches on the cholesterol medication (statin) you should stop the medication and call the prescribing physician.       Hypoglycemia “Rule of 15/15”   “Rule of 15/15” is an easy way to remember how to treat low glucose.     1) Check your blood glucose:     If your blood glucose is 70-100mg/dL and you are symptomatic (sweaty, shaky, hungry, irritable, tired) or your blood glucose is less than 70mg/dL, take 15 grams of fast acting carbohydrates*.   *Examples of fast acting carbohydrates are:   3-4 glucose tablets OR  1 tube glucose gel OR   1 cup non-fat milk OR   6oz regular soda OR   4oz (1/2 cup) fruit juice OR   2 teaspoons of sugar or honey.     2) Wait 15 minutes and then recheck your blood glucose. If your blood glucose is not up to 100mg/dL, then repeat the above treatment.     If the symptoms of low blood glucose are gone but it is more than 1 hour away from your meal time, eat a snack of 1 starch and 1 protein such as:  ½ sandwich OR   1oz cheese and 6 crackers OR   1 tbsp peanut butter and 6 crackers OR   4oz non-fat milk and 2 candace crackers.     Special Notes: Symptoms of Hypoglycemia with normal or high blood sugar.   If you have had high blood sugars for several weeks, your body will adapt and “get used to” these values. When blood sugar is treated and returns to more normal levels, symptoms of low blood sugar can occur even with normal blood sugars values (>70mg/dL). This condition is not dangerous and does not require treatment, as long as the blood sugar stays above 60mg/dL. In fact, it is best to try not to treat these symptoms as much as possible. This will allow the body to “reset” and eventually these symptoms will not occur with normal blood sugar values anymore. If you must eat because the symptoms are too severe, try to eat as little as possible to make the symptoms tolerable (try 10- 15grams of carbohydrates or less). If you  always eat enough to return the blood sugar to high values, the symptoms with normal sugar values will not go away and you will not be able to have good control of you blood sugar.   Hypoglycemia unawareness   If you have had frequent low blood sugar reactions, you may not develop typical symptoms (a condition called hypoglycemia unawareness and instead develop confusion, slurred speech, and drowsiness. Hypoglycemia unawareness can be dangerous, because confusion may keep you from treating the low blood sugar appropriately. This can allow the low blood sugar to become more severe and result in serious problems like passing out or having seizures. If you think you have hypoglycemia unawareness, please do not drive and discuss this with your doctor right away. For people who have this condition and cannot treat their low glucose without help, a medicine called glucagon needs to be used. By injecting glucagon (which comes in an emergency kit), a spouse/partner/friend/coworker can help to bring the glucose level back up to a normal range when the person with diabetes is unable or unwilling to safely eat carbohydrates.      LONG-TERM COMPLICATIONS OF POORLY CONTROLLED DIABETES  Diabetes is a disease which can negatively affect vision causing blindness, affect kidneys causing failure and need for dialysis, affect nerves leading to numbness/tingling and inability to tell when injury occurs, affect the cardiovascular system leading to heart attacks, affect the cerebrovascular system (brain blood vessels) leading to stroke, affect the blood vessels to arms and legs leading to issues with poor blood flow to those areas leading to wounds which don't heal and infection (this could lead to amputations).  Poorly controlled diabetes can lead to loss of limb and life.  You are doing the correct thing when you stay engaged with your medical care.         Call clinic if you have concerns about your blood glucose levels between  visits.            Sick Day Guidelines for People with Diabetes   Keep taking your insulin or diabetes pills    When you are sick, your blood sugar can go up. Take your insulin or pills unless your doctor   tells you to stop.    Be careful about over-the-counter medicines.   Some contain sugar. Keep some sugar-free   cough medicines and throat lozenges in your   home, along with aspirin or acetaminophen.   Check your blood sugar every 2 to 4 hours    If your blood sugar is over 400 mg/dL for two   tests in a row, call your doctor.    Sometimes you can have low blood sugar   when you are sick. If your sugar is low, you   may feel shaky, break into a sweat, feel   irritable, or feel your heart beat very fast. Treat   the low blood sugar with about 15 grams of   carbohydrate (see the lists below).   Eat small frequent meals and drink plenty   of fluids    Try to eat foods containing about 15 grams of   carbohydrates, every 1-2 hours.    Sip about 8 ounces of water or other fluids   each hour. If your blood sugar is over 300, use   sugar-free drinks like broth or tea. If it’s less   than 300, use liquids containing about 15 grams   of carbohydrates (see below).  Call your doctor if:    You have been sick for 1 to 2 days, and you   are not getting better    You have thrown up more than once    You have diarrhea more than 5 times or longer   than 6 hours    Your blood sugar is over 400 mg/dL for two   tests in a row    You have any of these symptoms:   - Increased drowsiness   - Stomach or chest pain   - Trouble breathing   - Dry, cracked lips, mouth or tongue   - A fruity odor to your breath    You are not sure what you need to do to take   care of yourself   Be ready to tell the doctor:    Your temperature    Your blood sugar level    How long you have been sick    How much and what type of insulin or pills   you have taken and the time you took it    How much food and fluid you have been able   to eat and drink     Whether you are throwing up or have diarrhea    Other medications you have taken    Any other symptoms you have      Foods with about 15 grams of carbohydrates  Fluids with about 15 grams of carbohydrates    1/2 cup gelatin (not sugar free)          6 saltines   1 slice of toast or bread            6 vanilla wafers   3 candace crackers                    1/2 cup custard   1/2 cup mashed potatoes        6 ounces yogurt   1/4 cup regular pudding        1/2 cup ice cream   1/2 cup cooked cereal  1 cup soup   1/2 cup fruit juice   1/2 cup regular soft drink (caffeine free,   not sugar free)   1 cup Gatorade   1 cup milk    [id number] (10/2018)      eyal

## 2025-04-30 NOTE — ED ADULT NURSE REASSESSMENT NOTE - NS ED NURSE REASSESS COMMENT FT1
Pt at baseline mental status and has no complaints at this time. Respirations even and unlabored. NSR on cardiac monitor. Safety maintained.

## 2025-04-30 NOTE — CONSULT NOTE ADULT - ASSESSMENT
45-year-old male with a past medical Struve hypertension, schizophrenia, diabetes, ROMMEL who presented to hospital due to a right neck abscess.    #Abnormal imaging of the neck  #Neck abscess, recurrent  #Leukocytosis  #History of MSSA neck abscess    Recommendations  Continue vancomycin for now, await for speciation of wound culture, GPC in clusters and Gram stain–cultures pending  Would discontinue piperacillin/tazobactam given culture above  Follow pending MRSA swab  ENT input  Follow fever curve and WBC count    Cliff Bauer MD  Division of Infectious Diseases  
 44 year old male with HTN, Schizophrenia, DM 2 , ROMMEL and recent neck abscess that was admitted to [facility] for BS control, neck packing and IV Anx. Patient was discharged home on oral antibiotics that he finished several weeks ago. No presents to [facility] ED c/o increased neck pain, swelling, warmth for several days

## 2025-04-30 NOTE — ED ADULT NURSE REASSESSMENT NOTE - NS ED NURSE REASSESS COMMENT FT1
Pt at baseline mental status. No complaints at this time. Sinus tach on cardiac monitor. Respirations even and unlabored on 2L NC. Safety maintained.

## 2025-05-01 LAB
ANION GAP SERPL CALC-SCNC: 12 MMOL/L — SIGNIFICANT CHANGE UP (ref 7–14)
BASOPHILS # BLD AUTO: 0.08 K/UL — SIGNIFICANT CHANGE UP (ref 0–0.2)
BASOPHILS NFR BLD AUTO: 0.8 % — SIGNIFICANT CHANGE UP (ref 0–2)
BUN SERPL-MCNC: 18 MG/DL — SIGNIFICANT CHANGE UP (ref 7–23)
CALCIUM SERPL-MCNC: 9.1 MG/DL — SIGNIFICANT CHANGE UP (ref 8.4–10.5)
CHLORIDE SERPL-SCNC: 99 MMOL/L — SIGNIFICANT CHANGE UP (ref 98–107)
CO2 SERPL-SCNC: 27 MMOL/L — SIGNIFICANT CHANGE UP (ref 22–31)
CREAT SERPL-MCNC: 0.84 MG/DL — SIGNIFICANT CHANGE UP (ref 0.5–1.3)
EGFR: 110 ML/MIN/1.73M2 — SIGNIFICANT CHANGE UP
EGFR: 110 ML/MIN/1.73M2 — SIGNIFICANT CHANGE UP
EOSINOPHIL # BLD AUTO: 0.78 K/UL — HIGH (ref 0–0.5)
EOSINOPHIL NFR BLD AUTO: 7.9 % — HIGH (ref 0–6)
GLUCOSE BLDC GLUCOMTR-MCNC: 102 MG/DL — HIGH (ref 70–99)
GLUCOSE BLDC GLUCOMTR-MCNC: 103 MG/DL — HIGH (ref 70–99)
GLUCOSE BLDC GLUCOMTR-MCNC: 111 MG/DL — HIGH (ref 70–99)
GLUCOSE BLDC GLUCOMTR-MCNC: 96 MG/DL — SIGNIFICANT CHANGE UP (ref 70–99)
GLUCOSE SERPL-MCNC: 128 MG/DL — HIGH (ref 70–99)
HCT VFR BLD CALC: 38.4 % — LOW (ref 39–50)
HGB BLD-MCNC: 12.3 G/DL — LOW (ref 13–17)
IANC: 5.93 K/UL — SIGNIFICANT CHANGE UP (ref 1.8–7.4)
IMM GRANULOCYTES NFR BLD AUTO: 0.5 % — SIGNIFICANT CHANGE UP (ref 0–0.9)
LYMPHOCYTES # BLD AUTO: 2.15 K/UL — SIGNIFICANT CHANGE UP (ref 1–3.3)
LYMPHOCYTES # BLD AUTO: 21.9 % — SIGNIFICANT CHANGE UP (ref 13–44)
MAGNESIUM SERPL-MCNC: 2 MG/DL — SIGNIFICANT CHANGE UP (ref 1.6–2.6)
MCHC RBC-ENTMCNC: 28.7 PG — SIGNIFICANT CHANGE UP (ref 27–34)
MCHC RBC-ENTMCNC: 32 G/DL — SIGNIFICANT CHANGE UP (ref 32–36)
MCV RBC AUTO: 89.5 FL — SIGNIFICANT CHANGE UP (ref 80–100)
MONOCYTES # BLD AUTO: 0.83 K/UL — SIGNIFICANT CHANGE UP (ref 0–0.9)
MONOCYTES NFR BLD AUTO: 8.5 % — SIGNIFICANT CHANGE UP (ref 2–14)
NEUTROPHILS # BLD AUTO: 5.93 K/UL — SIGNIFICANT CHANGE UP (ref 1.8–7.4)
NEUTROPHILS NFR BLD AUTO: 60.4 % — SIGNIFICANT CHANGE UP (ref 43–77)
NRBC # BLD AUTO: 0 K/UL — SIGNIFICANT CHANGE UP (ref 0–0)
NRBC # FLD: 0 K/UL — SIGNIFICANT CHANGE UP (ref 0–0)
NRBC BLD AUTO-RTO: 0 /100 WBCS — SIGNIFICANT CHANGE UP (ref 0–0)
PHOSPHATE SERPL-MCNC: 3.5 MG/DL — SIGNIFICANT CHANGE UP (ref 2.5–4.5)
PLATELET # BLD AUTO: 279 K/UL — SIGNIFICANT CHANGE UP (ref 150–400)
POTASSIUM SERPL-MCNC: 4.1 MMOL/L — SIGNIFICANT CHANGE UP (ref 3.5–5.3)
POTASSIUM SERPL-SCNC: 4.1 MMOL/L — SIGNIFICANT CHANGE UP (ref 3.5–5.3)
RBC # BLD: 4.29 M/UL — SIGNIFICANT CHANGE UP (ref 4.2–5.8)
RBC # FLD: 13.9 % — SIGNIFICANT CHANGE UP (ref 10.3–14.5)
SODIUM SERPL-SCNC: 138 MMOL/L — SIGNIFICANT CHANGE UP (ref 135–145)
VANCOMYCIN TROUGH SERPL-MCNC: 9.1 UG/ML — LOW (ref 10–20)
WBC # BLD: 9.82 K/UL — SIGNIFICANT CHANGE UP (ref 3.8–10.5)
WBC # FLD AUTO: 9.82 K/UL — SIGNIFICANT CHANGE UP (ref 3.8–10.5)

## 2025-05-01 PROCEDURE — 99232 SBSQ HOSP IP/OBS MODERATE 35: CPT

## 2025-05-01 PROCEDURE — G0545: CPT

## 2025-05-01 PROCEDURE — 99233 SBSQ HOSP IP/OBS HIGH 50: CPT | Mod: FS,GC

## 2025-05-01 RX ORDER — VANCOMYCIN HCL IN 5 % DEXTROSE 1.5G/250ML
1250 PLASTIC BAG, INJECTION (ML) INTRAVENOUS EVERY 8 HOURS
Refills: 0 | Status: DISCONTINUED | OUTPATIENT
Start: 2025-05-01 | End: 2025-05-02

## 2025-05-01 RX ADMIN — LISINOPRIL 5 MILLIGRAM(S): 5 TABLET ORAL at 06:14

## 2025-05-01 RX ADMIN — MONTELUKAST SODIUM 10 MILLIGRAM(S): 10 TABLET ORAL at 12:09

## 2025-05-01 RX ADMIN — Medication 300 MILLIGRAM(S): at 06:14

## 2025-05-01 RX ADMIN — Medication 1 APPLICATION(S): at 12:13

## 2025-05-01 RX ADMIN — ENOXAPARIN SODIUM 40 MILLIGRAM(S): 100 INJECTION SUBCUTANEOUS at 18:31

## 2025-05-01 RX ADMIN — Medication 166.67 MILLIGRAM(S): at 20:43

## 2025-05-01 RX ADMIN — SERTRALINE 100 MILLIGRAM(S): 100 TABLET, FILM COATED ORAL at 12:09

## 2025-05-01 RX ADMIN — ENOXAPARIN SODIUM 40 MILLIGRAM(S): 100 INJECTION SUBCUTANEOUS at 06:14

## 2025-05-01 RX ADMIN — ATORVASTATIN CALCIUM 10 MILLIGRAM(S): 80 TABLET, FILM COATED ORAL at 22:22

## 2025-05-01 RX ADMIN — FENOFIBRATE 48 MILLIGRAM(S): 160 TABLET ORAL at 18:31

## 2025-05-01 RX ADMIN — Medication 50 MILLIGRAM(S): at 12:09

## 2025-05-01 RX ADMIN — ARIPIPRAZOLE 10 MILLIGRAM(S): 2 TABLET ORAL at 12:09

## 2025-05-01 NOTE — DIETITIAN INITIAL EVALUATION ADULT - REASON FOR ADMISSION
Per chart review, 45-year-old Male w/ PMHx of HTN, schizophrenia, DM2, and ROMMEL who presents w/ right sided neck abscess. Admitted to medicine for further management and monitoring. s/p I+D by ENT in ED.

## 2025-05-01 NOTE — DIETITIAN INITIAL EVALUATION ADULT - DIET TYPE
Recommend d/c low sodium diet, and continue consistent carbohydrate with evening snacks diet and DASH/TLC diet./low sodium

## 2025-05-01 NOTE — PROGRESS NOTE ADULT - ASSESSMENT
44 y/o M with a history of Schizophrenia, DM2, HTN, had recent admission Feb. for R posterior suboccipital neck abscess, required packing for 1wk, on Vanc/Zosyn, Ceftriaxone, then Ancef while admitted and discharged on Bactrim (cx + for MSSA). Completed abx 2 weeks ago then had recurrence of pain and swelling for past 6 days s/p I&D with 15cc pus 4/29. Post I&D CT neck shows no well-defined peripherally enhancing drainable collection. Patient seen this morning with R posterior suboccipital neck erythema improved, but still firm, moderate amount of pus mixed with blood expressed at bedside, irrigated the wound with NS and packed with 1/4 iodoform gauze strip and covered by 4x4 guaze and tape. Currently on Vancomycin per ID recs. WBC 9.82, , afebrile overnight, MSSA+, culture with GPC.

## 2025-05-01 NOTE — PROGRESS NOTE ADULT - ASSESSMENT
45-year-old male with a past medical Struve hypertension, schizophrenia, diabetes, ROMMEL who presented to hospital due to a right neck abscess.    #Abnormal imaging of the neck  #Neck abscess, recurrent  #Leukocytosis  #History of MSSA neck abscess    Recommendations  Continue vancomycin for now, await for speciation of wound culture, GPC in clusters and Gram stain–Culture now with staph aureus, sensitivity pending  MRSA nares negative however would await sensitivity prior to antibiotic change  ENT input  Follow fever curve and WBC count    Cliff Bauer MD  Division of Infectious Diseases

## 2025-05-01 NOTE — PROGRESS NOTE ADULT - SUBJECTIVE AND OBJECTIVE BOX
Follow Up:  neck abscess    Interval History/ROS:  Overnight: No acute events.  Patient remains afebrile.  Otherwise hemodynamically stable.  Latest labs show resolved leukocytosis, anemia 12.3/30.4, eosinophil count noted to be 0.78.  BMP with renal function within normal limits.  Abscess culture with Staph aureus, sensitivities pending.    Patient seen examined at bedside.  No new complaints.  Continues to complain of neck pain around drainage.    Allergies  No Known Allergies        ANTIMICROBIALS:  vancomycin  IVPB 1500 every 12 hours      OTHER MEDS:  MEDICATIONS  (STANDING):  acetaminophen     Tablet .. 650 every 6 hours PRN  albuterol    90 MICROgram(s) HFA Inhaler 2 every 6 hours PRN  ARIPiprazole 10 daily  atorvastatin 10 at bedtime  cloZAPine 50 daily  dextrose 50% Injectable 25 once  dextrose 50% Injectable 12.5 once  dextrose 50% Injectable 25 once  dextrose Oral Gel 15 once PRN  enoxaparin Injectable 40 every 12 hours  fenofibrate Tablet 48 daily  glucagon  Injectable 1 once  insulin lispro (ADMELOG) corrective regimen sliding scale  three times a day before meals  insulin lispro (ADMELOG) corrective regimen sliding scale  at bedtime  lisinopril 5 daily  montelukast 10 daily  sertraline 100 daily      Vital Signs Last 24 Hrs  T(C): 36.3 (01 May 2025 06:06), Max: 36.7 (30 Apr 2025 21:52)  T(F): 97.4 (01 May 2025 06:06), Max: 98 (30 Apr 2025 21:52)  HR: 82 (01 May 2025 06:06) (82 - 93)  BP: 127/63 (01 May 2025 06:06) (115/69 - 143/69)  BP(mean): --  RR: 17 (01 May 2025 06:06) (17 - 17)  SpO2: 95% (01 May 2025 06:06) (95% - 97%)    Parameters below as of 01 May 2025 06:06  Patient On (Oxygen Delivery Method): room air, o2 was off        PHYSICAL EXAM:  Constitutional: non-toxic, no distress  HEAD/EYES: anicteric, no conjunctival injection  ENT:  Right posterior swelling, wound   Cardiovascular:   normal S1, S2, no murmur, no edema  Respiratory:  clear BS bilaterally, no wheezes, no rales  GI:  soft, non-tender, normal bowel sounds  :  no del rio, no CVA tenderness  Musculoskeletal:  no synovitis, normal ROM  Skin:  no rash, no erythema, no phlebitis  Heme/Onc: no lymphadenopathy                             12.3   9.82  )-----------( 279      ( 01 May 2025 07:23 )             38.4       05-01    138  |  99  |  18  ----------------------------<  128[H]  4.1   |  27  |  0.84    Ca    9.1      01 May 2025 07:23  Phos  3.5     05-01  Mg     2.00     05-01    TPro  7.6  /  Alb  3.7  /  TBili  0.6  /  DBili  x   /  AST  25  /  ALT  35  /  AlkPhos  103  04-30      Urinalysis Basic - ( 01 May 2025 07:23 )    Color: x / Appearance: x / SG: x / pH: x  Gluc: 128 mg/dL / Ketone: x  / Bili: x / Urobili: x   Blood: x / Protein: x / Nitrite: x   Leuk Esterase: x / RBC: x / WBC x   Sq Epi: x / Non Sq Epi: x / Bacteria: x        MICROBIOLOGY:  Vancomycin Level, Trough: 7.3 ug/mL (04-30-25 @ 17:10)  v    Culture - Abscess with Gram Stain (collected 30 Apr 2025 09:46)  Source: Abscess neck  Gram Stain (30 Apr 2025 13:56):    Few polymorphonuclear leukocytes per low power field    Few Gram Positive Cocci in Clusters per oil power field  Preliminary Report (01 May 2025 08:08):    Moderate Staphylococcus aureus                    RADIOLOGY:  Imaging reviewed

## 2025-05-01 NOTE — PROGRESS NOTE ADULT - PROBLEM SELECTOR PLAN 1
Recurrent abscess, previously I+D by ENT in Jan/Feb. Cultures grew MSSA. Was discharged with bactrim. Now recurring abscess without obvious drainable collection on imaging. He is a resident of [facility]    -c/w Vanco pending culture data (history of MRSA colonization)  -Abscess culture sent  -Check blood cultures  -Wound consult  -ID on board-appreciate further recommendations   -Appreciate ENT recs Recurrent abscess, previously I+D by ENT in Jan/Feb. Cultures grew MSSA. Was discharged with bactrim. Now recurring abscess without obvious drainable collection on imaging. He is a resident of [facility]    -c/w Vanc pending culture data (history of MRSA colonization)  -Abscess culture sent- growing staph aureus   -Check blood cultures  -Wound consult  -ID on board-appreciate further recommendations   -Appreciate ENT recs

## 2025-05-01 NOTE — PROGRESS NOTE ADULT - SUBJECTIVE AND OBJECTIVE BOX
ENT ISSUE/POD: R posterior suboccipital neck abscess    HPI: Patient seen and examined at bedside. No acute event overnight. No acute complaints.    PAST MEDICAL & SURGICAL HISTORY:  Schizophrenia      Obstructive sleep apnea      Hypertension      Type 2 diabetes mellitus      No significant past surgical history        Allergies    No Known Allergies    Intolerances      MEDICATIONS  (STANDING):  ARIPiprazole 10 milliGRAM(s) Oral daily  atorvastatin 10 milliGRAM(s) Oral at bedtime  chlorhexidine 2% Cloths 1 Application(s) Topical daily  cloZAPine 50 milliGRAM(s) Oral daily  dextrose 5%. 1000 milliLiter(s) (100 mL/Hr) IV Continuous <Continuous>  dextrose 5%. 1000 milliLiter(s) (50 mL/Hr) IV Continuous <Continuous>  dextrose 50% Injectable 25 Gram(s) IV Push once  dextrose 50% Injectable 12.5 Gram(s) IV Push once  dextrose 50% Injectable 25 Gram(s) IV Push once  enoxaparin Injectable 40 milliGRAM(s) SubCutaneous every 12 hours  fenofibrate Tablet 48 milliGRAM(s) Oral daily  glucagon  Injectable 1 milliGRAM(s) IntraMuscular once  insulin lispro (ADMELOG) corrective regimen sliding scale   SubCutaneous three times a day before meals  insulin lispro (ADMELOG) corrective regimen sliding scale   SubCutaneous at bedtime  lisinopril 5 milliGRAM(s) Oral daily  montelukast 10 milliGRAM(s) Oral daily  sertraline 100 milliGRAM(s) Oral daily  vancomycin  IVPB 1500 milliGRAM(s) IV Intermittent every 12 hours    MEDICATIONS  (PRN):  acetaminophen     Tablet .. 650 milliGRAM(s) Oral every 6 hours PRN Temp greater or equal to 38C (100.4F), Mild Pain (1 - 3)  albuterol    90 MICROgram(s) HFA Inhaler 2 Puff(s) Inhalation every 6 hours PRN Shortness of Breath and/or Wheezing  dextrose Oral Gel 15 Gram(s) Oral once PRN Blood Glucose LESS THAN 70 milliGRAM(s)/deciliter      Social History: see consult note    Family history: see consult note    ROS:   ENT: all negative except as noted in HPI   Pulm: denies SOB, cough, hemoptysis  Neuro: denies numbness/tingling, loss of sensation  Endo: denies heat/cold intolerance, excessive sweating      Vital Signs Last 24 Hrs  T(C): 36.3 (01 May 2025 06:06), Max: 36.7 (30 Apr 2025 21:52)  T(F): 97.4 (01 May 2025 06:06), Max: 98 (30 Apr 2025 21:52)  HR: 82 (01 May 2025 06:06) (82 - 93)  BP: 127/63 (01 May 2025 06:06) (115/69 - 143/69)  BP(mean): --  RR: 17 (01 May 2025 06:06) (17 - 17)  SpO2: 95% (01 May 2025 06:06) (95% - 97%)    Parameters below as of 01 May 2025 06:06  Patient On (Oxygen Delivery Method): room air, o2 was off                              12.3   9.82  )-----------( 279      ( 01 May 2025 07:23 )             38.4    05-01    138  |  99  |  18  ----------------------------<  128[H]  4.1   |  27  |  0.84    Ca    9.1      01 May 2025 07:23  Phos  3.5     05-01  Mg     2.00     05-01    TPro  7.6  /  Alb  3.7  /  TBili  0.6  /  DBili  x   /  AST  25  /  ALT  35  /  AlkPhos  103  04-30       PHYSICAL EXAM:  Gen: NAD  Skin: No rashes, bruises, or lesions  Head: Normocephalic, Atraumatic  Face: no edema, erythema, or fluctuance. Parotid glands soft without mass  Eyes: no scleral injection  Ears:no evidence of any fluid drainage. No mastoid tenderness, erythema, or ear bulging  Nose: Nares bilaterally patent, no discharge  Mouth: No Stridor / Drooling / Trismus.  Mucosa moist, tongue/uvula midline, oropharynx clear  Neck:  R posterior suboccipital neck erythema improving, but still firm, moderate amount of pus mixed with blood expressed at bedside, irrigated the wound with NS and packed with 1/4 iodoform gauze strip and covered by 4x4 guaze and tape. Rest of neck is flat, supple, no lymphadenopathy, trachea midline, no masses  Lymphatic: No lymphadenopathy  Resp: breathing easily, no stridor  Neuro: facial nerve intact, no facial droop

## 2025-05-01 NOTE — DIETITIAN INITIAL EVALUATION ADULT - PROBLEM SELECTOR PLAN 3
-Home Regimen: Metformin, Rybelsus   -A1c: 6.3%  -Hold home oral diabetic medications while inpatient   -Corrective SSI (Mild) TID before meals/Bedtime  -Goal -180  -FS Blood glucose monitoring Premeal/Bedtime   -Hypoglycemia precautions   -Carb Consistent Diet

## 2025-05-01 NOTE — DIETITIAN INITIAL EVALUATION ADULT - PHYSCIAL ASSESSMENT
Patient reports UBW of ~366lbs. Dosing weight 166kg/365.2lbs (4/29). Patient denies any weight changes.  Unable to verify weight accuracy per [facility] weight history: 166kg (4/29); 136.1kg(2/10); 145.1kg(1/20); 170.1kg(12/20); 150kg(8/20). Please obtain accurate weight.

## 2025-05-01 NOTE — DIETITIAN INITIAL EVALUATION ADULT - PERTINENT LABORATORY DATA
05-01    138  |  99  |  18  ----------------------------<  128[H]  4.1   |  27  |  0.84    Ca    9.1      01 May 2025 07:23  Phos  3.5     05-01  Mg     2.00     05-01    TPro  7.6  /  Alb  3.7  /  TBili  0.6  /  DBili  x   /  AST  25  /  ALT  35  /  AlkPhos  103  04-30  POCT Blood Glucose.: 102 mg/dL (05-01-25 @ 11:54)  A1C with Estimated Average Glucose Result: 6.3 % (04-29-25 @ 22:22)  A1C with Estimated Average Glucose Result: 6.5 % (01-29-25 @ 06:21)

## 2025-05-01 NOTE — DIETITIAN INITIAL EVALUATION ADULT - ADD RECOMMEND
1. Recommend d/c low sodium diet, and continue consistent carbohydrate with evening snacks diet and DASH/TLC diet.   2. Please consistently document %PO intake in nursing flowsheets   3. Monitor PO intake, Labs, weights, BMs, and skin integrity.

## 2025-05-01 NOTE — DIETITIAN INITIAL EVALUATION ADULT - OTHER INFO
Met patient at bedside. Patient alert, A&O x4. Patient reports good appetite in house. Per RN flowsheets intake is %. Patient denies any in house nausea, vomiting, diarrhea, or constipation. Last BM noted on 4/30 per RN flowsheets. No reported chewing/swallowing issues. No known food allergies. Medications notable for insulin. Labs reviewed, A1c 6.3% indicating fair control, fingersticks between 91-112mg/dL. Recommend d/c low sodium diet, and continue consistent carbohydrate with evening snacks diet and DASH/TLC diet. Education declined. RD to remain available for further nutritional interventions as indicated

## 2025-05-01 NOTE — DIETITIAN INITIAL EVALUATION ADULT - PERTINENT MEDS FT
MEDICATIONS  (STANDING):  ARIPiprazole 10 milliGRAM(s) Oral daily  atorvastatin 10 milliGRAM(s) Oral at bedtime  chlorhexidine 2% Cloths 1 Application(s) Topical daily  cloZAPine 50 milliGRAM(s) Oral daily  dextrose 5%. 1000 milliLiter(s) (100 mL/Hr) IV Continuous <Continuous>  dextrose 5%. 1000 milliLiter(s) (50 mL/Hr) IV Continuous <Continuous>  dextrose 50% Injectable 25 Gram(s) IV Push once  dextrose 50% Injectable 12.5 Gram(s) IV Push once  dextrose 50% Injectable 25 Gram(s) IV Push once  enoxaparin Injectable 40 milliGRAM(s) SubCutaneous every 12 hours  fenofibrate Tablet 48 milliGRAM(s) Oral daily  glucagon  Injectable 1 milliGRAM(s) IntraMuscular once  insulin lispro (ADMELOG) corrective regimen sliding scale   SubCutaneous three times a day before meals  insulin lispro (ADMELOG) corrective regimen sliding scale   SubCutaneous at bedtime  lisinopril 5 milliGRAM(s) Oral daily  montelukast 10 milliGRAM(s) Oral daily  sertraline 100 milliGRAM(s) Oral daily  vancomycin  IVPB 1500 milliGRAM(s) IV Intermittent every 12 hours    MEDICATIONS  (PRN):  acetaminophen     Tablet .. 650 milliGRAM(s) Oral every 6 hours PRN Temp greater or equal to 38C (100.4F), Mild Pain (1 - 3)  albuterol    90 MICROgram(s) HFA Inhaler 2 Puff(s) Inhalation every 6 hours PRN Shortness of Breath and/or Wheezing  dextrose Oral Gel 15 Gram(s) Oral once PRN Blood Glucose LESS THAN 70 milliGRAM(s)/deciliter

## 2025-05-01 NOTE — PROGRESS NOTE ADULT - SUBJECTIVE AND OBJECTIVE BOX
PROGRESS NOTE:   Authored by Suhail Calle MD  Patient is a 45y old  Male who presents with a chief complaint of Right neck abscess (30 Apr 2025 15:40)      SUBJECTIVE / OVERNIGHT EVENTS:  Patient refused BIPAP overnight. Reports feeling well.     ADDITIONAL REVIEW OF SYSTEMS: All other systems negative     MEDICATIONS  (STANDING):  ARIPiprazole 10 milliGRAM(s) Oral daily  atorvastatin 10 milliGRAM(s) Oral at bedtime  chlorhexidine 2% Cloths 1 Application(s) Topical daily  cloZAPine 50 milliGRAM(s) Oral daily  dextrose 5%. 1000 milliLiter(s) (100 mL/Hr) IV Continuous <Continuous>  dextrose 5%. 1000 milliLiter(s) (50 mL/Hr) IV Continuous <Continuous>  dextrose 50% Injectable 25 Gram(s) IV Push once  dextrose 50% Injectable 12.5 Gram(s) IV Push once  dextrose 50% Injectable 25 Gram(s) IV Push once  enoxaparin Injectable 40 milliGRAM(s) SubCutaneous every 12 hours  fenofibrate Tablet 48 milliGRAM(s) Oral daily  glucagon  Injectable 1 milliGRAM(s) IntraMuscular once  insulin lispro (ADMELOG) corrective regimen sliding scale   SubCutaneous three times a day before meals  insulin lispro (ADMELOG) corrective regimen sliding scale   SubCutaneous at bedtime  lisinopril 5 milliGRAM(s) Oral daily  montelukast 10 milliGRAM(s) Oral daily  sertraline 100 milliGRAM(s) Oral daily  vancomycin  IVPB 1500 milliGRAM(s) IV Intermittent every 12 hours    MEDICATIONS  (PRN):  acetaminophen     Tablet .. 650 milliGRAM(s) Oral every 6 hours PRN Temp greater or equal to 38C (100.4F), Mild Pain (1 - 3)  albuterol    90 MICROgram(s) HFA Inhaler 2 Puff(s) Inhalation every 6 hours PRN Shortness of Breath and/or Wheezing  dextrose Oral Gel 15 Gram(s) Oral once PRN Blood Glucose LESS THAN 70 milliGRAM(s)/deciliter      CAPILLARY BLOOD GLUCOSE      POCT Blood Glucose.: 111 mg/dL (30 Apr 2025 21:34)  POCT Blood Glucose.: 91 mg/dL (30 Apr 2025 17:14)  POCT Blood Glucose.: 100 mg/dL (30 Apr 2025 12:07)  POCT Blood Glucose.: 112 mg/dL (30 Apr 2025 08:18)    I&O's Summary      PHYSICAL EXAM:  Vital Signs Last 24 Hrs  T(C): 36.3 (01 May 2025 06:06), Max: 36.7 (30 Apr 2025 21:52)  T(F): 97.4 (01 May 2025 06:06), Max: 98 (30 Apr 2025 21:52)  HR: 82 (01 May 2025 06:06) (82 - 93)  BP: 127/63 (01 May 2025 06:06) (115/69 - 143/69)  BP(mean): --  RR: 17 (01 May 2025 06:06) (17 - 17)  SpO2: 95% (01 May 2025 06:06) (95% - 97%)    Parameters below as of 01 May 2025 06:06  Patient On (Oxygen Delivery Method): room air, o2 was off        GENERAL: No apparent distress.   HEAD:  Atraumatic, Normocephalic  EYES: EOMI, PERRLA, conjunctiva and sclera clear  NECK: right neck abscess   CHEST/LUNG: Clear to auscultation bilateral and symmetric; No wheezes, rales, or rhonchi  HEART: S1 and S2 normal. Regular rate and rhythm; No murmurs, rubs, or gallops  ABDOMEN: Soft, non-tender, non-distended; normal bowel sounds  EXTREMITIES:  2+ peripheral pulses b/l, No clubbing, cyanosis, or edema  NEUROLOGY: A&O x 3, no focal deficits  SKIN: No rashes or lesions    LABS:                        12.5   16.14 )-----------( 286      ( 30 Apr 2025 06:10 )             38.4     04-30    138  |  97[L]  |  20  ----------------------------<  106[H]  3.9   |  26  |  0.90    Ca    9.4      30 Apr 2025 06:10  Phos  3.5     04-30  Mg     1.90     04-30    TPro  7.6  /  Alb  3.7  /  TBili  0.6  /  DBili  x   /  AST  25  /  ALT  35  /  AlkPhos  103  04-30          Urinalysis Basic - ( 30 Apr 2025 06:10 )    Color: x / Appearance: x / SG: x / pH: x  Gluc: 106 mg/dL / Ketone: x  / Bili: x / Urobili: x   Blood: x / Protein: x / Nitrite: x   Leuk Esterase: x / RBC: x / WBC x   Sq Epi: x / Non Sq Epi: x / Bacteria: x        Culture - Abscess with Gram Stain (collected 30 Apr 2025 09:46)  Source: Abscess neck  Gram Stain (30 Apr 2025 13:56):    Few polymorphonuclear leukocytes per low power field    Few Gram Positive Cocci in Clusters per oil power field        RADIOLOGY & ADDITIONAL TESTS:  Lab Results Reviewed   Imaging Reviewed  Electrocardiogram Reviewed

## 2025-05-01 NOTE — PROGRESS NOTE ADULT - PROBLEM SELECTOR PLAN 1
- Please continue vancomycin per ID recs  - f/u Culture from 4/30  - ENT will manage the wound packing BID for now while inpatient  - Patient might need home care RN for wound care when close to discharge.   - Wound care instruction: express the wound to check if any purulence, follow by irrigated the wound with NS and packed with 1/4 iodoform gauze strip and covered by 4x4 guaze and tape.  - Continue monitor WBC  - Tight FSG control  - Case discussed with Dr. Mitchell.

## 2025-05-01 NOTE — DIETITIAN INITIAL EVALUATION ADULT - ORAL INTAKE PTA/DIET HISTORY
Patient reports good appetite / PO intake PTA. Denies prior use of nutrition shakes, but takes vitamin D PTA. No other reported issues.

## 2025-05-01 NOTE — DIETITIAN INITIAL EVALUATION ADULT - PROBLEM SELECTOR PLAN 2
-Home medication: Lisinopril 5 mg QD  -c/w home meds with hold parameters (SBP <110, DBP <60)   -Monitor BP closely and adjust medications if clinically indicated  -DASH Diet

## 2025-05-01 NOTE — DIETITIAN INITIAL EVALUATION ADULT - ENERGY INTAKE
Fall Prevention in the Home, Adult  Falls can cause injuries and affect people of all ages. There are many simple things that you can do to make your home safe and to help prevent falls.  If you need it, ask for help making these changes.  What actions can I take to prevent falls?  General information  Use good lighting in all rooms. Make sure to:  Replace any light bulbs that burn out.  Turn on lights if it is dark and use night-lights.  Keep items that you use often in easy-to-reach places. Lower the shelves around your home if needed.  Move furniture so that there are clear paths around it.  Do not keep throw rugs or other things on the floor that can make you trip.  If any of your floors are uneven, fix them.  Add color or contrast paint or tape to clearly shun and help you see:  Grab bars or handrails.  First and last steps of staircases.  Where the edge of each step is.  If you use a ladder or stepladder:  Make sure that it is fully opened. Do not climb a closed ladder.  Make sure the sides of the ladder are locked in place.  Have someone hold the ladder while you use it.  Know where your pets are as you move through your home.  What can I do in the bathroom?         Keep the floor dry. Clean up any water that is on the floor right away.  Remove soap buildup in the bathtub or shower. Buildup makes bathtubs and showers slippery.  Use non-skid mats or decals on the floor of the bathtub or shower.  Attach bath mats securely with double-sided, non-slip rug tape.  If you need to sit down while you are in the shower, use a non-slip stool.  Install grab bars by the toilet and in the bathtub and shower. Do not use towel bars as grab bars.  What can I do in the bedroom?  Make sure that you have a light by your bed that is easy to reach.  Do not use any sheets or blankets on your bed that hang to the floor.  Have a firm bench or chair with side arms that you can use for support when you get dressed.  What can I do in  the kitchen?  Clean up any spills right away.  If you need to reach something above you, use a sturdy step stool that has a grab bar.  Keep electrical cables out of the way.  Do not use floor polish or wax that makes floors slippery.  What can I do with my stairs?  Do not leave anything on the stairs.  Make sure that you have a light switch at the top and the bottom of the stairs. Have them installed if you do not have them.  Make sure that there are handrails on both sides of the stairs. Fix handrails that are broken or loose. Make sure that handrails are as long as the staircases.  Install non-slip stair treads on all stairs in your home if they do not have carpet.  Avoid having throw rugs at the top or bottom of stairs, or secure the rugs with carpet tape to prevent them from moving.  Choose a carpet design that does not hide the edge of steps on the stairs. Make sure that carpet is firmly attached to the stairs. Fix any carpet that is loose or worn.  What can I do on the outside of my home?  Use bright outdoor lighting.  Repair the edges of walkways and driveways and fix any cracks. Clear paths of anything that can make you trip, such as tools or rocks.  Add color or contrast paint or tape to clearly shun and help you see high doorway thresholds.  Trim any bushes or trees on the main path into your home.  Check that handrails are securely fastened and in good repair. Both sides of all steps should have handrails.  Install guardrails along the edges of any raised decks or porches.  Have leaves, snow, and ice cleared regularly. Use sand, salt, or ice melt on walkways during winter months if you live where there is ice and snow.  In the garage, clean up any spills right away, including grease or oil spills.  What other actions can I take?  Review your medicines with your health care provider. Some medicines can make you confused or feel dizzy. This can increase your chance of falling.  Wear closed-toe shoes that  fit well and support your feet. Wear shoes that have rubber soles and low heels.  Use a cane, walker, scooter, or crutches that help you move around if needed.  Talk with your provider about other ways that you can decrease your risk of falls. This may include seeing a physical therapist to learn to do exercises to improve movement and strength.  Where to find more information  Centers for Disease Control and Prevention, [place]: cdc.gov  [employer] [place] on Aging: mikaela.nih.gov  [employer] [place] on Aging: mikaela.nih.gov  Contact a health care provider if:  You are afraid of falling at home.  You feel weak, drowsy, or dizzy at home.  You fall at home.  Get help right away if you:  Lose consciousness or have trouble moving after a fall.  Have a fall that causes a head injury.  These symptoms may be an emergency. Get help right away. Call 911.  Do not wait to see if the symptoms will go away.  Do not drive yourself to the hospital.  This information is not intended to replace advice given to you by your health care provider. Make sure you discuss any questions you have with your health care provider.  Document Revised: 08/21/2023 Document Reviewed: 08/21/2023  Elsevier Patient Education © 2024 [employer]   Adequate (%)

## 2025-05-01 NOTE — DIETITIAN INITIAL EVALUATION ADULT - NS FNS DIET ORDER
Diet, Regular:   Consistent Carbohydrate {Evening Snack} (CSTCHOSN)  DASH/TLC {Sodium & Cholesterol Restricted} (DASH)  Low Sodium (04-29-25 @ 21:31) [Active]

## 2025-05-02 LAB
-  CLINDAMYCIN: SIGNIFICANT CHANGE UP
-  ERYTHROMYCIN: SIGNIFICANT CHANGE UP
-  GENTAMICIN: SIGNIFICANT CHANGE UP
-  OXACILLIN: SIGNIFICANT CHANGE UP
-  PENICILLIN: SIGNIFICANT CHANGE UP
-  RIFAMPIN: SIGNIFICANT CHANGE UP
-  TETRACYCLINE: SIGNIFICANT CHANGE UP
-  TRIMETHOPRIM/SULFAMETHOXAZOLE: SIGNIFICANT CHANGE UP
-  VANCOMYCIN: SIGNIFICANT CHANGE UP
ANION GAP SERPL CALC-SCNC: 12 MMOL/L — SIGNIFICANT CHANGE UP (ref 7–14)
BASOPHILS # BLD AUTO: 0.1 K/UL — SIGNIFICANT CHANGE UP (ref 0–0.2)
BASOPHILS NFR BLD AUTO: 1.1 % — SIGNIFICANT CHANGE UP (ref 0–2)
BUN SERPL-MCNC: 18 MG/DL — SIGNIFICANT CHANGE UP (ref 7–23)
CALCIUM SERPL-MCNC: 9.3 MG/DL — SIGNIFICANT CHANGE UP (ref 8.4–10.5)
CHLORIDE SERPL-SCNC: 101 MMOL/L — SIGNIFICANT CHANGE UP (ref 98–107)
CO2 SERPL-SCNC: 25 MMOL/L — SIGNIFICANT CHANGE UP (ref 22–31)
CREAT SERPL-MCNC: 0.87 MG/DL — SIGNIFICANT CHANGE UP (ref 0.5–1.3)
EGFR: 108 ML/MIN/1.73M2 — SIGNIFICANT CHANGE UP
EGFR: 108 ML/MIN/1.73M2 — SIGNIFICANT CHANGE UP
EOSINOPHIL # BLD AUTO: 0.62 K/UL — HIGH (ref 0–0.5)
EOSINOPHIL NFR BLD AUTO: 6.8 % — HIGH (ref 0–6)
GLUCOSE BLDC GLUCOMTR-MCNC: 100 MG/DL — HIGH (ref 70–99)
GLUCOSE BLDC GLUCOMTR-MCNC: 106 MG/DL — HIGH (ref 70–99)
GLUCOSE BLDC GLUCOMTR-MCNC: 109 MG/DL — HIGH (ref 70–99)
GLUCOSE BLDC GLUCOMTR-MCNC: 120 MG/DL — HIGH (ref 70–99)
GLUCOSE SERPL-MCNC: 111 MG/DL — HIGH (ref 70–99)
HCT VFR BLD CALC: 39.4 % — SIGNIFICANT CHANGE UP (ref 39–50)
HGB BLD-MCNC: 12.6 G/DL — LOW (ref 13–17)
IANC: 4.69 K/UL — SIGNIFICANT CHANGE UP (ref 1.8–7.4)
IMM GRANULOCYTES NFR BLD AUTO: 0.4 % — SIGNIFICANT CHANGE UP (ref 0–0.9)
LYMPHOCYTES # BLD AUTO: 2.91 K/UL — SIGNIFICANT CHANGE UP (ref 1–3.3)
LYMPHOCYTES # BLD AUTO: 32.1 % — SIGNIFICANT CHANGE UP (ref 13–44)
MAGNESIUM SERPL-MCNC: 2 MG/DL — SIGNIFICANT CHANGE UP (ref 1.6–2.6)
MCHC RBC-ENTMCNC: 28.5 PG — SIGNIFICANT CHANGE UP (ref 27–34)
MCHC RBC-ENTMCNC: 32 G/DL — SIGNIFICANT CHANGE UP (ref 32–36)
MCV RBC AUTO: 89.1 FL — SIGNIFICANT CHANGE UP (ref 80–100)
METHOD TYPE: SIGNIFICANT CHANGE UP
MONOCYTES # BLD AUTO: 0.7 K/UL — SIGNIFICANT CHANGE UP (ref 0–0.9)
MONOCYTES NFR BLD AUTO: 7.7 % — SIGNIFICANT CHANGE UP (ref 2–14)
NEUTROPHILS # BLD AUTO: 4.69 K/UL — SIGNIFICANT CHANGE UP (ref 1.8–7.4)
NEUTROPHILS NFR BLD AUTO: 51.9 % — SIGNIFICANT CHANGE UP (ref 43–77)
NRBC # BLD AUTO: 0 K/UL — SIGNIFICANT CHANGE UP (ref 0–0)
NRBC # FLD: 0 K/UL — SIGNIFICANT CHANGE UP (ref 0–0)
NRBC BLD AUTO-RTO: 0 /100 WBCS — SIGNIFICANT CHANGE UP (ref 0–0)
PHOSPHATE SERPL-MCNC: 3.2 MG/DL — SIGNIFICANT CHANGE UP (ref 2.5–4.5)
PLATELET # BLD AUTO: 303 K/UL — SIGNIFICANT CHANGE UP (ref 150–400)
POTASSIUM SERPL-MCNC: 3.8 MMOL/L — SIGNIFICANT CHANGE UP (ref 3.5–5.3)
POTASSIUM SERPL-SCNC: 3.8 MMOL/L — SIGNIFICANT CHANGE UP (ref 3.5–5.3)
RBC # BLD: 4.42 M/UL — SIGNIFICANT CHANGE UP (ref 4.2–5.8)
RBC # FLD: 13.8 % — SIGNIFICANT CHANGE UP (ref 10.3–14.5)
SODIUM SERPL-SCNC: 138 MMOL/L — SIGNIFICANT CHANGE UP (ref 135–145)
WBC # BLD: 9.06 K/UL — SIGNIFICANT CHANGE UP (ref 3.8–10.5)
WBC # FLD AUTO: 9.06 K/UL — SIGNIFICANT CHANGE UP (ref 3.8–10.5)

## 2025-05-02 PROCEDURE — 99232 SBSQ HOSP IP/OBS MODERATE 35: CPT

## 2025-05-02 PROCEDURE — 99232 SBSQ HOSP IP/OBS MODERATE 35: CPT | Mod: FS,GC

## 2025-05-02 PROCEDURE — G0545: CPT

## 2025-05-02 RX ORDER — MUPIROCIN CALCIUM 20 MG/G
1 CREAM TOPICAL
Refills: 0 | Status: DISCONTINUED | OUTPATIENT
Start: 2025-05-02 | End: 2025-05-02

## 2025-05-02 RX ORDER — CEFAZOLIN SODIUM IN 0.9 % NACL 3 G/100 ML
2000 INTRAVENOUS SOLUTION, PIGGYBACK (ML) INTRAVENOUS ONCE
Refills: 0 | Status: COMPLETED | OUTPATIENT
Start: 2025-05-02 | End: 2025-05-02

## 2025-05-02 RX ORDER — CEFAZOLIN SODIUM IN 0.9 % NACL 3 G/100 ML
2000 INTRAVENOUS SOLUTION, PIGGYBACK (ML) INTRAVENOUS EVERY 8 HOURS
Refills: 0 | Status: DISCONTINUED | OUTPATIENT
Start: 2025-05-02 | End: 2025-05-08

## 2025-05-02 RX ORDER — MUPIROCIN CALCIUM 20 MG/G
1 CREAM TOPICAL
Refills: 0 | Status: COMPLETED | OUTPATIENT
Start: 2025-05-02 | End: 2025-05-07

## 2025-05-02 RX ORDER — CEFAZOLIN SODIUM IN 0.9 % NACL 3 G/100 ML
INTRAVENOUS SOLUTION, PIGGYBACK (ML) INTRAVENOUS
Refills: 0 | Status: DISCONTINUED | OUTPATIENT
Start: 2025-05-02 | End: 2025-05-08

## 2025-05-02 RX ADMIN — Medication 166.67 MILLIGRAM(S): at 06:52

## 2025-05-02 RX ADMIN — SERTRALINE 100 MILLIGRAM(S): 100 TABLET, FILM COATED ORAL at 12:42

## 2025-05-02 RX ADMIN — MUPIROCIN CALCIUM 1 APPLICATION(S): 20 CREAM TOPICAL at 17:50

## 2025-05-02 RX ADMIN — Medication 100 MILLIGRAM(S): at 15:20

## 2025-05-02 RX ADMIN — FENOFIBRATE 48 MILLIGRAM(S): 160 TABLET ORAL at 12:42

## 2025-05-02 RX ADMIN — ATORVASTATIN CALCIUM 10 MILLIGRAM(S): 80 TABLET, FILM COATED ORAL at 22:32

## 2025-05-02 RX ADMIN — Medication 166.67 MILLIGRAM(S): at 14:47

## 2025-05-02 RX ADMIN — Medication 100 MILLIGRAM(S): at 22:33

## 2025-05-02 RX ADMIN — Medication 50 MILLIGRAM(S): at 12:42

## 2025-05-02 RX ADMIN — ARIPIPRAZOLE 10 MILLIGRAM(S): 2 TABLET ORAL at 12:42

## 2025-05-02 RX ADMIN — MONTELUKAST SODIUM 10 MILLIGRAM(S): 10 TABLET ORAL at 12:42

## 2025-05-02 RX ADMIN — LISINOPRIL 5 MILLIGRAM(S): 5 TABLET ORAL at 06:52

## 2025-05-02 RX ADMIN — ENOXAPARIN SODIUM 40 MILLIGRAM(S): 100 INJECTION SUBCUTANEOUS at 17:50

## 2025-05-02 RX ADMIN — Medication 1 APPLICATION(S): at 12:43

## 2025-05-02 RX ADMIN — ENOXAPARIN SODIUM 40 MILLIGRAM(S): 100 INJECTION SUBCUTANEOUS at 06:52

## 2025-05-02 NOTE — PROGRESS NOTE ADULT - ASSESSMENT
46 y/o M with a history of Schizophrenia, DM2, HTN, had recent admission Feb. for R posterior suboccipital neck abscess, required packing for 1wk, on Vanc/Zosyn, Ceftriaxone, then Ancef while admitted and discharged on Bactrim (cx + for MSSA). Completed abx 2 weeks ago then had recurrence of pain and swelling for past 6 days s/p I&D with 15cc pus 4/29. Post I&D CT neck shows no well-defined peripherally enhancing drainable collection. Patient seen this morning R posterior suboccipital neck erythema improved, no pus expressed at bedside, irrigated the wound with NS and packed with 1/4 iodoform gauze strip and covered by 4x4 guaze and tape. Currently on Vancomycin per ID recs. WBC 9.06, , afebrile overnight, MSSA+, preliminary culture with staph aureus

## 2025-05-02 NOTE — DISCHARGE NOTE PROVIDER - NSDCMRMEDTOKEN_GEN_ALL_CORE_FT
albuterol 90 mcg/inh inhalation aerosol: 2 puff(s) inhaled every 4 hours, As needed, Shortness of Breath and/or Wheezing  ARIPiprazole 10 mg oral tablet: 1 tab(s) orally once a day (before a meal)  atorvastatin 10 mg oral tablet: 1 tab(s) orally once a day (at bedtime)  cloZAPine 25 mg oral tablet: 2 tab(s) orally once a day at bedtime   FENOFIBRATE 48 MG TABLET: 1 tab(s) orally once a day  LISINOPRIL 5 MG TABLET: 1 tab(s) orally once a day  metFORMIN 500 mg oral tablet: 1 tab(s) orally 2 times a day  MONTELUKAST SOD 10 MG TABLET:   RYBELSUS 3 MG TABLET:   sertraline 100 mg oral tablet: 1 tab(s) orally once a day   albuterol 90 mcg/inh inhalation aerosol: 2 puff(s) inhaled every 4 hours, As needed, Shortness of Breath and/or Wheezing  ARIPiprazole 10 mg oral tablet: 1 tab(s) orally once a day (before a meal)  atorvastatin 10 mg oral tablet: 1 tab(s) orally once a day (at bedtime)  cephalexin 500 mg oral tablet: 1 tab(s) orally every 6 hours take through 5/9  cloZAPine 25 mg oral tablet: 2 tab(s) orally once a day at bedtime   FENOFIBRATE 48 MG TABLET: 1 tab(s) orally once a day  LISINOPRIL 5 MG TABLET: 1 tab(s) orally once a day  metFORMIN 500 mg oral tablet: 1 tab(s) orally 2 times a day  MONTELUKAST SOD 10 MG TABLET: 10 milligram(s) orally once a day daily  RYBELSUS 3 MG TABLET:   sertraline 100 mg oral tablet: 1 tab(s) orally once a day

## 2025-05-02 NOTE — PROGRESS NOTE ADULT - PROBLEM SELECTOR PLAN 1
- Improving  - Please continue vancomycin per ID recs  - f/u final culture from 4/30, preliminary shows staph aureus   - ENT will manage the wound packing daily for now while inpatient  - Patient might need home care RN for wound care when close to discharge.   - Wound care instruction: express the wound to check if any purulence, follow by irrigated the wound with NS and packed with 1/4 iodoform gauze strip and covered by 4x4 guaze and tape.  - Continue monitor WBC  - Tight FSG control  - Case discussed with Dr. Mitchell.

## 2025-05-02 NOTE — PROGRESS NOTE ADULT - ASSESSMENT
45-year-old male with a past medical Struve hypertension, schizophrenia, diabetes, ROMMEL who presented to hospital due to a right neck abscess.    #Abnormal imaging of the neck  #Neck abscess, recurrent  #Leukocytosis  #History of MSSA neck abscess  #Ongoing use of vancomycin    Recommendations  Continue vancomycin for now, await for speciation of wound culture, GPC in clusters and Gram stain–Culture now with staph aureus, sensitivity pending  Follow trough - latest 9.1  MRSA nares negative however would await sensitivity prior to antibiotic change  ENT input  Follow fever curve and WBC count    Cliff Bauer MD  Division of Infectious Diseases

## 2025-05-02 NOTE — DISCHARGE NOTE PROVIDER - NSDCCPTREATMENT_GEN_ALL_CORE_FT
PRINCIPAL PROCEDURE  Procedure: CT neck  Findings and Treatment: FINDINGS:  Aerodigestive structures: No evidence of mass or abnormal enhancement.   Nonspecific marked enlargement of the palatine tonsils.  Lymph nodes: Prominent right cervical chain nodes, likely reactive.  Parotid and submandibular glands:  Normal.  Thyroid gland: Normal. Right lobe extends cranially between the   retropharyngeal common carotid arteries.  Vascular structures: Normal. Bilateral retropharyngeal course of the   common carotid arteries.  Orbits: Normal  Partially visualized intracranial structures: Normal.  Paranasal sinuses: Tiny left maxillary mucosal polyp versus retention   cyst.  Mastoid air cells:  Clear.  Dentition: Dental caries involving predominantly the mandibular teeth.  Partially visualized lung apices: Normal.  Osseous structures: No fracture, dislocation or destructive lesion.   Reversal of the cervical lordosis.  Miscellaneous: Extensive infiltration of the subcutaneous fat of the   right posterior neck extending from the sternocleidomastoid muscle   laterally to approximately the midline posterior neck with areas of   complex fluid and/or phlegmon. Cutaneous defect with numerous additional   localized foci of gas deep to the defect. Hyperattenuating material   adjacent to the foci of subcutaneous gas, nonspecific. No well-defined   peripherally enhancing drainable collection.  IMPRESSION:  Extensive inflammation involving the right posterior neck with areas of   complex fluid and/or phlegmon. Focal cutaneous defect and localized gas   and hyperattenuating material, possibly blood products, tracking deep to   the defect. No well-defined peripherally enhancing drainable collection.  Enlarged bilateral palatine tonsils, nonspecific.

## 2025-05-02 NOTE — PROGRESS NOTE ADULT - SUBJECTIVE AND OBJECTIVE BOX
ENT ISSUE/POD: R posterior suboccipital neck abscess    HPI: Patient seen and examined at bedside. No acute event overnight. No acute complaints.      PAST MEDICAL & SURGICAL HISTORY:  Schizophrenia      Obstructive sleep apnea      Hypertension      Type 2 diabetes mellitus      No significant past surgical history        Allergies    No Known Allergies    Intolerances      MEDICATIONS  (STANDING):  ARIPiprazole 10 milliGRAM(s) Oral daily  atorvastatin 10 milliGRAM(s) Oral at bedtime  chlorhexidine 2% Cloths 1 Application(s) Topical daily  cloZAPine 50 milliGRAM(s) Oral daily  dextrose 5%. 1000 milliLiter(s) (100 mL/Hr) IV Continuous <Continuous>  dextrose 5%. 1000 milliLiter(s) (50 mL/Hr) IV Continuous <Continuous>  dextrose 50% Injectable 25 Gram(s) IV Push once  dextrose 50% Injectable 12.5 Gram(s) IV Push once  dextrose 50% Injectable 25 Gram(s) IV Push once  enoxaparin Injectable 40 milliGRAM(s) SubCutaneous every 12 hours  fenofibrate Tablet 48 milliGRAM(s) Oral daily  glucagon  Injectable 1 milliGRAM(s) IntraMuscular once  insulin lispro (ADMELOG) corrective regimen sliding scale   SubCutaneous three times a day before meals  insulin lispro (ADMELOG) corrective regimen sliding scale   SubCutaneous at bedtime  lisinopril 5 milliGRAM(s) Oral daily  montelukast 10 milliGRAM(s) Oral daily  mupirocin 2% Nasal 1 Application(s) Both Nostrils two times a day  sertraline 100 milliGRAM(s) Oral daily  vancomycin  IVPB 1250 milliGRAM(s) IV Intermittent every 8 hours    MEDICATIONS  (PRN):  acetaminophen     Tablet .. 650 milliGRAM(s) Oral every 6 hours PRN Temp greater or equal to 38C (100.4F), Mild Pain (1 - 3)  albuterol    90 MICROgram(s) HFA Inhaler 2 Puff(s) Inhalation every 6 hours PRN Shortness of Breath and/or Wheezing  dextrose Oral Gel 15 Gram(s) Oral once PRN Blood Glucose LESS THAN 70 milliGRAM(s)/deciliter      Social History: see consult note    Family history: see consult note    ROS:   ENT: all negative except as noted in HPI   Pulm: denies SOB, cough, hemoptysis  Neuro: denies numbness/tingling, loss of sensation  Endo: denies heat/cold intolerance, excessive sweating      Vital Signs Last 24 Hrs  T(C): 36.2 (02 May 2025 05:39), Max: 36.5 (01 May 2025 20:47)  T(F): 97.2 (02 May 2025 05:39), Max: 97.7 (01 May 2025 20:47)  HR: 74 (02 May 2025 05:39) (60 - 84)  BP: 114/61 (02 May 2025 05:39) (114/61 - 129/59)  BP(mean): --  RR: 17 (02 May 2025 05:39) (17 - 18)  SpO2: 98% (02 May 2025 05:39) (97% - 99%)    Parameters below as of 02 May 2025 05:39  Patient On (Oxygen Delivery Method): room air                              12.6   9.06  )-----------( 303      ( 02 May 2025 07:35 )             39.4    05-02    138  |  101  |  x   ----------------------------<  x   3.8   |  x   |  x     Ca    9.1      01 May 2025 07:23  Phos  3.5     05-01  Mg     2.00     05-01         PHYSICAL EXAM:  Gen: NAD  Skin: No rashes, bruises, or lesions  Head: Normocephalic, Atraumatic  Face: no edema, erythema, or fluctuance. Parotid glands soft without mass  Eyes: no scleral injection  Ears: no evidence of any fluid drainage. No mastoid tenderness, erythema, or ear bulging  Nose: Nares bilaterally patent, no discharge  Mouth: No Stridor / Drooling / Trismus.  Mucosa moist, tongue/uvula midline, oropharynx clear  Neck: R posterior suboccipital neck erythema improving, no pus expressed from the wound at bedside, irrigated the wound with NS and packed with 1/4 iodoform gauze strip and covered by 4x4 guaze and tape. Rest of neck is flat, supple, no lymphadenopathy, trachea midline, no masses  Lymphatic: No lymphadenopathy  Resp: breathing easily, no stridor  Neuro: facial nerve intact, no facial droop

## 2025-05-02 NOTE — PROGRESS NOTE ADULT - SUBJECTIVE AND OBJECTIVE BOX
Follow Up:  abscess    Interval History/ROS:  Overnight: No acute events.  Patient remains afebrile.  Otherwise hemodynamically stable on room air.  Latest labs show no leukocytosis, BMP with renal function within normal limits.  Vancomycin trough on 5/1/2025 is 9.1.  Blood cultures from 4/30/2025 remain negative to date.  Wound culture with Staph aureus, sensitivities pending.    Patient seen examined at bedside.  No new complaints.      Allergies  No Known Allergies        ANTIMICROBIALS:  vancomycin  IVPB 1250 every 8 hours      OTHER MEDS:  MEDICATIONS  (STANDING):  acetaminophen     Tablet .. 650 every 6 hours PRN  albuterol    90 MICROgram(s) HFA Inhaler 2 every 6 hours PRN  ARIPiprazole 10 daily  atorvastatin 10 at bedtime  cloZAPine 50 daily  dextrose 50% Injectable 25 once  dextrose 50% Injectable 12.5 once  dextrose 50% Injectable 25 once  dextrose Oral Gel 15 once PRN  enoxaparin Injectable 40 every 12 hours  fenofibrate Tablet 48 daily  glucagon  Injectable 1 once  insulin lispro (ADMELOG) corrective regimen sliding scale  three times a day before meals  insulin lispro (ADMELOG) corrective regimen sliding scale  at bedtime  lisinopril 5 daily  montelukast 10 daily  sertraline 100 daily      Vital Signs Last 24 Hrs  T(C): 36.2 (02 May 2025 05:39), Max: 36.5 (01 May 2025 20:47)  T(F): 97.2 (02 May 2025 05:39), Max: 97.7 (01 May 2025 20:47)  HR: 74 (02 May 2025 05:39) (60 - 84)  BP: 114/61 (02 May 2025 05:39) (114/61 - 129/59)  BP(mean): --  RR: 17 (02 May 2025 05:39) (17 - 18)  SpO2: 98% (02 May 2025 05:39) (97% - 99%)    Parameters below as of 02 May 2025 05:39  Patient On (Oxygen Delivery Method): room air        PHYSICAL EXAM:  Constitutional: non-toxic, no distress  HEAD/EYES: anicteric, no conjunctival injection  ENT:  Right posterior swelling, wound   Cardiovascular:   normal S1, S2, no murmur, no edema  Respiratory:  clear BS bilaterally, no wheezes, no rales  GI:  soft, non-tender, normal bowel sounds  :  no del rio, no CVA tenderness  Musculoskeletal:  no synovitis, normal ROM  Skin:  no rash, no erythema, no phlebitis  Heme/Onc: no lymphadenopathy                               12.6   9.06  )-----------( 303      ( 02 May 2025 07:35 )             39.4       05-02    138  |  101  |  18  ----------------------------<  111[H]  3.8   |  25  |  0.87    Ca    9.3      02 May 2025 07:35  Phos  3.2     05-02  Mg     2.00     05-02        Urinalysis Basic - ( 02 May 2025 07:35 )    Color: x / Appearance: x / SG: x / pH: x  Gluc: 111 mg/dL / Ketone: x  / Bili: x / Urobili: x   Blood: x / Protein: x / Nitrite: x   Leuk Esterase: x / RBC: x / WBC x   Sq Epi: x / Non Sq Epi: x / Bacteria: x        MICROBIOLOGY:  Vancomycin Level, Trough: 9.1 ug/mL (05-01-25 @ 17:38)  v    Culture - Blood (collected 30 Apr 2025 13:03)  Source: Blood Blood-Peripheral  Preliminary Report (01 May 2025 19:01):    No growth at 24 hours    Culture - Blood (collected 30 Apr 2025 12:17)  Source: Blood Blood-Peripheral  Preliminary Report (01 May 2025 19:01):    No growth at 24 hours    Culture - Abscess with Gram Stain (collected 30 Apr 2025 09:46)  Source: Abscess neck  Gram Stain (30 Apr 2025 13:56):    Few polymorphonuclear leukocytes per low power field    Few Gram Positive Cocci in Clusters per oil power field  Preliminary Report (01 May 2025 08:08):    Moderate Staphylococcus aureus                    RADIOLOGY:  Imaging reviewed

## 2025-05-02 NOTE — PROGRESS NOTE ADULT - PROBLEM SELECTOR PLAN 1
Culture: S. Aureus, pending sensitivities   Recurrent abscess, previously I+D by ENT in Jan/Feb. Cultures grew MSSA. Was discharged with bactrim.     -c/w Vanco pending culture data (history of MRSA colonization)  -BC negative x2  -Appreciate ENT recs  -Abscess now draining, c/w packing

## 2025-05-02 NOTE — DISCHARGE NOTE PROVIDER - CARE PROVIDERS DIRECT ADDRESSES
[email or web address]

## 2025-05-02 NOTE — DISCHARGE NOTE PROVIDER - CARE PROVIDER_API CALL
Cliff Bauer  Infectious Disease  [address] [place] [address]  Phone: [phone]  Fax: [phone]  Follow Up Time:     Bala Mitchell  Otolaryngology  [address], [place] [address]  Phone: [phone]  Fax: [phone]  Follow Up Time:

## 2025-05-02 NOTE — DISCHARGE NOTE PROVIDER - DETAILS OF MALNUTRITION DIAGNOSIS/DIAGNOSES
This patient has been assessed with a concern for Malnutrition and was treated during this hospitalization for the following Nutrition diagnosis/diagnoses:     -  05/01/2025: Morbid obesity (BMI > 40)

## 2025-05-02 NOTE — PROGRESS NOTE ADULT - SUBJECTIVE AND OBJECTIVE BOX
Medicine Progress Note  --------------------  Jaicnta Ku M.D.  Internal Medicine  PGY-1  --------------------      Patient: ELKE INTERIANO, MRN: [id number], : 1980  Admitted on 25 for Abscess of skin      LANGUAGE - English            ------------------------------------------------------------------------------------------------------------  SUMMARY (from last progress note through 25 @ 07:36):   ------------------------------------------------------------------------------------------------------------  OVERNIGHT EVENTS  NAEON    SUBJECTIVE  -     ROS negative unless noted above.  ------------------------------------------------------------------------------------------------------------  OBJECTIVE:  Physical Exam  CONST:     NAD, well-appearing; well-developed; appears stated age  EYES:         Conjunctiva clear; PERRL; no conjunctival pallor; no lid lag  ENMT:       MMM, no pharyngeal injection or exudates; normal dentition/dentures present  NECK:        Supple, no LAD; no palpable masses; no thyromegaly  RESP :        Normal respiratory effort; CTA bilaterally; no W/R/R  CHEST:       No TTP, no lines/ports; symmetric chest expansion  CARDIO:     RRR, normal S1 and S2, no M/R/G; apical impulse at MCL  VASC:         No JVD/AJR, no peripheral edema, pulses 2+ B/L, Cap refill <2s  ABD:           Soft, NT/ND, norm bowel sounds; no R/G; No HSM; No CVAT  MSK:          No clubbing of digits; no joint swelling or TTP  EXT:           WWP, no reduction in body hair, no LE skin changes  PSYCH        A&O to person, place, and time; affect appropriate  NEURO:     Non-focal; no gross sensory deficits; moving all extremities   SKIN:          No rashes; no palpable lesions; axillae not dry    Vital Signs Last 24 Hrs  T(F): 97.2, Max: 97.7 (25 @ 20:47)  HR: 74 (60 - 84)  BP: 114/61 (114/61 - 129/59)  RR: 17 (17 - 18)  SpO2: 98% (97% - 99%)        DAILY MEASUREMENTS:  I&O's Summary    Daily     Daily   Weight (kg): 166 (25 @ 15:55)  Orthostatic VS        LABS:                        12.3   9.82  )-----------( 279      ( 01 May 2025 07:23 )             38.4     Hgb Trend: 12.3<--, 12.5<--, 12.5<--, 12.9<--  05-    138  |  99  |  18  ----------------------------<  128[H]  4.1   |  27  |  0.84    Ca    9.1      01 May 2025 07:23  Phos  3.5       Mg     2.00             CAPILLARY BLOOD GLUCOSE      POCT Blood Glucose.: 111 mg/dL (01 May 2025 22:07)  POCT Blood Glucose.: 96 mg/dL (01 May 2025 17:15)  POCT Blood Glucose.: 102 mg/dL (01 May 2025 11:54)  POCT Blood Glucose.: 103 mg/dL (01 May 2025 08:17)        Urinalysis Basic - ( 01 May 2025 07:23 )    Color: x / Appearance: x / SG: x / pH: x  Gluc: 128 mg/dL / Ketone: x  / Bili: x / Urobili: x   Blood: x / Protein: x / Nitrite: x   Leuk Esterase: x / RBC: x / WBC x   Sq Epi: x / Non Sq Epi: x / Bacteria: x        Culture - Blood (collected 2025 13:03)  Source: Blood Blood-Peripheral  Preliminary Report (01 May 2025 19:01):    No growth at 24 hours    Culture - Blood (collected 2025 12:17)  Source: Blood Blood-Peripheral  Preliminary Report (01 May 2025 19:01):    No growth at 24 hours    Culture - Abscess with Gram Stain (collected 2025 09:46)  Source: Abscess neck  Gram Stain (2025 13:56):    Few polymorphonuclear leukocytes per low power field    Few Gram Positive Cocci in Clusters per oil power field  Preliminary Report (01 May 2025 08:08):    Moderate Staphylococcus aureus        ------------------------------------------------------------------------------------------------------------  MEDICATIONS  (STANDING):  ARIPiprazole 10 milliGRAM(s) Oral daily  atorvastatin 10 milliGRAM(s) Oral at bedtime  chlorhexidine 2% Cloths 1 Application(s) Topical daily  cloZAPine 50 milliGRAM(s) Oral daily  dextrose 5%. 1000 milliLiter(s) (100 mL/Hr) IV Continuous <Continuous>  dextrose 5%. 1000 milliLiter(s) (50 mL/Hr) IV Continuous <Continuous>  dextrose 50% Injectable 25 Gram(s) IV Push once  dextrose 50% Injectable 12.5 Gram(s) IV Push once  dextrose 50% Injectable 25 Gram(s) IV Push once  enoxaparin Injectable 40 milliGRAM(s) SubCutaneous every 12 hours  fenofibrate Tablet 48 milliGRAM(s) Oral daily  glucagon  Injectable 1 milliGRAM(s) IntraMuscular once  insulin lispro (ADMELOG) corrective regimen sliding scale   SubCutaneous three times a day before meals  insulin lispro (ADMELOG) corrective regimen sliding scale   SubCutaneous at bedtime  lisinopril 5 milliGRAM(s) Oral daily  montelukast 10 milliGRAM(s) Oral daily  sertraline 100 milliGRAM(s) Oral daily  vancomycin  IVPB 1250 milliGRAM(s) IV Intermittent every 8 hours    MEDICATIONS  (PRN):  acetaminophen     Tablet .. 650 milliGRAM(s) Oral every 6 hours PRN Temp greater or equal to 38C (100.4F), Mild Pain (1 - 3)  albuterol    90 MICROgram(s) HFA Inhaler 2 Puff(s) Inhalation every 6 hours PRN Shortness of Breath and/or Wheezing  dextrose Oral Gel 15 Gram(s) Oral once PRN Blood Glucose LESS THAN 70 milliGRAM(s)/deciliter    ------------------------------------------------------------------------------------------------------------  COORDINATION OF CARE:  Care discussed with consultants/other providers and notes reviewed [Y]     Medicine Progress Note  --------------------  Jacinta Ku M.D.  Internal Medicine  PGY-1  --------------------      Patient: ELKE INTERIANO, MRN: [id number], : 1980  Admitted on 25 for Abscess of skin      LANGUAGE - English            ------------------------------------------------------------------------------------------------------------  SUMMARY (from last progress note through 25 @ 07:36): Awaiting sensitivity culture data  ------------------------------------------------------------------------------------------------------------  OVERNIGHT EVENTS  NAEON    SUBJECTIVE  - No complaints, denies pain    ROS negative unless noted above.  ------------------------------------------------------------------------------------------------------------  OBJECTIVE:  Physical Exam  CONST:     NAD, well-appearing; well-developed; appears stated age  EYES:         Conjunctiva clear; PERRL; no conjunctival pallor; no lid lag  ENMT:       MMM, no pharyngeal injection or exudates; normal dentition/dentures present  NECK:        Supple, no LAD; no palpable masses; no thyromegaly  RESP :        Normal respiratory effort; CTA bilaterally; no W/R/R  CHEST:       No TTP, no lines/ports; symmetric chest expansion  CARDIO:     RRR, normal S1 and S2, no M/R/G; apical impulse at MCL  VASC:         No JVD/AJR, no peripheral edema, pulses 2+ B/L, Cap refill <2s  ABD:           Obese, soft +BS.   MSK:          No clubbing of digits; no joint swelling or TTP  EXT:           WWP, no reduction in body hair, no LE skin changes  PSYCH        A&O to person, place, and time; affect appropriate  NEURO:     Non-focal; no gross sensory deficits; moving all extremities   SKIN:          No rashes; no palpable lesions; axillae not dry    Vital Signs Last 24 Hrs  T(F): 97.2, Max: 97.7 (25 @ 20:47)  HR: 74 (60 - 84)  BP: 114/61 (114/61 - 129/59)  RR: 17 (17 - 18)  SpO2: 98% (97% - 99%)        DAILY MEASUREMENTS:  I&O's Summary    Daily     Daily   Weight (kg): 166 (25 @ 15:55)  Orthostatic VS        LABS:                        12.3   9.82  )-----------( 279      ( 01 May 2025 07:23 )             38.4     Hgb Trend: 12.3<--, 12.5<--, 12.5<--, 12.9<--  05-01    138  |  99  |  18  ----------------------------<  128[H]  4.1   |  27  |  0.84    Ca    9.1      01 May 2025 07:23  Phos  3.5     -  Mg     2.00             CAPILLARY BLOOD GLUCOSE      POCT Blood Glucose.: 111 mg/dL (01 May 2025 22:07)  POCT Blood Glucose.: 96 mg/dL (01 May 2025 17:15)  POCT Blood Glucose.: 102 mg/dL (01 May 2025 11:54)  POCT Blood Glucose.: 103 mg/dL (01 May 2025 08:17)        Urinalysis Basic - ( 01 May 2025 07:23 )    Color: x / Appearance: x / SG: x / pH: x  Gluc: 128 mg/dL / Ketone: x  / Bili: x / Urobili: x   Blood: x / Protein: x / Nitrite: x   Leuk Esterase: x / RBC: x / WBC x   Sq Epi: x / Non Sq Epi: x / Bacteria: x        Culture - Blood (collected 2025 13:03)  Source: Blood Blood-Peripheral  Preliminary Report (01 May 2025 19:01):    No growth at 24 hours    Culture - Blood (collected 2025 12:17)  Source: Blood Blood-Peripheral  Preliminary Report (01 May 2025 19:01):    No growth at 24 hours    Culture - Abscess with Gram Stain (collected 2025 09:46)  Source: Abscess neck  Gram Stain (2025 13:56):    Few polymorphonuclear leukocytes per low power field    Few Gram Positive Cocci in Clusters per oil power field  Preliminary Report (01 May 2025 08:08):    Moderate Staphylococcus aureus        ------------------------------------------------------------------------------------------------------------  MEDICATIONS  (STANDING):  ARIPiprazole 10 milliGRAM(s) Oral daily  atorvastatin 10 milliGRAM(s) Oral at bedtime  chlorhexidine 2% Cloths 1 Application(s) Topical daily  cloZAPine 50 milliGRAM(s) Oral daily  dextrose 5%. 1000 milliLiter(s) (100 mL/Hr) IV Continuous <Continuous>  dextrose 5%. 1000 milliLiter(s) (50 mL/Hr) IV Continuous <Continuous>  dextrose 50% Injectable 25 Gram(s) IV Push once  dextrose 50% Injectable 12.5 Gram(s) IV Push once  dextrose 50% Injectable 25 Gram(s) IV Push once  enoxaparin Injectable 40 milliGRAM(s) SubCutaneous every 12 hours  fenofibrate Tablet 48 milliGRAM(s) Oral daily  glucagon  Injectable 1 milliGRAM(s) IntraMuscular once  insulin lispro (ADMELOG) corrective regimen sliding scale   SubCutaneous three times a day before meals  insulin lispro (ADMELOG) corrective regimen sliding scale   SubCutaneous at bedtime  lisinopril 5 milliGRAM(s) Oral daily  montelukast 10 milliGRAM(s) Oral daily  sertraline 100 milliGRAM(s) Oral daily  vancomycin  IVPB 1250 milliGRAM(s) IV Intermittent every 8 hours    MEDICATIONS  (PRN):  acetaminophen     Tablet .. 650 milliGRAM(s) Oral every 6 hours PRN Temp greater or equal to 38C (100.4F), Mild Pain (1 - 3)  albuterol    90 MICROgram(s) HFA Inhaler 2 Puff(s) Inhalation every 6 hours PRN Shortness of Breath and/or Wheezing  dextrose Oral Gel 15 Gram(s) Oral once PRN Blood Glucose LESS THAN 70 milliGRAM(s)/deciliter    ------------------------------------------------------------------------------------------------------------  COORDINATION OF CARE:  Care discussed with consultants/other providers and notes reviewed [Y]

## 2025-05-02 NOTE — DISCHARGE NOTE PROVIDER - NSDCFUADDAPPT_GEN_ALL_CORE_FT
APPTS ARE READY TO BE MADE: [X] YES    Best Family or Patient Contact (if needed):    Additional Information about above appointments (if needed):    1: ENT  2:   3:     Other comments or requests:

## 2025-05-02 NOTE — PROGRESS NOTE ADULT - PROBLEM SELECTOR PLAN 6
VTE PPx: Lovenox  Nutrition: CC Diet  Access: PIV  Code Status: FULL  Dispo: pending clinical improvement VTE PPx: Lovenox  Nutrition: CC Diet  Code Status: FULL  Dispo: pending clinical improvement

## 2025-05-02 NOTE — DISCHARGE NOTE PROVIDER - NSDCFUADDINST_GEN_ALL_CORE_FT
When discharged to facility please include wound care instructions as follows: Express the wound to check for any purulence, then irrigate the wound with normal saline and pack it with 1/4 iodoform gauze strip until you feel resistance and are no longer able to pack. Then cover the wound site with 4x4 guaze and tape. Please change every MWF.

## 2025-05-02 NOTE — DISCHARGE NOTE PROVIDER - NSFOLLOWUPCLINICS_GEN_ALL_ED_FT
[facility]NT  ENT  [address], [address]  [place] [address]  Phone: [phone]  Fax:

## 2025-05-02 NOTE — DISCHARGE NOTE PROVIDER - NSDCCPCAREPLAN_GEN_ALL_CORE_FT
PRINCIPAL DISCHARGE DIAGNOSIS  Diagnosis: Abscess  Assessment and Plan of Treatment:      PRINCIPAL DISCHARGE DIAGNOSIS  Diagnosis: Abscess  Assessment and Plan of Treatment: You came in with an abscess on your neck. An abscess is a walled off infection. The ENT doctors drained your abscess and packed your wound every day. We treated you with antibiotics that you will take every 6 hours through 5/9. Please call [phone] to schedule outpatient appointment with the ENT doctors for follow up in 1-2 weeks.

## 2025-05-03 LAB
ANION GAP SERPL CALC-SCNC: 13 MMOL/L — SIGNIFICANT CHANGE UP (ref 7–14)
BASOPHILS # BLD AUTO: 0.11 K/UL — SIGNIFICANT CHANGE UP (ref 0–0.2)
BASOPHILS NFR BLD AUTO: 1.2 % — SIGNIFICANT CHANGE UP (ref 0–2)
BUN SERPL-MCNC: 17 MG/DL — SIGNIFICANT CHANGE UP (ref 7–23)
CALCIUM SERPL-MCNC: 9.3 MG/DL — SIGNIFICANT CHANGE UP (ref 8.4–10.5)
CHLORIDE SERPL-SCNC: 105 MMOL/L — SIGNIFICANT CHANGE UP (ref 98–107)
CO2 SERPL-SCNC: 23 MMOL/L — SIGNIFICANT CHANGE UP (ref 22–31)
CREAT SERPL-MCNC: 0.85 MG/DL — SIGNIFICANT CHANGE UP (ref 0.5–1.3)
EGFR: 109 ML/MIN/1.73M2 — SIGNIFICANT CHANGE UP
EGFR: 109 ML/MIN/1.73M2 — SIGNIFICANT CHANGE UP
EOSINOPHIL # BLD AUTO: 0.43 K/UL — SIGNIFICANT CHANGE UP (ref 0–0.5)
EOSINOPHIL NFR BLD AUTO: 4.6 % — SIGNIFICANT CHANGE UP (ref 0–6)
GLUCOSE BLDC GLUCOMTR-MCNC: 124 MG/DL — HIGH (ref 70–99)
GLUCOSE BLDC GLUCOMTR-MCNC: 124 MG/DL — HIGH (ref 70–99)
GLUCOSE BLDC GLUCOMTR-MCNC: 96 MG/DL — SIGNIFICANT CHANGE UP (ref 70–99)
GLUCOSE BLDC GLUCOMTR-MCNC: 99 MG/DL — SIGNIFICANT CHANGE UP (ref 70–99)
GLUCOSE SERPL-MCNC: 98 MG/DL — SIGNIFICANT CHANGE UP (ref 70–99)
HCT VFR BLD CALC: 39.1 % — SIGNIFICANT CHANGE UP (ref 39–50)
HGB BLD-MCNC: 12.6 G/DL — LOW (ref 13–17)
IANC: 5.22 K/UL — SIGNIFICANT CHANGE UP (ref 1.8–7.4)
IMM GRANULOCYTES NFR BLD AUTO: 0.8 % — SIGNIFICANT CHANGE UP (ref 0–0.9)
LYMPHOCYTES # BLD AUTO: 2.75 K/UL — SIGNIFICANT CHANGE UP (ref 1–3.3)
LYMPHOCYTES # BLD AUTO: 29.6 % — SIGNIFICANT CHANGE UP (ref 13–44)
MAGNESIUM SERPL-MCNC: 2.1 MG/DL — SIGNIFICANT CHANGE UP (ref 1.6–2.6)
MCHC RBC-ENTMCNC: 28.7 PG — SIGNIFICANT CHANGE UP (ref 27–34)
MCHC RBC-ENTMCNC: 32.2 G/DL — SIGNIFICANT CHANGE UP (ref 32–36)
MCV RBC AUTO: 89.1 FL — SIGNIFICANT CHANGE UP (ref 80–100)
MONOCYTES # BLD AUTO: 0.71 K/UL — SIGNIFICANT CHANGE UP (ref 0–0.9)
MONOCYTES NFR BLD AUTO: 7.6 % — SIGNIFICANT CHANGE UP (ref 2–14)
NEUTROPHILS # BLD AUTO: 5.22 K/UL — SIGNIFICANT CHANGE UP (ref 1.8–7.4)
NEUTROPHILS NFR BLD AUTO: 56.2 % — SIGNIFICANT CHANGE UP (ref 43–77)
NRBC # BLD AUTO: 0 K/UL — SIGNIFICANT CHANGE UP (ref 0–0)
NRBC # FLD: 0 K/UL — SIGNIFICANT CHANGE UP (ref 0–0)
NRBC BLD AUTO-RTO: 0 /100 WBCS — SIGNIFICANT CHANGE UP (ref 0–0)
PHOSPHATE SERPL-MCNC: 3.5 MG/DL — SIGNIFICANT CHANGE UP (ref 2.5–4.5)
PLATELET # BLD AUTO: 311 K/UL — SIGNIFICANT CHANGE UP (ref 150–400)
POTASSIUM SERPL-MCNC: 4.2 MMOL/L — SIGNIFICANT CHANGE UP (ref 3.5–5.3)
POTASSIUM SERPL-SCNC: 4.2 MMOL/L — SIGNIFICANT CHANGE UP (ref 3.5–5.3)
RBC # BLD: 4.39 M/UL — SIGNIFICANT CHANGE UP (ref 4.2–5.8)
RBC # FLD: 13.8 % — SIGNIFICANT CHANGE UP (ref 10.3–14.5)
SODIUM SERPL-SCNC: 141 MMOL/L — SIGNIFICANT CHANGE UP (ref 135–145)
WBC # BLD: 9.29 K/UL — SIGNIFICANT CHANGE UP (ref 3.8–10.5)
WBC # FLD AUTO: 9.29 K/UL — SIGNIFICANT CHANGE UP (ref 3.8–10.5)

## 2025-05-03 PROCEDURE — 99232 SBSQ HOSP IP/OBS MODERATE 35: CPT | Mod: 25

## 2025-05-03 RX ADMIN — MUPIROCIN CALCIUM 1 APPLICATION(S): 20 CREAM TOPICAL at 06:00

## 2025-05-03 RX ADMIN — ENOXAPARIN SODIUM 40 MILLIGRAM(S): 100 INJECTION SUBCUTANEOUS at 18:20

## 2025-05-03 RX ADMIN — ARIPIPRAZOLE 10 MILLIGRAM(S): 2 TABLET ORAL at 12:37

## 2025-05-03 RX ADMIN — ATORVASTATIN CALCIUM 10 MILLIGRAM(S): 80 TABLET, FILM COATED ORAL at 21:25

## 2025-05-03 RX ADMIN — MUPIROCIN CALCIUM 1 APPLICATION(S): 20 CREAM TOPICAL at 18:20

## 2025-05-03 RX ADMIN — FENOFIBRATE 48 MILLIGRAM(S): 160 TABLET ORAL at 12:37

## 2025-05-03 RX ADMIN — Medication 50 MILLIGRAM(S): at 12:37

## 2025-05-03 RX ADMIN — Medication 100 MILLIGRAM(S): at 21:25

## 2025-05-03 RX ADMIN — Medication 100 MILLIGRAM(S): at 13:06

## 2025-05-03 RX ADMIN — Medication 1 APPLICATION(S): at 12:37

## 2025-05-03 RX ADMIN — LISINOPRIL 5 MILLIGRAM(S): 5 TABLET ORAL at 05:56

## 2025-05-03 RX ADMIN — SERTRALINE 100 MILLIGRAM(S): 100 TABLET, FILM COATED ORAL at 12:37

## 2025-05-03 RX ADMIN — Medication 100 MILLIGRAM(S): at 05:56

## 2025-05-03 RX ADMIN — ENOXAPARIN SODIUM 40 MILLIGRAM(S): 100 INJECTION SUBCUTANEOUS at 05:56

## 2025-05-03 RX ADMIN — MONTELUKAST SODIUM 10 MILLIGRAM(S): 10 TABLET ORAL at 12:37

## 2025-05-03 NOTE — PROGRESS NOTE ADULT - ASSESSMENT
44 y/o M with a history of Schizophrenia, DM2, HTN, had recent admission Feb. for R posterior suboccipital neck abscess, required packing for 1wk, on Vanc/Zosyn, Ceftriaxone, then Ancef while admitted and discharged on Bactrim (cx + for MSSA). Completed abx 2 weeks ago then had recurrence of pain and swelling for past 6 days s/p I&D with 15cc pus 4/29. Post I&D CT neck shows no well-defined peripherally enhancing drainable collection. Patient seen this morning R posterior suboccipital neck erythema improved, no pus expressed at bedside, irrigated the wound with NS and packed with 1/4 iodoform gauze strip and covered by 4x4 guaze and tape. Currently on Vancomycin per ID recs. WBC 9.06, , afebrile overnight, MSSA+, preliminary culture with staph aureus      PLAN  - Please continue vancomycin per ID recs  - f/u final culture from 4/30: staph aureus   - ENT will manage the wound packing daily for now while inpatient  - Patient might need home care RN for wound care when close to discharge.   - Wound care instruction: express the wound to check if any purulence, follow by irrigated the wound with NS and packed with 1/4 iodoform gauze strip and covered by 4x4 guaze and tape.  - Continue monitor WBC  - Tight FSG control  - Case discussed with Dr. Mitchell.   46 y/o M with a history of Schizophrenia, DM2, HTN, had recent admission Feb. for R posterior suboccipital neck abscess, required packing for 1wk, on Vanc/Zosyn, Ceftriaxone, then Ancef while admitted and discharged on Bactrim (cx + for MSSA). Completed abx 2 weeks ago then had recurrence of pain and swelling for past 6 days s/p I&D with 15cc pus 4/29. Post I&D CT neck shows no well-defined peripherally enhancing drainable collection. Patient seen this morning R posterior suboccipital neck erythema improved, no pus expressed at bedside, irrigated the wound with NS and packed with 1/4 iodoform gauze strip and covered by 4x4 guaze and tape. Currently on Vancomycin per ID recs. WBC 9.06, , afebrile overnight, MSSA+, preliminary culture with staph aureus      PLAN  - f/u ID: abx narrowed to ancef  - f/u final culture from 4/30: staph aureus   - ENT will manage the wound packing daily for now while inpatient  - Patient might need home care RN for wound care when close to discharge.   - Wound care instruction: express the wound to check if any purulence, follow by irrigated the wound with NS and packed with 1/4 iodoform gauze strip and covered by 4x4 guaze and tape.  - Continue monitor WBC  - Tight FSG control  - Case discussed with Dr. Mitchell.

## 2025-05-03 NOTE — PROGRESS NOTE ADULT - SUBJECTIVE AND OBJECTIVE BOX
OTOLARYNGOLOGY (ENT) PROGRESS NOTE    PATIENT: ELKE INTERIANO  MRN: [id number]  : [birth year]  [id number]  DATE OF SERVICE:  25    Subjective/ Interval: Patient seen and examined at bedside. No acute events overnight. Wound irrigated and re-packed at bedside, pus expressed.    ALLERGIES:  No Known Allergies      MEDICATIONS:  Antiinfectives:   ceFAZolin   IVPB      ceFAZolin   IVPB 2000 milliGRAM(s) IV Intermittent every 8 hours    IV fluids:  dextrose 5%. 1000 milliLiter(s) IV Continuous <Continuous>  dextrose 5%. 1000 milliLiter(s) IV Continuous <Continuous>    Hematologic/Anticoagulation:  enoxaparin Injectable 40 milliGRAM(s) SubCutaneous every 12 hours    Pain medications/Neuro:  acetaminophen     Tablet .. 650 milliGRAM(s) Oral every 6 hours PRN  ARIPiprazole 10 milliGRAM(s) Oral daily  cloZAPine 50 milliGRAM(s) Oral daily  sertraline 100 milliGRAM(s) Oral daily    Endocrine Medications:   atorvastatin 10 milliGRAM(s) Oral at bedtime  dextrose 50% Injectable 25 Gram(s) IV Push once  dextrose 50% Injectable 12.5 Gram(s) IV Push once  dextrose 50% Injectable 25 Gram(s) IV Push once  dextrose Oral Gel 15 Gram(s) Oral once PRN  fenofibrate Tablet 48 milliGRAM(s) Oral daily  glucagon  Injectable 1 milliGRAM(s) IntraMuscular once  insulin lispro (ADMELOG) corrective regimen sliding scale   SubCutaneous three times a day before meals  insulin lispro (ADMELOG) corrective regimen sliding scale   SubCutaneous at bedtime    All other standing medications:   chlorhexidine 2% Cloths 1 Application(s) Topical daily  lisinopril 5 milliGRAM(s) Oral daily  montelukast 10 milliGRAM(s) Oral daily  mupirocin 2% Nasal 1 Application(s) Both Nostrils two times a day    All other PRN medications:  albuterol    90 MICROgram(s) HFA Inhaler 2 Puff(s) Inhalation every 6 hours PRN    Vital Signs Last 24 Hrs  T(C): 36.2 (03 May 2025 13:12), Max: 36.3 (02 May 2025 21:51)  T(F): 97.2 (03 May 2025 13:12), Max: 97.4 (02 May 2025 21:51)  HR: 91 (03 May 2025 13:12) (73 - 91)  BP: 108/83 (03 May 2025 13:12) (108/83 - 155/90)  BP(mean): --  RR: 18 (03 May 2025 13:12) (17 - 18)  SpO2: 95% (03 May 2025 13:12) (95% - 95%)    Parameters below as of 03 May 2025 13:12  Patient On (Oxygen Delivery Method): room air          PHYSICAL EXAM:  Gen: NAD  Skin: No rashes, bruises, or lesions  Head: Normocephalic, Atraumatic  Face: no edema, erythema, or fluctuance. Parotid glands soft without mass  Eyes: no scleral injection  Ears: no evidence of any fluid drainage. No mastoid tenderness, erythema, or ear bulging  Nose: Nares bilaterally patent, no discharge  Mouth: No Stridor / Drooling / Trismus.  Mucosa moist, tongue/uvula midline, oropharynx clear  Neck: R posterior suboccipital neck erythema improving, minimal pus expressed from the wound at bedside, irrigated the wound with NS and packed with 1/4 iodoform gauze strip and covered by 4x4 guaze and tape. Rest of neck is flat, supple, no lymphadenopathy, trachea midline, no masses  Lymphatic: No lymphadenopathy  Resp: breathing easily, no stridor  Neuro: facial nerve intact, no facial droop

## 2025-05-03 NOTE — PROGRESS NOTE ADULT - SUBJECTIVE AND OBJECTIVE BOX
Medicine Progress Note  --------------------  Jacinta Ku M.D.  Internal Medicine  PGY-1  --------------------      Patient: ELKE INTERIANO, MRN: [id number], : 1980  Admitted on 25 for Abscess of skin      LANGUAGE - English            ------------------------------------------------------------------------------------------------------------  SUMMARY (from last progress note through 25 @ 07:17): Patient showered yesterday. Abx downgraded to cefazolin, cultures growing MSSA  ------------------------------------------------------------------------------------------------------------  OVERNIGHT EVENTS  NAEON    SUBJECTIVE  -       ROS negative unless noted above.  ------------------------------------------------------------------------------------------------------------  OBJECTIVE:  Physical Exam  CONST:     NAD, well-appearing; well-developed; appears stated age  EYES:         Conjunctiva clear; PERRL; no conjunctival pallor; no lid lag  ENMT:       MMM, no pharyngeal injection or exudates; normal dentition/dentures present  NECK:        Supple, no LAD; no palpable masses; no thyromegaly  RESP :        Normal respiratory effort; CTA bilaterally; no W/R/R  CHEST:       No TTP, no lines/ports; symmetric chest expansion  CARDIO:     RRR, normal S1 and S2, no M/R/G; apical impulse at MCL  VASC:         No JVD/AJR, no peripheral edema, pulses 2+ B/L, Cap refill <2s  ABD:           Soft, NT/ND, norm bowel sounds; no R/G; No HSM; No CVAT  MSK:          No clubbing of digits; no joint swelling or TTP  EXT:           WWP, no reduction in body hair, no LE skin changes  PSYCH        A&O to person, place, and time; affect appropriate  NEURO:     Non-focal; no gross sensory deficits; moving all extremities   SKIN:          No rashes; no palpable lesions; axillae not dry    Vital Signs Last 24 Hrs  T(F): 97.2, Max: 98.5 (25 @ 12:40)  HR: 73 (73 - 92)  BP: 155/90 (118/80 - 155/90)  RR: 17 (17 - 18)  SpO2: 95% (95% - 99%)        DAILY MEASUREMENTS:  I&O's Summary    Daily     Daily   Weight (kg): 166 (25 @ 15:55)  Orthostatic VS        LABS:                        12.6   9.06  )-----------( 303      ( 02 May 2025 07:35 )             39.4     Hgb Trend: 12.6<--, 12.3<--, 12.5<--, 12.5<--, 12.9<--  05-02    138  |  101  |  18  ----------------------------<  111[H]  3.8   |  25  |  0.87    Ca    9.3      02 May 2025 07:35  Phos  3.2     05-02  Mg     2.00     05-02        CAPILLARY BLOOD GLUCOSE      POCT Blood Glucose.: 100 mg/dL (02 May 2025 21:35)  POCT Blood Glucose.: 109 mg/dL (02 May 2025 17:32)  POCT Blood Glucose.: 106 mg/dL (02 May 2025 12:13)  POCT Blood Glucose.: 120 mg/dL (02 May 2025 08:18)        Urinalysis Basic - ( 02 May 2025 07:35 )    Color: x / Appearance: x / SG: x / pH: x  Gluc: 111 mg/dL / Ketone: x  / Bili: x / Urobili: x   Blood: x / Protein: x / Nitrite: x   Leuk Esterase: x / RBC: x / WBC x   Sq Epi: x / Non Sq Epi: x / Bacteria: x        Culture - Blood (collected 2025 13:03)  Source: Blood Blood-Peripheral  Preliminary Report (02 May 2025 19:01):    No growth at 48 Hours    Culture - Blood (collected 2025 12:17)  Source: Blood Blood-Peripheral  Preliminary Report (02 May 2025 19:01):    No growth at 48 Hours    Culture - Abscess with Gram Stain (collected 2025 09:46)  Source: Abscess neck  Gram Stain (2025 13:56):    Few polymorphonuclear leukocytes per low power field    Few Gram Positive Cocci in Clusters per oil power field  Preliminary Report (01 May 2025 08:08):    Moderate Staphylococcus aureus  Organism: Staphylococcus aureus (02 May 2025 12:50)  Organism: Staphylococcus aureus (02 May 2025 12:50)        ------------------------------------------------------------------------------------------------------------  MEDICATIONS  (STANDING):  ARIPiprazole 10 milliGRAM(s) Oral daily  atorvastatin 10 milliGRAM(s) Oral at bedtime  ceFAZolin   IVPB      ceFAZolin   IVPB 2000 milliGRAM(s) IV Intermittent every 8 hours  chlorhexidine 2% Cloths 1 Application(s) Topical daily  cloZAPine 50 milliGRAM(s) Oral daily  dextrose 5%. 1000 milliLiter(s) (100 mL/Hr) IV Continuous <Continuous>  dextrose 5%. 1000 milliLiter(s) (50 mL/Hr) IV Continuous <Continuous>  dextrose 50% Injectable 25 Gram(s) IV Push once  dextrose 50% Injectable 12.5 Gram(s) IV Push once  dextrose 50% Injectable 25 Gram(s) IV Push once  enoxaparin Injectable 40 milliGRAM(s) SubCutaneous every 12 hours  fenofibrate Tablet 48 milliGRAM(s) Oral daily  glucagon  Injectable 1 milliGRAM(s) IntraMuscular once  insulin lispro (ADMELOG) corrective regimen sliding scale   SubCutaneous three times a day before meals  insulin lispro (ADMELOG) corrective regimen sliding scale   SubCutaneous at bedtime  lisinopril 5 milliGRAM(s) Oral daily  montelukast 10 milliGRAM(s) Oral daily  mupirocin 2% Nasal 1 Application(s) Both Nostrils two times a day  sertraline 100 milliGRAM(s) Oral daily    MEDICATIONS  (PRN):  acetaminophen     Tablet .. 650 milliGRAM(s) Oral every 6 hours PRN Temp greater or equal to 38C (100.4F), Mild Pain (1 - 3)  albuterol    90 MICROgram(s) HFA Inhaler 2 Puff(s) Inhalation every 6 hours PRN Shortness of Breath and/or Wheezing  dextrose Oral Gel 15 Gram(s) Oral once PRN Blood Glucose LESS THAN 70 milliGRAM(s)/deciliter    ------------------------------------------------------------------------------------------------------------  COORDINATION OF CARE:  Care discussed with consultants/other providers and notes reviewed [Y]

## 2025-05-03 NOTE — PROGRESS NOTE ADULT - PROBLEM SELECTOR PLAN 1
Culture: MSSA  Recurrent abscess, previously I+D by ENT in Jan/Feb. Cultures grew MSSA. Was discharged with bactrim.     -Abx downgraded to cefazolin, duration per ID  -BC negative x2  -Appreciate ENT recs  -Abscess now draining, c/w daily packing

## 2025-05-04 LAB
GLUCOSE BLDC GLUCOMTR-MCNC: 94 MG/DL — SIGNIFICANT CHANGE UP (ref 70–99)
GLUCOSE BLDC GLUCOMTR-MCNC: 96 MG/DL — SIGNIFICANT CHANGE UP (ref 70–99)
GLUCOSE BLDC GLUCOMTR-MCNC: 96 MG/DL — SIGNIFICANT CHANGE UP (ref 70–99)
GLUCOSE BLDC GLUCOMTR-MCNC: 97 MG/DL — SIGNIFICANT CHANGE UP (ref 70–99)

## 2025-05-04 PROCEDURE — 99233 SBSQ HOSP IP/OBS HIGH 50: CPT | Mod: 25

## 2025-05-04 RX ADMIN — Medication 50 MILLIGRAM(S): at 12:48

## 2025-05-04 RX ADMIN — LISINOPRIL 5 MILLIGRAM(S): 5 TABLET ORAL at 05:35

## 2025-05-04 RX ADMIN — MONTELUKAST SODIUM 10 MILLIGRAM(S): 10 TABLET ORAL at 12:43

## 2025-05-04 RX ADMIN — MUPIROCIN CALCIUM 1 APPLICATION(S): 20 CREAM TOPICAL at 17:21

## 2025-05-04 RX ADMIN — SERTRALINE 100 MILLIGRAM(S): 100 TABLET, FILM COATED ORAL at 12:44

## 2025-05-04 RX ADMIN — Medication 100 MILLIGRAM(S): at 21:19

## 2025-05-04 RX ADMIN — MUPIROCIN CALCIUM 1 APPLICATION(S): 20 CREAM TOPICAL at 05:35

## 2025-05-04 RX ADMIN — ENOXAPARIN SODIUM 40 MILLIGRAM(S): 100 INJECTION SUBCUTANEOUS at 05:36

## 2025-05-04 RX ADMIN — Medication 1 APPLICATION(S): at 12:49

## 2025-05-04 RX ADMIN — ATORVASTATIN CALCIUM 10 MILLIGRAM(S): 80 TABLET, FILM COATED ORAL at 21:19

## 2025-05-04 RX ADMIN — FENOFIBRATE 48 MILLIGRAM(S): 160 TABLET ORAL at 15:09

## 2025-05-04 RX ADMIN — Medication 100 MILLIGRAM(S): at 05:35

## 2025-05-04 RX ADMIN — Medication 100 MILLIGRAM(S): at 14:26

## 2025-05-04 RX ADMIN — ARIPIPRAZOLE 10 MILLIGRAM(S): 2 TABLET ORAL at 12:43

## 2025-05-04 NOTE — PROGRESS NOTE ADULT - PROBLEM SELECTOR PLAN 1
Culture: MSSA  Recurrent abscess, previously I+D by ENT in Jan/Feb. Cultures grew MSSA. Was discharged with bactrim.     -Abx downgraded to cefazolin, duration per ID  -BC negative x2  -Appreciate ENT recs  -Abscess now draining, c/w daily packing  -d/c back to facility when ENT feels packing can be changed to 3x/week (this is the most that is possible by home care, there is no one at facility who can do this). Re-assess on Monday

## 2025-05-04 NOTE — PROGRESS NOTE ADULT - PROBLEM SELECTOR PLAN 6
VTE PPx: Lovenox  Nutrition: CC Diet  Code Status: FULL. Aunt is guardian and makes decisions   Dispo: back to [facility]

## 2025-05-04 NOTE — PROGRESS NOTE ADULT - SUBJECTIVE AND OBJECTIVE BOX
OTOLARYNGOLOGY (ENT) PROGRESS NOTE    PATIENT: ELKE INTERIANO  MRN: [id number]  : [birth year]  [id number]  DATE OF SERVICE:  25  	  Subjective/ Interval:   Endorses improvement in pain. Neck ROM wnl, minimal serous fluid expressed from wound.    PHYSICAL EXAM:  Gen: NAD  Skin: No rashes, bruises, or lesions  Head: Normocephalic, Atraumatic  Face: no edema, erythema, or fluctuance. Parotid glands soft without mass  Eyes: no scleral injection  Ears: no evidence of any fluid drainage. No mastoid tenderness, erythema, or ear bulging  Nose: Nares bilaterally patent, no discharge  Mouth: No Stridor / Drooling / Trismus.  Mucosa moist, tongue/uvula midline, oropharynx clear  Neck: R posterior suboccipital neck erythema improving, no pus expressed from the wound at bedside, irrigated the wound with NS and packed with 1/4 iodoform gauze strip and covered by 4x4 guaze and tape. Rest of neck is flat, supple, no lymphadenopathy, trachea midline, no masses  Lymphatic: No lymphadenopathy  Resp: breathing easily, no stridor     LABS                       12.6   9.29  )-----------( 311      ( 03 May 2025 06:56 )             39.1    05-    141  |  105  |  17  ----------------------------<  98  4.2   |  23  |  0.85    Ca    9.3      03 May 2025 06:56  Phos  3.5     05-03  Mg     2.10     05-03           Coagulation Studies-     Urinalysis Basic - ( 03 May 2025 06:56 )    Color: x / Appearance: x / SG: x / pH: x  Gluc: 98 mg/dL / Ketone: x  / Bili: x / Urobili: x   Blood: x / Protein: x / Nitrite: x   Leuk Esterase: x / RBC: x / WBC x   Sq Epi: x / Non Sq Epi: x / Bacteria: x      Endocrine Panel-                MICROBIOLOGY:  Culture - Abscess with Gram Stain (collected 25 @ 09:46)  Source: Abscess neck  Gram Stain (25 @ 13:56):    Few polymorphonuclear leukocytes per low power field    Few Gram Positive Cocci in Clusters per oil power field  Preliminary Report (25 @ 08:08):    Moderate Staphylococcus aureus  Organism: Staphylococcus aureus (25 @ 12:50)  Organism: Staphylococcus aureus (25 @ 12:50)      -  Clindamycin: R <=0.25 This isolate is presumed to be clindamycin resistant based on detection of inducible resistance. Clindamycin may still be effective in some patients.      -  Oxacillin: S <=0.25 Oxacillin predicts susceptibility for dicloxacillin, methicillin, and nafcillin      -  Gentamicin: R >8 Should not be used as monotherapy      -  Vancomycin: S 0.5      -  Tetracycline: S <=4      Method Type: ALIYA      -  Penicillin: R >2      -  Rifampin: S <=1 Should not be used as monotherapy      -  Erythromycin: R >4      -  Trimethoprim/Sulfamethoxazole: S <=0.5/9.5      Culture Results:   No growth at 4 days (25 @ 13:03)  Culture Results:   No growth at 4 days (25 @ 12:17)  Culture Results:   Moderate Staphylococcus aureus (25 @ 09:46)      Culture - Blood (collected 25 @ 13:03)  Source: Blood Blood-Peripheral  Preliminary Report (25 @ 19:01):    No growth at 4 days    Culture - Blood (collected 25 @ 12:17)  Source: Blood Blood-Peripheral  Preliminary Report (25 @ 19:01):    No growth at 4 days    Culture - Abscess with Gram Stain (collected 25 @ 09:46)  Source: Abscess neck  Gram Stain (25 @ 13:56):    Few polymorphonuclear leukocytes per low power field    Few Gram Positive Cocci in Clusters per oil power field  Preliminary Report (25 @ 08:08):    Moderate Staphylococcus aureus  Organism: Staphylococcus aureus (25 @ 12:50)  Organism: Staphylococcus aureus (25 @ 12:50)      -  Clindamycin: R <=0.25 This isolate is presumed to be clindamycin resistant based on detection of inducible resistance. Clindamycin may still be effective in some patients.      -  Oxacillin: S <=0.25 Oxacillin predicts susceptibility for dicloxacillin, methicillin, and nafcillin      -  Gentamicin: R >8 Should not be used as monotherapy      -  Vancomycin: S 0.5      -  Tetracycline: S <=4      Method Type: ALIYA      -  Penicillin: R >2      -  Rifampin: S <=1 Should not be used as monotherapy      -  Erythromycin: R >4      -  Trimethoprim/Sulfamethoxazole: S <=0.5/9.5

## 2025-05-04 NOTE — PROGRESS NOTE ADULT - SUBJECTIVE AND OBJECTIVE BOX
Medicine Progress Note  --------------------  Jacinta Ku M.D.  Internal Medicine  PGY-1  --------------------      Patient: ELKE INTERIANO, MRN: [id number], : 1980  Admitted on 25 for Abscess of skin      LANGUAGE - English            ------------------------------------------------------------------------------------------------------------  SUMMARY (from last progress note through 25 @ 07:46): No new events  ------------------------------------------------------------------------------------------------------------  OVERNIGHT EVENTS  NAEON    SUBJECTIVE  - Feels well, no complaints. Denies pain from abscess site    ROS negative unless noted above.  ------------------------------------------------------------------------------------------------------------  OBJECTIVE:  Physical Exam  CONST:     NAD, well-appearing; well-developed; appears stated age  EYES:         Conjunctiva clear; PERRL; no conjunctival pallor; no lid lag  ENMT:       MMM, no pharyngeal injection or exudates; normal dentition/dentures present  NECK:        Supple, no LAD; no palpable masses; no thyromegaly  RESP :        Normal respiratory effort; CTA bilaterally; no W/R/R  CHEST:       No TTP, no lines/ports; symmetric chest expansion  CARDIO:     RRR, normal S1 and S2, no M/R/G; apical impulse at MCL  VASC:         No JVD/AJR, no peripheral edema, pulses 2+ B/L, Cap refill <2s  ABD:          Obese, soft, NT. +BS  MSK:          No clubbing of digits; no joint swelling or TTP  EXT:           WWP, no reduction in body hair, no LE skin changes  PSYCH        A&O to person, place, and time; affect appropriate. Flat affect  NEURO:     Non-focal; no gross sensory deficits; moving all extremities   SKIN:          No rashes; no palpable lesions; axillae not dry    Vital Signs Last 24 Hrs  T(F): 97.8, Max: 98.1 (25 @ 21:19)  HR: 62 (62 - 91)  BP: 119/60 (108/83 - 130/75)  RR: 18 (18 - 18)  SpO2: 97% (94% - 97%)        DAILY MEASUREMENTS:  I&O's Summary    Daily     Daily   Weight (kg): 166 (25 @ 15:55)  Orthostatic VS        LABS:                        12.6   9.29  )-----------( 311      ( 03 May 2025 06:56 )             39.1     Hgb Trend: 12.6<--, 12.6<--, 12.3<--, 12.5<--, 12.5<--  05-03    141  |  105  |  17  ----------------------------<  98  4.2   |  23  |  0.85    Ca    9.3      03 May 2025 06:56  Phos  3.5       Mg     2.10     -03        CAPILLARY BLOOD GLUCOSE      POCT Blood Glucose.: 124 mg/dL (03 May 2025 21:29)  POCT Blood Glucose.: 99 mg/dL (03 May 2025 17:33)  POCT Blood Glucose.: 96 mg/dL (03 May 2025 11:55)  POCT Blood Glucose.: 124 mg/dL (03 May 2025 08:19)        Urinalysis Basic - ( 03 May 2025 06:56 )    Color: x / Appearance: x / SG: x / pH: x  Gluc: 98 mg/dL / Ketone: x  / Bili: x / Urobili: x   Blood: x / Protein: x / Nitrite: x   Leuk Esterase: x / RBC: x / WBC x   Sq Epi: x / Non Sq Epi: x / Bacteria: x        ------------------------------------------------------------------------------------------------------------  MEDICATIONS  (STANDING):  ARIPiprazole 10 milliGRAM(s) Oral daily  atorvastatin 10 milliGRAM(s) Oral at bedtime  ceFAZolin   IVPB      ceFAZolin   IVPB 2000 milliGRAM(s) IV Intermittent every 8 hours  chlorhexidine 2% Cloths 1 Application(s) Topical daily  cloZAPine 50 milliGRAM(s) Oral daily  dextrose 5%. 1000 milliLiter(s) (100 mL/Hr) IV Continuous <Continuous>  dextrose 5%. 1000 milliLiter(s) (50 mL/Hr) IV Continuous <Continuous>  dextrose 50% Injectable 25 Gram(s) IV Push once  dextrose 50% Injectable 12.5 Gram(s) IV Push once  dextrose 50% Injectable 25 Gram(s) IV Push once  enoxaparin Injectable 40 milliGRAM(s) SubCutaneous every 12 hours  fenofibrate Tablet 48 milliGRAM(s) Oral daily  glucagon  Injectable 1 milliGRAM(s) IntraMuscular once  insulin lispro (ADMELOG) corrective regimen sliding scale   SubCutaneous three times a day before meals  insulin lispro (ADMELOG) corrective regimen sliding scale   SubCutaneous at bedtime  lisinopril 5 milliGRAM(s) Oral daily  montelukast 10 milliGRAM(s) Oral daily  mupirocin 2% Nasal 1 Application(s) Both Nostrils two times a day  sertraline 100 milliGRAM(s) Oral daily    MEDICATIONS  (PRN):  acetaminophen     Tablet .. 650 milliGRAM(s) Oral every 6 hours PRN Temp greater or equal to 38C (100.4F), Mild Pain (1 - 3)  albuterol    90 MICROgram(s) HFA Inhaler 2 Puff(s) Inhalation every 6 hours PRN Shortness of Breath and/or Wheezing  dextrose Oral Gel 15 Gram(s) Oral once PRN Blood Glucose LESS THAN 70 milliGRAM(s)/deciliter    ------------------------------------------------------------------------------------------------------------  COORDINATION OF CARE:  Care discussed with consultants/other providers and notes reviewed [Y]

## 2025-05-04 NOTE — PROGRESS NOTE ADULT - ASSESSMENT
44 y/o M with a history of Schizophrenia, DM2, HTN, had recent admission Feb. for R posterior suboccipital neck abscess, required packing for 1wk, on Vanc/Zosyn, Ceftriaxone, then Ancef while admitted and discharged on Bactrim (cx + for MSSA). Completed abx 2 weeks ago then had recurrence of pain and swelling for past 6 days s/p I&D with 15cc pus 4/29. Post I&D CT neck shows no well-defined peripherally enhancing drainable collection. Patient seen this morning R posterior suboccipital neck erythema improved, no pus expressed at bedside, irrigated the wound with NS and packed with 1/4 iodoform gauze strip and covered by 4x4 guaze and tape. Currently on Vancomycin per ID recs. WBC 9.06, , afebrile overnight, MSSA+, preliminary culture with staph aureus      PLAN  - f/u ID: abx narrowed to ancef  - f/u final culture from 4/30: staph aureus   - ENT will manage the wound packing daily for now while inpatient  - Patient might need home care RN for wound care when close to discharge  - Wound care instruction: express the wound to check if any purulence, follow by irrigated the wound with NS and packed with 1/4 iodoform gauze strip and covered by 4x4 guaze and tape  - Continue monitor WBC  - Tight FSG control

## 2025-05-05 LAB
ANION GAP SERPL CALC-SCNC: 14 MMOL/L — SIGNIFICANT CHANGE UP (ref 7–14)
BUN SERPL-MCNC: 18 MG/DL — SIGNIFICANT CHANGE UP (ref 7–23)
CALCIUM SERPL-MCNC: 9.1 MG/DL — SIGNIFICANT CHANGE UP (ref 8.4–10.5)
CHLORIDE SERPL-SCNC: 99 MMOL/L — SIGNIFICANT CHANGE UP (ref 98–107)
CO2 SERPL-SCNC: 23 MMOL/L — SIGNIFICANT CHANGE UP (ref 22–31)
CREAT SERPL-MCNC: 0.87 MG/DL — SIGNIFICANT CHANGE UP (ref 0.5–1.3)
CULTURE RESULTS: ABNORMAL
CULTURE RESULTS: SIGNIFICANT CHANGE UP
CULTURE RESULTS: SIGNIFICANT CHANGE UP
EGFR: 108 ML/MIN/1.73M2 — SIGNIFICANT CHANGE UP
EGFR: 108 ML/MIN/1.73M2 — SIGNIFICANT CHANGE UP
GLUCOSE BLDC GLUCOMTR-MCNC: 101 MG/DL — HIGH (ref 70–99)
GLUCOSE SERPL-MCNC: 94 MG/DL — SIGNIFICANT CHANGE UP (ref 70–99)
HCT VFR BLD CALC: 40.8 % — SIGNIFICANT CHANGE UP (ref 39–50)
HGB BLD-MCNC: 12.9 G/DL — LOW (ref 13–17)
MAGNESIUM SERPL-MCNC: 2.1 MG/DL — SIGNIFICANT CHANGE UP (ref 1.6–2.6)
MCHC RBC-ENTMCNC: 29 PG — SIGNIFICANT CHANGE UP (ref 27–34)
MCHC RBC-ENTMCNC: 31.6 G/DL — LOW (ref 32–36)
MCV RBC AUTO: 91.7 FL — SIGNIFICANT CHANGE UP (ref 80–100)
NRBC # BLD AUTO: 0 K/UL — SIGNIFICANT CHANGE UP (ref 0–0)
NRBC # FLD: 0 K/UL — SIGNIFICANT CHANGE UP (ref 0–0)
NRBC BLD AUTO-RTO: 0 /100 WBCS — SIGNIFICANT CHANGE UP (ref 0–0)
ORGANISM # SPEC MICROSCOPIC CNT: ABNORMAL
ORGANISM # SPEC MICROSCOPIC CNT: ABNORMAL
PHOSPHATE SERPL-MCNC: 3.5 MG/DL — SIGNIFICANT CHANGE UP (ref 2.5–4.5)
PLATELET # BLD AUTO: 287 K/UL — SIGNIFICANT CHANGE UP (ref 150–400)
POTASSIUM SERPL-MCNC: 4.1 MMOL/L — SIGNIFICANT CHANGE UP (ref 3.5–5.3)
POTASSIUM SERPL-SCNC: 4.1 MMOL/L — SIGNIFICANT CHANGE UP (ref 3.5–5.3)
RBC # BLD: 4.45 M/UL — SIGNIFICANT CHANGE UP (ref 4.2–5.8)
RBC # FLD: 14 % — SIGNIFICANT CHANGE UP (ref 10.3–14.5)
SODIUM SERPL-SCNC: 136 MMOL/L — SIGNIFICANT CHANGE UP (ref 135–145)
SPECIMEN SOURCE: SIGNIFICANT CHANGE UP
WBC # BLD: 9.6 K/UL — SIGNIFICANT CHANGE UP (ref 3.8–10.5)
WBC # FLD AUTO: 9.6 K/UL — SIGNIFICANT CHANGE UP (ref 3.8–10.5)

## 2025-05-05 PROCEDURE — 99233 SBSQ HOSP IP/OBS HIGH 50: CPT | Mod: GC

## 2025-05-05 PROCEDURE — G0545: CPT

## 2025-05-05 PROCEDURE — 99232 SBSQ HOSP IP/OBS MODERATE 35: CPT

## 2025-05-05 RX ADMIN — ARIPIPRAZOLE 10 MILLIGRAM(S): 2 TABLET ORAL at 12:27

## 2025-05-05 RX ADMIN — Medication 1 APPLICATION(S): at 18:10

## 2025-05-05 RX ADMIN — MONTELUKAST SODIUM 10 MILLIGRAM(S): 10 TABLET ORAL at 12:28

## 2025-05-05 RX ADMIN — ATORVASTATIN CALCIUM 10 MILLIGRAM(S): 80 TABLET, FILM COATED ORAL at 21:10

## 2025-05-05 RX ADMIN — Medication 100 MILLIGRAM(S): at 06:30

## 2025-05-05 RX ADMIN — ENOXAPARIN SODIUM 40 MILLIGRAM(S): 100 INJECTION SUBCUTANEOUS at 06:30

## 2025-05-05 RX ADMIN — MUPIROCIN CALCIUM 1 APPLICATION(S): 20 CREAM TOPICAL at 06:30

## 2025-05-05 RX ADMIN — FENOFIBRATE 48 MILLIGRAM(S): 160 TABLET ORAL at 12:27

## 2025-05-05 RX ADMIN — Medication 50 MILLIGRAM(S): at 18:07

## 2025-05-05 RX ADMIN — SERTRALINE 100 MILLIGRAM(S): 100 TABLET, FILM COATED ORAL at 12:27

## 2025-05-05 RX ADMIN — ENOXAPARIN SODIUM 40 MILLIGRAM(S): 100 INJECTION SUBCUTANEOUS at 18:06

## 2025-05-05 RX ADMIN — Medication 100 MILLIGRAM(S): at 16:00

## 2025-05-05 RX ADMIN — MUPIROCIN CALCIUM 1 APPLICATION(S): 20 CREAM TOPICAL at 18:06

## 2025-05-05 RX ADMIN — Medication 100 MILLIGRAM(S): at 21:09

## 2025-05-05 NOTE — PROGRESS NOTE ADULT - SUBJECTIVE AND OBJECTIVE BOX
SUBJECTIVE / OVERNIGHT EVENTS:  Pt seen and examined at bedside. WALKER.    MEDICATIONS  (STANDING):  ARIPiprazole 10 milliGRAM(s) Oral daily  atorvastatin 10 milliGRAM(s) Oral at bedtime  ceFAZolin   IVPB      ceFAZolin   IVPB 2000 milliGRAM(s) IV Intermittent every 8 hours  chlorhexidine 2% Cloths 1 Application(s) Topical daily  cloZAPine 50 milliGRAM(s) Oral daily  dextrose 5%. 1000 milliLiter(s) (50 mL/Hr) IV Continuous <Continuous>  dextrose 5%. 1000 milliLiter(s) (100 mL/Hr) IV Continuous <Continuous>  dextrose 50% Injectable 25 Gram(s) IV Push once  dextrose 50% Injectable 12.5 Gram(s) IV Push once  dextrose 50% Injectable 25 Gram(s) IV Push once  enoxaparin Injectable 40 milliGRAM(s) SubCutaneous every 12 hours  fenofibrate Tablet 48 milliGRAM(s) Oral daily  glucagon  Injectable 1 milliGRAM(s) IntraMuscular once  insulin lispro (ADMELOG) corrective regimen sliding scale   SubCutaneous three times a day before meals  insulin lispro (ADMELOG) corrective regimen sliding scale   SubCutaneous at bedtime  lisinopril 5 milliGRAM(s) Oral daily  montelukast 10 milliGRAM(s) Oral daily  mupirocin 2% Nasal 1 Application(s) Both Nostrils two times a day  sertraline 100 milliGRAM(s) Oral daily    MEDICATIONS  (PRN):  acetaminophen     Tablet .. 650 milliGRAM(s) Oral every 6 hours PRN Temp greater or equal to 38C (100.4F), Mild Pain (1 - 3)  albuterol    90 MICROgram(s) HFA Inhaler 2 Puff(s) Inhalation every 6 hours PRN Shortness of Breath and/or Wheezing  dextrose Oral Gel 15 Gram(s) Oral once PRN Blood Glucose LESS THAN 70 milliGRAM(s)/deciliter          PHYSICAL EXAM:  Vital Signs Last 24 Hrs  T(C): 36.6 (05 May 2025 05:50), Max: 36.8 (04 May 2025 21:18)  T(F): 97.8 (05 May 2025 05:50), Max: 98.3 (04 May 2025 21:18)  HR: 65 (05 May 2025 05:50) (65 - 105)  BP: 137/58 (05 May 2025 05:50) (116/60 - 137/58)  BP(mean): --  RR: 18 (05 May 2025 05:50) (18 - 18)  SpO2: 95% (05 May 2025 05:50) (95% - 98%)    Parameters below as of 05 May 2025 05:50  Patient On (Oxygen Delivery Method): room air        CAPILLARY BLOOD GLUCOSE      POCT Blood Glucose.: 101 mg/dL (05 May 2025 08:16)  POCT Blood Glucose.: 96 mg/dL (04 May 2025 22:32)  POCT Blood Glucose.: 97 mg/dL (04 May 2025 17:33)  POCT Blood Glucose.: 94 mg/dL (04 May 2025 11:51)    I&O's Summary    CONST:     NAD, well-appearing; well-developed; appears stated age  EYES:         Conjunctiva clear; PERRL; no conjunctival pallor; no lid lag  ENMT:       MMM, no pharyngeal injection or exudates; normal dentition/dentures present  NECK:        Supple, no LAD; no palpable masses; no thyromegaly  RESP :        Normal respiratory effort; CTA bilaterally; no W/R/R  CHEST:       No TTP, no lines/ports; symmetric chest expansion  CARDIO:     RRR, normal S1 and S2, no M/R/G; apical impulse at MCL  VASC:         No JVD/AJR, no peripheral edema, pulses 2+ B/L, Cap refill <2s  ABD:          Obese, soft, NT. +BS  MSK:          No clubbing of digits; no joint swelling or TTP  EXT:           WWP, no reduction in body hair, no LE skin changes  PSYCH        A&O to person, place, and time; affect appropriate. Flat affect  NEURO:     Non-focal; no gross sensory deficits; moving all extremities   SKIN:          No rashes; no palpable lesions; axillae not dry    LABS:                        12.9   9.60  )-----------( 287      ( 05 May 2025 06:00 )             40.8     05-05    x   |  x   |  18  ----------------------------<  94  x    |  23  |  0.87    Ca    9.1      05 May 2025 06:00  Phos  3.5     05-05  Mg     2.10     05-05            Urinalysis Basic - ( 05 May 2025 06:00 )    Color: x / Appearance: x / SG: x / pH: x  Gluc: 94 mg/dL / Ketone: x  / Bili: x / Urobili: x   Blood: x / Protein: x / Nitrite: x   Leuk Esterase: x / RBC: x / WBC x   Sq Epi: x / Non Sq Epi: x / Bacteria: x          IMAGING:    [X] All pertinent imaging reviewed by me SUBJECTIVE / OVERNIGHT EVENTS:  Pt seen and examined at bedside. WALKER. Still reports subjective fevers since yesterday.     MEDICATIONS  (STANDING):  ARIPiprazole 10 milliGRAM(s) Oral daily  atorvastatin 10 milliGRAM(s) Oral at bedtime  ceFAZolin   IVPB      ceFAZolin   IVPB 2000 milliGRAM(s) IV Intermittent every 8 hours  chlorhexidine 2% Cloths 1 Application(s) Topical daily  cloZAPine 50 milliGRAM(s) Oral daily  dextrose 5%. 1000 milliLiter(s) (50 mL/Hr) IV Continuous <Continuous>  dextrose 5%. 1000 milliLiter(s) (100 mL/Hr) IV Continuous <Continuous>  dextrose 50% Injectable 25 Gram(s) IV Push once  dextrose 50% Injectable 12.5 Gram(s) IV Push once  dextrose 50% Injectable 25 Gram(s) IV Push once  enoxaparin Injectable 40 milliGRAM(s) SubCutaneous every 12 hours  fenofibrate Tablet 48 milliGRAM(s) Oral daily  glucagon  Injectable 1 milliGRAM(s) IntraMuscular once  insulin lispro (ADMELOG) corrective regimen sliding scale   SubCutaneous three times a day before meals  insulin lispro (ADMELOG) corrective regimen sliding scale   SubCutaneous at bedtime  lisinopril 5 milliGRAM(s) Oral daily  montelukast 10 milliGRAM(s) Oral daily  mupirocin 2% Nasal 1 Application(s) Both Nostrils two times a day  sertraline 100 milliGRAM(s) Oral daily    MEDICATIONS  (PRN):  acetaminophen     Tablet .. 650 milliGRAM(s) Oral every 6 hours PRN Temp greater or equal to 38C (100.4F), Mild Pain (1 - 3)  albuterol    90 MICROgram(s) HFA Inhaler 2 Puff(s) Inhalation every 6 hours PRN Shortness of Breath and/or Wheezing  dextrose Oral Gel 15 Gram(s) Oral once PRN Blood Glucose LESS THAN 70 milliGRAM(s)/deciliter          PHYSICAL EXAM:  Vital Signs Last 24 Hrs  T(C): 36.6 (05 May 2025 05:50), Max: 36.8 (04 May 2025 21:18)  T(F): 97.8 (05 May 2025 05:50), Max: 98.3 (04 May 2025 21:18)  HR: 65 (05 May 2025 05:50) (65 - 105)  BP: 137/58 (05 May 2025 05:50) (116/60 - 137/58)  BP(mean): --  RR: 18 (05 May 2025 05:50) (18 - 18)  SpO2: 95% (05 May 2025 05:50) (95% - 98%)    Parameters below as of 05 May 2025 05:50  Patient On (Oxygen Delivery Method): room air        CAPILLARY BLOOD GLUCOSE      POCT Blood Glucose.: 101 mg/dL (05 May 2025 08:16)  POCT Blood Glucose.: 96 mg/dL (04 May 2025 22:32)  POCT Blood Glucose.: 97 mg/dL (04 May 2025 17:33)  POCT Blood Glucose.: 94 mg/dL (04 May 2025 11:51)    I&O's Summary    CONST:     NAD, well-appearing; well-developed; appears stated age  EYES:         Conjunctiva clear; PERRL; no conjunctival pallor; no lid lag  ENMT:       MMM, no pharyngeal injection or exudates; normal dentition/dentures present  NECK:        Supple, no LAD; no palpable masses; no thyromegaly  RESP :        Normal respiratory effort; CTA bilaterally; no W/R/R  CHEST:       No TTP, no lines/ports; symmetric chest expansion  CARDIO:     RRR, normal S1 and S2, no M/R/G; apical impulse at MCL  VASC:         No JVD/AJR, no peripheral edema, pulses 2+ B/L, Cap refill <2s  ABD:          Obese, soft, NT. +BS  MSK:          No clubbing of digits; no joint swelling or TTP  EXT:           WWP, no reduction in body hair, no LE skin changes  PSYCH        A&O to person, place, and time; affect appropriate. Flat affect  NEURO:     Non-focal; no gross sensory deficits; moving all extremities   SKIN:          No rashes; no palpable lesions; axillae not dry    LABS:                        12.9   9.60  )-----------( 287      ( 05 May 2025 06:00 )             40.8     05-05    x   |  x   |  18  ----------------------------<  94  x    |  23  |  0.87    Ca    9.1      05 May 2025 06:00  Phos  3.5     05-05  Mg     2.10     05-05            Urinalysis Basic - ( 05 May 2025 06:00 )    Color: x / Appearance: x / SG: x / pH: x  Gluc: 94 mg/dL / Ketone: x  / Bili: x / Urobili: x   Blood: x / Protein: x / Nitrite: x   Leuk Esterase: x / RBC: x / WBC x   Sq Epi: x / Non Sq Epi: x / Bacteria: x          IMAGING:    [X] All pertinent imaging reviewed by me

## 2025-05-05 NOTE — PROGRESS NOTE ADULT - PROBLEM SELECTOR PLAN 1
Culture: MSSA  Recurrent abscess, previously I+D by ENT in Jan/Feb. Cultures grew MSSA. Was discharged with bactrim.     > Abx downgraded to cefazolin, duration per ID  > BC negative x2  > Appreciate ENT recs  > Abscess now draining, c/w daily packing  > d/c back to facility when ENT feels packing can be changed to 3x/week (this is the most that is possible by home care, there is no one at facility who can do this). Re-assess on Monday Culture: MSSA  Recurrent abscess, previously I+D by ENT in Jan/Feb. Cultures grew MSSA. Was discharged with bactrim.     > Abx downgraded to cefazolin, duration per ID  > BC negative x2  > Appreciate ENT recs  > Abscess now draining, c/w daily packing  > d/c back to facility when ENT feels packing can be changed to 3x/week (this is the most that is possible by home care, there is no one at facility who can do this) - Per ENT discussion (05/5), the patient will need an extended stay for daily packings Culture: MSSA  Recurrent abscess, previously I+D by ENT in Jan/Feb. Cultures grew MSSA. Was discharged with bactrim.     > Abx downgraded to cefazolin (05/02-    ), s/p Vanc/Amp/Zosyn (04/29-05/02)  > Transition to PO cephalexin therapy, 500 mg q6hr on discharge (full total 10D abx duration)  > BC negative x2  > Appreciate ENT recs  > Abscess now draining, c/w daily packing  > d/c back to facility when ENT feels packing can be changed to 3x/week (this is the most that is possible by home care, there is no one at facility who can do this) - Per ENT discussion (05/5), the patient will need an extended stay for daily packings

## 2025-05-05 NOTE — PROGRESS NOTE ADULT - SUBJECTIVE AND OBJECTIVE BOX
Follow Up:  abscess    Interval History/ROS:  Interval events noted.  Overnight: No acute events.  Patient remains afebrile.  Otherwise hemodynamically stable on room air.  Latest labs show no leukocytosis, BMP with renal function within normal limits.  Blood cultures from 4/30/2025 are negative to date, abscess culture with MSSA.    Patient seen examined at bedside.  No new complaints.      Allergies  No Known Allergies        ANTIMICROBIALS:  ceFAZolin   IVPB    ceFAZolin   IVPB 2000 every 8 hours      OTHER MEDS:  MEDICATIONS  (STANDING):  acetaminophen     Tablet .. 650 every 6 hours PRN  albuterol    90 MICROgram(s) HFA Inhaler 2 every 6 hours PRN  ARIPiprazole 10 daily  atorvastatin 10 at bedtime  cloZAPine 50 daily  dextrose 50% Injectable 25 once  dextrose 50% Injectable 12.5 once  dextrose 50% Injectable 25 once  dextrose Oral Gel 15 once PRN  enoxaparin Injectable 40 every 12 hours  fenofibrate Tablet 48 daily  glucagon  Injectable 1 once  insulin lispro (ADMELOG) corrective regimen sliding scale  three times a day before meals  insulin lispro (ADMELOG) corrective regimen sliding scale  at bedtime  lisinopril 5 daily  montelukast 10 daily  sertraline 100 daily      Vital Signs Last 24 Hrs  T(C): 36.6 (05 May 2025 05:50), Max: 36.8 (04 May 2025 21:18)  T(F): 97.8 (05 May 2025 05:50), Max: 98.3 (04 May 2025 21:18)  HR: 65 (05 May 2025 05:50) (65 - 105)  BP: 137/58 (05 May 2025 05:50) (116/60 - 137/58)  BP(mean): --  RR: 18 (05 May 2025 05:50) (18 - 18)  SpO2: 95% (05 May 2025 05:50) (95% - 98%)    Parameters below as of 05 May 2025 05:50  Patient On (Oxygen Delivery Method): room air        PHYSICAL EXAM:  Constitutional: non-toxic, no distress  HEAD/EYES: anicteric, no conjunctival injection  ENT:  Right posterior swelling, wound   Cardiovascular:   normal S1, S2, no murmur, no edema  Respiratory:  clear BS bilaterally, no wheezes, no rales  GI:  soft, non-tender, normal bowel sounds  :  no del rio, no CVA tenderness  Musculoskeletal:  no synovitis, normal ROM  Skin:  no rash, no erythema, no phlebitis  Heme/Onc: no lymphadenopathy                               12.9   9.60  )-----------( 287      ( 05 May 2025 06:00 )             40.8       05-05    136  |  99  |  18  ----------------------------<  94  4.1   |  23  |  0.87    Ca    9.1      05 May 2025 06:00  Phos  3.5     05-05  Mg     2.10     05-05        Urinalysis Basic - ( 05 May 2025 06:00 )    Color: x / Appearance: x / SG: x / pH: x  Gluc: 94 mg/dL / Ketone: x  / Bili: x / Urobili: x   Blood: x / Protein: x / Nitrite: x   Leuk Esterase: x / RBC: x / WBC x   Sq Epi: x / Non Sq Epi: x / Bacteria: x        MICROBIOLOGY:  v    Culture - Blood (collected 30 Apr 2025 13:03)  Source: Blood Blood-Peripheral  Preliminary Report (04 May 2025 19:01):    No growth at 4 days                    RADIOLOGY:  Imaging reviewed

## 2025-05-05 NOTE — PROGRESS NOTE ADULT - SUBJECTIVE AND OBJECTIVE BOX
ENT Progress Note    Interval: now to daily packing changes, no further purulence from wound, minimal packing placed today      MEDICATIONS  (STANDING):  ARIPiprazole 10 milliGRAM(s) Oral daily  atorvastatin 10 milliGRAM(s) Oral at bedtime  ceFAZolin   IVPB      ceFAZolin   IVPB 2000 milliGRAM(s) IV Intermittent every 8 hours  chlorhexidine 2% Cloths 1 Application(s) Topical daily  cloZAPine 50 milliGRAM(s) Oral daily  dextrose 5%. 1000 milliLiter(s) (100 mL/Hr) IV Continuous <Continuous>  dextrose 5%. 1000 milliLiter(s) (50 mL/Hr) IV Continuous <Continuous>  dextrose 50% Injectable 25 Gram(s) IV Push once  dextrose 50% Injectable 12.5 Gram(s) IV Push once  dextrose 50% Injectable 25 Gram(s) IV Push once  enoxaparin Injectable 40 milliGRAM(s) SubCutaneous every 12 hours  fenofibrate Tablet 48 milliGRAM(s) Oral daily  glucagon  Injectable 1 milliGRAM(s) IntraMuscular once  insulin lispro (ADMELOG) corrective regimen sliding scale   SubCutaneous three times a day before meals  insulin lispro (ADMELOG) corrective regimen sliding scale   SubCutaneous at bedtime  lisinopril 5 milliGRAM(s) Oral daily  montelukast 10 milliGRAM(s) Oral daily  mupirocin 2% Nasal 1 Application(s) Both Nostrils two times a day  sertraline 100 milliGRAM(s) Oral daily    MEDICATIONS  (PRN):  acetaminophen     Tablet .. 650 milliGRAM(s) Oral every 6 hours PRN Temp greater or equal to 38C (100.4F), Mild Pain (1 - 3)  albuterol    90 MICROgram(s) HFA Inhaler 2 Puff(s) Inhalation every 6 hours PRN Shortness of Breath and/or Wheezing  dextrose Oral Gel 15 Gram(s) Oral once PRN Blood Glucose LESS THAN 70 milliGRAM(s)/deciliter        Vital Signs Last 24 Hrs  T(C): 36.6 (05 May 2025 13:15), Max: 36.8 (04 May 2025 21:18)  T(F): 97.8 (05 May 2025 13:15), Max: 98.3 (04 May 2025 21:18)  HR: 91 (05 May 2025 13:15) (65 - 91)  BP: 135/97 (05 May 2025 13:15) (116/60 - 137/58)  BP(mean): --  RR: 18 (05 May 2025 13:15) (18 - 18)  SpO2: 95% (05 May 2025 13:15) (95% - 97%)    Parameters below as of 05 May 2025 13:15  Patient On (Oxygen Delivery Method): room air        Physical Exam:  Alert, NAD  Nonlabored Respirations RA, no stridor or stertor   Neck: posterior neck wound with 5mm opening, packing placed to stent open, wound bed irrigated with saline without purulence noted, residual erythema and mild induration but improving   Neuro: CN II-XII grossly intact                                 12.9   9.60  )-----------( 287      ( 05 May 2025 06:00 )             40.8    05-05    136  |  99  |  18  ----------------------------<  94  4.1   |  23  |  0.87    Ca    9.1      05 May 2025 06:00  Phos  3.5     05-05  Mg     2.10     05-05           A/P: 44 y/o M with a history of Schizophrenia, DM2, HTN, had recent admission Feb. for R posterior suboccipital neck abscess, required packing for 1wk, on Vanc/Zosyn, Ceftriaxone, then Ancef while admitted and discharged on Bactrim (cx + for MSSA). Completed abx 2 weeks ago then had recurrence of pain and swelling for past 6 days s/p I&D with 15cc pus 4/29. Post I&D CT neck shows no well-defined peripherally enhancing drainable collection. Patient seen this morning R posterior suboccipital neck erythema improved, no pus expressed at bedside, irrigated the wound with NS and packed with 1/4 iodoform gauze strip and covered by 4x4 guaze and tape. Currently on Vancomycin per ID recs. WBC 9.06, , afebrile overnight, MSSA+, preliminary culture with staph aureus      PLAN  - f/u ID: abx narrowed to ancef  - f/u final culture from 4/30: staph aureus   - ENT will manage the wound packing daily for now while inpatient  - Patient might need home care RN for wound care when close to discharge  - Wound care instruction: express the wound to check if any purulence, follow by irrigated the wound with NS and packed with 1/4 iodoform gauze strip and covered by 4x4 guaze and tape  - Continue monitor WBC  - Tight FSG control

## 2025-05-05 NOTE — PROGRESS NOTE ADULT - ASSESSMENT
45-year-old male with a past medical Struve hypertension, schizophrenia, diabetes, ROMMEL who presented to hospital due to a right neck abscess.    #Abnormal imaging of the neck  #Neck abscess, recurrent  #Leukocytosis  #History of MSSA neck abscess    Recommendations  Culture now with staph aureus, sensitivity reviewed - MSSA  Continue cefazolin  ENT input  Plan to treat   Follow fever curve and WBC count    Cliff Bauer MD  Division of Infectious Diseases   45-year-old male with a past medical Struve hypertension, schizophrenia, diabetes, ROMMEL who presented to hospital due to a right neck abscess.    #Abnormal imaging of the neck  #Neck abscess, recurrent  #Leukocytosis  #History of MSSA neck abscess    Recommendations  Culture now with staph aureus, sensitivity reviewed - MSSA  Continue cefazolin  ENT input  Plan to treat 10 days with PO transition at discharge  Can plan for PO cephalexin therapy, 500 mg q6hr  Follow fever curve and WBC count    Cliff Bauer MD  Division of Infectious Diseases

## 2025-05-06 LAB
ANION GAP SERPL CALC-SCNC: 12 MMOL/L — SIGNIFICANT CHANGE UP (ref 7–14)
BASOPHILS # BLD AUTO: 0.11 K/UL — SIGNIFICANT CHANGE UP (ref 0–0.2)
BASOPHILS NFR BLD AUTO: 1.1 % — SIGNIFICANT CHANGE UP (ref 0–2)
BUN SERPL-MCNC: 16 MG/DL — SIGNIFICANT CHANGE UP (ref 7–23)
CALCIUM SERPL-MCNC: 9.7 MG/DL — SIGNIFICANT CHANGE UP (ref 8.4–10.5)
CHLORIDE SERPL-SCNC: 101 MMOL/L — SIGNIFICANT CHANGE UP (ref 98–107)
CO2 SERPL-SCNC: 25 MMOL/L — SIGNIFICANT CHANGE UP (ref 22–31)
CREAT SERPL-MCNC: 0.86 MG/DL — SIGNIFICANT CHANGE UP (ref 0.5–1.3)
EGFR: 109 ML/MIN/1.73M2 — SIGNIFICANT CHANGE UP
EGFR: 109 ML/MIN/1.73M2 — SIGNIFICANT CHANGE UP
EOSINOPHIL # BLD AUTO: 0.38 K/UL — SIGNIFICANT CHANGE UP (ref 0–0.5)
EOSINOPHIL NFR BLD AUTO: 3.7 % — SIGNIFICANT CHANGE UP (ref 0–6)
GLUCOSE SERPL-MCNC: 105 MG/DL — HIGH (ref 70–99)
HCT VFR BLD CALC: 42.3 % — SIGNIFICANT CHANGE UP (ref 39–50)
HGB BLD-MCNC: 13.5 G/DL — SIGNIFICANT CHANGE UP (ref 13–17)
IANC: 5.63 K/UL — SIGNIFICANT CHANGE UP (ref 1.8–7.4)
IMM GRANULOCYTES NFR BLD AUTO: 1.2 % — HIGH (ref 0–0.9)
LYMPHOCYTES # BLD AUTO: 3.34 K/UL — HIGH (ref 1–3.3)
LYMPHOCYTES # BLD AUTO: 32.6 % — SIGNIFICANT CHANGE UP (ref 13–44)
MAGNESIUM SERPL-MCNC: 2.3 MG/DL — SIGNIFICANT CHANGE UP (ref 1.6–2.6)
MCHC RBC-ENTMCNC: 28.4 PG — SIGNIFICANT CHANGE UP (ref 27–34)
MCHC RBC-ENTMCNC: 31.9 G/DL — LOW (ref 32–36)
MCV RBC AUTO: 89.1 FL — SIGNIFICANT CHANGE UP (ref 80–100)
MONOCYTES # BLD AUTO: 0.68 K/UL — SIGNIFICANT CHANGE UP (ref 0–0.9)
MONOCYTES NFR BLD AUTO: 6.6 % — SIGNIFICANT CHANGE UP (ref 2–14)
NEUTROPHILS # BLD AUTO: 5.63 K/UL — SIGNIFICANT CHANGE UP (ref 1.8–7.4)
NEUTROPHILS NFR BLD AUTO: 54.8 % — SIGNIFICANT CHANGE UP (ref 43–77)
NRBC # BLD AUTO: 0 K/UL — SIGNIFICANT CHANGE UP (ref 0–0)
NRBC # FLD: 0 K/UL — SIGNIFICANT CHANGE UP (ref 0–0)
NRBC BLD AUTO-RTO: 0 /100 WBCS — SIGNIFICANT CHANGE UP (ref 0–0)
PHOSPHATE SERPL-MCNC: 3.7 MG/DL — SIGNIFICANT CHANGE UP (ref 2.5–4.5)
PLATELET # BLD AUTO: 321 K/UL — SIGNIFICANT CHANGE UP (ref 150–400)
POTASSIUM SERPL-MCNC: 4.4 MMOL/L — SIGNIFICANT CHANGE UP (ref 3.5–5.3)
POTASSIUM SERPL-SCNC: 4.4 MMOL/L — SIGNIFICANT CHANGE UP (ref 3.5–5.3)
RBC # BLD: 4.75 M/UL — SIGNIFICANT CHANGE UP (ref 4.2–5.8)
RBC # FLD: 13.7 % — SIGNIFICANT CHANGE UP (ref 10.3–14.5)
SODIUM SERPL-SCNC: 138 MMOL/L — SIGNIFICANT CHANGE UP (ref 135–145)
WBC # BLD: 10.26 K/UL — SIGNIFICANT CHANGE UP (ref 3.8–10.5)
WBC # FLD AUTO: 10.26 K/UL — SIGNIFICANT CHANGE UP (ref 3.8–10.5)

## 2025-05-06 PROCEDURE — 99232 SBSQ HOSP IP/OBS MODERATE 35: CPT

## 2025-05-06 PROCEDURE — G0545: CPT

## 2025-05-06 PROCEDURE — 99231 SBSQ HOSP IP/OBS SF/LOW 25: CPT

## 2025-05-06 RX ADMIN — MUPIROCIN CALCIUM 1 APPLICATION(S): 20 CREAM TOPICAL at 17:03

## 2025-05-06 RX ADMIN — Medication 100 MILLIGRAM(S): at 05:52

## 2025-05-06 RX ADMIN — ARIPIPRAZOLE 10 MILLIGRAM(S): 2 TABLET ORAL at 11:58

## 2025-05-06 RX ADMIN — Medication 100 MILLIGRAM(S): at 21:42

## 2025-05-06 RX ADMIN — Medication 100 MILLIGRAM(S): at 13:04

## 2025-05-06 RX ADMIN — ENOXAPARIN SODIUM 40 MILLIGRAM(S): 100 INJECTION SUBCUTANEOUS at 05:53

## 2025-05-06 RX ADMIN — ATORVASTATIN CALCIUM 10 MILLIGRAM(S): 80 TABLET, FILM COATED ORAL at 21:43

## 2025-05-06 RX ADMIN — MONTELUKAST SODIUM 10 MILLIGRAM(S): 10 TABLET ORAL at 11:58

## 2025-05-06 RX ADMIN — ENOXAPARIN SODIUM 40 MILLIGRAM(S): 100 INJECTION SUBCUTANEOUS at 17:03

## 2025-05-06 RX ADMIN — SERTRALINE 100 MILLIGRAM(S): 100 TABLET, FILM COATED ORAL at 11:57

## 2025-05-06 RX ADMIN — MUPIROCIN CALCIUM 1 APPLICATION(S): 20 CREAM TOPICAL at 05:54

## 2025-05-06 RX ADMIN — FENOFIBRATE 48 MILLIGRAM(S): 160 TABLET ORAL at 11:58

## 2025-05-06 RX ADMIN — LISINOPRIL 5 MILLIGRAM(S): 5 TABLET ORAL at 05:52

## 2025-05-06 RX ADMIN — Medication 50 MILLIGRAM(S): at 11:57

## 2025-05-06 RX ADMIN — Medication 1 APPLICATION(S): at 12:04

## 2025-05-06 NOTE — PROGRESS NOTE ADULT - SUBJECTIVE AND OBJECTIVE BOX
ENT PROGRESS NOTE     HPI: Patient seen and examined at bedside. No overnight events. Patient denying any pain from abscess site, denies pain with packing changes. Packing changes now daily (previously BID) given improvement. Patient remains afebrile, normal WBC count.         PAST MEDICAL & SURGICAL HISTORY:  Schizophrenia      Obstructive sleep apnea      Hypertension      Type 2 diabetes mellitus      No significant past surgical history        Allergies    No Known Allergies    Intolerances      MEDICATIONS  (STANDING):  ARIPiprazole 10 milliGRAM(s) Oral daily  atorvastatin 10 milliGRAM(s) Oral at bedtime  ceFAZolin   IVPB      ceFAZolin   IVPB 2000 milliGRAM(s) IV Intermittent every 8 hours  chlorhexidine 2% Cloths 1 Application(s) Topical daily  cloZAPine 50 milliGRAM(s) Oral daily  dextrose 5%. 1000 milliLiter(s) (100 mL/Hr) IV Continuous <Continuous>  dextrose 5%. 1000 milliLiter(s) (50 mL/Hr) IV Continuous <Continuous>  dextrose 50% Injectable 25 Gram(s) IV Push once  dextrose 50% Injectable 12.5 Gram(s) IV Push once  dextrose 50% Injectable 25 Gram(s) IV Push once  enoxaparin Injectable 40 milliGRAM(s) SubCutaneous every 12 hours  fenofibrate Tablet 48 milliGRAM(s) Oral daily  glucagon  Injectable 1 milliGRAM(s) IntraMuscular once  insulin lispro (ADMELOG) corrective regimen sliding scale   SubCutaneous three times a day before meals  insulin lispro (ADMELOG) corrective regimen sliding scale   SubCutaneous at bedtime  lisinopril 5 milliGRAM(s) Oral daily  montelukast 10 milliGRAM(s) Oral daily  mupirocin 2% Nasal 1 Application(s) Both Nostrils two times a day  sertraline 100 milliGRAM(s) Oral daily    MEDICATIONS  (PRN):  acetaminophen     Tablet .. 650 milliGRAM(s) Oral every 6 hours PRN Temp greater or equal to 38C (100.4F), Mild Pain (1 - 3)  albuterol    90 MICROgram(s) HFA Inhaler 2 Puff(s) Inhalation every 6 hours PRN Shortness of Breath and/or Wheezing  dextrose Oral Gel 15 Gram(s) Oral once PRN Blood Glucose LESS THAN 70 milliGRAM(s)/deciliter      Social History: see consult note    Family history: see consult note    ROS:   ENT: all negative except as noted in HPI   Pulm: denies SOB, cough, hemoptysis  Neuro: denies numbness/tingling, loss of sensation  Endo: denies heat/cold intolerance, excessive sweating      Vital Signs Last 24 Hrs  T(C): 36.3 (06 May 2025 05:03), Max: 36.7 (05 May 2025 18:38)  T(F): 97.4 (06 May 2025 05:03), Max: 98 (05 May 2025 18:38)  HR: 78 (06 May 2025 05:03) (71 - 95)  BP: 116/83 (06 May 2025 05:03) (116/83 - 135/97)  BP(mean): --  RR: 18 (06 May 2025 05:03) (18 - 19)  SpO2: 97% (06 May 2025 05:03) (94% - 97%)    Parameters below as of 06 May 2025 05:03  Patient On (Oxygen Delivery Method): room air                              13.5   10.26 )-----------( 321      ( 06 May 2025 06:45 )             42.3    05-06    138  |  101  |  16  ----------------------------<  105[H]  4.4   |  25  |  0.86    Ca    9.7      06 May 2025 06:45  Phos  3.7     05-06  Mg     2.30     05-06         PHYSICAL EXAM:  Awake, alert, NAD  Nonlabored Respirations RA, no stridor or stertor   Neck: posterior neck wound with 5mm opening, packing placed to stent open, wound bed irrigated with saline without any return of purulence noted, residual erythema and mild induration but improving   Neuro: CN II-XII grossly intact            I performed a face to face history and physical exam of the patient and discussed their management with the resident. I reviewed the resident's note and agree with the documented findings and plan of care.    89 y/o female with HTN, COPD on 3LNC, with recent admission to [facility] for T12 compression fracture, BIBA from rehab for increased work of breathing, + cough nonproductive, no fever, no chills, no CP, n/v/c/d, on exam pt tachypneic, on 4LNC saturating low 90s, decreased BS b/l on exam, RRR, abdomen soft NT/ND, ext no c/c/e, obtain EKG, O2, duo neb, steroids, CBC, CMP, BNP, Viral panel, admit

## 2025-05-06 NOTE — PROGRESS NOTE ADULT - ASSESSMENT
45M w/ PMHx of HTN, schizophrenia, DM2, and ROMMEL who presents w/ right sided neck abscess. Admitted to medicine for further management and monitoring. s/p I+D by ENT in ED.  45M w/ PMHx of HTN, schizophrenia, DM2, and ROMMEL who presents w/ right sided neck abscess. Admitted to medicine for further management and monitoring. s/p I+D by ENT in ED. Patient requires neck packing, currently need daily packing done by ENT.

## 2025-05-06 NOTE — PROGRESS NOTE ADULT - PROBLEM SELECTOR PLAN 1
Culture: MSSA  Recurrent abscess, previously I+D by ENT in Jan/Feb. Cultures grew MSSA. Was discharged with bactrim.     > Abx downgraded to cefazolin (05/02-    ), s/p Vanc/Amp/Zosyn (04/29-05/02)  > Transition to PO cephalexin therapy, 500 mg q6hr on discharge (full total 10D abx duration)  > BC negative x2  > Appreciate ENT recs  > Abscess now draining, c/w daily packing  > d/c back to facility when ENT feels packing can be changed to 3x/week (this is the most that is possible by home care, there is no one at facility who can do this) - Per ENT discussion (05/5), the patient will need an extended stay for daily packings Culture: MSSA  Recurrent abscess, previously I+D by ENT in Jan/Feb. Cultures grew MSSA. Was discharged with bactrim.     > Abx downgraded to cefazolin (05/02-    ), s/p Vanc/Amp/Zosyn (04/29-05/02)  > Transition to PO cephalexin therapy, 500 mg q6hr on discharge (full total 10D abx duration), last day 5/9  > BC negative x2  > Appreciate ENT recs  > Abscess now draining, c/w daily packing  > d/c back to facility when ENT feels packing can be changed to 3x/week (this is the most that is possible by home care, there is no one at facility who can do this) - Per ENT discussion (05/5), the patient will need an extended stay for daily packings

## 2025-05-06 NOTE — PROGRESS NOTE ADULT - SUBJECTIVE AND OBJECTIVE BOX
Follow Up:  abscess    Interval History/ROS:  Overnight: No acute events.  Patient remains afebrile.  Otherwise hemodynamically stable on room air.  Latest labs show no leukocytosis, BMP with renal function within normal limits.    Patient seen examined at bedside.  No new complaints.    Allergies  No Known Allergies        ANTIMICROBIALS:  ceFAZolin   IVPB    ceFAZolin   IVPB 2000 every 8 hours      OTHER MEDS:  MEDICATIONS  (STANDING):  acetaminophen     Tablet .. 650 every 6 hours PRN  albuterol    90 MICROgram(s) HFA Inhaler 2 every 6 hours PRN  ARIPiprazole 10 daily  atorvastatin 10 at bedtime  cloZAPine 50 daily  dextrose 50% Injectable 25 once  dextrose 50% Injectable 12.5 once  dextrose 50% Injectable 25 once  dextrose Oral Gel 15 once PRN  enoxaparin Injectable 40 every 12 hours  fenofibrate Tablet 48 daily  glucagon  Injectable 1 once  insulin lispro (ADMELOG) corrective regimen sliding scale  three times a day before meals  insulin lispro (ADMELOG) corrective regimen sliding scale  at bedtime  lisinopril 5 daily  montelukast 10 daily  sertraline 100 daily      Vital Signs Last 24 Hrs  T(C): 36.3 (06 May 2025 05:03), Max: 36.7 (05 May 2025 18:38)  T(F): 97.4 (06 May 2025 05:03), Max: 98 (05 May 2025 18:38)  HR: 78 (06 May 2025 05:03) (71 - 95)  BP: 116/83 (06 May 2025 05:03) (116/83 - 135/97)  BP(mean): --  RR: 18 (06 May 2025 05:03) (18 - 19)  SpO2: 97% (06 May 2025 05:03) (94% - 97%)    Parameters below as of 06 May 2025 05:03  Patient On (Oxygen Delivery Method): room air        PHYSICAL EXAM:  Constitutional: non-toxic, no distress  HEAD/EYES: anicteric, no conjunctival injection  ENT:  Right posterior swelling, wound   Cardiovascular:   normal S1, S2, no murmur, no edema  Respiratory:  clear BS bilaterally, no wheezes, no rales  GI:  soft, non-tender, normal bowel sounds  :  no del rio, no CVA tenderness  Musculoskeletal:  no synovitis, normal ROM  Skin:  no rash, no erythema, no phlebitis  Heme/Onc: no lymphadenopathy                                       13.5   10.26 )-----------( 321      ( 06 May 2025 06:45 )             42.3       05-06    138  |  101  |  16  ----------------------------<  105[H]  4.4   |  25  |  0.86    Ca    9.7      06 May 2025 06:45  Phos  3.7     05-06  Mg     2.30     05-06        Urinalysis Basic - ( 06 May 2025 06:45 )    Color: x / Appearance: x / SG: x / pH: x  Gluc: 105 mg/dL / Ketone: x  / Bili: x / Urobili: x   Blood: x / Protein: x / Nitrite: x   Leuk Esterase: x / RBC: x / WBC x   Sq Epi: x / Non Sq Epi: x / Bacteria: x        MICROBIOLOGY:  v                  RADIOLOGY:  Imaging reviewed

## 2025-05-06 NOTE — PROGRESS NOTE ADULT - SUBJECTIVE AND OBJECTIVE BOX
***************************************************************  Juliette Walker, PGY1  Internal Medicine   Available on [employer] TEAMS  ***************************************************************    ELKE INTERIANO  45y  MRN: [id number]    Patient is a 45y old  Male who presents with a chief complaint of Right neck abscess (05 May 2025 14:48)      Interval/Overnight Events: no events ON.     Subjective: Pt seen and examined at bedside. Denies fever, CP, SOB, abn pain, N/V, dysuria. Tolerating diet.      MEDICATIONS  (STANDING):  ARIPiprazole 10 milliGRAM(s) Oral daily  atorvastatin 10 milliGRAM(s) Oral at bedtime  ceFAZolin   IVPB      ceFAZolin   IVPB 2000 milliGRAM(s) IV Intermittent every 8 hours  chlorhexidine 2% Cloths 1 Application(s) Topical daily  cloZAPine 50 milliGRAM(s) Oral daily  dextrose 5%. 1000 milliLiter(s) (100 mL/Hr) IV Continuous <Continuous>  dextrose 5%. 1000 milliLiter(s) (50 mL/Hr) IV Continuous <Continuous>  dextrose 50% Injectable 25 Gram(s) IV Push once  dextrose 50% Injectable 12.5 Gram(s) IV Push once  dextrose 50% Injectable 25 Gram(s) IV Push once  enoxaparin Injectable 40 milliGRAM(s) SubCutaneous every 12 hours  fenofibrate Tablet 48 milliGRAM(s) Oral daily  glucagon  Injectable 1 milliGRAM(s) IntraMuscular once  insulin lispro (ADMELOG) corrective regimen sliding scale   SubCutaneous three times a day before meals  insulin lispro (ADMELOG) corrective regimen sliding scale   SubCutaneous at bedtime  lisinopril 5 milliGRAM(s) Oral daily  montelukast 10 milliGRAM(s) Oral daily  mupirocin 2% Nasal 1 Application(s) Both Nostrils two times a day  sertraline 100 milliGRAM(s) Oral daily    MEDICATIONS  (PRN):  acetaminophen     Tablet .. 650 milliGRAM(s) Oral every 6 hours PRN Temp greater or equal to 38C (100.4F), Mild Pain (1 - 3)  albuterol    90 MICROgram(s) HFA Inhaler 2 Puff(s) Inhalation every 6 hours PRN Shortness of Breath and/or Wheezing  dextrose Oral Gel 15 Gram(s) Oral once PRN Blood Glucose LESS THAN 70 milliGRAM(s)/deciliter      Objective:    Vitals: Vital Signs Last 24 Hrs  T(C): 36.3 (05-06-25 @ 05:03), Max: 36.7 (05-05-25 @ 18:38)  T(F): 97.4 (05-06-25 @ 05:03), Max: 98 (05-05-25 @ 18:38)  HR: 78 (05-06-25 @ 05:03) (71 - 95)  BP: 116/83 (05-06-25 @ 05:03) (116/83 - 135/97)  BP(mean): --  RR: 18 (05-06-25 @ 05:03) (18 - 19)  SpO2: 97% (05-06-25 @ 05:03) (94% - 97%)                I&O's Summary    05 May 2025 07:01  -  06 May 2025 07:00  --------------------------------------------------------  IN: 1000 mL / OUT: 0 mL / NET: 1000 mL        PHYSICAL EXAM:  GENERAL: NAD  HEAD:  Atraumatic, Normocephalic  EYES: EOMI, conjunctiva and sclera clear  CHEST/LUNG: Clear to auscultation bilaterally; No rales, rhonchi, wheezing, or rubs  HEART: Regular rate and rhythm; No murmurs, rubs, or gallops  ABDOMEN: Soft, Nontender, Nondistended;   SKIN: No rashes or lesions  NERVOUS SYSTEM:  Alert & Oriented X3, no focal deficits    LABS:                        12.9   9.60  )-----------( 287      ( 05 May 2025 06:00 )             40.8     05-05    136  |  99  |  18  ----------------------------<  94  4.1   |  23  |  0.87    Ca    9.1      05 May 2025 06:00  Phos  3.5     05-05  Mg     2.10     05-05      CAPILLARY BLOOD GLUCOSE      POCT Blood Glucose.: 108 mg/dL (05 May 2025 22:00)  POCT Blood Glucose.: 90 mg/dL (05 May 2025 17:10)  POCT Blood Glucose.: 96 mg/dL (05 May 2025 11:52)  POCT Blood Glucose.: 101 mg/dL (05 May 2025 08:16)        Urinalysis Basic - ( 05 May 2025 06:00 )    Color: x / Appearance: x / SG: x / pH: x  Gluc: 94 mg/dL / Ketone: x  / Bili: x / Urobili: x   Blood: x / Protein: x / Nitrite: x   Leuk Esterase: x / RBC: x / WBC x   Sq Epi: x / Non Sq Epi: x / Bacteria: x          RADIOLOGY & ADDITIONAL TESTS:    Imaging Personally Reviewed:  [x ] YES  [ ] NO    Consultants involved in case:   Consultant(s) Notes Reviewed:  [ x] YES  [ ] NO:   Care Discussed with Consultants/Other Providers [x ] YES  [ ] NO         ***************************************************************  Juliette Walker, PGY1  Internal Medicine   Available on [employer] TEAMS  ***************************************************************    ELKE INTERIANO  45y  MRN: [id number]    Patient is a 45y old  Male who presents with a chief complaint of Right neck abscess (05 May 2025 14:48)      Interval/Overnight Events: no events ON.     Subjective: Pt seen and examined at bedside. No acute complaints. No fevers, chills, chest pain, or neck pain.     MEDICATIONS  (STANDING):  ARIPiprazole 10 milliGRAM(s) Oral daily  atorvastatin 10 milliGRAM(s) Oral at bedtime  ceFAZolin   IVPB      ceFAZolin   IVPB 2000 milliGRAM(s) IV Intermittent every 8 hours  chlorhexidine 2% Cloths 1 Application(s) Topical daily  cloZAPine 50 milliGRAM(s) Oral daily  dextrose 5%. 1000 milliLiter(s) (100 mL/Hr) IV Continuous <Continuous>  dextrose 5%. 1000 milliLiter(s) (50 mL/Hr) IV Continuous <Continuous>  dextrose 50% Injectable 25 Gram(s) IV Push once  dextrose 50% Injectable 12.5 Gram(s) IV Push once  dextrose 50% Injectable 25 Gram(s) IV Push once  enoxaparin Injectable 40 milliGRAM(s) SubCutaneous every 12 hours  fenofibrate Tablet 48 milliGRAM(s) Oral daily  glucagon  Injectable 1 milliGRAM(s) IntraMuscular once  insulin lispro (ADMELOG) corrective regimen sliding scale   SubCutaneous three times a day before meals  insulin lispro (ADMELOG) corrective regimen sliding scale   SubCutaneous at bedtime  lisinopril 5 milliGRAM(s) Oral daily  montelukast 10 milliGRAM(s) Oral daily  mupirocin 2% Nasal 1 Application(s) Both Nostrils two times a day  sertraline 100 milliGRAM(s) Oral daily    MEDICATIONS  (PRN):  acetaminophen     Tablet .. 650 milliGRAM(s) Oral every 6 hours PRN Temp greater or equal to 38C (100.4F), Mild Pain (1 - 3)  albuterol    90 MICROgram(s) HFA Inhaler 2 Puff(s) Inhalation every 6 hours PRN Shortness of Breath and/or Wheezing  dextrose Oral Gel 15 Gram(s) Oral once PRN Blood Glucose LESS THAN 70 milliGRAM(s)/deciliter      Objective:    Vitals: Vital Signs Last 24 Hrs  T(C): 36.3 (05-06-25 @ 05:03), Max: 36.7 (05-05-25 @ 18:38)  T(F): 97.4 (05-06-25 @ 05:03), Max: 98 (05-05-25 @ 18:38)  HR: 78 (05-06-25 @ 05:03) (71 - 95)  BP: 116/83 (05-06-25 @ 05:03) (116/83 - 135/97)  BP(mean): --  RR: 18 (05-06-25 @ 05:03) (18 - 19)  SpO2: 97% (05-06-25 @ 05:03) (94% - 97%)                I&O's Summary    05 May 2025 07:01  -  06 May 2025 07:00  --------------------------------------------------------  IN: 1000 mL / OUT: 0 mL / NET: 1000 mL        PHYSICAL EXAM:  GENERAL: NAD, sitting up in bed  HEAD:  Atraumatic, Normocephalic; Neck packing in place  EYES: EOMI, conjunctiva and sclera clear  CHEST/LUNG: Clear to auscultation bilaterally; No rales, rhonchi, wheezing, or rubs  HEART: Regular rate and rhythm; No murmurs, rubs, or gallops  ABDOMEN: Soft, Nontender, Nondistended;   SKIN: No rashes or lesions  NERVOUS SYSTEM:  Non-focal, moving all limbs spontaneously    LABS:                        12.9   9.60  )-----------( 287      ( 05 May 2025 06:00 )             40.8     05-05    136  |  99  |  18  ----------------------------<  94  4.1   |  23  |  0.87    Ca    9.1      05 May 2025 06:00  Phos  3.5     05-05  Mg     2.10     05-05      CAPILLARY BLOOD GLUCOSE      POCT Blood Glucose.: 108 mg/dL (05 May 2025 22:00)  POCT Blood Glucose.: 90 mg/dL (05 May 2025 17:10)  POCT Blood Glucose.: 96 mg/dL (05 May 2025 11:52)  POCT Blood Glucose.: 101 mg/dL (05 May 2025 08:16)        Urinalysis Basic - ( 05 May 2025 06:00 )    Color: x / Appearance: x / SG: x / pH: x  Gluc: 94 mg/dL / Ketone: x  / Bili: x / Urobili: x   Blood: x / Protein: x / Nitrite: x   Leuk Esterase: x / RBC: x / WBC x   Sq Epi: x / Non Sq Epi: x / Bacteria: x          RADIOLOGY & ADDITIONAL TESTS:    Imaging Personally Reviewed:  [x ] YES  [ ] NO    Consultants involved in case:   Consultant(s) Notes Reviewed:  [ x] YES  [ ] NO:   Care Discussed with Consultants/Other Providers [x ] YES  [ ] NO

## 2025-05-06 NOTE — PROGRESS NOTE ADULT - ASSESSMENT
45-year-old male with a past medical Struve hypertension, schizophrenia, diabetes, ROMMEL who presented to hospital due to a right neck abscess.    #Abnormal imaging of the neck  #Neck abscess, recurrent  #Leukocytosis  #History of MSSA neck abscess    Recommendations  Culture now with staph aureus, sensitivity reviewed - MSSA  Continue cefazolin  ENT input  Plan to treat 10 days with PO transition at discharge  Can plan for PO cephalexin therapy, 500 mg q6hr, end date: 5/9/25  Advised for close follow-up with primary care, ENT  Can also follow-up with ID clinic, please place referral at discharge  Follow fever curve and WBC count    ID to sign off. Please contact as issues arise.    Cliff Bauer MD  Division of Infectious Diseases

## 2025-05-06 NOTE — PROGRESS NOTE ADULT - PROBLEM SELECTOR PLAN 6
VTE PPx: Lovenox  Nutrition: CC Diet  Code Status: FULL. Aunt is guardian and makes decisions   Dispo: back to [facility] VTE PPx: Lovenox  Nutrition: CC Diet  Code Status: FULL. Aunt is guardian and makes decisions   Dispo: Likely SNF

## 2025-05-06 NOTE — PROGRESS NOTE ADULT - ASSESSMENT
44 y/o M with a history of Schizophrenia, DM2, HTN, had recent admission Feb. for R posterior suboccipital neck abscess, required packing for 1wk, on Vanc/Zosyn, Ceftriaxone, then Ancef while admitted and discharged on Bactrim (cx + for MSSA). Completed abx 2 weeks ago then had recurrence of pain and swelling for past 6 days s/p I&D with 15cc pus 4/29. Post I&D CT neck shows no well-defined peripherally enhancing drainable collection. Patient seen this morning R posterior suboccipital neck erythema improved, no pus expressed at bedside, irrigated the wound with NS and packed with 1/4 iodoform gauze strip and covered by 4x4 guaze and tape. Packing changes now only required daily (from BID) given improvement. ID following for antibiotic reccs/regimen. WBC 10.26, FSG , afebrile overnight, MSSA+, preliminary culture with staph aureus.      PLAN  - f/u ID reccs: abx narrowed to cefazolin, will plan to treat 10 days with PO transition to cephalexin upon discharge  - f/u final culture from 4/30: staph aureus   - ENT will manage the wound packing once daily for now while inpatient  - Patient will need home care RN for wound care when close to discharge  - Wound care instructions as follows: Express the wound to check for any purulence, then irrigate the wound with normal saline and pack it with 1/4 iodoform gauze strip until you feel resistance and are no longer able to pack. Then cover the wound site with 4x4 guaze and tape.  - Continue monitor WBC  - Tight FSG control  - Discussed with Dr. Mitchell

## 2025-05-07 DIAGNOSIS — L02.811 CUTANEOUS ABSCESS OF HEAD [ANY PART, EXCEPT FACE]: ICD-10-CM

## 2025-05-07 LAB
ANION GAP SERPL CALC-SCNC: 13 MMOL/L — SIGNIFICANT CHANGE UP (ref 7–14)
BUN SERPL-MCNC: 20 MG/DL — SIGNIFICANT CHANGE UP (ref 7–23)
CALCIUM SERPL-MCNC: 9.7 MG/DL — SIGNIFICANT CHANGE UP (ref 8.4–10.5)
CHLORIDE SERPL-SCNC: 100 MMOL/L — SIGNIFICANT CHANGE UP (ref 98–107)
CO2 SERPL-SCNC: 26 MMOL/L — SIGNIFICANT CHANGE UP (ref 22–31)
CREAT SERPL-MCNC: 0.81 MG/DL — SIGNIFICANT CHANGE UP (ref 0.5–1.3)
EGFR: 111 ML/MIN/1.73M2 — SIGNIFICANT CHANGE UP
EGFR: 111 ML/MIN/1.73M2 — SIGNIFICANT CHANGE UP
GLUCOSE SERPL-MCNC: 99 MG/DL — SIGNIFICANT CHANGE UP (ref 70–99)
HCT VFR BLD CALC: 43.3 % — SIGNIFICANT CHANGE UP (ref 39–50)
HGB BLD-MCNC: 13.6 G/DL — SIGNIFICANT CHANGE UP (ref 13–17)
MAGNESIUM SERPL-MCNC: 2.2 MG/DL — SIGNIFICANT CHANGE UP (ref 1.6–2.6)
MCHC RBC-ENTMCNC: 28.8 PG — SIGNIFICANT CHANGE UP (ref 27–34)
MCHC RBC-ENTMCNC: 31.4 G/DL — LOW (ref 32–36)
MCV RBC AUTO: 91.5 FL — SIGNIFICANT CHANGE UP (ref 80–100)
NRBC # BLD AUTO: 0 K/UL — SIGNIFICANT CHANGE UP (ref 0–0)
NRBC # FLD: 0 K/UL — SIGNIFICANT CHANGE UP (ref 0–0)
NRBC BLD AUTO-RTO: 0 /100 WBCS — SIGNIFICANT CHANGE UP (ref 0–0)
PHOSPHATE SERPL-MCNC: 4 MG/DL — SIGNIFICANT CHANGE UP (ref 2.5–4.5)
PLATELET # BLD AUTO: 277 K/UL — SIGNIFICANT CHANGE UP (ref 150–400)
POTASSIUM SERPL-MCNC: 4.5 MMOL/L — SIGNIFICANT CHANGE UP (ref 3.5–5.3)
POTASSIUM SERPL-SCNC: 4.5 MMOL/L — SIGNIFICANT CHANGE UP (ref 3.5–5.3)
RBC # BLD: 4.73 M/UL — SIGNIFICANT CHANGE UP (ref 4.2–5.8)
RBC # FLD: 14 % — SIGNIFICANT CHANGE UP (ref 10.3–14.5)
SODIUM SERPL-SCNC: 139 MMOL/L — SIGNIFICANT CHANGE UP (ref 135–145)
WBC # BLD: 9.8 K/UL — SIGNIFICANT CHANGE UP (ref 3.8–10.5)
WBC # FLD AUTO: 9.8 K/UL — SIGNIFICANT CHANGE UP (ref 3.8–10.5)

## 2025-05-07 PROCEDURE — 99232 SBSQ HOSP IP/OBS MODERATE 35: CPT | Mod: GC

## 2025-05-07 RX ADMIN — Medication 50 MILLIGRAM(S): at 11:46

## 2025-05-07 RX ADMIN — ARIPIPRAZOLE 10 MILLIGRAM(S): 2 TABLET ORAL at 11:46

## 2025-05-07 RX ADMIN — ENOXAPARIN SODIUM 40 MILLIGRAM(S): 100 INJECTION SUBCUTANEOUS at 17:33

## 2025-05-07 RX ADMIN — MONTELUKAST SODIUM 10 MILLIGRAM(S): 10 TABLET ORAL at 11:46

## 2025-05-07 RX ADMIN — MUPIROCIN CALCIUM 1 APPLICATION(S): 20 CREAM TOPICAL at 05:53

## 2025-05-07 RX ADMIN — ENOXAPARIN SODIUM 40 MILLIGRAM(S): 100 INJECTION SUBCUTANEOUS at 05:53

## 2025-05-07 RX ADMIN — SERTRALINE 100 MILLIGRAM(S): 100 TABLET, FILM COATED ORAL at 11:46

## 2025-05-07 RX ADMIN — Medication 100 MILLIGRAM(S): at 14:09

## 2025-05-07 RX ADMIN — ATORVASTATIN CALCIUM 10 MILLIGRAM(S): 80 TABLET, FILM COATED ORAL at 21:16

## 2025-05-07 RX ADMIN — Medication 100 MILLIGRAM(S): at 21:16

## 2025-05-07 RX ADMIN — LISINOPRIL 5 MILLIGRAM(S): 5 TABLET ORAL at 05:53

## 2025-05-07 RX ADMIN — FENOFIBRATE 48 MILLIGRAM(S): 160 TABLET ORAL at 11:46

## 2025-05-07 RX ADMIN — Medication 100 MILLIGRAM(S): at 05:53

## 2025-05-07 RX ADMIN — Medication 1 APPLICATION(S): at 11:47

## 2025-05-07 NOTE — PROVIDER CONTACT NOTE (OTHER) - ASSESSMENT
Pt is not afebrile at this time. Pt shows no s/s of acute distress
Uf=594/58; no acute distress
Hm=416/58; ; no acute distress

## 2025-05-07 NOTE — PROVIDER CONTACT NOTE (OTHER) - BACKGROUND
45M w/ PMHx of HTN, schizophrenia, DM2, and ROMMEL who presents w/ right sided neck abscess. Admitted to medicine for further management and monitoring. s/p I+D by ENT in ED.
Pt is from [facility], coming in for regrowth of right neck abscess.
45M w/ PMHx of HTN, schizophrenia, DM2, and ROMMEL who presents w/ right sided neck abscess. Admitted to medicine for further management and monitoring. s/p I+D by ENT in ED.

## 2025-05-07 NOTE — PROVIDER CONTACT NOTE (OTHER) - ACTION/TREATMENT ORDERED:
MD notified and made aware.
MD notified and made aware. hold lisinopril as per parameters
MD notified, Abx tx contd.

## 2025-05-07 NOTE — PROGRESS NOTE ADULT - SUBJECTIVE AND OBJECTIVE BOX
INTERVAL HPI/OVERNIGHT EVENTS:  Pt seen and examined at bedside. No acute overnight events or complaints.    VITAL SIGNS:  T(F): 97.2 (05-07-25 @ 05:16)  HR: 77 (05-07-25 @ 05:16)  BP: 121/87 (05-07-25 @ 05:16)  RR: 18 (05-07-25 @ 05:16)  SpO2: 94% (05-07-25 @ 05:16)  Wt(kg): --    PHYSICAL EXAM:    Constitutional: WDWN, NAD  HEENT: PERRL, EOMI, sclera non-icteric, neck supple, trachea midline, no masses, no JVD, MMM, good dentition  Respiratory: CTA b/l, good air entry b/l, no wheezing, no rhonchi, no rales, without accessory muscle use and no intercostal retractions  Cardiovascular: RRR, normal S1S2, no M/R/G  Gastrointestinal: soft, NTND, no masses palpable, BS normal  Extremities: Warm, well perfused, pulses equal bilateral upper and lower extremities, no edema, no clubbing. Capillary refill <2 sec  Neurological: AAOx3, CN Grossly intact  Skin: Normal temperature, warm, dry    MEDICATIONS  (STANDING):  ARIPiprazole 10 milliGRAM(s) Oral daily  atorvastatin 10 milliGRAM(s) Oral at bedtime  ceFAZolin   IVPB      ceFAZolin   IVPB 2000 milliGRAM(s) IV Intermittent every 8 hours  chlorhexidine 2% Cloths 1 Application(s) Topical daily  cloZAPine 50 milliGRAM(s) Oral daily  dextrose 5%. 1000 milliLiter(s) (100 mL/Hr) IV Continuous <Continuous>  dextrose 5%. 1000 milliLiter(s) (50 mL/Hr) IV Continuous <Continuous>  dextrose 50% Injectable 25 Gram(s) IV Push once  dextrose 50% Injectable 12.5 Gram(s) IV Push once  dextrose 50% Injectable 25 Gram(s) IV Push once  enoxaparin Injectable 40 milliGRAM(s) SubCutaneous every 12 hours  fenofibrate Tablet 48 milliGRAM(s) Oral daily  glucagon  Injectable 1 milliGRAM(s) IntraMuscular once  insulin lispro (ADMELOG) corrective regimen sliding scale   SubCutaneous three times a day before meals  insulin lispro (ADMELOG) corrective regimen sliding scale   SubCutaneous at bedtime  lisinopril 5 milliGRAM(s) Oral daily  montelukast 10 milliGRAM(s) Oral daily  sertraline 100 milliGRAM(s) Oral daily    MEDICATIONS  (PRN):  acetaminophen     Tablet .. 650 milliGRAM(s) Oral every 6 hours PRN Temp greater or equal to 38C (100.4F), Mild Pain (1 - 3)  albuterol    90 MICROgram(s) HFA Inhaler 2 Puff(s) Inhalation every 6 hours PRN Shortness of Breath and/or Wheezing  dextrose Oral Gel 15 Gram(s) Oral once PRN Blood Glucose LESS THAN 70 milliGRAM(s)/deciliter      Allergies    No Known Allergies    Intolerances        LABS:                        13.5   10.26 )-----------( 321      ( 06 May 2025 06:45 )             42.3     05-06    138  |  101  |  16  ----------------------------<  105[H]  4.4   |  25  |  0.86    Ca    9.7      06 May 2025 06:45  Phos  3.7     05-06  Mg     2.30     05-06        Urinalysis Basic - ( 06 May 2025 06:45 )    Color: x / Appearance: x / SG: x / pH: x  Gluc: 105 mg/dL / Ketone: x  / Bili: x / Urobili: x   Blood: x / Protein: x / Nitrite: x   Leuk Esterase: x / RBC: x / WBC x   Sq Epi: x / Non Sq Epi: x / Bacteria: x        RADIOLOGY & ADDITIONAL TESTS:  Reviewed      ******************  Authored By: Kendrick Arzate MD PGY1  Internal Medicine  MS Teams Preferred  ******************

## 2025-05-07 NOTE — PROGRESS NOTE ADULT - PROBLEM SELECTOR PLAN 1
Culture: MSSA  Recurrent abscess, previously I+D by ENT in Jan/Feb. Cultures grew MSSA. Was discharged with bactrim.     > Abx downgraded to cefazolin (05/02-    ), s/p Vanc/Amp/Zosyn (04/29-05/02)  > Transition to PO cephalexin therapy, 500 mg q6hr on discharge (full total 10D abx duration), last day 5/9  > BC negative x2  > Appreciate ENT recs  > Abscess now draining, c/w daily packing  > d/c back to facility when ENT feels packing can be changed to 3x/week (this is the most that is possible by home care, there is no one at facility who can do this) - Per ENT discussion (05/5), the patient will need an extended stay for daily packings

## 2025-05-07 NOTE — PROGRESS NOTE ADULT - PROBLEM SELECTOR PLAN 1
- Packing changes can now be performed every other day given significant clinical improvement   - ID recommendations appreciated - per last note continue with cefazolin, plan to treat 10 days with PO transition to cephalexin upon discharge (500mg q6 hrs with end date 5/9).  - f/u final culture from 4/30: staph aureus   - When discharged to facility please include wound care instructions as follows: Express the wound to check for any purulence, then irrigate the wound with normal saline and pack it with 1/4 iodoform gauze strip until you feel resistance and are no longer able to pack. Then cover the wound site with 4x4 guaze and tape.  - Continue monitor WBC  - Tight FSG control  - ENT follow up outpatient, please provide patient with number [phone] to schedule outpatient appointment.  - Discussed with Dr. Mitchell

## 2025-05-07 NOTE — PROGRESS NOTE ADULT - ASSESSMENT
45M w/ PMHx of HTN, schizophrenia, DM2, and ROMMEL who presents w/ right sided neck abscess. Admitted to medicine for further management and monitoring. s/p I+D by ENT in ED. Patient requires neck packing, currently need daily packing done by ENT.

## 2025-05-07 NOTE — PROGRESS NOTE ADULT - SUBJECTIVE AND OBJECTIVE BOX
ENT PROGRESS NOTE    HPI: Patient seen and examined at bedside. No overnight events. Patient denying any pain from abscess site, denies pain with packing changes. Packing changes now daily (previously BID) given improvement. Patient remains afebrile, WBC 9.8.      PAST MEDICAL & SURGICAL HISTORY:  Schizophrenia      Obstructive sleep apnea      Hypertension      Type 2 diabetes mellitus      No significant past surgical history        Allergies    No Known Allergies    Intolerances      MEDICATIONS  (STANDING):  ARIPiprazole 10 milliGRAM(s) Oral daily  atorvastatin 10 milliGRAM(s) Oral at bedtime  ceFAZolin   IVPB      ceFAZolin   IVPB 2000 milliGRAM(s) IV Intermittent every 8 hours  chlorhexidine 2% Cloths 1 Application(s) Topical daily  cloZAPine 50 milliGRAM(s) Oral daily  dextrose 5%. 1000 milliLiter(s) (100 mL/Hr) IV Continuous <Continuous>  dextrose 5%. 1000 milliLiter(s) (50 mL/Hr) IV Continuous <Continuous>  dextrose 50% Injectable 25 Gram(s) IV Push once  dextrose 50% Injectable 12.5 Gram(s) IV Push once  dextrose 50% Injectable 25 Gram(s) IV Push once  enoxaparin Injectable 40 milliGRAM(s) SubCutaneous every 12 hours  fenofibrate Tablet 48 milliGRAM(s) Oral daily  glucagon  Injectable 1 milliGRAM(s) IntraMuscular once  insulin lispro (ADMELOG) corrective regimen sliding scale   SubCutaneous three times a day before meals  insulin lispro (ADMELOG) corrective regimen sliding scale   SubCutaneous at bedtime  lisinopril 5 milliGRAM(s) Oral daily  montelukast 10 milliGRAM(s) Oral daily  sertraline 100 milliGRAM(s) Oral daily    MEDICATIONS  (PRN):  acetaminophen     Tablet .. 650 milliGRAM(s) Oral every 6 hours PRN Temp greater or equal to 38C (100.4F), Mild Pain (1 - 3)  albuterol    90 MICROgram(s) HFA Inhaler 2 Puff(s) Inhalation every 6 hours PRN Shortness of Breath and/or Wheezing  dextrose Oral Gel 15 Gram(s) Oral once PRN Blood Glucose LESS THAN 70 milliGRAM(s)/deciliter      Social History: see consult note    Family history: see consult note    ROS:   ENT: all negative except as noted in HPI   Pulm: denies SOB, cough, hemoptysis  Neuro: denies numbness/tingling, loss of sensation  Endo: denies heat/cold intolerance, excessive sweating      Vital Signs Last 24 Hrs  T(C): 36.2 (07 May 2025 11:46), Max: 36.3 (06 May 2025 21:15)  T(F): 97.1 (07 May 2025 11:46), Max: 97.3 (06 May 2025 21:15)  HR: 74 (07 May 2025 11:46) (74 - 82)  BP: 116/67 (07 May 2025 11:46) (116/67 - 130/58)  BP(mean): --  RR: 18 (07 May 2025 11:46) (17 - 18)  SpO2: 95% (07 May 2025 11:46) (94% - 95%)    Parameters below as of 07 May 2025 11:46  Patient On (Oxygen Delivery Method): room air               13.6   9.80  )-----------( 277      ( 07 May 2025 06:35 )             43.3    05-07    139  |  100  |  20  ----------------------------<  99  4.5   |  26  |  0.81    Ca    9.7      07 May 2025 06:35  Phos  4.0     05-07  Mg     2.20     05-07      PHYSICAL EXAM:  Awake, alert, NAD  Nonlabored Respirations RA, no stridor or stertor   Neck: posterior neck wound much improved, smaller than on initial eval, wound bed irrigated with saline without any return of purulence noted, repacked with couple cm of 1/4” packing and pressure dressing applied w/ 4x4 and silk tape on top; residual erythema and mild induration but improving   Neuro: CN II-XII grossly intact         ENT PROGRESS NOTE    HPI: Patient seen and examined at bedside. No overnight events. Patient denying any pain from abscess site, denies pain with packing changes. Patient remains afebrile, WBC 9.8.      PAST MEDICAL & SURGICAL HISTORY:  Schizophrenia      Obstructive sleep apnea      Hypertension      Type 2 diabetes mellitus      No significant past surgical history        Allergies    No Known Allergies    Intolerances      MEDICATIONS  (STANDING):  ARIPiprazole 10 milliGRAM(s) Oral daily  atorvastatin 10 milliGRAM(s) Oral at bedtime  ceFAZolin   IVPB      ceFAZolin   IVPB 2000 milliGRAM(s) IV Intermittent every 8 hours  chlorhexidine 2% Cloths 1 Application(s) Topical daily  cloZAPine 50 milliGRAM(s) Oral daily  dextrose 5%. 1000 milliLiter(s) (100 mL/Hr) IV Continuous <Continuous>  dextrose 5%. 1000 milliLiter(s) (50 mL/Hr) IV Continuous <Continuous>  dextrose 50% Injectable 25 Gram(s) IV Push once  dextrose 50% Injectable 12.5 Gram(s) IV Push once  dextrose 50% Injectable 25 Gram(s) IV Push once  enoxaparin Injectable 40 milliGRAM(s) SubCutaneous every 12 hours  fenofibrate Tablet 48 milliGRAM(s) Oral daily  glucagon  Injectable 1 milliGRAM(s) IntraMuscular once  insulin lispro (ADMELOG) corrective regimen sliding scale   SubCutaneous three times a day before meals  insulin lispro (ADMELOG) corrective regimen sliding scale   SubCutaneous at bedtime  lisinopril 5 milliGRAM(s) Oral daily  montelukast 10 milliGRAM(s) Oral daily  sertraline 100 milliGRAM(s) Oral daily    MEDICATIONS  (PRN):  acetaminophen     Tablet .. 650 milliGRAM(s) Oral every 6 hours PRN Temp greater or equal to 38C (100.4F), Mild Pain (1 - 3)  albuterol    90 MICROgram(s) HFA Inhaler 2 Puff(s) Inhalation every 6 hours PRN Shortness of Breath and/or Wheezing  dextrose Oral Gel 15 Gram(s) Oral once PRN Blood Glucose LESS THAN 70 milliGRAM(s)/deciliter      Social History: see consult note    Family history: see consult note    ROS:   ENT: all negative except as noted in HPI   Pulm: denies SOB, cough, hemoptysis  Neuro: denies numbness/tingling, loss of sensation  Endo: denies heat/cold intolerance, excessive sweating      Vital Signs Last 24 Hrs  T(C): 36.2 (07 May 2025 11:46), Max: 36.3 (06 May 2025 21:15)  T(F): 97.1 (07 May 2025 11:46), Max: 97.3 (06 May 2025 21:15)  HR: 74 (07 May 2025 11:46) (74 - 82)  BP: 116/67 (07 May 2025 11:46) (116/67 - 130/58)  BP(mean): --  RR: 18 (07 May 2025 11:46) (17 - 18)  SpO2: 95% (07 May 2025 11:46) (94% - 95%)    Parameters below as of 07 May 2025 11:46  Patient On (Oxygen Delivery Method): room air               13.6   9.80  )-----------( 277      ( 07 May 2025 06:35 )             43.3    05-07    139  |  100  |  20  ----------------------------<  99  4.5   |  26  |  0.81    Ca    9.7      07 May 2025 06:35  Phos  4.0     05-07  Mg     2.20     05-07      PHYSICAL EXAM:  Awake, alert, NAD  Nonlabored Respirations RA, no stridor or stertor   Neck: posterior neck wound much improved, smaller than on initial eval, wound bed irrigated with saline without any return of purulence noted, repacked with couple cm of 1/4” packing and pressure dressing applied w/ 4x4 and silk tape on top; residual erythema and mild induration but improving   Neuro: CN II-XII grossly intact

## 2025-05-07 NOTE — PROVIDER CONTACT NOTE (OTHER) - SITUATION
Es=658/58; notify DBP<60
Bu=723/58; notify DBP<60
Abscess culture from 9 am this morning came back with Gram + cocci in clusters, and polymorphonuclear glucocytes.

## 2025-05-07 NOTE — PROGRESS NOTE ADULT - PROBLEM SELECTOR PLAN 6
VTE PPx: Lovenox  Nutrition: CC Diet  Code Status: FULL. Aunt is guardian and makes decisions   Dispo: Likely SNF

## 2025-05-07 NOTE — PROGRESS NOTE ADULT - ASSESSMENT
46 y/o M with a history of Schizophrenia, DM2, HTN, had recent admission Feb. for R posterior suboccipital neck abscess, required packing for 1wk, on Vanc/Zosyn, Ceftriaxone, then Ancef while admitted and discharged on Bactrim (cx + for MSSA). Completed abx 2 weeks ago then had recurrence of pain and swelling for past 6 days s/p I&D with 15cc pus 4/29. Post I&D CT neck shows no well-defined peripherally enhancing drainable collection. Patient seen this afternoon, R posterior neck wound much improved, smaller than on initial eval, wound bed irrigated with saline without any return of purulence noted, repacked with couple cm of 1/4” packing and pressure dressing applied w/ 4x4 and silk tape on top; residual erythema and mild induration but improving. ID following for antibiotics regimen, planning to treat for 10 days total with PO transition to cephalexin upon discharge. Given significant clinical improvement, packing changes can now be performed every other day.

## 2025-05-08 ENCOUNTER — TRANSCRIPTION ENCOUNTER (OUTPATIENT)
Age: 45
End: 2025-05-08

## 2025-05-08 VITALS
RESPIRATION RATE: 18 BRPM | OXYGEN SATURATION: 95 % | TEMPERATURE: 97 F | HEART RATE: 79 BPM | DIASTOLIC BLOOD PRESSURE: 66 MMHG | SYSTOLIC BLOOD PRESSURE: 110 MMHG

## 2025-05-08 PROCEDURE — 99239 HOSP IP/OBS DSCHRG MGMT >30: CPT

## 2025-05-08 RX ORDER — CEPHALEXIN 250 MG/1
1 CAPSULE ORAL
Qty: 8 | Refills: 0
Start: 2025-05-08 | End: 2025-05-09

## 2025-05-08 RX ADMIN — FENOFIBRATE 48 MILLIGRAM(S): 160 TABLET ORAL at 11:40

## 2025-05-08 RX ADMIN — MONTELUKAST SODIUM 10 MILLIGRAM(S): 10 TABLET ORAL at 11:40

## 2025-05-08 RX ADMIN — ARIPIPRAZOLE 10 MILLIGRAM(S): 2 TABLET ORAL at 11:41

## 2025-05-08 RX ADMIN — Medication 1 APPLICATION(S): at 11:40

## 2025-05-08 RX ADMIN — SERTRALINE 100 MILLIGRAM(S): 100 TABLET, FILM COATED ORAL at 11:40

## 2025-05-08 RX ADMIN — Medication 100 MILLIGRAM(S): at 05:48

## 2025-05-08 RX ADMIN — ENOXAPARIN SODIUM 40 MILLIGRAM(S): 100 INJECTION SUBCUTANEOUS at 17:31

## 2025-05-08 RX ADMIN — ENOXAPARIN SODIUM 40 MILLIGRAM(S): 100 INJECTION SUBCUTANEOUS at 05:48

## 2025-05-08 RX ADMIN — LISINOPRIL 5 MILLIGRAM(S): 5 TABLET ORAL at 05:48

## 2025-05-08 RX ADMIN — Medication 100 MILLIGRAM(S): at 14:33

## 2025-05-08 NOTE — DISCHARGE NOTE NURSING/CASE MANAGEMENT/SOCIAL WORK - PATIENT PORTAL LINK FT
You can access the FollowMyHealth Patient Portal offered by [facility] by registering at the following website: [email or web address]. By joining [facility]’s FollowMyHealth portal, you will also be able to view your health information using other applications (apps) compatible with our system.

## 2025-05-08 NOTE — PROGRESS NOTE ADULT - REASON FOR ADMISSION
Right neck abscess

## 2025-05-08 NOTE — DISCHARGE NOTE NURSING/CASE MANAGEMENT/SOCIAL WORK - FINANCIAL ASSISTANCE
[facility] provides services at a reduced cost to those who are determined to be eligible through [facility]’s financial assistance program. Information regarding [facility]’s financial assistance program can be found by going to [email or web address] or by calling [phone].

## 2025-05-08 NOTE — DISCHARGE NOTE NURSING/CASE MANAGEMENT/SOCIAL WORK - NSDCPEFALRISK_GEN_ALL_CORE
For information on Fall & Injury Prevention, visit: [email or web address] OR  [email or web address] OR  https://www.cdc.gov/steadi/patient.html

## 2025-05-08 NOTE — PROGRESS NOTE ADULT - PROBLEM SELECTOR PLAN 1
Culture: MSSA  Recurrent abscess, previously I+D by ENT in Jan/Feb. Cultures grew MSSA. Was discharged with bactrim.     > Abx downgraded to cefazolin (05/02-    ), s/p Vanc/Amp/Zosyn (04/29-05/02)  > Transition to PO cephalexin therapy, 500 mg q6hr on discharge (full total 10D abx duration), last day 5/9  > BC negative x2  > Appreciate ENT recs  > Abscess now draining, c/w daily packing  > ENT ok with spacing out dressing changes to every other day -> d/c back to facility

## 2025-05-08 NOTE — PROGRESS NOTE ADULT - PROBLEM SELECTOR PROBLEM 1
Neck abscess
Abscess, occipital
Neck abscess

## 2025-05-08 NOTE — PROGRESS NOTE ADULT - ATTENDING COMMENTS
46 yo M w/ HTN, schizophrenia, DM-2, ROMMEL, hx neck abscess s/p I &D MSSA d/c'ed with bactrim p/w neck abscess     # Neck abscess- CT 4/29 extensive inflammation RT neck with areas of complex fluid and or phlegmon. focal cutaneous defect with gas in setting of recent I & D no overt drainable collection; daily wound care as per ENT; Cx MSSA; cefazolin; eventual transition to keflex antibiotics to complete 5/9; wound care 3x a week as per ENT   #HTN- c/w lisinopril   #DM-2- A1c 6.3% on ISS   # schizophrenia- aripiprazole/sertraline and clozapine   # ROMMEL not on biPAP      Dispo: ENT now recommend 3x week wound plan for discharge to group home
45 y.o. Male w/ Hx Schizophrenia, HTN, DM2, ROMMEL, right neck abscess in 1/2025 s/p I &D and bactrim tx p/w recurrent right posterior neck abscess  as seen on Ct neck s/p I&D on zosyn/Vanco. wound Cx grew staph aureus.  ID consult appreciated.
45 y.o. Male w/ Hx Schizophrenia, HTN, DM2, ROMMEL, right neck abscess in 1/2025 s/p I&D and bactrim tx p/w recurrent right posterior neck abscess  as seen on Ct neck s/p I&D on Vanco. wound Cx grew staph aureus. F/U sensitivities.  ID consult appreciated. Patient needs dressing changes more than 3x/week due to size as per ENT so won't be able to do wound care at home for now.
46 yo M w/ HTN, schizophrenia, DM-2, ROMMEL, hx neck abscess s/p I &D MSSA d/c'ed with bactrim p/w neck abscess     # Neck abscess- CT 4/29 extensive inflammation RT neck with areas of complex fluid and or phlegmon. focal cutaneous defect with gas in setting of recent I & D no overt drainable collection; daily wound care as per ENT; Cx MSSA; cefazolin; eventual transition to keflex antibiotics to complete 5/9  #HTN- c/w lisinopril   #DM-2- A1c 6.3% on ISS   # schizophrenia- aripiprazole/sertraline and clozapine   # ROMMEL not on biPAP      Dispo: ENT rec daily wound care; case discussed on rounds with CM/SW poss rehab for wound care needs
45 y.o. Male w/ Hx Schizophrenia, HTN, DM2, ROMMEL, right neck abscess in 1/2025 s/p I &D and bactrim tx p/w recurrent right posterior neck abscess  as seen on Ct neck s/p I&D on zosyn/Vanco. F/U wound Cx, Blood Cx to direct management. ID consulted.
45 y.o. Male w/ Hx Schizophrenia, HTN, DM2, ROMMEL, right neck abscess in 1/2025 s/p I&D and bactrim tx p/w recurrent right posterior neck abscess  as seen on Ct neck s/p I&D on Vanco. wound Cx grew MSSA. abx changed to Ancef per   ID.  Patient needs dressing changes more than 3x/week due to size as per ENT so won't be able to do wound care at home for now.
45 y.o. Male w/ Hx Schizophrenia, HTN, DM2, ROMMEL, right neck abscess in 1/2025 s/p I&D and bactrim tx p/w recurrent right posterior neck abscess  as seen on Ct neck s/p I&D on Vanco. wound Cx grew MSSA. abx changed to Ancef per   ID.  Patient needs dressing changes more than 3x/week due to size as per ENT so won't be able to do wound care at home for now.
46 yo M w/ HTN, schizophrenia, DM-2, ROMMEL, hx neck abscess s/p I &D MSSA d/c'ed with bactrim p/w neck abscess     # Neck abscess- CT 4/29 extensive inflammation RT neck with areas of complex fluid and or phlegmon. focal cutaneous defect with gas in setting of recent I & D no overt drainable collection; daily wound care as per ENT; Cx MSSA; cefazolin; eventual transition to keflex; appreciate ID recs;   #HTN- c/w lisinopril   #DM-2- A1c 6.3% on ISS   # schizophrenia- aripiprazole/sertraline and clozapine   # ROMMEL not on biPAP      Dispo: ENT rec daily wound care; case discussed on rounds with CM/SW poss rehab for wound care needs
44 yo M w/ HTN, schizophrenia, DM-2, ROMMEL, hx neck abscess s/p I &D MSSA d/c'ed with bactrim p/w neck abscess     # Neck abscess- CT 4/29 extensive inflammation RT neck with areas of complex fluid and or phlegmon. focal cutaneous defect with gas in setting of recent I & D no overt drainable collection; daily wound care as per ENT; no overt pus noted on exam; Cx MSSA; cefazolin; eventual transition to keflex; appreciate ID recs;   #HTN- c/w lisinopril   #DM-2- A1c 6.3% on ISS   # schizophrenia- aripiprazole/sertraline and clozapine   # ROMMEL not on biPAP      Dispo: pending wound care recs by ENT

## 2025-05-08 NOTE — PROGRESS NOTE ADULT - PROBLEM SELECTOR PLAN 3
-Home Regimen: Metformin, Rybelsus   -A1c: 6.3%  -Hold home oral diabetic medications while inpatient     > c/w Corrective SSI (Mild) TID before meals/Bedtime  > c/w FS Blood glucose monitoring Premeal/Bedtime   > c/w Carb Consistent Diet
-Home Regimen: Metformin, Rybelsus   -A1c: 6.3%  -Hold home oral diabetic medications while inpatient   -Corrective SSI (Mild) TID before meals/Bedtime  -Goal -180  -FS Blood glucose monitoring Premeal/Bedtime   -Hypoglycemia precautions   -Carb Consistent Diet
-Home Regimen: Metformin, Rybelsus   -A1c: 6.3%  -Hold home oral diabetic medications while inpatient     > c/w Corrective SSI (Mild) TID before meals/Bedtime  > c/w FS Blood glucose monitoring Premeal/Bedtime   > c/w Carb Consistent Diet
-Home Regimen: Metformin, Rybelsus   -A1c: 6.3%  -Hold home oral diabetic medications while inpatient   -Corrective SSI (Mild) TID before meals/Bedtime  -Goal -180  -FS Blood glucose monitoring Premeal/Bedtime   -Hypoglycemia precautions   -Carb Consistent Diet

## 2025-05-08 NOTE — CHART NOTE - NSCHARTNOTEFT_GEN_A_CORE
Given patient's significant clinical improvement (decrease in wound size, decrease in purulence expressed, not requiring as many packing changes), packing changes can now be performed three times per week. Discussed with attending.

## 2025-05-08 NOTE — PROGRESS NOTE ADULT - PROVIDER SPECIALTY LIST ADULT
ENT
ENT
Infectious Disease
Internal Medicine
Internal Medicine
ENT
Infectious Disease
ENT
Infectious Disease
Internal Medicine
ENT
Infectious Disease
ENT
Internal Medicine

## 2025-05-08 NOTE — PROGRESS NOTE ADULT - TIME BILLING
Reviewed lab data, radiology results, consultants' recommendations, documentation in [place], discussed with family, ACP, interdisciplinary staff and/or intervention were performed.
Reviewed lab data, radiology results, consultants' recommendations, documentation in [place], discussed with family, ACP, interdisciplinary staff and/or intervention were performed.
Reviewed lab data, radiology results, consultants' recommendations, documentation in [place], discussed with family, ACP, interdisciplinary staff and/or intervention were performed.
- Ordering, reviewing, and interpreting labs, testing, and imaging.  - Independently obtaining a review of systems and performing a physical exam  - Reviewing consultant documentation/recommendations in addition to discussing plan of care with consultants.  - Counselling and educating patient and family regarding interpretation of aforementioned items and plan of care.
- Ordering, reviewing, and interpreting labs, testing, and imaging.  - Independently obtaining a review of systems and performing a physical exam  - Reviewing consultant documentation/recommendations in addition to discussing plan of care with consultants.  - Counselling and educating patient and family regarding interpretation of aforementioned items and plan of care.
Reviewed lab data, radiology results, consultants' recommendations, documentation in [place], discussed with family, ACP, interdisciplinary staff and/or intervention were performed.
- Ordering, reviewing, and interpreting labs, testing, and imaging.  - Independently obtaining a review of systems and performing a physical exam  - Reviewing consultant documentation/recommendations in addition to discussing plan of care with consultants.  - Counselling and educating patient and family regarding interpretation of aforementioned items and plan of care.
Reviewed lab data, radiology results, consultants' recommendations, documentation in [place], discussed with family, ACP, interdisciplinary staff and/or intervention were performed.
- Ordering, reviewing, and interpreting labs, testing, and imaging.  - Independently obtaining a review of systems and performing a physical exam  - Reviewing consultant documentation/recommendations in addition to discussing plan of care with consultants.  - Counselling and educating patient and family regarding interpretation of aforementioned items and plan of care.

## 2025-05-08 NOTE — PROGRESS NOTE ADULT - PROBLEM SELECTOR PROBLEM 3
Diabetes type 2

## 2025-05-08 NOTE — PROGRESS NOTE ADULT - PROBLEM SELECTOR PLAN 4
-Continue with home ARIPiprazole 10 mg QD, sertraline 100 mg QD, cloZAPine 50 mg QD
> c/w home ARIPiprazole 10 mg QD, sertraline 100 mg QD, cloZAPine 50 mg QD
> c/w home ARIPiprazole 10 mg QD, sertraline 100 mg QD, cloZAPine 50 mg QD
-Continue with home ARIPiprazole 10 mg QD, sertraline 100 mg QD, cloZAPine 50 mg QD
> c/w home ARIPiprazole 10 mg QD, sertraline 100 mg QD, cloZAPine 50 mg QD
> c/w home ARIPiprazole 10 mg QD, sertraline 100 mg QD, cloZAPine 50 mg QD

## 2025-05-08 NOTE — PROGRESS NOTE ADULT - NUTRITIONAL ASSESSMENT
This patient has been assessed with a concern for Malnutrition and has been determined to have a diagnosis/diagnoses of Morbid obesity (BMI > 40).    This patient is being managed with:   Diet Regular-  Consistent Carbohydrate {Evening Snack} (CSTCHOSN)  DASH/TLC {Sodium & Cholesterol Restricted} (DASH)  Entered: May  1 2025  2:17PM  

## 2025-05-08 NOTE — PROGRESS NOTE ADULT - PROBLEM SELECTOR PLAN 2
-Home medication: Lisinopril 5 mg QD    > c/w Lisinopril 5mg PO QD  with hold parameters (SBP <110, DBP <60)   > c/w DASH diet
-Home medication: Lisinopril 5 mg QD  -c/w home meds with hold parameters (SBP <110, DBP <60)   -Monitor BP closely and adjust medications if clinically indicated  -DASH Diet
-Home medication: Lisinopril 5 mg QD    > c/w Lisinopril 5mg PO QD  with hold parameters (SBP <110, DBP <60)   > c/w DASH diet
-Home medication: Lisinopril 5 mg QD  -c/w home meds with hold parameters (SBP <110, DBP <60)   -Monitor BP closely and adjust medications if clinically indicated  -DASH Diet

## 2025-05-08 NOTE — PROGRESS NOTE ADULT - SUBJECTIVE AND OBJECTIVE BOX
INTERVAL HPI/OVERNIGHT EVENTS:  Pt seen and examined at bedside. No acute overnight events or complaints.    VITAL SIGNS:  T(F): 97.5 (05-08-25 @ 05:39)  HR: 73 (05-08-25 @ 05:39)  BP: 137/84 (05-08-25 @ 05:39)  RR: 18 (05-08-25 @ 05:39)  SpO2: 94% (05-08-25 @ 05:39)  Wt(kg): --    PHYSICAL EXAM:    Constitutional: WDWN, NAD  HEENT: PERRL, EOMI, sclera non-icteric, neck supple, trachea midline, no masses, no JVD, MMM, good dentition  Respiratory: CTA b/l, good air entry b/l, no wheezing, no rhonchi, no rales, without accessory muscle use and no intercostal retractions  Cardiovascular: RRR, normal S1S2, no M/R/G  Gastrointestinal: soft, NTND, no masses palpable, BS normal  Extremities: Warm, well perfused, pulses equal bilateral upper and lower extremities, no edema, no clubbing. Capillary refill <2 sec  Neurological: AAOx3, CN Grossly intact  Skin: posterior neck wound much improved    MEDICATIONS  (STANDING):  ARIPiprazole 10 milliGRAM(s) Oral daily  atorvastatin 10 milliGRAM(s) Oral at bedtime  ceFAZolin   IVPB      ceFAZolin   IVPB 2000 milliGRAM(s) IV Intermittent every 8 hours  chlorhexidine 2% Cloths 1 Application(s) Topical daily  cloZAPine 50 milliGRAM(s) Oral daily  dextrose 5%. 1000 milliLiter(s) (100 mL/Hr) IV Continuous <Continuous>  dextrose 5%. 1000 milliLiter(s) (50 mL/Hr) IV Continuous <Continuous>  dextrose 50% Injectable 25 Gram(s) IV Push once  dextrose 50% Injectable 12.5 Gram(s) IV Push once  dextrose 50% Injectable 25 Gram(s) IV Push once  enoxaparin Injectable 40 milliGRAM(s) SubCutaneous every 12 hours  fenofibrate Tablet 48 milliGRAM(s) Oral daily  glucagon  Injectable 1 milliGRAM(s) IntraMuscular once  insulin lispro (ADMELOG) corrective regimen sliding scale   SubCutaneous three times a day before meals  insulin lispro (ADMELOG) corrective regimen sliding scale   SubCutaneous at bedtime  lisinopril 5 milliGRAM(s) Oral daily  montelukast 10 milliGRAM(s) Oral daily  sertraline 100 milliGRAM(s) Oral daily    MEDICATIONS  (PRN):  acetaminophen     Tablet .. 650 milliGRAM(s) Oral every 6 hours PRN Temp greater or equal to 38C (100.4F), Mild Pain (1 - 3)  albuterol    90 MICROgram(s) HFA Inhaler 2 Puff(s) Inhalation every 6 hours PRN Shortness of Breath and/or Wheezing  dextrose Oral Gel 15 Gram(s) Oral once PRN Blood Glucose LESS THAN 70 milliGRAM(s)/deciliter      Allergies    No Known Allergies    Intolerances        LABS:                        13.6   9.80  )-----------( 277      ( 07 May 2025 06:35 )             43.3     05-07    139  |  100  |  20  ----------------------------<  99  4.5   |  26  |  0.81    Ca    9.7      07 May 2025 06:35  Phos  4.0     05-07  Mg     2.20     05-07        Urinalysis Basic - ( 07 May 2025 06:35 )    Color: x / Appearance: x / SG: x / pH: x  Gluc: 99 mg/dL / Ketone: x  / Bili: x / Urobili: x   Blood: x / Protein: x / Nitrite: x   Leuk Esterase: x / RBC: x / WBC x   Sq Epi: x / Non Sq Epi: x / Bacteria: x        RADIOLOGY & ADDITIONAL TESTS:  Reviewed      ******************  Authored By: Kendrick Arzate MD PGY1  Internal Medicine  MS Teams Preferred  ******************

## 2025-05-08 NOTE — DISCHARGE NOTE NURSING/CASE MANAGEMENT/SOCIAL WORK - NURSING SECTION COMPLETE
Patient/Caregiver provided printed discharge information. Topical Retinoid counseling:  Patient advised to apply a pea-sized amount only at bedtime and wait 30 minutes after washing their face before applying.  If too drying, patient may add a non-comedogenic moisturizer. The patient verbalized understanding of the proper use and possible adverse effects of retinoids.  All of the patient's questions and concerns were addressed.

## 2025-05-08 NOTE — DISCHARGE NOTE NURSING/CASE MANAGEMENT/SOCIAL WORK - NSDCVIVACCINE_GEN_ALL_CORE_FT
Tdap; 20-May-2015 00:52; Juancho Jackson); [employer]; [id number]; IntraMuscular; Deltoid Right.; 0.5 milliLiter(s); VIS (VIS Published: 09-May-2013, VIS Presented: 20-May-2015);

## 2025-05-12 ENCOUNTER — EMERGENCY (EMERGENCY)
Facility: HOSPITAL | Age: 45
LOS: 1 days | End: 2025-05-12
Attending: EMERGENCY MEDICINE | Admitting: EMERGENCY MEDICINE
Payer: MEDICARE

## 2025-05-12 VITALS
HEIGHT: 69 IN | OXYGEN SATURATION: 95 % | DIASTOLIC BLOOD PRESSURE: 79 MMHG | TEMPERATURE: 98 F | RESPIRATION RATE: 18 BRPM | SYSTOLIC BLOOD PRESSURE: 125 MMHG | WEIGHT: 315 LBS | HEART RATE: 103 BPM

## 2025-05-12 LAB
ALBUMIN SERPL ELPH-MCNC: 4.1 G/DL — SIGNIFICANT CHANGE UP (ref 3.3–5)
ALP SERPL-CCNC: 70 U/L — SIGNIFICANT CHANGE UP (ref 40–120)
ALT FLD-CCNC: 26 U/L — SIGNIFICANT CHANGE UP (ref 4–41)
ANION GAP SERPL CALC-SCNC: 12 MMOL/L — SIGNIFICANT CHANGE UP (ref 7–14)
AST SERPL-CCNC: 18 U/L — SIGNIFICANT CHANGE UP (ref 4–40)
BASOPHILS # BLD AUTO: 0.09 K/UL — SIGNIFICANT CHANGE UP (ref 0–0.2)
BASOPHILS NFR BLD AUTO: 0.8 % — SIGNIFICANT CHANGE UP (ref 0–2)
BILIRUB SERPL-MCNC: 0.2 MG/DL — SIGNIFICANT CHANGE UP (ref 0.2–1.2)
BUN SERPL-MCNC: 21 MG/DL — SIGNIFICANT CHANGE UP (ref 7–23)
CALCIUM SERPL-MCNC: 9.7 MG/DL — SIGNIFICANT CHANGE UP (ref 8.4–10.5)
CHLORIDE SERPL-SCNC: 101 MMOL/L — SIGNIFICANT CHANGE UP (ref 98–107)
CO2 SERPL-SCNC: 26 MMOL/L — SIGNIFICANT CHANGE UP (ref 22–31)
CREAT SERPL-MCNC: 0.88 MG/DL — SIGNIFICANT CHANGE UP (ref 0.5–1.3)
EGFR: 108 ML/MIN/1.73M2 — SIGNIFICANT CHANGE UP
EGFR: 108 ML/MIN/1.73M2 — SIGNIFICANT CHANGE UP
EOSINOPHIL # BLD AUTO: 0.19 K/UL — SIGNIFICANT CHANGE UP (ref 0–0.5)
EOSINOPHIL NFR BLD AUTO: 1.6 % — SIGNIFICANT CHANGE UP (ref 0–6)
GLUCOSE SERPL-MCNC: 105 MG/DL — HIGH (ref 70–99)
HCT VFR BLD CALC: 41.8 % — SIGNIFICANT CHANGE UP (ref 39–50)
HGB BLD-MCNC: 13.2 G/DL — SIGNIFICANT CHANGE UP (ref 13–17)
IANC: 7.68 K/UL — HIGH (ref 1.8–7.4)
IMM GRANULOCYTES NFR BLD AUTO: 0.4 % — SIGNIFICANT CHANGE UP (ref 0–0.9)
LYMPHOCYTES # BLD AUTO: 26.6 % — SIGNIFICANT CHANGE UP (ref 13–44)
LYMPHOCYTES # BLD AUTO: 3.15 K/UL — SIGNIFICANT CHANGE UP (ref 1–3.3)
MCHC RBC-ENTMCNC: 28.8 PG — SIGNIFICANT CHANGE UP (ref 27–34)
MCHC RBC-ENTMCNC: 31.6 G/DL — LOW (ref 32–36)
MCV RBC AUTO: 91.3 FL — SIGNIFICANT CHANGE UP (ref 80–100)
MONOCYTES # BLD AUTO: 0.67 K/UL — SIGNIFICANT CHANGE UP (ref 0–0.9)
MONOCYTES NFR BLD AUTO: 5.7 % — SIGNIFICANT CHANGE UP (ref 2–14)
NEUTROPHILS # BLD AUTO: 7.68 K/UL — HIGH (ref 1.8–7.4)
NEUTROPHILS NFR BLD AUTO: 64.9 % — SIGNIFICANT CHANGE UP (ref 43–77)
NRBC # BLD AUTO: 0 K/UL — SIGNIFICANT CHANGE UP (ref 0–0)
NRBC # FLD: 0 K/UL — SIGNIFICANT CHANGE UP (ref 0–0)
NRBC BLD AUTO-RTO: 0 /100 WBCS — SIGNIFICANT CHANGE UP (ref 0–0)
PLATELET # BLD AUTO: 253 K/UL — SIGNIFICANT CHANGE UP (ref 150–400)
POTASSIUM SERPL-MCNC: 4.1 MMOL/L — SIGNIFICANT CHANGE UP (ref 3.5–5.3)
POTASSIUM SERPL-SCNC: 4.1 MMOL/L — SIGNIFICANT CHANGE UP (ref 3.5–5.3)
PROT SERPL-MCNC: 7.5 G/DL — SIGNIFICANT CHANGE UP (ref 6–8.3)
RBC # BLD: 4.58 M/UL — SIGNIFICANT CHANGE UP (ref 4.2–5.8)
RBC # FLD: 14.3 % — SIGNIFICANT CHANGE UP (ref 10.3–14.5)
SODIUM SERPL-SCNC: 139 MMOL/L — SIGNIFICANT CHANGE UP (ref 135–145)
WBC # BLD: 11.83 K/UL — HIGH (ref 3.8–10.5)
WBC # FLD AUTO: 11.83 K/UL — HIGH (ref 3.8–10.5)

## 2025-05-12 PROCEDURE — 99285 EMERGENCY DEPT VISIT HI MDM: CPT

## 2025-05-12 RX ORDER — CLINDAMYCIN PHOSPHATE 150 MG/ML
900 VIAL (ML) INJECTION ONCE
Refills: 0 | Status: COMPLETED | OUTPATIENT
Start: 2025-05-12 | End: 2025-05-12

## 2025-05-12 RX ADMIN — Medication 100 MILLIGRAM(S): at 20:33

## 2025-05-12 NOTE — ED ADULT TRIAGE NOTE - CHIEF COMPLAINT QUOTE
pt coming from [place] [id number] for follow up wound care. pt seen at [facility] ED on thursday for evaluation of right neck abscess; pt completed PO antibiotics. pt unable to received follow up wound care at [facility]. phx schizophrenia, asthma, sleep apnea, DM.

## 2025-05-12 NOTE — ED ADULT NURSE NOTE - CHIEF COMPLAINT QUOTE
pt coming from [place] [id number] for follow up wound care. pt seen at [facility] ED on thursday for evaluation of right neck abscess; pt completed PO antibiotics. pt unable to received follow up wound care at [facility]. phx schizophrenia, asthma, sleep apnea, DM.

## 2025-05-12 NOTE — ED PROVIDER NOTE - PHYSICAL EXAMINATION
Right posterior neck 5 x 3 cm area of erythema, healed horizontal wound, scabbed, NT with no fluctuance or discharge.  Patient with several firm nodular areas below area of incision, nonfluctuant.

## 2025-05-12 NOTE — ED ADULT NURSE NOTE - OBJECTIVE STATEMENT
Pt arrives from [facility] for follow up on posterior neck abscess. Pt reports completing course of antibiotics. Pmh - schizophrenia. On assessment pt a&ox3. Respirations even and unlabored. No signs of distress noted. Redness to posterior right side f\of neck noted. Pt denies pain or fevers. 20g IV placed to right AC. Labs collected and sent. Will continue to monitor.

## 2025-05-12 NOTE — ED PROVIDER NOTE - PROGRESS NOTE DETAILS
Arianna: Patient endorsed to me from Dr. Prieto.  Pt noted to have < from: CT Neck Soft Tissue w/ IV Cont (05.13.25 @ 00:48) > Overall improved edema/inflammation in the posterior right neck with associated skin thickening.  No drainable collection at this time.< end of copied text >.    Started on clindamycin IV.  Will start clindamycin 450mg every 8 hours for 10 days.

## 2025-05-12 NOTE — ED PROVIDER NOTE - NSFOLLOWUPINSTRUCTIONS_ED_ALL_ED_FT
Cellulitis    A ct was performed with no signs of abscess or phlegmon.     Cellulitis is a skin infection caused by bacteria. This condition occurs most often in the arms and lower legs but can occur anywhere over the body. Symptoms include redness, swelling, warm skin, tenderness, and chills/fever. If you were prescribed an antibiotic medicine, take it as told by your health care provider. Do not stop taking the antibiotic even if you start to feel better.    Take clindamycin 450mg every 8 hours for 10 days.     SEEK IMMEDIATE MEDICAL CARE IF YOU HAVE ANY OF THE FOLLOWING SYMPTOMS: worsening fever, red streaks coming from affected area, vomiting or diarrhea, or dizziness/lightheadedness.

## 2025-05-12 NOTE — ED ADULT NURSE NOTE - DRUG PRE-SCREENING (DAST -1)
Constitutional: See HPI.  Eyes: No visual changes, eye pain or discharge. No Photophobia  ENMT: No hearing changes, pain, discharge or infections. No neck pain or stiffness. No limited ROM  Cardiac: No SOB or edema. No chest pain with exertion.  Respiratory: No cough or respiratory distress.   GI: No nausea, vomiting, diarrhea or abdominal pain.  : No dysuria, frequency or burning. No Discharge  MS: No myalgia, muscle weakness, joint pain or back pain.  Neuro: see hpi   Skin: No skin rash.  Except as documented in the HPI, all other systems are negative. Statement Selected

## 2025-05-12 NOTE — ED PROVIDER NOTE - PATIENT PORTAL LINK FT
You can access the FollowMyHealth Patient Portal offered by [facility] by registering at the following website: [email or web address]. By joining [employer]’s FollowMyHealth portal, you will also be able to view your health information using other applications (apps) compatible with our system.

## 2025-05-12 NOTE — ED PROVIDER NOTE - OBJECTIVE STATEMENT
45M complaining of neck redness and swelling.  Patient has history of recently treated cellulitis and abscess to right posterior neck.  Patient initially presented at the end of April and was admitted for 1 week resolving IV antibiotics, wound care status post I&D for purulent drainage, irrigation and twice per day dressing changes.  Wound was improving, patient was changed from Vanco to cefazolin when cultures grew MSSA, and was DC'd on oral antibiotics to finish on 5/9.  Patient had been arranged for outpatient wound care 3 times weekly.  Patient now states has finished antibiotics but has increased redness and swelling to area.  Denies new discharge or crusting.  States has no pain.  Denies fevers or chills.  Bandage removed from wound and patient states bandage has not been changed since he left the hospital, unclear if wound care or home nursing has been occurring.  Previous chart reviewed, patient was followed by ID and ENT.

## 2025-05-12 NOTE — ED ADULT NURSE NOTE - NSSUHOSCREENINGYN_ED_ALL_ED
Overdue reminder letter mailed out to patient.      Lab:   [address], EIA
Yes - the patient is able to be screened

## 2025-05-12 NOTE — ED PROVIDER NOTE - CLINICAL SUMMARY MEDICAL DECISION MAKING FREE TEXT BOX
45 mm with complaint of increased redness and swelling in the area of recent treatment of cellulitis and abscess.  Patient finished oral antibiotics 3 days ago.  Denies fever, no complaints of pain to area, no discharge.  Patient noticed increased redness so came for eval.  No obvious area of fluctuance or pustular discharge, patient does have erythema with swelling and by history had deep space infection 1 to 2 weeks ago.  Possible LNs, possible deep space infection without obvious skin drainage.  Will do labs, IV antibiotic dose, CT imaging.  If no deep space infection and only superficial, not an abscess and lymph nodes or reactive lymph nodes we will plan DC home with clinda.  For any deep space concern will consult ENT.

## 2025-05-13 VITALS
SYSTOLIC BLOOD PRESSURE: 112 MMHG | TEMPERATURE: 98 F | DIASTOLIC BLOOD PRESSURE: 80 MMHG | OXYGEN SATURATION: 93 % | RESPIRATION RATE: 18 BRPM | HEART RATE: 72 BPM

## 2025-05-13 PROCEDURE — 70491 CT SOFT TISSUE NECK W/DYE: CPT | Mod: 26

## 2025-05-13 RX ORDER — CLINDAMYCIN PHOSPHATE 150 MG/ML
3 VIAL (ML) INJECTION
Qty: 90 | Refills: 0
Start: 2025-05-13 | End: 2025-05-22

## 2025-05-13 NOTE — PROVIDER CONTACT NOTE (OTHER) - ASSESSMENT
Called residence; spoke with staff member Felix, who confirms that pt is a resident and can return.  States he should travel back via ambulance to ensure he returns safely.  Discussed with provider, who is aware and in agreement with the plan.  Verbal huddle complete.

## 2025-06-18 NOTE — ED PROVIDER NOTE - EKG ADDITIONAL INFORMATION FREE TEXT
Brief Postoperative Note      Patient: [employer]  YOB: 1960  MRN: [id number]    Date of Procedure: 6/18/2025    Pre-Op Diagnosis Codes:      * Hernia, umbilical [K42.9]    Post-Op Diagnosis: Same       Procedure(s):  OPEN UMBILICAL HERNIA REPAIR    Surgeon(s):  Dimas Leone MD    Assistant:  Surgical Assistant: Domonique Ness    Anesthesia: General    Estimated Blood Loss (mL): Minimal    Complications: None    Specimens:   * No specimens in log *    Implants:  * No implants in log *      Drains: * No LDAs found *    Findings:  Infection Present At Time Of Surgery (PATOS) (choose all levels that have infection present):  No infection present  Other Findings: umbilical hernia repaired    Electronically signed by Dimas Leone MD on 6/18/2025 at 8:08 AM  
-

## 2025-07-29 NOTE — ED PROVIDER NOTE - NSFOLLOWUPINSTRUCTIONS_ED_ALL_ED_FT
No
[facility] - Ophthalmology  Ophthalmology  [address], [address]  [place] [address]  Phone: [phone]  Fax:   Follow Up Time:     [facility]  [address] [facility] [address] [place] [address]  [phone]    Conjunctivitis    WHAT YOU NEED TO KNOW:  Conjunctivitis, or pink eye, is inflammation of your conjunctiva. The conjunctiva is a thin tissue that covers the front of your eye and the back of your eyelids. The conjunctiva helps protect your eye and keep it moist. Conjunctivitis may be caused by bacteria, allergies, or a virus. If your conjunctivitis is caused by bacteria, it may get better on its own in about 7 days. Viral conjunctivitis can last up to 3 weeks.     DISCHARGE INSTRUCTIONS:    Return to the emergency department if:   •You have worsening eye pain.   •The swelling in your eye gets worse, even after treatment.   •Your vision suddenly becomes worse or you cannot see at all.      Contact your healthcare provider if:   •You develop a fever and ear pain.  •You have tiny bumps or spots of blood on your eye.  •You have questions or concerns about your condition or care.      Manage your symptoms:   •Apply a cool compress. Wet a washcloth with cold water and place it on your eye. This will help decrease itching and irritation.  •Do not wear contact lenses. They can irritate your eye. Throw away the pair you are using and ask when you can wear them again. Use a new pair of lenses when your healthcare provider says it is okay.   •Avoid irritants. Stay away from smoke filled areas. Shield your eyes from wind and sun.   •Flush your eye. You may need to flush your eye with saline to help decrease your symptoms. Ask for more information on how to flush your eye.       Medicines: Treatment depends on what is causing your conjunctivitis. You maybe given any of the following:  •Allergy medicine helps decrease itchy, red, swollen eyes caused by allergies. It may be given as a pill, eye drops, or nasal spray.  •Antibiotics may be needed if your conjunctivitis is caused by bacteria. This medicine may be given as a pill, eye drops, or eye ointment.  •Take your medicine as directed. Contact your healthcare provider if you think your medicine is not helping or if you have side effects. Tell him or her if you are allergic to any medicine. Keep a list of the medicines, vitamins, and herbs you take. Include the amounts, and when and why you take them. Bring the list or the pill bottles to follow-up visits. Carry your medicine list with you in case of an emergency.      Prevent the spread of conjunctivitis:   •Wash your hands with soap and water often. Wash your hands before and after you touch your eyes. Also wash your hands before you prepare or eat food and after you use the bathroom or change a diaper.  •Avoid allergens. Try to avoid the things that cause your allergies, such as pets, dust, or grass.   •Avoid contact with others. Do not share towels or washcloths. Try to stay away from others as much as possible. Ask when you can return to work or school.   •Throw away eye makeup. The bacteria that caused your conjunctivitis can stay in eye makeup. Throw away mascara and other eye makeup.    Conjuntivitis    LO QUE NECESITA SABER:  La conjuntivitis es la inflamación de la conjuntiva. La conjuntiva es el tejido perez que cubre la parte frontal de gordon ramesh y la parte posterior de elliot párpados. La conjuntiva ayuda a proteger gordon ramesh y a mantenerlo húmedo. La conjuntivitis podría ser a causa de ramona bacteria, alergias o de un virus. Si gordon conjuntivitis es a causa de ramona bacteria, podría mejorar por sí rubi en aproximadamente 7 kristina. La conjuntivitis viral puede durar hasta 3 semanas.    INSTRUCCIONES SOBRE EL DEJAH HOSPITALARIA:    Regrese a la darek de emergencias si:  •Gordon dolor de ramesh empeora.  •La inflamación en elliot ojos empeora, aún después del tratamiento.  •Gordon visión empeora repentinamente o usted no puede bhupinder en lo absoluto.      Comuníquese con gordon médico si:  •Usted presenta fiebre o dolor de oído.  •Usted tiene bultos o manchas de karena pequeñas en gordon ramesh.  •Usted tiene preguntas o inquietudes acerca de gordon condición o cuidado.  El manejo de elliot síntomas:  •Aplique ramona compresa fría.Moje ramona toalla con agua fría y colóquela sobre gordon ramesh. [place] ayuda a disminuir la comezón e irritación.  •No use lentes de contacto.Ellos podrían irritar elliot ojos. Deseche el par que está usando y pregunte cuándo puede usarlos otra vez. Use un par de lentes nuevos cuando gordon médico lo autorice.  •Evite los irritantes.Aléjese de las áreas llenas de humo. Proteja elliot ojos del aire y del sol.  •Enjuague gordon ramesh.Es posible que necesite enjuagar gordon ramesh con solución salina para ayudar a disminuir elliot síntomas. Pida más información sobre cómo enjuagar gordon ramesh.  Medicamentos:El tratamiento depende de lo que está provocando la conjuntivitis. A usted  podrían  darle alguno de lo siguientes:  •Medicamentos para la alergiaayuda a disminuir la comezón, el enrojecimiento y la inflamación de elliot ojos a causa de las alergias. Se lo podrían roddy en forma de píldora, gotas para los ojos o atomizador nasal.  •Los antibióticospodrían ser necesarios si gordon conjuntivitis es a causa de ramona bacteria. Jennifer medicamento podría ser en forma de píldora, gotas o pomada para los ojos.      •[place]immerman elliot medicamentos linn se le haya indicado.Consulte con gordon médico si usted naila que gordon medicamento no le está ayudando o si presenta efectos secundarios. Infórmele si es alérgico a cualquier medicamento. Mantenga ramona lista actualizada de los medicamentos, las vitaminas y los productos herbales que pippa. Incluya los siguientes datos de los medicamentos: cantidad, frecuencia y motivo de administración. Traiga con usted la lista o los envases de las píldoras a elliot citas de seguimiento. Lleve la lista de los medicamentos con usted en cristal de ramona emergencia.      Evite la propagación de la conjuntivitis:  •Lávese elliot cheikh con jabón y agua con frecuencia.Lávese las cheikh antes y después de tocarse los ojos. También lávese elliot cheikh antes de preparar o comer alimentos y después de usar el baño o cambiar un pañal.      •Evite los alérgenos.Trate de evitar las cosas que le provoquen alergias, linn las mascotas, polvo o pasto.      •Evite el contacto con otras personas.No comparta las toallas o paños. Trate de permanecer lejos de otras personas tanto linn sea posible. Pregunte cuándo puede regresar al trabajo o a clase.      •Deseche el maquillaje de ojos.La bacteria que provoca la conjuntivitis puede estar en el maquillaje de ojos. Deseche el rimel y otros maquillajes de ojos.

## 2025-09-15 ENCOUNTER — EMERGENCY (EMERGENCY)
Facility: HOSPITAL | Age: 45
LOS: 1 days | End: 2025-09-15
Attending: EMERGENCY MEDICINE | Admitting: EMERGENCY MEDICINE
Payer: MEDICARE

## 2025-09-15 VITALS
RESPIRATION RATE: 20 BRPM | TEMPERATURE: 98 F | DIASTOLIC BLOOD PRESSURE: 77 MMHG | OXYGEN SATURATION: 92 % | SYSTOLIC BLOOD PRESSURE: 110 MMHG | HEART RATE: 76 BPM

## 2025-09-15 PROCEDURE — 93010 ELECTROCARDIOGRAM REPORT: CPT

## 2025-09-15 PROCEDURE — 99283 EMERGENCY DEPT VISIT LOW MDM: CPT
